# Patient Record
Sex: MALE | Race: WHITE | Employment: FULL TIME | URBAN - METROPOLITAN AREA
[De-identification: names, ages, dates, MRNs, and addresses within clinical notes are randomized per-mention and may not be internally consistent; named-entity substitution may affect disease eponyms.]

---

## 2018-01-01 ENCOUNTER — HOSPITAL ENCOUNTER (EMERGENCY)
Age: 58
Discharge: HOME | End: 2018-12-31
Attending: EMERGENCY MEDICINE
Payer: COMMERCIAL

## 2018-01-01 ENCOUNTER — APPOINTMENT (OUTPATIENT)
Dept: GENERAL RADIOLOGY | Age: 58
End: 2018-01-01
Attending: EMERGENCY MEDICINE
Payer: COMMERCIAL

## 2018-01-01 ENCOUNTER — APPOINTMENT (OUTPATIENT)
Dept: CT IMAGING | Age: 58
End: 2018-01-01
Attending: EMERGENCY MEDICINE
Payer: COMMERCIAL

## 2018-01-01 VITALS
OXYGEN SATURATION: 99 % | HEIGHT: 70 IN | RESPIRATION RATE: 12 BRPM | BODY MASS INDEX: 24.21 KG/M2 | TEMPERATURE: 98.3 F | WEIGHT: 169.09 LBS | DIASTOLIC BLOOD PRESSURE: 87 MMHG | SYSTOLIC BLOOD PRESSURE: 138 MMHG | HEART RATE: 93 BPM

## 2018-01-01 DIAGNOSIS — R91.8 MASS OF UPPER LOBE OF RIGHT LUNG: Primary | ICD-10-CM

## 2018-01-01 LAB
ALBUMIN SERPL-MCNC: 4.2 G/DL (ref 3.5–5)
ALBUMIN/GLOB SERPL: 0.9 {RATIO} (ref 1.1–2.2)
ALP SERPL-CCNC: 42 U/L (ref 45–117)
ALT SERPL-CCNC: 54 U/L (ref 12–78)
ANION GAP SERPL CALC-SCNC: 7 MMOL/L (ref 5–15)
APPEARANCE UR: CLEAR
AST SERPL-CCNC: 41 U/L (ref 15–37)
ATRIAL RATE: 97 BPM
BACTERIA URNS QL MICRO: NEGATIVE /HPF
BASOPHILS # BLD: 0 K/UL (ref 0–0.1)
BASOPHILS NFR BLD: 0 % (ref 0–1)
BILIRUB SERPL-MCNC: 0.5 MG/DL (ref 0.2–1)
BILIRUB UR QL: NEGATIVE
BUN SERPL-MCNC: 18 MG/DL (ref 6–20)
BUN/CREAT SERPL: 12 (ref 12–20)
CALCIUM SERPL-MCNC: 9.7 MG/DL (ref 8.5–10.1)
CALCULATED R AXIS, ECG10: 59 DEGREES
CALCULATED T AXIS, ECG11: 67 DEGREES
CHLORIDE SERPL-SCNC: 102 MMOL/L (ref 97–108)
CO2 SERPL-SCNC: 25 MMOL/L (ref 21–32)
COLOR UR: NORMAL
CREAT SERPL-MCNC: 1.48 MG/DL (ref 0.7–1.3)
DIAGNOSIS, 93000: NORMAL
DIFFERENTIAL METHOD BLD: NORMAL
EOSINOPHIL # BLD: 0 K/UL (ref 0–0.4)
EOSINOPHIL NFR BLD: 0 % (ref 0–7)
EPITH CASTS URNS QL MICRO: NORMAL /LPF
ERYTHROCYTE [DISTWIDTH] IN BLOOD BY AUTOMATED COUNT: 14.2 % (ref 11.5–14.5)
GLOBULIN SER CALC-MCNC: 4.6 G/DL (ref 2–4)
GLUCOSE BLD STRIP.AUTO-MCNC: 190 MG/DL (ref 65–100)
GLUCOSE BLD STRIP.AUTO-MCNC: 55 MG/DL (ref 65–100)
GLUCOSE BLD STRIP.AUTO-MCNC: 68 MG/DL (ref 65–100)
GLUCOSE SERPL-MCNC: 79 MG/DL (ref 65–100)
GLUCOSE UR STRIP.AUTO-MCNC: NEGATIVE MG/DL
HCT VFR BLD AUTO: 40.4 % (ref 36.6–50.3)
HGB BLD-MCNC: 12.9 G/DL (ref 12.1–17)
HGB UR QL STRIP: NEGATIVE
HYALINE CASTS URNS QL MICRO: NORMAL /LPF (ref 0–5)
IMM GRANULOCYTES # BLD: 0 K/UL (ref 0–0.04)
IMM GRANULOCYTES NFR BLD AUTO: 0 % (ref 0–0.5)
KETONES UR QL STRIP.AUTO: NEGATIVE MG/DL
LEUKOCYTE ESTERASE UR QL STRIP.AUTO: NEGATIVE
LYMPHOCYTES # BLD: 1.1 K/UL (ref 0.8–3.5)
LYMPHOCYTES NFR BLD: 22 % (ref 12–49)
MAGNESIUM SERPL-MCNC: 2.2 MG/DL (ref 1.6–2.4)
MCH RBC QN AUTO: 27.7 PG (ref 26–34)
MCHC RBC AUTO-ENTMCNC: 31.9 G/DL (ref 30–36.5)
MCV RBC AUTO: 86.9 FL (ref 80–99)
MONOCYTES # BLD: 0.6 K/UL (ref 0–1)
MONOCYTES NFR BLD: 13 % (ref 5–13)
NEUTS SEG # BLD: 3.2 K/UL (ref 1.8–8)
NEUTS SEG NFR BLD: 64 % (ref 32–75)
NITRITE UR QL STRIP.AUTO: NEGATIVE
NRBC # BLD: 0 K/UL (ref 0–0.01)
NRBC BLD-RTO: 0 PER 100 WBC
P-R INTERVAL, ECG05: 154 MS
PH UR STRIP: 6.5 [PH] (ref 5–8)
PLATELET # BLD AUTO: 323 K/UL (ref 150–400)
PMV BLD AUTO: 9 FL (ref 8.9–12.9)
POTASSIUM SERPL-SCNC: 3.9 MMOL/L (ref 3.5–5.1)
PROT SERPL-MCNC: 8.8 G/DL (ref 6.4–8.2)
PROT UR STRIP-MCNC: NEGATIVE MG/DL
Q-T INTERVAL, ECG07: 348 MS
QRS DURATION, ECG06: 78 MS
QTC CALCULATION (BEZET), ECG08: 441 MS
RBC # BLD AUTO: 4.65 M/UL (ref 4.1–5.7)
RBC #/AREA URNS HPF: NORMAL /HPF (ref 0–5)
SERVICE CMNT-IMP: ABNORMAL
SERVICE CMNT-IMP: ABNORMAL
SERVICE CMNT-IMP: NORMAL
SODIUM SERPL-SCNC: 134 MMOL/L (ref 136–145)
SP GR UR REFRACTOMETRY: 1.01 (ref 1–1.03)
TROPONIN I SERPL-MCNC: <0.05 NG/ML
UA: UC IF INDICATED,UAUC: NORMAL
UROBILINOGEN UR QL STRIP.AUTO: 1 EU/DL (ref 0.2–1)
VENTRICULAR RATE, ECG03: 97 BPM
WBC # BLD AUTO: 4.9 K/UL (ref 4.1–11.1)
WBC URNS QL MICRO: NORMAL /HPF (ref 0–4)

## 2018-01-01 PROCEDURE — 85025 COMPLETE CBC W/AUTO DIFF WBC: CPT

## 2018-01-01 PROCEDURE — 81001 URINALYSIS AUTO W/SCOPE: CPT

## 2018-01-01 PROCEDURE — 71275 CT ANGIOGRAPHY CHEST: CPT

## 2018-01-01 PROCEDURE — 36415 COLL VENOUS BLD VENIPUNCTURE: CPT

## 2018-01-01 PROCEDURE — 82962 GLUCOSE BLOOD TEST: CPT

## 2018-01-01 PROCEDURE — 84484 ASSAY OF TROPONIN QUANT: CPT

## 2018-01-01 PROCEDURE — 99284 EMERGENCY DEPT VISIT MOD MDM: CPT

## 2018-01-01 PROCEDURE — 83735 ASSAY OF MAGNESIUM: CPT

## 2018-01-01 PROCEDURE — 74011636320 HC RX REV CODE- 636/320: Performed by: EMERGENCY MEDICINE

## 2018-01-01 PROCEDURE — 74011250636 HC RX REV CODE- 250/636: Performed by: EMERGENCY MEDICINE

## 2018-01-01 PROCEDURE — 93005 ELECTROCARDIOGRAM TRACING: CPT

## 2018-01-01 PROCEDURE — 80053 COMPREHEN METABOLIC PANEL: CPT

## 2018-01-01 PROCEDURE — 71046 X-RAY EXAM CHEST 2 VIEWS: CPT

## 2018-01-01 RX ORDER — HYDROCODONE BITARTRATE AND ACETAMINOPHEN 5; 325 MG/1; MG/1
1 TABLET ORAL
Qty: 20 TAB | Refills: 0 | Status: SHIPPED | OUTPATIENT
Start: 2018-01-01 | End: 2019-01-01 | Stop reason: ALTCHOICE

## 2018-01-01 RX ORDER — SODIUM CHLORIDE 9 MG/ML
50 INJECTION, SOLUTION INTRAVENOUS
Status: COMPLETED | OUTPATIENT
Start: 2018-01-01 | End: 2018-01-01

## 2018-01-01 RX ORDER — SODIUM CHLORIDE 0.9 % (FLUSH) 0.9 %
10 SYRINGE (ML) INJECTION
Status: COMPLETED | OUTPATIENT
Start: 2018-01-01 | End: 2018-01-01

## 2018-01-01 RX ADMIN — IOPAMIDOL 80 ML: 755 INJECTION, SOLUTION INTRAVENOUS at 16:58

## 2018-01-01 RX ADMIN — SODIUM CHLORIDE 50 ML/HR: 900 INJECTION, SOLUTION INTRAVENOUS at 16:58

## 2018-01-01 RX ADMIN — Medication 10 ML: at 16:58

## 2018-12-31 NOTE — ED NOTES
Bedside and Verbal shift change report given to Constantino Hall RN (oncoming nurse) by  Devang gomez). Report included the following information SBAR, Kardex, ED Summary, [facility] and Recent Results.

## 2018-12-31 NOTE — ED NOTES
Pt discharged by Dr. Dickson Fuentes. Pt provided with discharge instructions Rx and instructions on follow up care. Pt out of ED in stable condition accompanied by wife.

## 2018-12-31 NOTE — ED PROVIDER NOTES
EMERGENCY DEPARTMENT HISTORY AND PHYSICAL EXAM 
 
 
Date: 12/31/2018 Patient Name: Madi Godoy History of Presenting Illness Patient presents today with fatigue and weakness at home with on and off low blood sugar in 80s at home. Patient interviewed, examined and orders placed. Blood sugar in triage was 55, given 8 oz of orange juice with  Plan for re-check. Chief Complaint Patient presents with  Lethargy  
  x a few weeks at home, pt is diabetic with BG of 55 in triage. History Provided By: Patient HPI: Madi Godoy, 62 y.o. male with PMHx significant for DM, low blood sugar, presents ambulatory to the ED with cc of a low blood sugar this morning. Pt reports associated symptoms of sweating, fatigue, and weight loss. He states his fasting sugar was 89 and had eaten a light breakfast before taking his Humalog. About 1 hour later, his blood sugar had dropped and the pt had some juice and candy, to which he had felt better. Additionally, the pt notes he has been eating less due to a chest pain after eating with a recent dx of hyperactive esophagus x 1.5 month ago. He additionally reports being nauseous. Pt further notes he had been SOB last night with a productive cough. He denies any recent changes to his medication. Pt denies any vomiting, diarrhea, or urinary sxs. There are no other complaints, changes, or physical findings at this time. PCP: Demetrio Beatty MD 
 
No current facility-administered medications on file prior to encounter. No current outpatient medications on file prior to encounter. Past History Past Medical History: 
History reviewed. No pertinent past medical history. Past Surgical History: 
History reviewed. No pertinent surgical history. Family History: 
History reviewed. No pertinent family history. Social History: 
Social History Tobacco Use  Smoking status: Not on file Substance Use Topics  Alcohol use: Not on file  Drug use: Not on file Allergies: 
No Known Allergies Review of Systems Review of Systems Constitutional: Positive for appetite change, diaphoresis, fatigue and unexpected weight change. Negative for chills and fever. HENT: Negative for congestion and rhinorrhea. Eyes: Negative for visual disturbance. Respiratory: Positive for cough and shortness of breath. Negative for wheezing. Cardiovascular: Positive for chest pain. Negative for palpitations. Gastrointestinal: Positive for nausea. Negative for abdominal distention, abdominal pain, constipation, diarrhea and vomiting. Endocrine: Negative. Genitourinary: Negative for difficulty urinating and dysuria. Musculoskeletal: Negative. Skin: Negative for rash. Neurological: Negative for dizziness, weakness and light-headedness. Psychiatric/Behavioral: Negative for suicidal ideas. Physical Exam  
Physical Exam  
Constitutional: He is oriented to person, place, and time. He appears well-developed and well-nourished. No distress. HENT:  
Head: Normocephalic and atraumatic. Mouth/Throat: Oropharynx is clear and moist.  
Eyes: Conjunctivae and EOM are normal.  
Neck: Neck supple. No JVD present. No tracheal deviation present. Cardiovascular: Normal rate, regular rhythm and intact distal pulses. Exam reveals no gallop and no friction rub. No murmur heard. Pulmonary/Chest: Effort normal and breath sounds normal. No stridor. No respiratory distress. He has no wheezes. Abdominal: Soft. Bowel sounds are normal. He exhibits no distension and no mass. There is no tenderness. There is no guarding. Musculoskeletal: Normal range of motion. He exhibits no edema or tenderness. No deformity Neurological: He is alert and oriented to person, place, and time. He has normal strength. No focal deficits Skin: Skin is warm, dry and intact. No rash noted. Psychiatric: He has a normal mood and affect.  His behavior is normal. Judgment and thought content normal.  
Nursing note and vitals reviewed. Diagnostic Study Results Labs - Recent Results (from the past 12 hour(s)) GLUCOSE, POC Collection Time: 12/31/18  1:00 PM  
Result Value Ref Range Glucose (POC) 55 (L) 65 - 100 mg/dL Performed by Genaro JENKINS   
EKG, 12 LEAD, INITIAL Collection Time: 12/31/18  1:06 PM  
Result Value Ref Range Ventricular Rate 97 BPM  
 Atrial Rate 97 BPM  
 P-R Interval 154 ms QRS Duration 78 ms Q-T Interval 348 ms QTC Calculation (Bezet) 441 ms Calculated R Axis 59 degrees Calculated T Axis 67 degrees Diagnosis ** Poor data quality, interpretation may be adversely affected Recommend repeat Normal sinus rhythm Nonspecific ST abnormality No previous ECGs available Confirmed by Vinicio Cain MD, [facility] ([id number]) on 12/31/2018 4:47:37 PM 
  
GLUCOSE, POC Collection Time: 12/31/18  1:12 PM  
Result Value Ref Range Glucose (POC) 68 65 - 100 mg/dL Performed by Judi Wynn (PCT) CBC WITH AUTOMATED DIFF Collection Time: 12/31/18  1:13 PM  
Result Value Ref Range WBC 4.9 4.1 - 11.1 K/uL  
 RBC 4.65 4.10 - 5.70 M/uL  
 HGB 12.9 12.1 - 17.0 g/dL HCT 40.4 36.6 - 50.3 % MCV 86.9 80.0 - 99.0 FL  
 MCH 27.7 26.0 - 34.0 PG  
 MCHC 31.9 30.0 - 36.5 g/dL  
 RDW 14.2 11.5 - 14.5 % PLATELET 277 334 - 340 K/uL MPV 9.0 8.9 - 12.9 FL  
 NRBC 0.0 0  WBC ABSOLUTE NRBC 0.00 0.00 - 0.01 K/uL NEUTROPHILS 64 32 - 75 % LYMPHOCYTES 22 12 - 49 % MONOCYTES 13 5 - 13 % EOSINOPHILS 0 0 - 7 % BASOPHILS 0 0 - 1 % IMMATURE GRANULOCYTES 0 0.0 - 0.5 % ABS. NEUTROPHILS 3.2 1.8 - 8.0 K/UL  
 ABS. LYMPHOCYTES 1.1 0.8 - 3.5 K/UL  
 ABS. MONOCYTES 0.6 0.0 - 1.0 K/UL  
 ABS. EOSINOPHILS 0.0 0.0 - 0.4 K/UL  
 ABS. BASOPHILS 0.0 0.0 - 0.1 K/UL  
 ABS. IMM. GRANS. 0.0 0.00 - 0.04 K/UL  
 DF AUTOMATED METABOLIC PANEL, COMPREHENSIVE  Collection Time: 12/31/18  1:13 PM  
 Result Value Ref Range Sodium 134 (L) 136 - 145 mmol/L Potassium 3.9 3.5 - 5.1 mmol/L Chloride 102 97 - 108 mmol/L  
 CO2 25 21 - 32 mmol/L Anion gap 7 5 - 15 mmol/L Glucose 79 65 - 100 mg/dL BUN 18 6 - 20 MG/DL Creatinine 1.48 (H) 0.70 - 1.30 MG/DL  
 BUN/Creatinine ratio 12 12 - 20 GFR est AA 59 (L) >60 ml/min/1.73m2 GFR est non-AA 49 (L) >60 ml/min/1.73m2 Calcium 9.7 8.5 - 10.1 MG/DL Bilirubin, total 0.5 0.2 - 1.0 MG/DL  
 ALT (SGPT) 54 12 - 78 U/L  
 AST (SGOT) 41 (H) 15 - 37 U/L Alk. phosphatase 42 (L) 45 - 117 U/L Protein, total 8.8 (H) 6.4 - 8.2 g/dL Albumin 4.2 3.5 - 5.0 g/dL Globulin 4.6 (H) 2.0 - 4.0 g/dL A-G Ratio 0.9 (L) 1.1 - 2.2 MAGNESIUM Collection Time: 12/31/18  1:13 PM  
Result Value Ref Range Magnesium 2.2 1.6 - 2.4 mg/dL TROPONIN I Collection Time: 12/31/18  1:13 PM  
Result Value Ref Range Troponin-I, Qt. <0.05 <0.05 ng/mL GLUCOSE, POC Collection Time: 12/31/18  3:27 PM  
Result Value Ref Range Glucose (POC) 190 (H) 65 - 100 mg/dL Performed by Chaya Medinaurbon URINALYSIS W/ REFLEX CULTURE Collection Time: 12/31/18  4:19 PM  
Result Value Ref Range Color YELLOW/STRAW Appearance CLEAR CLEAR Specific gravity 1.007 1.003 - 1.030    
 pH (UA) 6.5 5.0 - 8.0 Protein NEGATIVE  NEG mg/dL Glucose NEGATIVE  NEG mg/dL Ketone NEGATIVE  NEG mg/dL Bilirubin NEGATIVE  NEG Blood NEGATIVE  NEG Urobilinogen 1.0 0.2 - 1.0 EU/dL Nitrites NEGATIVE  NEG Leukocyte Esterase NEGATIVE  NEG    
 WBC 0-4 0 - 4 /hpf  
 RBC 0-5 0 - 5 /hpf Epithelial cells FEW FEW /lpf Bacteria NEGATIVE  NEG /hpf  
 UA:UC IF INDICATED CULTURE NOT INDICATED BY UA RESULT CNI Hyaline cast 0-2 0 - 5 /lpf Radiologic Studies -  
CT Results  (Last 48 hours) 12/31/18 1658  CTA Nilson. Kristy 105 Final result Impression:  Impression: 1. Large right upper lobe mass measuring 5.6 x 4.7 x 4.8 cm, which is in  
continuity with extensive conglomerate right paratracheal and right hilar  
adenopathy. Findings are consistent with primary lung malignancy with extensive  
metastatic adenopathy. The right upper lobe mass occludes the segmental and  
subsegmental bronchi of the posterior segment of the right upper lobe. 2. Metastatic subcarinal adenopathy. Enlarged left lower paratracheal  
adenopathy, which is also favored to be metastatic. 3. A 6 mm satellite nodule in the right upper lobe, consistent with metastasis. 4. Indeterminate hypodensities within hepatic segment IVb, which may represent  
metastatic disease. 5. No evidence of pulmonary embolism. 23X Narrative:  EXAM:  CTA CHEST W OR W WO CONT INDICATION: Chest pain, acute, pulmonary embolism (PE) suspected COMPARISON: Chest radiograph 12/31/2018. CONTRAST:  80 mL of Isovue-370.  
? Technique: Following the uneventful intravenous administration of contrast, thin  
axial images were obtained through the chest. Coronal and sagittal  
reconstructions were generated. MIP image reconstructions were also performed. CT dose reduction was achieved through use of a standardized protocol tailored  
for this examination and automatic exposure control for dose modulation. ? Findings:  
Vascular: No evidence of acute or chronic pulmonary embolism. The pulmonary arteries are  
normal in size. The thoracic aorta is normal in size. ? Chest:  
Lungs/Pleura: Large right upper lobe mass measuring 5.6 x 4.7 x 4.8 cm. Small  
satellite nodule in the right upper lobe measuring 6 mm (series 2 image 88). The  
mass is in continuity with the conglomerate right paratracheal adenopathy as  
described. There is occlusion of the posterior segmental and subsegmental  
bronchi of the right upper lobe. Axilla/Soft Tissue: No pathologic axillary adenopathy. Mediastinum: The heart is normal in size. No pericardial effusion. Conglomerate  
right paratracheal adenopathy measuring 6.8 x 3.9 x 7.2 cm. Right hilar  
adenopathy measuring 3.0 x 2.2 cm. Enlarged subcarinal adenopathy measuring 5.5  
x 2.4 cm. There are enlarged left lower paratracheal lymph nodes measuring 2.7 x  
1.1 cm. Upper Abdomen: Indeterminant hypodensities within the left hepatic lobe segment IVb measuring 3.5 x 1.6 cm in aggregate (series 2 image 20). Simple cyst in the  
upper pole of the left kidney measuring 2.5 cm, with a posterior peripheral  
calcification. Bones: No evidence of acute fracture, dislocation, or aggressive osseous  
abnormality. ?  
  
  
 
CXR Results  (Last 48 hours) 12/31/18 1439  XR CHEST PA LAT Final result Impression:  IMPRESSION:   
1. Round mass in the right upper thorax measuring 3.8 x 3.0 cm, which appears to  
lie in the posterior mediastinum on lateral view. Recommend chest CT with  
contrast for further evaluation. 23X Narrative:  EXAM:  XR CHEST PA LAT INDICATION:   lethargy, weakness, cp  
   
COMPARISON: None. FINDINGS: PA and lateral radiographs of the chest were obtained. Round mass in the right upper thorax measuring 3.8 x 3.0 cm, which appears to  
lie in the posterior mediastinum on lateral view. Otherwise no focal  
consolidation. No pleural effusion or pneumothorax. Heart, rio, mediastinum  
are within normal limits. No acute osseous abnormalities. Multilevel  
degenerative disc disease in the thoracic spine. Medical Decision Making I am the first provider for this patient. I reviewed the vital signs, available nursing notes, past medical history, past surgical history, family history and social history. Vital Signs-Reviewed the patient's vital signs. Patient Vitals for the past 12 hrs: 
 Temp Pulse Resp BP SpO2  
12/31/18 1815  93 12 138/87 99 % 12/31/18 1645  91 17  98 % 12/31/18 1257 98.3 °F (36.8 °C) (!) 101 12 142/79 100 % Pulse Oximetry Analysis - 100% on RA Cardiac Monitor:  
Rate: 94 bpm 
Rhythm: Normal Sinus Rhythm EKG interpretation: (Preliminary): 1306 Rhythm: normal sinus rhythm; and regular . Rate (approx.): 97; Axis: normal; QRS interval: normal ; ST/T wave: no ST changes. Written by Natalia Tena ED Scribe, as dictated by Laila Baird DO. Records Reviewed: Nursing Notes, Old Medical Records, Previous Radiology Studies and Previous Laboratory Studies Provider Notes (Medical Decision Making): Pt presenting with hypoglycemia now resolved after eating. Has been having sseveral month h/o weakness, sob, intermittent cp with swallowing, and weight loss. DDx includes electrolyte abnormality, dehydration, anemia, esophageal spasm, esophageal stricture, GERD, atypical CP, PNA. Will check labs, continue to monitor blood sugar. Low suspicion for ACS given chronicity of pain and episodic nature with swallowing. ED Course:  
Initial assessment performed. The patients presenting problems have been discussed, and they are in agreement with the care plan formulated and outlined with them. I have encouraged them to ask questions as they arise throughout their visit. PROGRESS NOTE: 
4:13 PM 
Pt has been re-evaluated. He was updated on his lab and imaging results. Critical Care Time:  
0 Disposition: 
DISCHARGE NOTE 
6:14 PM 
The patient has been re-evaluated and is ready for discharge. Reviewed available results with patient. Counseled patient on diagnosis and care plan. Patient has expressed understanding, and all questions have been answered. Patient agrees with plan and agrees to follow up as recommended, or return to the ED if their symptoms worsen. Discharge instructions have been provided and explained to the patient, along with reasons to return to the ED. PLAN: 
1. Discharge There are no discharge medications for this patient. 2.  
Follow-up Information Follow up With Specialties Details Why Contact Info Irma Hernandez MD Hematology and Oncology, Internal Medicine, Hematology, Oncology Schedule an appointment as soon as possible for a visit in 2 days  [address] [facility] 
[phone] Luanne Burch MD Internal Medicine, Pulmonary Disease Schedule an appointment as soon as possible for a visit in 2 days  [address] [facility] 
[phone] Tianna Santiago MD Family Practice Schedule an appointment as soon as possible for a visit in 2 days  [address] [place] [address] 
[phone] [facility] EMERGENCY DEPT Emergency Medicine  As needed, If symptoms worsen [address] [facility] 
[phone] Return to ED if worse Diagnosis Clinical Impression: 1. Mass of upper lobe of right lung Attestations: This note is prepared by Gadiel Covington, acting as Scribe for Francis Muñoz DO. Francis Muñoz DO: The scribe's documentation has been prepared under my direction and personally reviewed by me in its entirety. I confirm that the note above accurately reflects all work, treatment, procedures, and medical decision making performed by me. This note will not be viewable in [address]

## 2019-01-01 ENCOUNTER — TELEPHONE (OUTPATIENT)
Dept: PALLATIVE CARE | Age: 59
End: 2019-01-01

## 2019-01-01 ENCOUNTER — HOME CARE VISIT (OUTPATIENT)
Dept: SCHEDULING | Facility: HOME HEALTH | Age: 59
End: 2019-01-01
Payer: MEDICAID

## 2019-01-01 ENCOUNTER — OFFICE VISIT (OUTPATIENT)
Dept: ONCOLOGY | Age: 59
End: 2019-01-01

## 2019-01-01 ENCOUNTER — HOME CARE VISIT (OUTPATIENT)
Dept: HOSPICE | Facility: HOSPICE | Age: 59
End: 2019-01-01
Payer: MEDICAID

## 2019-01-01 ENCOUNTER — OFFICE VISIT (OUTPATIENT)
Dept: FAMILY MEDICINE CLINIC | Age: 59
End: 2019-01-01

## 2019-01-01 ENCOUNTER — TELEPHONE (OUTPATIENT)
Dept: ONCOLOGY | Age: 59
End: 2019-01-01

## 2019-01-01 ENCOUNTER — DOCUMENTATION ONLY (OUTPATIENT)
Dept: ONCOLOGY | Age: 59
End: 2019-01-01

## 2019-01-01 ENCOUNTER — TELEPHONE (OUTPATIENT)
Dept: SURGERY | Age: 59
End: 2019-01-01

## 2019-01-01 ENCOUNTER — OFFICE VISIT (OUTPATIENT)
Dept: PALLATIVE CARE | Age: 59
End: 2019-01-01

## 2019-01-01 ENCOUNTER — HOSPITAL ENCOUNTER (OUTPATIENT)
Dept: INFUSION THERAPY | Age: 59
Discharge: HOME | End: 2019-08-12
Payer: MEDICAID

## 2019-01-01 ENCOUNTER — HOSPITAL ENCOUNTER (EMERGENCY)
Age: 59
Discharge: ANOTHER HOSPITAL | End: 2019-09-19
Attending: EMERGENCY MEDICINE
Payer: MEDICAID

## 2019-01-01 ENCOUNTER — HOSPITAL ENCOUNTER (OUTPATIENT)
Dept: GENERAL RADIOLOGY | Age: 59
Discharge: HOME | End: 2019-01-14
Attending: RADIOLOGY
Payer: COMMERCIAL

## 2019-01-01 ENCOUNTER — TELEPHONE (OUTPATIENT)
Dept: CASE MANAGEMENT | Age: 59
End: 2019-01-01

## 2019-01-01 ENCOUNTER — HOSPITAL ENCOUNTER (OUTPATIENT)
Dept: CT IMAGING | Age: 59
Discharge: HOME | End: 2019-05-13
Attending: INTERNAL MEDICINE
Payer: MEDICAID

## 2019-01-01 ENCOUNTER — ANESTHESIA EVENT (OUTPATIENT)
Dept: SURGERY | Age: 59
End: 2019-01-01
Payer: COMMERCIAL

## 2019-01-01 ENCOUNTER — NURSE NAVIGATOR (OUTPATIENT)
Dept: PALLATIVE CARE | Age: 59
End: 2019-01-01

## 2019-01-01 ENCOUNTER — LAB ONLY (OUTPATIENT)
Dept: FAMILY MEDICINE CLINIC | Age: 59
End: 2019-01-01

## 2019-01-01 ENCOUNTER — APPOINTMENT (OUTPATIENT)
Dept: CT IMAGING | Age: 59
DRG: 041 | End: 2019-01-01
Attending: NEUROLOGICAL SURGERY
Payer: MEDICAID

## 2019-01-01 ENCOUNTER — APPOINTMENT (OUTPATIENT)
Dept: INFUSION THERAPY | Age: 59
End: 2019-01-01
Payer: MEDICAID

## 2019-01-01 ENCOUNTER — HOSPITAL ENCOUNTER (INPATIENT)
Age: 59
LOS: 6 days | Discharge: HOSPICE | DRG: 041 | End: 2019-09-25
Attending: INTERNAL MEDICINE | Admitting: INTERNAL MEDICINE
Payer: MEDICAID

## 2019-01-01 ENCOUNTER — HOSPITAL ENCOUNTER (OUTPATIENT)
Dept: INFUSION THERAPY | Age: 59
Discharge: HOME | End: 2019-07-22
Payer: SUBSIDIZED

## 2019-01-01 ENCOUNTER — APPOINTMENT (OUTPATIENT)
Dept: INFUSION THERAPY | Age: 59
End: 2019-01-01

## 2019-01-01 ENCOUNTER — HOSPITAL ENCOUNTER (INPATIENT)
Dept: RADIATION THERAPY | Age: 59
Discharge: HOME | DRG: 041 | End: 2019-09-23
Payer: MEDICAID

## 2019-01-01 ENCOUNTER — HOSPITAL ENCOUNTER (INPATIENT)
Dept: MRI IMAGING | Age: 59
Discharge: HOME | DRG: 041 | End: 2019-09-19
Attending: NEUROLOGICAL SURGERY
Payer: MEDICAID

## 2019-01-01 ENCOUNTER — DOCUMENTATION ONLY (OUTPATIENT)
Dept: PALLATIVE CARE | Age: 59
End: 2019-01-01

## 2019-01-01 ENCOUNTER — HOSPITAL ENCOUNTER (OUTPATIENT)
Dept: INFUSION THERAPY | Age: 59
End: 2019-01-01
Payer: SUBSIDIZED

## 2019-01-01 ENCOUNTER — ANESTHESIA (OUTPATIENT)
Dept: SURGERY | Age: 59
End: 2019-01-01
Payer: COMMERCIAL

## 2019-01-01 ENCOUNTER — APPOINTMENT (OUTPATIENT)
Dept: CT IMAGING | Age: 59
End: 2019-01-01
Attending: EMERGENCY MEDICINE
Payer: MEDICAID

## 2019-01-01 ENCOUNTER — HOSPITAL ENCOUNTER (OUTPATIENT)
Dept: CT IMAGING | Age: 59
Discharge: HOME | End: 2019-01-14
Attending: INTERNAL MEDICINE
Payer: COMMERCIAL

## 2019-01-01 ENCOUNTER — TELEPHONE (OUTPATIENT)
Dept: FAMILY MEDICINE CLINIC | Age: 59
End: 2019-01-01

## 2019-01-01 ENCOUNTER — HOSPITAL ENCOUNTER (OUTPATIENT)
Dept: INFUSION THERAPY | Age: 59
Discharge: HOME | End: 2019-03-11
Payer: MEDICAID

## 2019-01-01 ENCOUNTER — HOSPITAL ENCOUNTER (OUTPATIENT)
Dept: MRI IMAGING | Age: 59
Discharge: HOME | End: 2019-07-03
Attending: INTERNAL MEDICINE
Payer: SUBSIDIZED

## 2019-01-01 ENCOUNTER — HOSPITAL ENCOUNTER (OUTPATIENT)
Dept: INFUSION THERAPY | Age: 59
End: 2019-01-01

## 2019-01-01 ENCOUNTER — HOSPITAL ENCOUNTER (OUTPATIENT)
Dept: CT IMAGING | Age: 59
Discharge: HOME | End: 2019-07-31
Attending: INTERNAL MEDICINE
Payer: MEDICAID

## 2019-01-01 ENCOUNTER — HOSPITAL ENCOUNTER (OUTPATIENT)
Age: 59
Discharge: HOME | End: 2019-02-12
Attending: THORACIC SURGERY (CARDIOTHORACIC VASCULAR SURGERY) | Admitting: THORACIC SURGERY (CARDIOTHORACIC VASCULAR SURGERY)
Payer: COMMERCIAL

## 2019-01-01 ENCOUNTER — HOSPITAL ENCOUNTER (OUTPATIENT)
Dept: INFUSION THERAPY | Age: 59
Discharge: HOME | End: 2019-06-03
Payer: MEDICAID

## 2019-01-01 ENCOUNTER — HOSPITAL ENCOUNTER (OUTPATIENT)
Dept: INFUSION THERAPY | Age: 59
Discharge: HOME | End: 2019-09-03
Payer: MEDICAID

## 2019-01-01 ENCOUNTER — HOSPITAL ENCOUNTER (OUTPATIENT)
Dept: INFUSION THERAPY | Age: 59
Discharge: HOME | End: 2019-03-04
Payer: MEDICAID

## 2019-01-01 ENCOUNTER — APPOINTMENT (OUTPATIENT)
Dept: GENERAL RADIOLOGY | Age: 59
End: 2019-01-01
Attending: THORACIC SURGERY (CARDIOTHORACIC VASCULAR SURGERY)
Payer: COMMERCIAL

## 2019-01-01 ENCOUNTER — DOCUMENTATION ONLY (OUTPATIENT)
Dept: FAMILY MEDICINE CLINIC | Age: 59
End: 2019-01-01

## 2019-01-01 ENCOUNTER — HOSPITAL ENCOUNTER (OUTPATIENT)
Dept: INFUSION THERAPY | Age: 59
Discharge: HOME | End: 2019-04-01
Payer: MEDICAID

## 2019-01-01 ENCOUNTER — OFFICE VISIT (OUTPATIENT)
Dept: SURGERY | Age: 59
End: 2019-01-01

## 2019-01-01 ENCOUNTER — HOSPITAL ENCOUNTER (OUTPATIENT)
Dept: INFUSION THERAPY | Age: 59
Discharge: HOME | End: 2019-05-13
Payer: MEDICAID

## 2019-01-01 ENCOUNTER — HOSPITAL ENCOUNTER (INPATIENT)
Age: 59
LOS: 9 days | End: 2019-11-28
Attending: INTERNAL MEDICINE | Admitting: INTERNAL MEDICINE

## 2019-01-01 ENCOUNTER — APPOINTMENT (OUTPATIENT)
Dept: GENERAL RADIOLOGY | Age: 59
End: 2019-01-01
Attending: EMERGENCY MEDICINE
Payer: MEDICAID

## 2019-01-01 ENCOUNTER — NURSE NAVIGATOR (OUTPATIENT)
Dept: ONCOLOGY | Age: 59
End: 2019-01-01

## 2019-01-01 ENCOUNTER — HOSPITAL ENCOUNTER (OUTPATIENT)
Dept: INFUSION THERAPY | Age: 59
Discharge: HOME | End: 2019-04-22
Payer: MEDICAID

## 2019-01-01 ENCOUNTER — HOSPICE ADMISSION (OUTPATIENT)
Dept: HOSPICE | Facility: HOSPICE | Age: 59
End: 2019-01-01
Payer: MEDICAID

## 2019-01-01 ENCOUNTER — HOSPITAL ENCOUNTER (OUTPATIENT)
Dept: INFUSION THERAPY | Age: 59
Discharge: HOME | End: 2019-07-01
Payer: SUBSIDIZED

## 2019-01-01 ENCOUNTER — HOSPITAL ENCOUNTER (INPATIENT)
Age: 59
LOS: 3 days | Discharge: HOSPICE | DRG: 951 | End: 2019-11-19
Attending: INTERNAL MEDICINE | Admitting: INTERNAL MEDICINE
Payer: OTHER MISCELLANEOUS

## 2019-01-01 VITALS
OXYGEN SATURATION: 98 % | HEART RATE: 82 BPM | RESPIRATION RATE: 16 BRPM | TEMPERATURE: 98.2 F | SYSTOLIC BLOOD PRESSURE: 140 MMHG | DIASTOLIC BLOOD PRESSURE: 92 MMHG

## 2019-01-01 VITALS
TEMPERATURE: 99.3 F | HEIGHT: 70 IN | OXYGEN SATURATION: 98 % | HEART RATE: 93 BPM | DIASTOLIC BLOOD PRESSURE: 87 MMHG | BODY MASS INDEX: 21.96 KG/M2 | WEIGHT: 153.4 LBS | RESPIRATION RATE: 16 BRPM | SYSTOLIC BLOOD PRESSURE: 148 MMHG

## 2019-01-01 VITALS
SYSTOLIC BLOOD PRESSURE: 123 MMHG | TEMPERATURE: 98.5 F | HEART RATE: 78 BPM | OXYGEN SATURATION: 99 % | DIASTOLIC BLOOD PRESSURE: 88 MMHG | HEIGHT: 70 IN | WEIGHT: 159.4 LBS | RESPIRATION RATE: 16 BRPM | BODY MASS INDEX: 22.82 KG/M2

## 2019-01-01 VITALS
OXYGEN SATURATION: 98 % | RESPIRATION RATE: 16 BRPM | BODY MASS INDEX: 23.69 KG/M2 | TEMPERATURE: 98.3 F | WEIGHT: 165.5 LBS | DIASTOLIC BLOOD PRESSURE: 73 MMHG | SYSTOLIC BLOOD PRESSURE: 130 MMHG | HEART RATE: 86 BPM | HEIGHT: 70 IN

## 2019-01-01 VITALS
SYSTOLIC BLOOD PRESSURE: 117 MMHG | OXYGEN SATURATION: 96 % | HEART RATE: 100 BPM | BODY MASS INDEX: 22.76 KG/M2 | TEMPERATURE: 98.7 F | RESPIRATION RATE: 16 BRPM | DIASTOLIC BLOOD PRESSURE: 74 MMHG | HEIGHT: 70 IN | WEIGHT: 159 LBS

## 2019-01-01 VITALS
TEMPERATURE: 98.6 F | RESPIRATION RATE: 18 BRPM | HEIGHT: 70 IN | DIASTOLIC BLOOD PRESSURE: 89 MMHG | WEIGHT: 161.8 LBS | HEART RATE: 92 BPM | OXYGEN SATURATION: 100 % | BODY MASS INDEX: 23.16 KG/M2 | SYSTOLIC BLOOD PRESSURE: 138 MMHG

## 2019-01-01 VITALS
SYSTOLIC BLOOD PRESSURE: 124 MMHG | HEIGHT: 70 IN | OXYGEN SATURATION: 96 % | RESPIRATION RATE: 18 BRPM | BODY MASS INDEX: 24.05 KG/M2 | WEIGHT: 168 LBS | DIASTOLIC BLOOD PRESSURE: 81 MMHG | HEART RATE: 83 BPM | TEMPERATURE: 98.4 F

## 2019-01-01 VITALS
HEART RATE: 100 BPM | OXYGEN SATURATION: 97 % | WEIGHT: 162 LBS | RESPIRATION RATE: 20 BRPM | HEIGHT: 70 IN | BODY MASS INDEX: 23.19 KG/M2 | TEMPERATURE: 99.7 F | DIASTOLIC BLOOD PRESSURE: 92 MMHG | SYSTOLIC BLOOD PRESSURE: 154 MMHG

## 2019-01-01 VITALS
WEIGHT: 159.9 LBS | HEART RATE: 86 BPM | RESPIRATION RATE: 16 BRPM | OXYGEN SATURATION: 100 % | TEMPERATURE: 98.6 F | BODY MASS INDEX: 22.89 KG/M2 | DIASTOLIC BLOOD PRESSURE: 87 MMHG | SYSTOLIC BLOOD PRESSURE: 132 MMHG | HEIGHT: 70 IN

## 2019-01-01 VITALS
HEART RATE: 112 BPM | BODY MASS INDEX: 20.44 KG/M2 | HEIGHT: 70 IN | DIASTOLIC BLOOD PRESSURE: 73 MMHG | OXYGEN SATURATION: 97 % | WEIGHT: 142.8 LBS | RESPIRATION RATE: 18 BRPM | TEMPERATURE: 98.6 F | SYSTOLIC BLOOD PRESSURE: 103 MMHG

## 2019-01-01 VITALS
SYSTOLIC BLOOD PRESSURE: 120 MMHG | TEMPERATURE: 98.3 F | HEIGHT: 70 IN | OXYGEN SATURATION: 96 % | RESPIRATION RATE: 18 BRPM | WEIGHT: 155.2 LBS | DIASTOLIC BLOOD PRESSURE: 84 MMHG | BODY MASS INDEX: 22.22 KG/M2 | HEART RATE: 103 BPM

## 2019-01-01 VITALS
SYSTOLIC BLOOD PRESSURE: 148 MMHG | DIASTOLIC BLOOD PRESSURE: 97 MMHG | HEART RATE: 97 BPM | WEIGHT: 158.2 LBS | TEMPERATURE: 98.6 F | OXYGEN SATURATION: 98 % | BODY MASS INDEX: 22.65 KG/M2 | HEIGHT: 70 IN | RESPIRATION RATE: 20 BRPM

## 2019-01-01 VITALS
BODY MASS INDEX: 20.47 KG/M2 | DIASTOLIC BLOOD PRESSURE: 76 MMHG | TEMPERATURE: 98.8 F | TEMPERATURE: 97.9 F | HEART RATE: 91 BPM | DIASTOLIC BLOOD PRESSURE: 97 MMHG | RESPIRATION RATE: 18 BRPM | HEIGHT: 70 IN | WEIGHT: 143 LBS | HEART RATE: 99 BPM | RESPIRATION RATE: 18 BRPM | SYSTOLIC BLOOD PRESSURE: 142 MMHG | SYSTOLIC BLOOD PRESSURE: 130 MMHG

## 2019-01-01 VITALS
SYSTOLIC BLOOD PRESSURE: 130 MMHG | DIASTOLIC BLOOD PRESSURE: 82 MMHG | HEART RATE: 95 BPM | RESPIRATION RATE: 18 BRPM | OXYGEN SATURATION: 99 %

## 2019-01-01 VITALS
RESPIRATION RATE: 18 BRPM | OXYGEN SATURATION: 98 % | WEIGHT: 159.8 LBS | BODY MASS INDEX: 22.88 KG/M2 | HEIGHT: 70 IN | TEMPERATURE: 97.9 F | DIASTOLIC BLOOD PRESSURE: 77 MMHG | SYSTOLIC BLOOD PRESSURE: 124 MMHG | HEART RATE: 81 BPM

## 2019-01-01 VITALS
RESPIRATION RATE: 16 BRPM | HEIGHT: 71 IN | WEIGHT: 159.39 LBS | DIASTOLIC BLOOD PRESSURE: 73 MMHG | HEART RATE: 86 BPM | TEMPERATURE: 98.3 F | OXYGEN SATURATION: 98 % | SYSTOLIC BLOOD PRESSURE: 130 MMHG | BODY MASS INDEX: 22.31 KG/M2

## 2019-01-01 VITALS
HEART RATE: 91 BPM | OXYGEN SATURATION: 98 % | SYSTOLIC BLOOD PRESSURE: 90 MMHG | DIASTOLIC BLOOD PRESSURE: 61 MMHG | TEMPERATURE: 97.8 F | HEIGHT: 70 IN | RESPIRATION RATE: 16 BRPM | BODY MASS INDEX: 20.39 KG/M2 | WEIGHT: 142.44 LBS

## 2019-01-01 VITALS
BODY MASS INDEX: 22.84 KG/M2 | OXYGEN SATURATION: 100 % | RESPIRATION RATE: 16 BRPM | DIASTOLIC BLOOD PRESSURE: 86 MMHG | HEIGHT: 70 IN | WEIGHT: 159.56 LBS | SYSTOLIC BLOOD PRESSURE: 134 MMHG | TEMPERATURE: 98.2 F | HEART RATE: 84 BPM

## 2019-01-01 VITALS
HEART RATE: 90 BPM | SYSTOLIC BLOOD PRESSURE: 128 MMHG | WEIGHT: 164 LBS | DIASTOLIC BLOOD PRESSURE: 83 MMHG | BODY MASS INDEX: 23.48 KG/M2 | HEIGHT: 70 IN | RESPIRATION RATE: 18 BRPM | TEMPERATURE: 98.3 F

## 2019-01-01 VITALS
HEART RATE: 98 BPM | BODY MASS INDEX: 22.94 KG/M2 | RESPIRATION RATE: 18 BRPM | HEIGHT: 70 IN | OXYGEN SATURATION: 98 % | TEMPERATURE: 98.7 F | SYSTOLIC BLOOD PRESSURE: 147 MMHG | WEIGHT: 160.2 LBS | DIASTOLIC BLOOD PRESSURE: 95 MMHG

## 2019-01-01 VITALS
HEART RATE: 93 BPM | BODY MASS INDEX: 21.45 KG/M2 | SYSTOLIC BLOOD PRESSURE: 131 MMHG | DIASTOLIC BLOOD PRESSURE: 84 MMHG | WEIGHT: 149.8 LBS | RESPIRATION RATE: 16 BRPM | OXYGEN SATURATION: 98 % | HEIGHT: 70 IN

## 2019-01-01 VITALS
TEMPERATURE: 98.8 F | BODY MASS INDEX: 22.68 KG/M2 | WEIGHT: 158.4 LBS | DIASTOLIC BLOOD PRESSURE: 83 MMHG | RESPIRATION RATE: 18 BRPM | HEART RATE: 66 BPM | HEIGHT: 70 IN | OXYGEN SATURATION: 100 % | SYSTOLIC BLOOD PRESSURE: 134 MMHG

## 2019-01-01 VITALS
OXYGEN SATURATION: 100 % | TEMPERATURE: 98 F | BODY MASS INDEX: 24.2 KG/M2 | RESPIRATION RATE: 16 BRPM | SYSTOLIC BLOOD PRESSURE: 118 MMHG | HEIGHT: 70 IN | HEART RATE: 79 BPM | DIASTOLIC BLOOD PRESSURE: 60 MMHG | WEIGHT: 169 LBS

## 2019-01-01 VITALS
DIASTOLIC BLOOD PRESSURE: 84 MMHG | WEIGHT: 162.1 LBS | BODY MASS INDEX: 23.21 KG/M2 | TEMPERATURE: 99 F | HEART RATE: 90 BPM | OXYGEN SATURATION: 99 % | SYSTOLIC BLOOD PRESSURE: 159 MMHG | RESPIRATION RATE: 18 BRPM | HEIGHT: 70 IN

## 2019-01-01 VITALS
TEMPERATURE: 98.8 F | BODY MASS INDEX: 22.84 KG/M2 | HEIGHT: 70 IN | WEIGHT: 159.56 LBS | DIASTOLIC BLOOD PRESSURE: 90 MMHG | OXYGEN SATURATION: 98 % | RESPIRATION RATE: 16 BRPM | SYSTOLIC BLOOD PRESSURE: 132 MMHG | HEART RATE: 91 BPM

## 2019-01-01 VITALS
BODY MASS INDEX: 22.88 KG/M2 | HEIGHT: 70 IN | RESPIRATION RATE: 18 BRPM | OXYGEN SATURATION: 97 % | SYSTOLIC BLOOD PRESSURE: 134 MMHG | DIASTOLIC BLOOD PRESSURE: 87 MMHG | WEIGHT: 159.8 LBS | TEMPERATURE: 98.5 F | HEART RATE: 91 BPM

## 2019-01-01 VITALS
DIASTOLIC BLOOD PRESSURE: 61 MMHG | RESPIRATION RATE: 18 BRPM | SYSTOLIC BLOOD PRESSURE: 94 MMHG | HEART RATE: 88 BPM | WEIGHT: 159 LBS | OXYGEN SATURATION: 98 % | HEIGHT: 70 IN | BODY MASS INDEX: 22.76 KG/M2

## 2019-01-01 VITALS
SYSTOLIC BLOOD PRESSURE: 74 MMHG | BODY MASS INDEX: 20.52 KG/M2 | RESPIRATION RATE: 18 BRPM | HEART RATE: 113 BPM | HEIGHT: 70 IN | DIASTOLIC BLOOD PRESSURE: 56 MMHG | WEIGHT: 143.3 LBS | OXYGEN SATURATION: 95 % | TEMPERATURE: 98.6 F

## 2019-01-01 VITALS
RESPIRATION RATE: 24 BRPM | DIASTOLIC BLOOD PRESSURE: 60 MMHG | HEART RATE: 123 BPM | TEMPERATURE: 98 F | OXYGEN SATURATION: 84 % | SYSTOLIC BLOOD PRESSURE: 100 MMHG

## 2019-01-01 VITALS
RESPIRATION RATE: 18 BRPM | DIASTOLIC BLOOD PRESSURE: 78 MMHG | HEART RATE: 92 BPM | SYSTOLIC BLOOD PRESSURE: 120 MMHG | OXYGEN SATURATION: 98 %

## 2019-01-01 VITALS
DIASTOLIC BLOOD PRESSURE: 86 MMHG | WEIGHT: 161.44 LBS | HEART RATE: 95 BPM | TEMPERATURE: 98.4 F | SYSTOLIC BLOOD PRESSURE: 140 MMHG | RESPIRATION RATE: 16 BRPM | OXYGEN SATURATION: 99 % | BODY MASS INDEX: 23.11 KG/M2 | HEIGHT: 70 IN

## 2019-01-01 VITALS
BODY MASS INDEX: 23.16 KG/M2 | HEIGHT: 70 IN | TEMPERATURE: 98.9 F | DIASTOLIC BLOOD PRESSURE: 85 MMHG | HEART RATE: 87 BPM | OXYGEN SATURATION: 100 % | SYSTOLIC BLOOD PRESSURE: 134 MMHG | RESPIRATION RATE: 18 BRPM | WEIGHT: 161.8 LBS

## 2019-01-01 VITALS
BODY MASS INDEX: 22.99 KG/M2 | RESPIRATION RATE: 18 BRPM | HEART RATE: 80 BPM | TEMPERATURE: 98.3 F | OXYGEN SATURATION: 97 % | DIASTOLIC BLOOD PRESSURE: 71 MMHG | SYSTOLIC BLOOD PRESSURE: 106 MMHG | HEIGHT: 70 IN | WEIGHT: 160.6 LBS

## 2019-01-01 VITALS
OXYGEN SATURATION: 95 % | WEIGHT: 159.39 LBS | DIASTOLIC BLOOD PRESSURE: 63 MMHG | SYSTOLIC BLOOD PRESSURE: 110 MMHG | HEIGHT: 70 IN | TEMPERATURE: 98.3 F | RESPIRATION RATE: 16 BRPM | BODY MASS INDEX: 22.82 KG/M2 | HEART RATE: 76 BPM

## 2019-01-01 VITALS
RESPIRATION RATE: 16 BRPM | HEART RATE: 104 BPM | DIASTOLIC BLOOD PRESSURE: 95 MMHG | OXYGEN SATURATION: 100 % | SYSTOLIC BLOOD PRESSURE: 137 MMHG | TEMPERATURE: 97.6 F

## 2019-01-01 VITALS
SYSTOLIC BLOOD PRESSURE: 110 MMHG | HEART RATE: 90 BPM | RESPIRATION RATE: 18 BRPM | OXYGEN SATURATION: 98 % | DIASTOLIC BLOOD PRESSURE: 78 MMHG

## 2019-01-01 VITALS
TEMPERATURE: 98.5 F | OXYGEN SATURATION: 98 % | RESPIRATION RATE: 18 BRPM | HEIGHT: 70 IN | DIASTOLIC BLOOD PRESSURE: 73 MMHG | BODY MASS INDEX: 23.02 KG/M2 | HEART RATE: 98 BPM | SYSTOLIC BLOOD PRESSURE: 120 MMHG | WEIGHT: 160.8 LBS

## 2019-01-01 VITALS
DIASTOLIC BLOOD PRESSURE: 68 MMHG | OXYGEN SATURATION: 97 % | RESPIRATION RATE: 20 BRPM | HEART RATE: 84 BPM | SYSTOLIC BLOOD PRESSURE: 120 MMHG

## 2019-01-01 VITALS
OXYGEN SATURATION: 96 % | SYSTOLIC BLOOD PRESSURE: 144 MMHG | WEIGHT: 161 LBS | HEART RATE: 97 BPM | HEIGHT: 70 IN | RESPIRATION RATE: 18 BRPM | BODY MASS INDEX: 23.05 KG/M2 | DIASTOLIC BLOOD PRESSURE: 79 MMHG | TEMPERATURE: 99 F

## 2019-01-01 VITALS
HEIGHT: 70 IN | RESPIRATION RATE: 20 BRPM | OXYGEN SATURATION: 98 % | BODY MASS INDEX: 21.9 KG/M2 | TEMPERATURE: 98.8 F | DIASTOLIC BLOOD PRESSURE: 97 MMHG | WEIGHT: 153 LBS | HEART RATE: 90 BPM | SYSTOLIC BLOOD PRESSURE: 153 MMHG

## 2019-01-01 VITALS
WEIGHT: 155.5 LBS | OXYGEN SATURATION: 99 % | HEART RATE: 98 BPM | DIASTOLIC BLOOD PRESSURE: 79 MMHG | RESPIRATION RATE: 18 BRPM | TEMPERATURE: 98.1 F | HEIGHT: 70 IN | BODY MASS INDEX: 22.26 KG/M2 | SYSTOLIC BLOOD PRESSURE: 132 MMHG

## 2019-01-01 VITALS
SYSTOLIC BLOOD PRESSURE: 122 MMHG | TEMPERATURE: 98.7 F | OXYGEN SATURATION: 98 % | DIASTOLIC BLOOD PRESSURE: 60 MMHG | RESPIRATION RATE: 18 BRPM | HEART RATE: 94 BPM

## 2019-01-01 VITALS
TEMPERATURE: 98.9 F | HEART RATE: 79 BPM | DIASTOLIC BLOOD PRESSURE: 83 MMHG | SYSTOLIC BLOOD PRESSURE: 150 MMHG | RESPIRATION RATE: 16 BRPM | WEIGHT: 153.25 LBS | HEIGHT: 70 IN | BODY MASS INDEX: 21.94 KG/M2

## 2019-01-01 VITALS
OXYGEN SATURATION: 97 % | DIASTOLIC BLOOD PRESSURE: 117 MMHG | RESPIRATION RATE: 18 BRPM | TEMPERATURE: 98.5 F | SYSTOLIC BLOOD PRESSURE: 130 MMHG | HEART RATE: 87 BPM

## 2019-01-01 VITALS
BODY MASS INDEX: 23.34 KG/M2 | HEART RATE: 88 BPM | OXYGEN SATURATION: 97 % | SYSTOLIC BLOOD PRESSURE: 130 MMHG | RESPIRATION RATE: 18 BRPM | WEIGHT: 163 LBS | TEMPERATURE: 98.8 F | HEIGHT: 70 IN | DIASTOLIC BLOOD PRESSURE: 75 MMHG

## 2019-01-01 VITALS
TEMPERATURE: 98.1 F | OXYGEN SATURATION: 97 % | SYSTOLIC BLOOD PRESSURE: 92 MMHG | DIASTOLIC BLOOD PRESSURE: 66 MMHG | BODY MASS INDEX: 20.47 KG/M2 | RESPIRATION RATE: 18 BRPM | WEIGHT: 143 LBS | HEIGHT: 70 IN | HEART RATE: 78 BPM

## 2019-01-01 VITALS
RESPIRATION RATE: 12 BRPM | OXYGEN SATURATION: 97 % | HEART RATE: 122 BPM | DIASTOLIC BLOOD PRESSURE: 50 MMHG | SYSTOLIC BLOOD PRESSURE: 88 MMHG

## 2019-01-01 VITALS
RESPIRATION RATE: 16 BRPM | OXYGEN SATURATION: 98 % | SYSTOLIC BLOOD PRESSURE: 132 MMHG | DIASTOLIC BLOOD PRESSURE: 80 MMHG | TEMPERATURE: 98.5 F | HEART RATE: 98 BPM

## 2019-01-01 VITALS
DIASTOLIC BLOOD PRESSURE: 60 MMHG | RESPIRATION RATE: 18 BRPM | HEART RATE: 94 BPM | SYSTOLIC BLOOD PRESSURE: 110 MMHG | OXYGEN SATURATION: 99 %

## 2019-01-01 DIAGNOSIS — E43 PROTEIN-CALORIE MALNUTRITION, SEVERE (HCC): ICD-10-CM

## 2019-01-01 DIAGNOSIS — C34.10 MALIGNANT NEOPLASM OF UPPER LOBE OF LUNG, UNSPECIFIED LATERALITY (HCC): Primary | ICD-10-CM

## 2019-01-01 DIAGNOSIS — R11.0 CHEMOTHERAPY-INDUCED NAUSEA: ICD-10-CM

## 2019-01-01 DIAGNOSIS — G89.3 CANCER RELATED PAIN: ICD-10-CM

## 2019-01-01 DIAGNOSIS — R59.0 MEDIASTINAL LYMPHADENOPATHY: ICD-10-CM

## 2019-01-01 DIAGNOSIS — E11.21 CONTROLLED TYPE 2 DIABETES MELLITUS WITH DIABETIC NEPHROPATHY, UNSPECIFIED WHETHER LONG TERM INSULIN USE (HCC): Primary | ICD-10-CM

## 2019-01-01 DIAGNOSIS — C79.31 LUNG CANCER METASTATIC TO BRAIN (HCC): Primary | ICD-10-CM

## 2019-01-01 DIAGNOSIS — E55.9 VITAMIN D DEFICIENCY: ICD-10-CM

## 2019-01-01 DIAGNOSIS — C34.90 MALIGNANT NEOPLASM OF LUNG, UNSPECIFIED LATERALITY, UNSPECIFIED PART OF LUNG (HCC): Primary | ICD-10-CM

## 2019-01-01 DIAGNOSIS — C34.11 MALIGNANT NEOPLASM OF UPPER LOBE OF RIGHT LUNG (HCC): ICD-10-CM

## 2019-01-01 DIAGNOSIS — C34.90 LUNG CANCER METASTATIC TO BRAIN (HCC): ICD-10-CM

## 2019-01-01 DIAGNOSIS — C34.90 LUNG CANCER METASTATIC TO BRAIN (HCC): Primary | ICD-10-CM

## 2019-01-01 DIAGNOSIS — C79.31 LUNG CANCER METASTATIC TO BRAIN (HCC): ICD-10-CM

## 2019-01-01 DIAGNOSIS — E78.00 HYPERCHOLESTEROLEMIA: ICD-10-CM

## 2019-01-01 DIAGNOSIS — G89.3 NEOPLASM RELATED PAIN: ICD-10-CM

## 2019-01-01 DIAGNOSIS — R41.89 COGNITIVE IMPAIRMENT: ICD-10-CM

## 2019-01-01 DIAGNOSIS — C34.91 PRIMARY MALIGNANT NEOPLASM OF RIGHT LUNG METASTATIC TO OTHER SITE (HCC): ICD-10-CM

## 2019-01-01 DIAGNOSIS — C79.31 BRAIN METASTASES (HCC): ICD-10-CM

## 2019-01-01 DIAGNOSIS — C34.11 MALIGNANT NEOPLASM OF UPPER LOBE OF RIGHT LUNG (HCC): Primary | ICD-10-CM

## 2019-01-01 DIAGNOSIS — M25.511 ACUTE PAIN OF RIGHT SHOULDER: ICD-10-CM

## 2019-01-01 DIAGNOSIS — R91.8 LUNG MASS: ICD-10-CM

## 2019-01-01 DIAGNOSIS — C34.91 MALIGNANT NEOPLASM OF RIGHT LUNG, UNSPECIFIED PART OF LUNG (HCC): Primary | ICD-10-CM

## 2019-01-01 DIAGNOSIS — R53.83 LETHARGY: ICD-10-CM

## 2019-01-01 DIAGNOSIS — E11.65 TYPE 2 DIABETES MELLITUS WITH HYPERGLYCEMIA, WITH LONG-TERM CURRENT USE OF INSULIN (HCC): ICD-10-CM

## 2019-01-01 DIAGNOSIS — E86.1 HYPOTENSION DUE TO HYPOVOLEMIA: ICD-10-CM

## 2019-01-01 DIAGNOSIS — E11.21 CONTROLLED TYPE 2 DIABETES MELLITUS WITH DIABETIC NEPHROPATHY, UNSPECIFIED WHETHER LONG TERM INSULIN USE (HCC): ICD-10-CM

## 2019-01-01 DIAGNOSIS — M25.511 ACUTE PAIN OF RIGHT SHOULDER: Primary | ICD-10-CM

## 2019-01-01 DIAGNOSIS — K59.03 DRUG-INDUCED CONSTIPATION: ICD-10-CM

## 2019-01-01 DIAGNOSIS — R41.0 CONFUSION: ICD-10-CM

## 2019-01-01 DIAGNOSIS — R35.0 FREQUENCY OF URINATION: ICD-10-CM

## 2019-01-01 DIAGNOSIS — C79.31 BRAIN METASTASIS (HCC): Primary | ICD-10-CM

## 2019-01-01 DIAGNOSIS — R53.0 NEOPLASTIC (MALIGNANT) RELATED FATIGUE: ICD-10-CM

## 2019-01-01 DIAGNOSIS — K11.7 INCREASED OROPHARYNGEAL SECRETIONS: ICD-10-CM

## 2019-01-01 DIAGNOSIS — T40.2X5A CONSTIPATION DUE TO OPIOID THERAPY: ICD-10-CM

## 2019-01-01 DIAGNOSIS — Z13.29 SCREENING FOR THYROID DISORDER: ICD-10-CM

## 2019-01-01 DIAGNOSIS — F05 DELIRIUM DUE TO KNOWN PHYSIOLOGICAL CONDITION: ICD-10-CM

## 2019-01-01 DIAGNOSIS — I95.89 HYPOTENSION DUE TO HYPOVOLEMIA: ICD-10-CM

## 2019-01-01 DIAGNOSIS — D64.9 NORMOCHROMIC NORMOCYTIC ANEMIA: ICD-10-CM

## 2019-01-01 DIAGNOSIS — I10 HYPERTENSION GOAL BP (BLOOD PRESSURE) < 130/80: ICD-10-CM

## 2019-01-01 DIAGNOSIS — R63.4 WEIGHT LOSS, NON-INTENTIONAL: ICD-10-CM

## 2019-01-01 DIAGNOSIS — T45.1X5A CHEMOTHERAPY-INDUCED NAUSEA: ICD-10-CM

## 2019-01-01 DIAGNOSIS — R06.09 OTHER FORM OF DYSPNEA: ICD-10-CM

## 2019-01-01 DIAGNOSIS — R63.0 ANOREXIA: ICD-10-CM

## 2019-01-01 DIAGNOSIS — C34.10 MALIGNANT NEOPLASM OF UPPER LOBE OF LUNG, UNSPECIFIED LATERALITY (HCC): ICD-10-CM

## 2019-01-01 DIAGNOSIS — C34.91 STAGE IV ADENOCARCINOMA OF LUNG, RIGHT (HCC): Primary | ICD-10-CM

## 2019-01-01 DIAGNOSIS — N28.9 ACUTE RENAL INSUFFICIENCY: ICD-10-CM

## 2019-01-01 DIAGNOSIS — Z51.5 COMFORT MEASURES ONLY STATUS: ICD-10-CM

## 2019-01-01 DIAGNOSIS — K21.00 GASTROESOPHAGEAL REFLUX DISEASE WITH ESOPHAGITIS: ICD-10-CM

## 2019-01-01 DIAGNOSIS — T45.1X5A ANTINEOPLASTIC CHEMOTHERAPY INDUCED ANEMIA: ICD-10-CM

## 2019-01-01 DIAGNOSIS — M54.12 BRACHIAL NEURALGIA: Primary | ICD-10-CM

## 2019-01-01 DIAGNOSIS — C34.90 NON-SMALL CELL LUNG CANCER, UNSPECIFIED LATERALITY (HCC): ICD-10-CM

## 2019-01-01 DIAGNOSIS — Z71.89 GOALS OF CARE, COUNSELING/DISCUSSION: ICD-10-CM

## 2019-01-01 DIAGNOSIS — Z79.4 TYPE 2 DIABETES MELLITUS WITH HYPERGLYCEMIA, WITH LONG-TERM CURRENT USE OF INSULIN (HCC): ICD-10-CM

## 2019-01-01 DIAGNOSIS — R63.0 ANOREXIA: Primary | ICD-10-CM

## 2019-01-01 DIAGNOSIS — F43.21 GRIEF: ICD-10-CM

## 2019-01-01 DIAGNOSIS — K59.03 CONSTIPATION DUE TO OPIOID THERAPY: ICD-10-CM

## 2019-01-01 DIAGNOSIS — G89.3 CANCER ASSOCIATED PAIN: ICD-10-CM

## 2019-01-01 DIAGNOSIS — Z11.59 NEED FOR HEPATITIS C SCREENING TEST: ICD-10-CM

## 2019-01-01 DIAGNOSIS — D64.81 ANTINEOPLASTIC CHEMOTHERAPY INDUCED ANEMIA: ICD-10-CM

## 2019-01-01 DIAGNOSIS — Z76.89 ESTABLISHING CARE WITH NEW DOCTOR, ENCOUNTER FOR: ICD-10-CM

## 2019-01-01 DIAGNOSIS — R07.89 RIGHT-SIDED CHEST WALL PAIN: ICD-10-CM

## 2019-01-01 DIAGNOSIS — R53.1 ACUTE LEFT-SIDED WEAKNESS: ICD-10-CM

## 2019-01-01 DIAGNOSIS — Z65.8 PSYCHOSOCIAL STRESSORS: ICD-10-CM

## 2019-01-01 DIAGNOSIS — R53.1 WEAKNESS GENERALIZED: ICD-10-CM

## 2019-01-01 DIAGNOSIS — C34.01 MALIGNANT NEOPLASM OF HILUS OF RIGHT LUNG (HCC): Primary | ICD-10-CM

## 2019-01-01 DIAGNOSIS — R52 INTRACTABLE PAIN: ICD-10-CM

## 2019-01-01 DIAGNOSIS — Z59.9 FINANCIAL PROBLEMS: ICD-10-CM

## 2019-01-01 DIAGNOSIS — R53.0 NEOPLASTIC MALIGNANT RELATED FATIGUE: ICD-10-CM

## 2019-01-01 DIAGNOSIS — R45.4 EXCESSIVE ANGER: ICD-10-CM

## 2019-01-01 DIAGNOSIS — R05.9 COUGH: ICD-10-CM

## 2019-01-01 DIAGNOSIS — R06.02 SHORTNESS OF BREATH: ICD-10-CM

## 2019-01-01 DIAGNOSIS — R07.89 RIGHT-SIDED CHEST WALL PAIN: Primary | ICD-10-CM

## 2019-01-01 DIAGNOSIS — R41.89 DECREASED RESPONSIVENESS: ICD-10-CM

## 2019-01-01 DIAGNOSIS — R45.1 RESTLESSNESS: ICD-10-CM

## 2019-01-01 LAB
25(OH)D3+25(OH)D2 SERPL-MCNC: 25.3 NG/ML (ref 30–100)
ALBUMIN SERPL-MCNC: 2.8 G/DL (ref 3.5–5)
ALBUMIN SERPL-MCNC: 3 G/DL (ref 3.5–5)
ALBUMIN SERPL-MCNC: 3 G/DL (ref 3.5–5)
ALBUMIN SERPL-MCNC: 3.2 G/DL (ref 3.5–5)
ALBUMIN SERPL-MCNC: 3.2 G/DL (ref 3.5–5)
ALBUMIN SERPL-MCNC: 3.3 G/DL (ref 3.5–5)
ALBUMIN SERPL-MCNC: 3.4 G/DL (ref 3.5–5)
ALBUMIN SERPL-MCNC: 3.5 G/DL (ref 3.5–5)
ALBUMIN SERPL-MCNC: 3.5 G/DL (ref 3.5–5)
ALBUMIN SERPL-MCNC: 3.6 G/DL (ref 3.5–5)
ALBUMIN SERPL-MCNC: 3.8 G/DL (ref 3.5–5)
ALBUMIN SERPL-MCNC: 4 G/DL (ref 3.5–5)
ALBUMIN SERPL-MCNC: 4.4 G/DL (ref 3.5–5.5)
ALBUMIN/GLOB SERPL: 0.6 {RATIO} (ref 1.1–2.2)
ALBUMIN/GLOB SERPL: 0.6 {RATIO} (ref 1.1–2.2)
ALBUMIN/GLOB SERPL: 0.7 {RATIO} (ref 1.1–2.2)
ALBUMIN/GLOB SERPL: 0.7 {RATIO} (ref 1.1–2.2)
ALBUMIN/GLOB SERPL: 0.8 {RATIO} (ref 1.1–2.2)
ALBUMIN/GLOB SERPL: 0.9 {RATIO} (ref 1.1–2.2)
ALBUMIN/GLOB SERPL: 1 {RATIO} (ref 1.1–2.2)
ALBUMIN/GLOB SERPL: 1.1 {RATIO} (ref 1.1–2.2)
ALBUMIN/GLOB SERPL: 1.6 {RATIO} (ref 1.2–2.2)
ALP SERPL-CCNC: 114 U/L (ref 45–117)
ALP SERPL-CCNC: 445 U/L (ref 45–117)
ALP SERPL-CCNC: 46 U/L (ref 45–117)
ALP SERPL-CCNC: 469 U/L (ref 45–117)
ALP SERPL-CCNC: 50 U/L (ref 45–117)
ALP SERPL-CCNC: 53 U/L (ref 45–117)
ALP SERPL-CCNC: 59 U/L (ref 45–117)
ALP SERPL-CCNC: 61 U/L (ref 45–117)
ALP SERPL-CCNC: 68 U/L (ref 45–117)
ALP SERPL-CCNC: 72 IU/L (ref 39–117)
ALP SERPL-CCNC: 73 U/L (ref 45–117)
ALP SERPL-CCNC: 73 U/L (ref 45–117)
ALP SERPL-CCNC: 74 U/L (ref 45–117)
ALP SERPL-CCNC: 76 U/L (ref 45–117)
ALP SERPL-CCNC: 89 U/L (ref 45–117)
ALT SERPL-CCNC: 146 U/L (ref 12–78)
ALT SERPL-CCNC: 151 U/L (ref 12–78)
ALT SERPL-CCNC: 22 U/L (ref 12–78)
ALT SERPL-CCNC: 22 U/L (ref 12–78)
ALT SERPL-CCNC: 23 U/L (ref 12–78)
ALT SERPL-CCNC: 26 U/L (ref 12–78)
ALT SERPL-CCNC: 27 U/L (ref 12–78)
ALT SERPL-CCNC: 28 IU/L (ref 0–44)
ALT SERPL-CCNC: 31 U/L (ref 12–78)
ALT SERPL-CCNC: 33 U/L (ref 12–78)
ALT SERPL-CCNC: 34 U/L (ref 12–78)
ALT SERPL-CCNC: 41 U/L (ref 12–78)
ALT SERPL-CCNC: 41 U/L (ref 12–78)
ALT SERPL-CCNC: 47 U/L (ref 12–78)
ALT SERPL-CCNC: 52 U/L (ref 12–78)
ANION GAP SERPL CALC-SCNC: 11 MMOL/L (ref 5–15)
ANION GAP SERPL CALC-SCNC: 4 MMOL/L (ref 5–15)
ANION GAP SERPL CALC-SCNC: 4 MMOL/L (ref 5–15)
ANION GAP SERPL CALC-SCNC: 5 MMOL/L (ref 5–15)
ANION GAP SERPL CALC-SCNC: 6 MMOL/L (ref 5–15)
ANION GAP SERPL CALC-SCNC: 6 MMOL/L (ref 5–15)
ANION GAP SERPL CALC-SCNC: 7 MMOL/L (ref 5–15)
ANION GAP SERPL CALC-SCNC: 8 MMOL/L (ref 5–15)
ANION GAP SERPL CALC-SCNC: 9 MMOL/L (ref 5–15)
APPEARANCE UR: CLEAR
APPEARANCE UR: CLEAR
AST SERPL-CCNC: 111 U/L (ref 15–37)
AST SERPL-CCNC: 116 U/L (ref 15–37)
AST SERPL-CCNC: 121 U/L (ref 15–37)
AST SERPL-CCNC: 14 U/L (ref 15–37)
AST SERPL-CCNC: 15 U/L (ref 15–37)
AST SERPL-CCNC: 15 U/L (ref 15–37)
AST SERPL-CCNC: 21 U/L (ref 15–37)
AST SERPL-CCNC: 22 U/L (ref 15–37)
AST SERPL-CCNC: 27 U/L (ref 15–37)
AST SERPL-CCNC: 29 U/L (ref 15–37)
AST SERPL-CCNC: 30 U/L (ref 15–37)
AST SERPL-CCNC: 34 IU/L (ref 0–40)
AST SERPL-CCNC: 39 U/L (ref 15–37)
AST SERPL-CCNC: 40 U/L (ref 15–37)
AST SERPL-CCNC: 56 U/L (ref 15–37)
ATRIAL RATE: 88 BPM
BACTERIA URNS QL MICRO: NEGATIVE /HPF
BASO+EOS+MONOS # BLD AUTO: 0.4 K/UL (ref 0.2–1.2)
BASO+EOS+MONOS NFR BLD AUTO: 13 % (ref 3.2–16.9)
BASOPHILS # BLD AUTO: 0 X10E3/UL (ref 0–0.2)
BASOPHILS # BLD: 0 K/UL (ref 0–0.1)
BASOPHILS # BLD: 0.1 K/UL (ref 0–0.1)
BASOPHILS NFR BLD AUTO: 1 %
BASOPHILS NFR BLD: 0 % (ref 0–1)
BASOPHILS NFR BLD: 1 % (ref 0–1)
BILIRUB SERPL-MCNC: 0.3 MG/DL (ref 0.2–1)
BILIRUB SERPL-MCNC: 0.4 MG/DL (ref 0.2–1)
BILIRUB SERPL-MCNC: 0.4 MG/DL (ref 0–1.2)
BILIRUB SERPL-MCNC: 0.5 MG/DL (ref 0.2–1)
BILIRUB SERPL-MCNC: 0.5 MG/DL (ref 0.2–1)
BILIRUB SERPL-MCNC: 0.6 MG/DL (ref 0.2–1)
BILIRUB SERPL-MCNC: 0.7 MG/DL (ref 0.2–1)
BILIRUB SERPL-MCNC: 0.8 MG/DL (ref 0.2–1)
BILIRUB SERPL-MCNC: 0.8 MG/DL (ref 0.2–1)
BILIRUB SERPL-MCNC: 1.1 MG/DL (ref 0.2–1)
BILIRUB UR QL CFM: POSITIVE
BILIRUB UR QL STRIP: NEGATIVE
BUN SERPL-MCNC: 14 MG/DL (ref 6–20)
BUN SERPL-MCNC: 15 MG/DL (ref 6–24)
BUN SERPL-MCNC: 17 MG/DL (ref 6–20)
BUN SERPL-MCNC: 17 MG/DL (ref 6–20)
BUN SERPL-MCNC: 18 MG/DL (ref 6–20)
BUN SERPL-MCNC: 19 MG/DL (ref 6–20)
BUN SERPL-MCNC: 20 MG/DL (ref 6–20)
BUN SERPL-MCNC: 21 MG/DL (ref 6–20)
BUN SERPL-MCNC: 21 MG/DL (ref 6–20)
BUN SERPL-MCNC: 22 MG/DL (ref 6–20)
BUN SERPL-MCNC: 23 MG/DL (ref 6–20)
BUN SERPL-MCNC: 23 MG/DL (ref 6–20)
BUN SERPL-MCNC: 25 MG/DL (ref 6–20)
BUN SERPL-MCNC: 26 MG/DL (ref 6–20)
BUN SERPL-MCNC: 27 MG/DL (ref 6–20)
BUN SERPL-MCNC: 33 MG/DL (ref 6–20)
BUN SERPL-MCNC: 54 MG/DL (ref 6–20)
BUN/CREAT SERPL: 14 (ref 9–20)
BUN/CREAT SERPL: 16 (ref 12–20)
BUN/CREAT SERPL: 17 (ref 12–20)
BUN/CREAT SERPL: 17 (ref 12–20)
BUN/CREAT SERPL: 18 (ref 12–20)
BUN/CREAT SERPL: 19 (ref 12–20)
BUN/CREAT SERPL: 20 (ref 12–20)
BUN/CREAT SERPL: 22 (ref 12–20)
BUN/CREAT SERPL: 22 (ref 12–20)
BUN/CREAT SERPL: 23 (ref 12–20)
BUN/CREAT SERPL: 24 (ref 12–20)
BUN/CREAT SERPL: 26 (ref 12–20)
BUN/CREAT SERPL: 29 (ref 12–20)
BUN/CREAT SERPL: 33 (ref 12–20)
BUN/CREAT SERPL: 34 (ref 12–20)
CALCIUM SERPL-MCNC: 10 MG/DL (ref 8.5–10.1)
CALCIUM SERPL-MCNC: 8.6 MG/DL (ref 8.5–10.1)
CALCIUM SERPL-MCNC: 8.6 MG/DL (ref 8.5–10.1)
CALCIUM SERPL-MCNC: 8.7 MG/DL (ref 8.5–10.1)
CALCIUM SERPL-MCNC: 8.9 MG/DL (ref 8.5–10.1)
CALCIUM SERPL-MCNC: 9 MG/DL (ref 8.5–10.1)
CALCIUM SERPL-MCNC: 9.1 MG/DL (ref 8.5–10.1)
CALCIUM SERPL-MCNC: 9.3 MG/DL (ref 8.5–10.1)
CALCIUM SERPL-MCNC: 9.4 MG/DL (ref 8.7–10.2)
CALCIUM SERPL-MCNC: 9.5 MG/DL (ref 8.5–10.1)
CALCIUM SERPL-MCNC: 9.5 MG/DL (ref 8.5–10.1)
CALCIUM SERPL-MCNC: 9.7 MG/DL (ref 8.5–10.1)
CALCIUM SERPL-MCNC: 9.8 MG/DL (ref 8.5–10.1)
CALCULATED P AXIS, ECG09: 51 DEGREES
CALCULATED R AXIS, ECG10: 16 DEGREES
CALCULATED T AXIS, ECG11: 31 DEGREES
CHLORIDE SERPL-SCNC: 100 MMOL/L (ref 96–106)
CHLORIDE SERPL-SCNC: 100 MMOL/L (ref 97–108)
CHLORIDE SERPL-SCNC: 101 MMOL/L (ref 97–108)
CHLORIDE SERPL-SCNC: 102 MMOL/L (ref 97–108)
CHLORIDE SERPL-SCNC: 102 MMOL/L (ref 97–108)
CHLORIDE SERPL-SCNC: 103 MMOL/L (ref 97–108)
CHLORIDE SERPL-SCNC: 104 MMOL/L (ref 97–108)
CHLORIDE SERPL-SCNC: 106 MMOL/L (ref 97–108)
CHLORIDE SERPL-SCNC: 107 MMOL/L (ref 97–108)
CHLORIDE SERPL-SCNC: 108 MMOL/L (ref 97–108)
CHLORIDE SERPL-SCNC: 96 MMOL/L (ref 97–108)
CHLORIDE SERPL-SCNC: 96 MMOL/L (ref 97–108)
CHLORIDE SERPL-SCNC: 97 MMOL/L (ref 97–108)
CHLORIDE SERPL-SCNC: 99 MMOL/L (ref 97–108)
CO2 SERPL-SCNC: 21 MMOL/L (ref 20–29)
CO2 SERPL-SCNC: 22 MMOL/L (ref 21–32)
CO2 SERPL-SCNC: 23 MMOL/L (ref 21–32)
CO2 SERPL-SCNC: 24 MMOL/L (ref 21–32)
CO2 SERPL-SCNC: 24 MMOL/L (ref 21–32)
CO2 SERPL-SCNC: 25 MMOL/L (ref 21–32)
CO2 SERPL-SCNC: 25 MMOL/L (ref 21–32)
CO2 SERPL-SCNC: 26 MMOL/L (ref 21–32)
CO2 SERPL-SCNC: 28 MMOL/L (ref 21–32)
CO2 SERPL-SCNC: 29 MMOL/L (ref 21–32)
COLOR UR: ABNORMAL
COLOR UR: YELLOW
CREAT SERPL-MCNC: 0.68 MG/DL (ref 0.7–1.3)
CREAT SERPL-MCNC: 0.78 MG/DL (ref 0.7–1.3)
CREAT SERPL-MCNC: 0.8 MG/DL (ref 0.7–1.3)
CREAT SERPL-MCNC: 0.93 MG/DL (ref 0.7–1.3)
CREAT SERPL-MCNC: 0.93 MG/DL (ref 0.7–1.3)
CREAT SERPL-MCNC: 1 MG/DL (ref 0.7–1.3)
CREAT SERPL-MCNC: 1.01 MG/DL (ref 0.7–1.3)
CREAT SERPL-MCNC: 1.01 MG/DL (ref 0.7–1.3)
CREAT SERPL-MCNC: 1.03 MG/DL (ref 0.7–1.3)
CREAT SERPL-MCNC: 1.04 MG/DL (ref 0.7–1.3)
CREAT SERPL-MCNC: 1.04 MG/DL (ref 0.7–1.3)
CREAT SERPL-MCNC: 1.09 MG/DL (ref 0.76–1.27)
CREAT SERPL-MCNC: 1.11 MG/DL (ref 0.7–1.3)
CREAT SERPL-MCNC: 1.12 MG/DL (ref 0.7–1.3)
CREAT SERPL-MCNC: 1.16 MG/DL (ref 0.7–1.3)
CREAT SERPL-MCNC: 1.59 MG/DL (ref 0.7–1.3)
CREAT SERPL-MCNC: 1.64 MG/DL (ref 0.7–1.3)
DIAGNOSIS, 93000: NORMAL
DIFFERENTIAL METHOD BLD: ABNORMAL
EOSINOPHIL # BLD AUTO: 0 X10E3/UL (ref 0–0.4)
EOSINOPHIL # BLD: 0 K/UL (ref 0–0.4)
EOSINOPHIL NFR BLD AUTO: 0 %
EOSINOPHIL NFR BLD: 0 % (ref 0–7)
EPITH CASTS URNS QL MICRO: ABNORMAL /LPF
ERYTHROCYTE [DISTWIDTH] IN BLOOD BY AUTOMATED COUNT: 12.7 % (ref 12.3–15.4)
ERYTHROCYTE [DISTWIDTH] IN BLOOD BY AUTOMATED COUNT: 13 % (ref 11.5–14.5)
ERYTHROCYTE [DISTWIDTH] IN BLOOD BY AUTOMATED COUNT: 13.1 % (ref 11.5–14.5)
ERYTHROCYTE [DISTWIDTH] IN BLOOD BY AUTOMATED COUNT: 13.1 % (ref 11.5–14.5)
ERYTHROCYTE [DISTWIDTH] IN BLOOD BY AUTOMATED COUNT: 13.2 % (ref 11.5–14.5)
ERYTHROCYTE [DISTWIDTH] IN BLOOD BY AUTOMATED COUNT: 13.3 % (ref 11.5–14.5)
ERYTHROCYTE [DISTWIDTH] IN BLOOD BY AUTOMATED COUNT: 13.3 % (ref 11.5–14.5)
ERYTHROCYTE [DISTWIDTH] IN BLOOD BY AUTOMATED COUNT: 13.5 % (ref 11.5–14.5)
ERYTHROCYTE [DISTWIDTH] IN BLOOD BY AUTOMATED COUNT: 13.8 % (ref 11.5–14.5)
ERYTHROCYTE [DISTWIDTH] IN BLOOD BY AUTOMATED COUNT: 13.9 % (ref 11.5–14.5)
ERYTHROCYTE [DISTWIDTH] IN BLOOD BY AUTOMATED COUNT: 14.4 % (ref 11.5–14.5)
ERYTHROCYTE [DISTWIDTH] IN BLOOD BY AUTOMATED COUNT: 18.5 % (ref 11.5–14.5)
ERYTHROCYTE [DISTWIDTH] IN BLOOD BY AUTOMATED COUNT: 19.3 % (ref 11.5–14.5)
ERYTHROCYTE [DISTWIDTH] IN BLOOD BY AUTOMATED COUNT: 19.4 % (ref 11.8–15.8)
ERYTHROCYTE [DISTWIDTH] IN BLOOD BY AUTOMATED COUNT: 21.1 % (ref 11.5–14.5)
EST. AVERAGE GLUCOSE BLD GHB EST-MCNC: 114 MG/DL
EST. AVERAGE GLUCOSE BLD GHB EST-MCNC: 117 MG/DL
GLOBULIN SER CALC-MCNC: 2.8 G/DL (ref 1.5–4.5)
GLOBULIN SER CALC-MCNC: 3.2 G/DL (ref 2–4)
GLOBULIN SER CALC-MCNC: 3.4 G/DL (ref 2–4)
GLOBULIN SER CALC-MCNC: 3.7 G/DL (ref 2–4)
GLOBULIN SER CALC-MCNC: 3.7 G/DL (ref 2–4)
GLOBULIN SER CALC-MCNC: 3.8 G/DL (ref 2–4)
GLOBULIN SER CALC-MCNC: 4 G/DL (ref 2–4)
GLOBULIN SER CALC-MCNC: 4.1 G/DL (ref 2–4)
GLOBULIN SER CALC-MCNC: 4.1 G/DL (ref 2–4)
GLOBULIN SER CALC-MCNC: 4.6 G/DL (ref 2–4)
GLOBULIN SER CALC-MCNC: 4.7 G/DL (ref 2–4)
GLUCOSE BLD STRIP.AUTO-MCNC: 121 MG/DL (ref 65–100)
GLUCOSE BLD STRIP.AUTO-MCNC: 122 MG/DL (ref 65–100)
GLUCOSE BLD STRIP.AUTO-MCNC: 130 MG/DL (ref 65–100)
GLUCOSE BLD STRIP.AUTO-MCNC: 131 MG/DL (ref 65–100)
GLUCOSE BLD STRIP.AUTO-MCNC: 149 MG/DL (ref 65–100)
GLUCOSE BLD STRIP.AUTO-MCNC: 151 MG/DL (ref 65–100)
GLUCOSE BLD STRIP.AUTO-MCNC: 155 MG/DL (ref 65–100)
GLUCOSE BLD STRIP.AUTO-MCNC: 158 MG/DL (ref 65–100)
GLUCOSE BLD STRIP.AUTO-MCNC: 164 MG/DL (ref 65–100)
GLUCOSE BLD STRIP.AUTO-MCNC: 165 MG/DL (ref 65–100)
GLUCOSE BLD STRIP.AUTO-MCNC: 176 MG/DL (ref 65–100)
GLUCOSE BLD STRIP.AUTO-MCNC: 178 MG/DL (ref 65–100)
GLUCOSE BLD STRIP.AUTO-MCNC: 179 MG/DL (ref 65–100)
GLUCOSE BLD STRIP.AUTO-MCNC: 180 MG/DL (ref 65–100)
GLUCOSE BLD STRIP.AUTO-MCNC: 190 MG/DL (ref 65–100)
GLUCOSE BLD STRIP.AUTO-MCNC: 195 MG/DL (ref 65–100)
GLUCOSE BLD STRIP.AUTO-MCNC: 198 MG/DL (ref 65–100)
GLUCOSE BLD STRIP.AUTO-MCNC: 209 MG/DL (ref 65–100)
GLUCOSE BLD STRIP.AUTO-MCNC: 210 MG/DL (ref 65–100)
GLUCOSE BLD STRIP.AUTO-MCNC: 214 MG/DL (ref 65–100)
GLUCOSE BLD STRIP.AUTO-MCNC: 222 MG/DL (ref 65–100)
GLUCOSE BLD STRIP.AUTO-MCNC: 225 MG/DL (ref 65–100)
GLUCOSE BLD STRIP.AUTO-MCNC: 231 MG/DL (ref 65–100)
GLUCOSE BLD STRIP.AUTO-MCNC: 237 MG/DL (ref 65–100)
GLUCOSE BLD STRIP.AUTO-MCNC: 245 MG/DL (ref 65–100)
GLUCOSE BLD STRIP.AUTO-MCNC: 245 MG/DL (ref 65–100)
GLUCOSE BLD STRIP.AUTO-MCNC: 247 MG/DL (ref 65–100)
GLUCOSE BLD STRIP.AUTO-MCNC: 248 MG/DL (ref 65–100)
GLUCOSE BLD STRIP.AUTO-MCNC: 256 MG/DL (ref 65–100)
GLUCOSE BLD STRIP.AUTO-MCNC: 263 MG/DL (ref 65–100)
GLUCOSE BLD STRIP.AUTO-MCNC: 318 MG/DL (ref 65–100)
GLUCOSE BLD STRIP.AUTO-MCNC: 345 MG/DL (ref 65–100)
GLUCOSE BLD STRIP.AUTO-MCNC: 369 MG/DL (ref 65–100)
GLUCOSE BLD STRIP.AUTO-MCNC: 431 MG/DL (ref 65–100)
GLUCOSE BLD STRIP.AUTO-MCNC: 56 MG/DL (ref 65–100)
GLUCOSE BLD STRIP.AUTO-MCNC: 56 MG/DL (ref 65–100)
GLUCOSE BLD STRIP.AUTO-MCNC: 593 MG/DL (ref 65–100)
GLUCOSE BLD STRIP.AUTO-MCNC: 64 MG/DL (ref 65–100)
GLUCOSE BLD STRIP.AUTO-MCNC: 69 MG/DL (ref 65–100)
GLUCOSE BLD STRIP.AUTO-MCNC: 72 MG/DL (ref 65–100)
GLUCOSE BLD STRIP.AUTO-MCNC: 85 MG/DL (ref 65–100)
GLUCOSE BLD STRIP.AUTO-MCNC: 87 MG/DL (ref 65–100)
GLUCOSE BLD STRIP.AUTO-MCNC: 93 MG/DL (ref 65–100)
GLUCOSE BLD STRIP.AUTO-MCNC: >600 MG/DL (ref 65–100)
GLUCOSE POC: 127 MG/DL
GLUCOSE SERPL-MCNC: 135 MG/DL (ref 65–100)
GLUCOSE SERPL-MCNC: 139 MG/DL (ref 65–100)
GLUCOSE SERPL-MCNC: 141 MG/DL (ref 65–100)
GLUCOSE SERPL-MCNC: 142 MG/DL (ref 65–100)
GLUCOSE SERPL-MCNC: 143 MG/DL (ref 65–100)
GLUCOSE SERPL-MCNC: 143 MG/DL (ref 65–100)
GLUCOSE SERPL-MCNC: 147 MG/DL (ref 65–100)
GLUCOSE SERPL-MCNC: 151 MG/DL (ref 65–100)
GLUCOSE SERPL-MCNC: 161 MG/DL (ref 65–100)
GLUCOSE SERPL-MCNC: 172 MG/DL (ref 65–99)
GLUCOSE SERPL-MCNC: 182 MG/DL (ref 65–100)
GLUCOSE SERPL-MCNC: 191 MG/DL (ref 65–100)
GLUCOSE SERPL-MCNC: 230 MG/DL (ref 65–100)
GLUCOSE SERPL-MCNC: 64 MG/DL (ref 65–100)
GLUCOSE SERPL-MCNC: 738 MG/DL (ref 65–100)
GLUCOSE SERPL-MCNC: 85 MG/DL (ref 65–100)
GLUCOSE SERPL-MCNC: 91 MG/DL (ref 65–100)
GLUCOSE UR QL: NEGATIVE
GLUCOSE UR STRIP.AUTO-MCNC: NEGATIVE MG/DL
HBA1C MFR BLD HPLC: 6.5 %
HBA1C MFR BLD: 5.6 % (ref 4.2–6.3)
HBA1C MFR BLD: 5.7 % (ref 4.8–5.6)
HCT VFR BLD AUTO: 31.1 % (ref 36.6–50.3)
HCT VFR BLD AUTO: 31.8 % (ref 36.6–50.3)
HCT VFR BLD AUTO: 32 % (ref 36.6–50.3)
HCT VFR BLD AUTO: 32.3 % (ref 36.6–50.3)
HCT VFR BLD AUTO: 32.9 % (ref 36.6–50.3)
HCT VFR BLD AUTO: 33 % (ref 36.6–50.3)
HCT VFR BLD AUTO: 33 % (ref 36.6–50.3)
HCT VFR BLD AUTO: 33.7 % (ref 36.6–50.3)
HCT VFR BLD AUTO: 34.7 % (ref 36.6–50.3)
HCT VFR BLD AUTO: 35 % (ref 36.6–50.3)
HCT VFR BLD AUTO: 35 % (ref 36.6–50.3)
HCT VFR BLD AUTO: 35.1 % (ref 36.6–50.3)
HCT VFR BLD AUTO: 35.1 % (ref 37.5–51)
HCT VFR BLD AUTO: 36.2 % (ref 36.6–50.3)
HCT VFR BLD AUTO: 37.2 % (ref 36.6–50.3)
HGB BLD-MCNC: 10.2 G/DL (ref 12.1–17)
HGB BLD-MCNC: 10.4 G/DL (ref 12.1–17)
HGB BLD-MCNC: 10.6 G/DL (ref 12.1–17)
HGB BLD-MCNC: 10.6 G/DL (ref 12.1–17)
HGB BLD-MCNC: 10.7 G/DL (ref 12.1–17)
HGB BLD-MCNC: 10.9 G/DL (ref 12.1–17)
HGB BLD-MCNC: 10.9 G/DL (ref 12.1–17)
HGB BLD-MCNC: 11.1 G/DL (ref 12.1–17)
HGB BLD-MCNC: 11.2 G/DL (ref 12.1–17)
HGB BLD-MCNC: 11.3 G/DL (ref 13–17.7)
HGB BLD-MCNC: 11.6 G/DL (ref 12.1–17)
HGB BLD-MCNC: 11.7 G/DL (ref 12.1–17)
HGB BLD-MCNC: 11.7 G/DL (ref 12.1–17)
HGB BLD-MCNC: 12.2 G/DL (ref 12.1–17)
HGB BLD-MCNC: 9.9 G/DL (ref 12.1–17)
HGB UR QL STRIP: ABNORMAL
HGB UR QL STRIP: NEGATIVE
HYALINE CASTS URNS QL MICRO: ABNORMAL /LPF (ref 0–5)
IMM GRANULOCYTES # BLD AUTO: 0 K/UL (ref 0–0.04)
IMM GRANULOCYTES # BLD AUTO: 0 X10E3/UL (ref 0–0.1)
IMM GRANULOCYTES # BLD AUTO: 0.1 K/UL (ref 0–0.04)
IMM GRANULOCYTES # BLD AUTO: 0.2 K/UL (ref 0–0.04)
IMM GRANULOCYTES # BLD AUTO: 0.5 K/UL (ref 0–0.04)
IMM GRANULOCYTES NFR BLD AUTO: 0 % (ref 0–0.5)
IMM GRANULOCYTES NFR BLD AUTO: 1 %
IMM GRANULOCYTES NFR BLD AUTO: 1 % (ref 0–0.5)
IMM GRANULOCYTES NFR BLD AUTO: 2 % (ref 0–0.5)
IMM GRANULOCYTES NFR BLD AUTO: 2 % (ref 0–0.5)
IMM GRANULOCYTES NFR BLD AUTO: 6 % (ref 0–0.5)
INR PPP: 1.2 (ref 0.9–1.1)
KETONES UR QL STRIP.AUTO: 15 MG/DL
KETONES UR QL STRIP: NEGATIVE
LEUKOCYTE ESTERASE UR QL STRIP.AUTO: NEGATIVE
LEUKOCYTE ESTERASE UR QL STRIP: NEGATIVE
LYMPHOCYTES # BLD AUTO: 0.8 X10E3/UL (ref 0.7–3.1)
LYMPHOCYTES # BLD: 0.5 K/UL (ref 0.8–3.5)
LYMPHOCYTES # BLD: 0.5 K/UL (ref 0.8–3.5)
LYMPHOCYTES # BLD: 0.6 K/UL (ref 0.8–3.5)
LYMPHOCYTES # BLD: 0.7 K/UL (ref 0.8–3.5)
LYMPHOCYTES # BLD: 0.7 K/UL (ref 0.8–3.5)
LYMPHOCYTES # BLD: 1.1 K/UL (ref 0.8–3.5)
LYMPHOCYTES # BLD: 1.2 K/UL (ref 0.8–3.5)
LYMPHOCYTES # BLD: 1.4 K/UL (ref 0.8–3.5)
LYMPHOCYTES # BLD: 1.4 K/UL (ref 0.8–3.5)
LYMPHOCYTES # BLD: 1.7 K/UL (ref 0.8–3.5)
LYMPHOCYTES NFR BLD AUTO: 53 %
LYMPHOCYTES NFR BLD: 10 % (ref 12–49)
LYMPHOCYTES NFR BLD: 10 % (ref 12–49)
LYMPHOCYTES NFR BLD: 15 % (ref 12–49)
LYMPHOCYTES NFR BLD: 16 % (ref 12–49)
LYMPHOCYTES NFR BLD: 16 % (ref 12–49)
LYMPHOCYTES NFR BLD: 19 % (ref 12–49)
LYMPHOCYTES NFR BLD: 22 % (ref 12–49)
LYMPHOCYTES NFR BLD: 25 % (ref 12–49)
LYMPHOCYTES NFR BLD: 26 % (ref 12–49)
LYMPHOCYTES NFR BLD: 26 % (ref 12–49)
LYMPHOCYTES NFR BLD: 29 % (ref 12–49)
LYMPHOCYTES NFR BLD: 40 % (ref 12–49)
LYMPHOCYTES NFR BLD: 5 % (ref 12–49)
LYMPHOCYTES NFR BLD: 6 % (ref 12–49)
MCH RBC QN AUTO: 28.7 PG (ref 26–34)
MCH RBC QN AUTO: 28.8 PG (ref 26–34)
MCH RBC QN AUTO: 29.7 PG (ref 26–34)
MCH RBC QN AUTO: 30 PG (ref 26–34)
MCH RBC QN AUTO: 30.3 PG (ref 26–34)
MCH RBC QN AUTO: 30.4 PG (ref 26–34)
MCH RBC QN AUTO: 30.4 PG (ref 26–34)
MCH RBC QN AUTO: 30.5 PG (ref 26–34)
MCH RBC QN AUTO: 30.9 PG (ref 26.6–33)
MCH RBC QN AUTO: 31.8 PG (ref 26–34)
MCH RBC QN AUTO: 32 PG (ref 26–34)
MCH RBC QN AUTO: 32.1 PG (ref 26–34)
MCH RBC QN AUTO: 32.2 PG (ref 26–34)
MCH RBC QN AUTO: 32.6 PG (ref 26–34)
MCH RBC QN AUTO: 33.1 PG (ref 26–34)
MCHC RBC AUTO-ENTMCNC: 31.8 G/DL (ref 30–36.5)
MCHC RBC AUTO-ENTMCNC: 31.8 G/DL (ref 30–36.5)
MCHC RBC AUTO-ENTMCNC: 31.9 G/DL (ref 30–36.5)
MCHC RBC AUTO-ENTMCNC: 32 G/DL (ref 30–36.5)
MCHC RBC AUTO-ENTMCNC: 32 G/DL (ref 30–36.5)
MCHC RBC AUTO-ENTMCNC: 32.2 G/DL (ref 30–36.5)
MCHC RBC AUTO-ENTMCNC: 32.2 G/DL (ref 31.5–35.7)
MCHC RBC AUTO-ENTMCNC: 32.3 G/DL (ref 30–36.5)
MCHC RBC AUTO-ENTMCNC: 32.7 G/DL (ref 30–36.5)
MCHC RBC AUTO-ENTMCNC: 32.8 G/DL (ref 30–36.5)
MCHC RBC AUTO-ENTMCNC: 32.8 G/DL (ref 30–36.5)
MCHC RBC AUTO-ENTMCNC: 33 G/DL (ref 30–36.5)
MCHC RBC AUTO-ENTMCNC: 33 G/DL (ref 30–36.5)
MCHC RBC AUTO-ENTMCNC: 33.1 G/DL (ref 30–36.5)
MCHC RBC AUTO-ENTMCNC: 33.3 G/DL (ref 30–36.5)
MCV RBC AUTO: 100 FL (ref 80–99)
MCV RBC AUTO: 86.2 FL (ref 80–99)
MCV RBC AUTO: 87.9 FL (ref 80–99)
MCV RBC AUTO: 92.8 FL (ref 80–99)
MCV RBC AUTO: 94.3 FL (ref 80–99)
MCV RBC AUTO: 94.4 FL (ref 80–99)
MCV RBC AUTO: 95 FL (ref 80–99)
MCV RBC AUTO: 95.3 FL (ref 80–99)
MCV RBC AUTO: 95.4 FL (ref 80–99)
MCV RBC AUTO: 96 FL (ref 79–97)
MCV RBC AUTO: 96.8 FL (ref 80–99)
MCV RBC AUTO: 97.2 FL (ref 80–99)
MCV RBC AUTO: 97.6 FL (ref 80–99)
MCV RBC AUTO: 99.2 FL (ref 80–99)
MCV RBC AUTO: 99.4 FL (ref 80–99)
MICRO URNS: NORMAL
MONOCYTES # BLD AUTO: 0.3 X10E3/UL (ref 0.1–0.9)
MONOCYTES # BLD: 0.5 K/UL (ref 0–1)
MONOCYTES # BLD: 0.6 K/UL (ref 0–1)
MONOCYTES # BLD: 0.8 K/UL (ref 0–1)
MONOCYTES # BLD: 0.8 K/UL (ref 0–1)
MONOCYTES # BLD: 0.9 K/UL (ref 0–1)
MONOCYTES # BLD: 1.1 K/UL (ref 0–1)
MONOCYTES # BLD: 1.1 K/UL (ref 0–1)
MONOCYTES # BLD: 1.3 K/UL (ref 0–1)
MONOCYTES # BLD: 1.4 K/UL (ref 0–1)
MONOCYTES NFR BLD AUTO: 21 %
MONOCYTES NFR BLD: 12 % (ref 5–13)
MONOCYTES NFR BLD: 12 % (ref 5–13)
MONOCYTES NFR BLD: 13 % (ref 5–13)
MONOCYTES NFR BLD: 14 % (ref 5–13)
MONOCYTES NFR BLD: 14 % (ref 5–13)
MONOCYTES NFR BLD: 15 % (ref 5–13)
MONOCYTES NFR BLD: 16 % (ref 5–13)
MONOCYTES NFR BLD: 16 % (ref 5–13)
MONOCYTES NFR BLD: 18 % (ref 5–13)
MONOCYTES NFR BLD: 20 % (ref 5–13)
MONOCYTES NFR BLD: 5 % (ref 5–13)
MONOCYTES NFR BLD: 7 % (ref 5–13)
MONOCYTES NFR BLD: 9 % (ref 5–13)
MORPHOLOGY BLD-IMP: ABNORMAL
NEUTROPHILS # BLD AUTO: 0.4 X10E3/UL (ref 1.4–7)
NEUTROPHILS NFR BLD AUTO: 24 %
NEUTS SEG # BLD: 1.2 K/UL (ref 1.8–8)
NEUTS SEG # BLD: 1.9 K/UL (ref 1.8–8)
NEUTS SEG # BLD: 10.2 K/UL (ref 1.8–8)
NEUTS SEG # BLD: 10.4 K/UL (ref 1.8–8)
NEUTS SEG # BLD: 2 K/UL (ref 1.8–8)
NEUTS SEG # BLD: 2.3 K/UL (ref 1.8–8)
NEUTS SEG # BLD: 2.5 K/UL (ref 1.8–8)
NEUTS SEG # BLD: 2.6 K/UL (ref 1.8–8)
NEUTS SEG # BLD: 3.1 K/UL (ref 1.8–8)
NEUTS SEG # BLD: 3.2 K/UL (ref 1.8–8)
NEUTS SEG # BLD: 4.7 K/UL (ref 1.8–8)
NEUTS SEG # BLD: 5.9 K/UL (ref 1.8–8)
NEUTS SEG # BLD: 7.3 K/UL (ref 1.8–8)
NEUTS SEG # BLD: 9.6 K/UL (ref 1.8–8)
NEUTS SEG NFR BLD: 39 % (ref 32–75)
NEUTS SEG NFR BLD: 56 % (ref 32–75)
NEUTS SEG NFR BLD: 57 % (ref 32–75)
NEUTS SEG NFR BLD: 58 % (ref 32–75)
NEUTS SEG NFR BLD: 61 % (ref 32–75)
NEUTS SEG NFR BLD: 62 % (ref 32–75)
NEUTS SEG NFR BLD: 65 % (ref 32–75)
NEUTS SEG NFR BLD: 66 % (ref 32–75)
NEUTS SEG NFR BLD: 68 % (ref 32–75)
NEUTS SEG NFR BLD: 69 % (ref 32–75)
NEUTS SEG NFR BLD: 72 % (ref 32–75)
NEUTS SEG NFR BLD: 81 % (ref 32–75)
NEUTS SEG NFR BLD: 88 % (ref 32–75)
NEUTS SEG NFR BLD: 88 % (ref 32–75)
NITRITE UR QL STRIP.AUTO: NEGATIVE
NITRITE UR QL STRIP: NEGATIVE
NRBC # BLD: 0 K/UL (ref 0–0.01)
NRBC # BLD: 0.02 K/UL (ref 0–0.01)
NRBC # BLD: 0.03 K/UL (ref 0–0.01)
NRBC BLD-RTO: 0 PER 100 WBC
NRBC BLD-RTO: 0.3 PER 100 WBC
NRBC BLD-RTO: 0.4 PER 100 WBC
P-R INTERVAL, ECG05: 166 MS
PH UR STRIP: 5.5 [PH] (ref 5–8)
PH UR STRIP: 6 [PH] (ref 5–7.5)
PLATELET # BLD AUTO: 131 X10E3/UL (ref 150–450)
PLATELET # BLD AUTO: 205 K/UL (ref 150–400)
PLATELET # BLD AUTO: 228 K/UL (ref 150–400)
PLATELET # BLD AUTO: 234 K/UL (ref 150–400)
PLATELET # BLD AUTO: 234 K/UL (ref 150–400)
PLATELET # BLD AUTO: 235 K/UL (ref 150–400)
PLATELET # BLD AUTO: 237 K/UL (ref 150–400)
PLATELET # BLD AUTO: 242 K/UL (ref 150–400)
PLATELET # BLD AUTO: 253 K/UL (ref 150–400)
PLATELET # BLD AUTO: 260 K/UL (ref 150–400)
PLATELET # BLD AUTO: 261 K/UL (ref 150–400)
PLATELET # BLD AUTO: 286 K/UL (ref 150–400)
PLATELET # BLD AUTO: 299 K/UL (ref 150–400)
PLATELET # BLD AUTO: 345 K/UL (ref 150–400)
PLATELET # BLD AUTO: 485 K/UL (ref 150–400)
PLATELET COMMENTS,PCOM: ABNORMAL
PMV BLD AUTO: 10.3 FL (ref 8.9–12.9)
PMV BLD AUTO: 8.5 FL (ref 8.9–12.9)
PMV BLD AUTO: 8.6 FL (ref 8.9–12.9)
PMV BLD AUTO: 8.9 FL (ref 8.9–12.9)
PMV BLD AUTO: 9 FL (ref 8.9–12.9)
PMV BLD AUTO: 9.1 FL (ref 8.9–12.9)
PMV BLD AUTO: 9.4 FL (ref 8.9–12.9)
PMV BLD AUTO: 9.5 FL (ref 8.9–12.9)
PMV BLD AUTO: 9.5 FL (ref 8.9–12.9)
POTASSIUM SERPL-SCNC: 3.7 MMOL/L (ref 3.5–5.1)
POTASSIUM SERPL-SCNC: 3.7 MMOL/L (ref 3.5–5.2)
POTASSIUM SERPL-SCNC: 3.9 MMOL/L (ref 3.5–5.1)
POTASSIUM SERPL-SCNC: 4 MMOL/L (ref 3.5–5.1)
POTASSIUM SERPL-SCNC: 4.1 MMOL/L (ref 3.5–5.1)
POTASSIUM SERPL-SCNC: 4.2 MMOL/L (ref 3.5–5.1)
POTASSIUM SERPL-SCNC: 4.3 MMOL/L (ref 3.5–5.1)
POTASSIUM SERPL-SCNC: 4.4 MMOL/L (ref 3.5–5.1)
POTASSIUM SERPL-SCNC: 4.4 MMOL/L (ref 3.5–5.1)
POTASSIUM SERPL-SCNC: 5.4 MMOL/L (ref 3.5–5.1)
PROT SERPL-MCNC: 6.6 G/DL (ref 6.4–8.2)
PROT SERPL-MCNC: 6.9 G/DL (ref 6.4–8.2)
PROT SERPL-MCNC: 7 G/DL (ref 6.4–8.2)
PROT SERPL-MCNC: 7.2 G/DL (ref 6–8.5)
PROT SERPL-MCNC: 7.3 G/DL (ref 6.4–8.2)
PROT SERPL-MCNC: 7.4 G/DL (ref 6.4–8.2)
PROT SERPL-MCNC: 7.5 G/DL (ref 6.4–8.2)
PROT SERPL-MCNC: 7.6 G/DL (ref 6.4–8.2)
PROT SERPL-MCNC: 7.7 G/DL (ref 6.4–8.2)
PROT SERPL-MCNC: 7.8 G/DL (ref 6.4–8.2)
PROT UR QL STRIP: NEGATIVE
PROT UR STRIP-MCNC: NEGATIVE MG/DL
PROTHROMBIN TIME: 12.6 SEC (ref 9–11.1)
Q-T INTERVAL, ECG07: 364 MS
QRS DURATION, ECG06: 86 MS
QTC CALCULATION (BEZET), ECG08: 440 MS
RBC # BLD AUTO: 3.25 M/UL (ref 4.1–5.7)
RBC # BLD AUTO: 3.26 M/UL (ref 4.1–5.7)
RBC # BLD AUTO: 3.27 M/UL (ref 4.1–5.7)
RBC # BLD AUTO: 3.37 M/UL (ref 4.1–5.7)
RBC # BLD AUTO: 3.38 M/UL (ref 4.1–5.7)
RBC # BLD AUTO: 3.41 M/UL (ref 4.1–5.7)
RBC # BLD AUTO: 3.49 M/UL (ref 4.1–5.7)
RBC # BLD AUTO: 3.5 M/UL (ref 4.1–5.7)
RBC # BLD AUTO: 3.57 M/UL (ref 4.1–5.7)
RBC # BLD AUTO: 3.64 M/UL (ref 4.1–5.7)
RBC # BLD AUTO: 3.65 M/UL (ref 4.1–5.7)
RBC # BLD AUTO: 3.66 X10E6/UL (ref 4.14–5.8)
RBC # BLD AUTO: 3.77 M/UL (ref 4.1–5.7)
RBC # BLD AUTO: 4.07 M/UL (ref 4.1–5.7)
RBC # BLD AUTO: 4.23 M/UL (ref 4.1–5.7)
RBC #/AREA URNS HPF: ABNORMAL /HPF (ref 0–5)
RBC MORPH BLD: ABNORMAL
SERVICE CMNT-IMP: ABNORMAL
SERVICE CMNT-IMP: NORMAL
SODIUM SERPL-SCNC: 130 MMOL/L (ref 136–145)
SODIUM SERPL-SCNC: 131 MMOL/L (ref 136–145)
SODIUM SERPL-SCNC: 132 MMOL/L (ref 136–145)
SODIUM SERPL-SCNC: 133 MMOL/L (ref 136–145)
SODIUM SERPL-SCNC: 134 MMOL/L (ref 136–145)
SODIUM SERPL-SCNC: 135 MMOL/L (ref 136–145)
SODIUM SERPL-SCNC: 136 MMOL/L (ref 136–145)
SODIUM SERPL-SCNC: 136 MMOL/L (ref 136–145)
SODIUM SERPL-SCNC: 137 MMOL/L (ref 134–144)
SODIUM SERPL-SCNC: 137 MMOL/L (ref 136–145)
SODIUM SERPL-SCNC: 138 MMOL/L (ref 136–145)
SODIUM SERPL-SCNC: 138 MMOL/L (ref 136–145)
SODIUM SERPL-SCNC: 139 MMOL/L (ref 136–145)
SODIUM SERPL-SCNC: 140 MMOL/L (ref 136–145)
SP GR UR REFRACTOMETRY: 1.02 (ref 1–1.03)
SP GR UR: 1.02 (ref 1–1.03)
TROPONIN I SERPL-MCNC: <0.05 NG/ML
TSH SERPL DL<=0.005 MIU/L-ACNC: 3 UIU/ML (ref 0.45–4.5)
TSH SERPL DL<=0.05 MIU/L-ACNC: 1.21 UIU/ML (ref 0.36–3.74)
TSH SERPL DL<=0.05 MIU/L-ACNC: 1.69 UIU/ML (ref 0.36–3.74)
TSH SERPL DL<=0.05 MIU/L-ACNC: 1.86 UIU/ML (ref 0.36–3.74)
TSH SERPL DL<=0.05 MIU/L-ACNC: 2.26 UIU/ML (ref 0.36–3.74)
TSH SERPL DL<=0.05 MIU/L-ACNC: 3.71 UIU/ML (ref 0.36–3.74)
TSH SERPL DL<=0.05 MIU/L-ACNC: 4.06 UIU/ML (ref 0.36–3.74)
UA: UC IF INDICATED,UAUC: ABNORMAL
UROBILINOGEN UR QL STRIP.AUTO: 1 EU/DL (ref 0.2–1)
UROBILINOGEN UR STRIP-MCNC: 0.2 MG/DL (ref 0.2–1)
VENTRICULAR RATE, ECG03: 88 BPM
WBC # BLD AUTO: 1.5 X10E3/UL (ref 3.4–10.8)
WBC # BLD AUTO: 10.7 K/UL (ref 4.1–11.1)
WBC # BLD AUTO: 11 K/UL (ref 4.1–11.1)
WBC # BLD AUTO: 11.8 K/UL (ref 4.1–11.1)
WBC # BLD AUTO: 12.6 K/UL (ref 4.1–11.1)
WBC # BLD AUTO: 2.8 K/UL (ref 4.1–11.1)
WBC # BLD AUTO: 3 K/UL (ref 4.1–11.1)
WBC # BLD AUTO: 3.3 K/UL (ref 4.1–11.1)
WBC # BLD AUTO: 3.9 K/UL (ref 4.1–11.1)
WBC # BLD AUTO: 4.2 K/UL (ref 4.1–11.1)
WBC # BLD AUTO: 4.3 K/UL (ref 4.1–11.1)
WBC # BLD AUTO: 4.5 K/UL (ref 4.1–11.1)
WBC # BLD AUTO: 5.4 K/UL (ref 4.1–11.1)
WBC # BLD AUTO: 7.1 K/UL (ref 4.1–11.1)
WBC # BLD AUTO: 9.1 K/UL (ref 4.1–11.1)
WBC MORPH BLD: ABNORMAL
WBC URNS QL MICRO: ABNORMAL /HPF (ref 0–4)

## 2019-01-01 PROCEDURE — 0656 HSPC GENERAL INPATIENT

## 2019-01-01 PROCEDURE — 96413 CHEMO IV INFUSION 1 HR: CPT

## 2019-01-01 PROCEDURE — 74011250636 HC RX REV CODE- 250/636: Performed by: INTERNAL MEDICINE

## 2019-01-01 PROCEDURE — 74011250636 HC RX REV CODE- 250/636: Performed by: NURSE PRACTITIONER

## 2019-01-01 PROCEDURE — 80053 COMPREHEN METABOLIC PANEL: CPT

## 2019-01-01 PROCEDURE — 74011000258 HC RX REV CODE- 258: Performed by: INTERNAL MEDICINE

## 2019-01-01 PROCEDURE — 74011636637 HC RX REV CODE- 636/637: Performed by: HOSPITALIST

## 2019-01-01 PROCEDURE — 96411 CHEMO IV PUSH ADDL DRUG: CPT

## 2019-01-01 PROCEDURE — 0651 HSPC ROUTINE HOME CARE

## 2019-01-01 PROCEDURE — 74011250636 HC RX REV CODE- 250/636

## 2019-01-01 PROCEDURE — G0299 HHS/HOSPICE OF RN EA 15 MIN: HCPCS

## 2019-01-01 PROCEDURE — 74011250636 HC RX REV CODE- 250/636: Performed by: HOSPITALIST

## 2019-01-01 PROCEDURE — 93005 ELECTROCARDIOGRAM TRACING: CPT

## 2019-01-01 PROCEDURE — 85025 COMPLETE CBC W/AUTO DIFF WBC: CPT

## 2019-01-01 PROCEDURE — 74011250637 HC RX REV CODE- 250/637: Performed by: NURSE PRACTITIONER

## 2019-01-01 PROCEDURE — 77030020256 HC SOL INJ NACL 0.9%  500ML: Performed by: THORACIC SURGERY (CARDIOTHORACIC VASCULAR SURGERY)

## 2019-01-01 PROCEDURE — 74011636637 HC RX REV CODE- 636/637: Performed by: INTERNAL MEDICINE

## 2019-01-01 PROCEDURE — 74011000250 HC RX REV CODE- 250: Performed by: INTERNAL MEDICINE

## 2019-01-01 PROCEDURE — 96375 TX/PRO/DX INJ NEW DRUG ADDON: CPT

## 2019-01-01 PROCEDURE — HOSPICE MEDICATION HC HH HOSPICE MEDICATION

## 2019-01-01 PROCEDURE — 84443 ASSAY THYROID STIM HORMONE: CPT

## 2019-01-01 PROCEDURE — 76000 FLUOROSCOPY <1 HR PHYS/QHP: CPT

## 2019-01-01 PROCEDURE — 36415 COLL VENOUS BLD VENIPUNCTURE: CPT

## 2019-01-01 PROCEDURE — ? HC HH HOSPICE MEDICATION

## 2019-01-01 PROCEDURE — 96367 TX/PROPH/DG ADDL SEQ IV INF: CPT

## 2019-01-01 PROCEDURE — G0155 HHCP-SVS OF CSW,EA 15 MIN: HCPCS

## 2019-01-01 PROCEDURE — A6250 SKIN SEAL PROTECT MOISTURIZR: HCPCS

## 2019-01-01 PROCEDURE — 74011250637 HC RX REV CODE- 250/637: Performed by: PHYSICAL MEDICINE & REHABILITATION

## 2019-01-01 PROCEDURE — 80048 BASIC METABOLIC PNL TOTAL CA: CPT

## 2019-01-01 PROCEDURE — 65270000015 HC RM PRIVATE ONCOLOGY

## 2019-01-01 PROCEDURE — G0300 HHS/HOSPICE OF LPN EA 15 MIN: HCPCS

## 2019-01-01 PROCEDURE — 74011250637 HC RX REV CODE- 250/637: Performed by: INTERNAL MEDICINE

## 2019-01-01 PROCEDURE — 77030020847 HC STATLOK BARD -A

## 2019-01-01 PROCEDURE — T4541 LARGE DISPOSABLE UNDERPAD: HCPCS

## 2019-01-01 PROCEDURE — A4927 NON-STERILE GLOVES: HCPCS

## 2019-01-01 PROCEDURE — 74011000250 HC RX REV CODE- 250: Performed by: EMERGENCY MEDICINE

## 2019-01-01 PROCEDURE — 74011636637 HC RX REV CODE- 636/637: Performed by: FAMILY MEDICINE

## 2019-01-01 PROCEDURE — 96417 CHEMO IV INFUS EACH ADDL SEQ: CPT

## 2019-01-01 PROCEDURE — 71275 CT ANGIOGRAPHY CHEST: CPT

## 2019-01-01 PROCEDURE — 74011250637 HC RX REV CODE- 250/637: Performed by: HOSPITALIST

## 2019-01-01 PROCEDURE — 65660000000 HC RM CCU STEPDOWN

## 2019-01-01 PROCEDURE — 96372 THER/PROPH/DIAG INJ SC/IM: CPT

## 2019-01-01 PROCEDURE — 82962 GLUCOSE BLOOD TEST: CPT

## 2019-01-01 PROCEDURE — 74011250636 HC RX REV CODE- 250/636: Performed by: ANESTHESIOLOGY

## 2019-01-01 PROCEDURE — 96361 HYDRATE IV INFUSION ADD-ON: CPT

## 2019-01-01 PROCEDURE — 71260 CT THORAX DX C+: CPT

## 2019-01-01 PROCEDURE — 74011000250 HC RX REV CODE- 250

## 2019-01-01 PROCEDURE — 74011636320 HC RX REV CODE- 636/320: Performed by: INTERNAL MEDICINE

## 2019-01-01 PROCEDURE — 70553 MRI BRAIN STEM W/O & W/DYE: CPT

## 2019-01-01 PROCEDURE — A9270 NON-COVERED ITEM OR SERVICE: HCPCS

## 2019-01-01 PROCEDURE — HOSPICE MEDICATION 0636 HC HH HOSPICE MEDICATION

## 2019-01-01 PROCEDURE — 71045 X-RAY EXAM CHEST 1 VIEW: CPT

## 2019-01-01 PROCEDURE — 85610 PROTHROMBIN TIME: CPT

## 2019-01-01 PROCEDURE — 97116 GAIT TRAINING THERAPY: CPT

## 2019-01-01 PROCEDURE — 74011000250 HC RX REV CODE- 250: Performed by: NURSE PRACTITIONER

## 2019-01-01 PROCEDURE — 77030013160 HC PROTCT ARM THMB DERY -A

## 2019-01-01 PROCEDURE — 77030039266 HC ADH SKN EXOFIN S2SG -A: Performed by: THORACIC SURGERY (CARDIOTHORACIC VASCULAR SURGERY)

## 2019-01-01 PROCEDURE — 97530 THERAPEUTIC ACTIVITIES: CPT

## 2019-01-01 PROCEDURE — 74011250636 HC RX REV CODE- 250/636: Performed by: THORACIC SURGERY (CARDIOTHORACIC VASCULAR SURGERY)

## 2019-01-01 PROCEDURE — 74011000258 HC RX REV CODE- 258: Performed by: RADIOLOGY

## 2019-01-01 PROCEDURE — 77030012965 HC NDL HUBR BBMI -A

## 2019-01-01 PROCEDURE — 77030031139 HC SUT VCRL2 J&J -A: Performed by: THORACIC SURGERY (CARDIOTHORACIC VASCULAR SURGERY)

## 2019-01-01 PROCEDURE — 88333 PATH CONSLTJ SURG CYTO XM 1: CPT

## 2019-01-01 PROCEDURE — 70470 CT HEAD/BRAIN W/O & W/DYE: CPT

## 2019-01-01 PROCEDURE — 81001 URINALYSIS AUTO W/SCOPE: CPT

## 2019-01-01 PROCEDURE — 74011250636 HC RX REV CODE- 250/636: Performed by: NEUROLOGICAL SURGERY

## 2019-01-01 PROCEDURE — 74011636637 HC RX REV CODE- 636/637: Performed by: NURSE PRACTITIONER

## 2019-01-01 PROCEDURE — 77030032490 HC SLV COMPR SCD KNE COVD -B: Performed by: THORACIC SURGERY (CARDIOTHORACIC VASCULAR SURGERY)

## 2019-01-01 PROCEDURE — 97165 OT EVAL LOW COMPLEX 30 MIN: CPT

## 2019-01-01 PROCEDURE — A9575 INJ GADOTERATE MEGLUMI 0.1ML: HCPCS | Performed by: NEUROLOGICAL SURGERY

## 2019-01-01 PROCEDURE — C1788 PORT, INDWELLING, IMP: HCPCS | Performed by: THORACIC SURGERY (CARDIOTHORACIC VASCULAR SURGERY)

## 2019-01-01 PROCEDURE — 74011000258 HC RX REV CODE- 258: Performed by: NURSE PRACTITIONER

## 2019-01-01 PROCEDURE — 88305 TISSUE EXAM BY PATHOLOGIST: CPT

## 2019-01-01 PROCEDURE — 74011000255 HC RX REV CODE- 255: Performed by: INTERNAL MEDICINE

## 2019-01-01 PROCEDURE — 77010033678 HC OXYGEN DAILY

## 2019-01-01 PROCEDURE — A9575 INJ GADOTERATE MEGLUMI 0.1ML: HCPCS | Performed by: INTERNAL MEDICINE

## 2019-01-01 PROCEDURE — 74011000250 HC RX REV CODE- 250: Performed by: THORACIC SURGERY (CARDIOTHORACIC VASCULAR SURGERY)

## 2019-01-01 PROCEDURE — 90686 IIV4 VACC NO PRSV 0.5 ML IM: CPT | Performed by: INTERNAL MEDICINE

## 2019-01-01 PROCEDURE — 74177 CT ABD & PELVIS W/CONTRAST: CPT

## 2019-01-01 PROCEDURE — 96360 HYDRATION IV INFUSION INIT: CPT

## 2019-01-01 PROCEDURE — 94760 N-INVAS EAR/PLS OXIMETRY 1: CPT

## 2019-01-01 PROCEDURE — 74011250637 HC RX REV CODE- 250/637: Performed by: EMERGENCY MEDICINE

## 2019-01-01 PROCEDURE — 83036 HEMOGLOBIN GLYCOSYLATED A1C: CPT

## 2019-01-01 PROCEDURE — 90471 IMMUNIZATION ADMIN: CPT

## 2019-01-01 PROCEDURE — 32405 CT BX LUNG NDL PERC: CPT

## 2019-01-01 PROCEDURE — 74011250637 HC RX REV CODE- 250/637: Performed by: RADIOLOGY

## 2019-01-01 PROCEDURE — 76060000034 HC ANESTHESIA 1.5 TO 2 HR: Performed by: THORACIC SURGERY (CARDIOTHORACIC VASCULAR SURGERY)

## 2019-01-01 PROCEDURE — 96415 CHEMO IV INFUSION ADDL HR: CPT

## 2019-01-01 PROCEDURE — 74011636320 HC RX REV CODE- 636/320: Performed by: EMERGENCY MEDICINE

## 2019-01-01 PROCEDURE — 74011250636 HC RX REV CODE- 250/636: Performed by: EMERGENCY MEDICINE

## 2019-01-01 PROCEDURE — 51798 US URINE CAPACITY MEASURE: CPT

## 2019-01-01 PROCEDURE — 77030020782 HC GWN BAIR PAWS FLX 3M -B

## 2019-01-01 PROCEDURE — A4245 ALCOHOL WIPES PER BOX: HCPCS

## 2019-01-01 PROCEDURE — 99285 EMERGENCY DEPT VISIT HI MDM: CPT

## 2019-01-01 PROCEDURE — 97161 PT EVAL LOW COMPLEX 20 MIN: CPT

## 2019-01-01 PROCEDURE — A4221 SUPP NON-INSULIN INF CATH/WK: HCPCS

## 2019-01-01 PROCEDURE — 65610000006 HC RM INTENSIVE CARE

## 2019-01-01 PROCEDURE — 76210000021 HC REC RM PH II 0.5 TO 1 HR: Performed by: THORACIC SURGERY (CARDIOTHORACIC VASCULAR SURGERY)

## 2019-01-01 PROCEDURE — HHS10554 SHAMPOO/BODY WASH 8 OZ ALOE VESTA

## 2019-01-01 PROCEDURE — 77030011640 HC PAD GRND REM COVD -A: Performed by: THORACIC SURGERY (CARDIOTHORACIC VASCULAR SURGERY)

## 2019-01-01 PROCEDURE — 71046 X-RAY EXAM CHEST 2 VIEWS: CPT

## 2019-01-01 PROCEDURE — 84484 ASSAY OF TROPONIN QUANT: CPT

## 2019-01-01 PROCEDURE — A4212 NON CORING NEEDLE OR STYLET: HCPCS

## 2019-01-01 PROCEDURE — A4253 BLOOD GLUCOSE/REAGENT STRIPS: HCPCS

## 2019-01-01 PROCEDURE — 74011250636 HC RX REV CODE- 250/636: Performed by: FAMILY MEDICINE

## 2019-01-01 PROCEDURE — 3336500001 HSPC ELECTION

## 2019-01-01 PROCEDURE — E0325 URINAL MALE JUG-TYPE: HCPCS

## 2019-01-01 PROCEDURE — 77030002986 HC SUT PROL J&J -A: Performed by: THORACIC SURGERY (CARDIOTHORACIC VASCULAR SURGERY)

## 2019-01-01 PROCEDURE — 96374 THER/PROPH/DIAG INJ IV PUSH: CPT

## 2019-01-01 PROCEDURE — 76010000153 HC OR TIME 1.5 TO 2 HR: Performed by: THORACIC SURGERY (CARDIOTHORACIC VASCULAR SURGERY)

## 2019-01-01 PROCEDURE — 74011636320 HC RX REV CODE- 636/320: Performed by: RADIOLOGY

## 2019-01-01 PROCEDURE — 76210000063 HC OR PH I REC FIRST 0.5 HR: Performed by: THORACIC SURGERY (CARDIOTHORACIC VASCULAR SURGERY)

## 2019-01-01 DEVICE — SYSTEM INFUS PRT CATH 8FR L66CM INTRO 8FR CHST TI SGL LUMN: Type: IMPLANTABLE DEVICE | Site: CHEST | Status: FUNCTIONAL

## 2019-01-01 RX ORDER — ALBUTEROL SULFATE 0.83 MG/ML
2.5 SOLUTION RESPIRATORY (INHALATION) AS NEEDED
Status: CANCELLED
Start: 2019-01-01

## 2019-01-01 RX ORDER — SODIUM CHLORIDE 9 MG/ML
10 INJECTION INTRAMUSCULAR; INTRAVENOUS; SUBCUTANEOUS AS NEEDED
Status: CANCELLED | OUTPATIENT
Start: 2019-01-01

## 2019-01-01 RX ORDER — HEPARIN 100 UNIT/ML
300-500 SYRINGE INTRAVENOUS AS NEEDED
Status: CANCELLED
Start: 2019-01-01

## 2019-01-01 RX ORDER — HYDROCORTISONE SODIUM SUCCINATE 100 MG/2ML
100 INJECTION, POWDER, FOR SOLUTION INTRAMUSCULAR; INTRAVENOUS AS NEEDED
Status: CANCELLED | OUTPATIENT
Start: 2019-01-01

## 2019-01-01 RX ORDER — SODIUM CHLORIDE 0.9 % (FLUSH) 0.9 %
10 SYRINGE (ML) INJECTION AS NEEDED
Status: ACTIVE | OUTPATIENT
Start: 2019-01-01 | End: 2019-01-01

## 2019-01-01 RX ORDER — ONDANSETRON 2 MG/ML
8 INJECTION INTRAMUSCULAR; INTRAVENOUS ONCE
Status: COMPLETED | OUTPATIENT
Start: 2019-01-01 | End: 2019-01-01

## 2019-01-01 RX ORDER — ONDANSETRON 2 MG/ML
8 INJECTION INTRAMUSCULAR; INTRAVENOUS AS NEEDED
Status: CANCELLED | OUTPATIENT
Start: 2019-01-01

## 2019-01-01 RX ORDER — SODIUM CHLORIDE 9 MG/ML
25 INJECTION, SOLUTION INTRAVENOUS CONTINUOUS
Status: DISCONTINUED | OUTPATIENT
Start: 2019-01-01 | End: 2019-01-01 | Stop reason: HOSPADM

## 2019-01-01 RX ORDER — HEPARIN 100 UNIT/ML
300-500 SYRINGE INTRAVENOUS AS NEEDED
Status: ACTIVE | OUTPATIENT
Start: 2019-01-01 | End: 2019-01-01

## 2019-01-01 RX ORDER — HALOPERIDOL 5 MG/ML
2 INJECTION INTRAMUSCULAR EVERY 6 HOURS
Status: DISCONTINUED | OUTPATIENT
Start: 2019-01-01 | End: 2019-01-01 | Stop reason: HOSPADM

## 2019-01-01 RX ORDER — OXYCODONE HYDROCHLORIDE 10 MG/1
10 TABLET ORAL
Qty: 60 TAB | Refills: 0 | Status: SHIPPED | OUTPATIENT
Start: 2019-01-01 | End: 2019-01-01

## 2019-01-01 RX ORDER — AMLODIPINE BESYLATE 10 MG/1
5 TABLET ORAL DAILY
COMMUNITY
End: 2019-01-01 | Stop reason: ALTCHOICE

## 2019-01-01 RX ORDER — PALONOSETRON 0.05 MG/ML
0.25 INJECTION, SOLUTION INTRAVENOUS ONCE
Status: CANCELLED | OUTPATIENT
Start: 2019-01-01

## 2019-01-01 RX ORDER — MORPHINE SULFATE 30 MG/1
30 TABLET, FILM COATED, EXTENDED RELEASE ORAL EVERY 8 HOURS
Qty: 90 TAB | Refills: 0 | Status: SHIPPED | OUTPATIENT
Start: 2019-01-01 | End: 2019-01-01 | Stop reason: SDUPTHER

## 2019-01-01 RX ORDER — ACETAMINOPHEN 325 MG/1
650 TABLET ORAL ONCE
Status: COMPLETED | OUTPATIENT
Start: 2019-01-01 | End: 2019-01-01

## 2019-01-01 RX ORDER — SODIUM CHLORIDE 9 MG/ML
25 INJECTION, SOLUTION INTRAVENOUS CONTINUOUS
Status: DISPENSED | OUTPATIENT
Start: 2019-01-01 | End: 2019-01-01

## 2019-01-01 RX ORDER — SODIUM CHLORIDE 0.9 % (FLUSH) 0.9 %
10 SYRINGE (ML) INJECTION AS NEEDED
Status: CANCELLED
Start: 2019-01-01

## 2019-01-01 RX ORDER — HALOPERIDOL 5 MG/ML
2 INJECTION INTRAMUSCULAR EVERY 4 HOURS
Status: DISCONTINUED | OUTPATIENT
Start: 2019-01-01 | End: 2019-01-01

## 2019-01-01 RX ORDER — CYANOCOBALAMIN 1000 UG/ML
1000 INJECTION, SOLUTION INTRAMUSCULAR; SUBCUTANEOUS
Status: COMPLETED | OUTPATIENT
Start: 2019-01-01 | End: 2019-01-01

## 2019-01-01 RX ORDER — OXYCODONE HCL 20 MG/1
20 TABLET, FILM COATED, EXTENDED RELEASE ORAL EVERY 12 HOURS
Qty: 60 TAB | Refills: 0 | Status: SHIPPED | OUTPATIENT
Start: 2019-01-01 | End: 2019-01-01

## 2019-01-01 RX ORDER — SODIUM CHLORIDE 9 MG/ML
25 INJECTION, SOLUTION INTRAVENOUS CONTINUOUS
Status: CANCELLED | OUTPATIENT
Start: 2019-01-01 | End: 2019-01-01

## 2019-01-01 RX ORDER — EPINEPHRINE 1 MG/ML
0.3 INJECTION, SOLUTION, CONCENTRATE INTRAVENOUS AS NEEDED
Status: CANCELLED | OUTPATIENT
Start: 2019-01-01

## 2019-01-01 RX ORDER — SODIUM CHLORIDE 0.9 % (FLUSH) 0.9 %
5-40 SYRINGE (ML) INJECTION EVERY 8 HOURS
Status: DISCONTINUED | OUTPATIENT
Start: 2019-01-01 | End: 2019-01-01 | Stop reason: HOSPADM

## 2019-01-01 RX ORDER — MORPHINE SULFATE 30 MG/1
30 TABLET, FILM COATED, EXTENDED RELEASE ORAL EVERY 8 HOURS
Qty: 90 TAB | Refills: 0 | Status: SHIPPED | OUTPATIENT
Start: 2019-01-01 | End: 2019-01-01

## 2019-01-01 RX ORDER — DEXAMETHASONE SODIUM PHOSPHATE 10 MG/ML
6 INJECTION INTRAMUSCULAR; INTRAVENOUS DAILY
Status: DISCONTINUED | OUTPATIENT
Start: 2019-01-01 | End: 2019-01-01

## 2019-01-01 RX ORDER — INSULIN GLARGINE 100 [IU]/ML
7 INJECTION, SOLUTION SUBCUTANEOUS
Status: DISCONTINUED | OUTPATIENT
Start: 2019-01-01 | End: 2019-01-01

## 2019-01-01 RX ORDER — ONDANSETRON 2 MG/ML
8 INJECTION INTRAMUSCULAR; INTRAVENOUS ONCE
Status: CANCELLED
Start: 2019-01-01

## 2019-01-01 RX ORDER — HALOPERIDOL 5 MG/ML
2 INJECTION INTRAMUSCULAR
Status: DISCONTINUED | OUTPATIENT
Start: 2019-01-01 | End: 2019-01-01

## 2019-01-01 RX ORDER — DIPHENHYDRAMINE HYDROCHLORIDE 50 MG/ML
12.5 INJECTION, SOLUTION INTRAMUSCULAR; INTRAVENOUS AS NEEDED
Status: DISCONTINUED | OUTPATIENT
Start: 2019-01-01 | End: 2019-01-01 | Stop reason: HOSPADM

## 2019-01-01 RX ORDER — SODIUM CHLORIDE 0.9 % (FLUSH) 0.9 %
5-40 SYRINGE (ML) INJECTION AS NEEDED
Status: DISCONTINUED | OUTPATIENT
Start: 2019-01-01 | End: 2019-01-01 | Stop reason: HOSPADM

## 2019-01-01 RX ORDER — DIPHENHYDRAMINE HYDROCHLORIDE 50 MG/ML
50 INJECTION, SOLUTION INTRAMUSCULAR; INTRAVENOUS AS NEEDED
Status: CANCELLED
Start: 2019-01-01

## 2019-01-01 RX ORDER — DEXTROSE 50 % IN WATER (D50W) INTRAVENOUS SYRINGE
12.5-25 AS NEEDED
Status: DISCONTINUED | OUTPATIENT
Start: 2019-01-01 | End: 2019-01-01

## 2019-01-01 RX ORDER — SODIUM CHLORIDE, SODIUM LACTATE, POTASSIUM CHLORIDE, CALCIUM CHLORIDE 600; 310; 30; 20 MG/100ML; MG/100ML; MG/100ML; MG/100ML
125 INJECTION, SOLUTION INTRAVENOUS CONTINUOUS
Status: DISCONTINUED | OUTPATIENT
Start: 2019-01-01 | End: 2019-01-01 | Stop reason: HOSPADM

## 2019-01-01 RX ORDER — HALOPERIDOL 2 MG/ML
2 SOLUTION ORAL
Status: DISCONTINUED | OUTPATIENT
Start: 2019-01-01 | End: 2019-01-01 | Stop reason: HOSPADM

## 2019-01-01 RX ORDER — SIMVASTATIN 40 MG/1
40 TABLET, FILM COATED ORAL
COMMUNITY
End: 2019-01-01 | Stop reason: ALTCHOICE

## 2019-01-01 RX ORDER — DEXAMETHASONE SODIUM PHOSPHATE 4 MG/ML
8 INJECTION, SOLUTION INTRA-ARTICULAR; INTRALESIONAL; INTRAMUSCULAR; INTRAVENOUS; SOFT TISSUE ONCE
Status: CANCELLED | OUTPATIENT
Start: 2019-01-01

## 2019-01-01 RX ORDER — ONDANSETRON 2 MG/ML
4 INJECTION INTRAMUSCULAR; INTRAVENOUS
Status: DISCONTINUED | OUTPATIENT
Start: 2019-01-01 | End: 2019-01-01 | Stop reason: HOSPADM

## 2019-01-01 RX ORDER — DEXAMETHASONE SODIUM PHOSPHATE 4 MG/ML
10 INJECTION, SOLUTION INTRA-ARTICULAR; INTRALESIONAL; INTRAMUSCULAR; INTRAVENOUS; SOFT TISSUE
Status: COMPLETED | OUTPATIENT
Start: 2019-01-01 | End: 2019-01-01

## 2019-01-01 RX ORDER — SODIUM CHLORIDE 0.9 % (FLUSH) 0.9 %
10 SYRINGE (ML) INJECTION
Status: COMPLETED | OUTPATIENT
Start: 2019-01-01 | End: 2019-01-01

## 2019-01-01 RX ORDER — SODIUM CHLORIDE 9 MG/ML
10 INJECTION INTRAMUSCULAR; INTRAVENOUS; SUBCUTANEOUS AS NEEDED
Status: ACTIVE | OUTPATIENT
Start: 2019-01-01 | End: 2019-01-01

## 2019-01-01 RX ORDER — LIDOCAINE HYDROCHLORIDE 10 MG/ML
5 INJECTION, SOLUTION EPIDURAL; INFILTRATION; INTRACAUDAL; PERINEURAL
Status: COMPLETED | OUTPATIENT
Start: 2019-01-01 | End: 2019-01-01

## 2019-01-01 RX ORDER — MORPHINE SULFATE 30 MG/1
30 TABLET, FILM COATED, EXTENDED RELEASE ORAL EVERY 12 HOURS
Qty: 60 TAB | Refills: 0 | Status: SHIPPED | OUTPATIENT
Start: 2019-01-01 | End: 2019-01-01

## 2019-01-01 RX ORDER — MORPHINE SULFATE 30 MG/1
30 TABLET, FILM COATED, EXTENDED RELEASE ORAL EVERY 8 HOURS
Status: DISCONTINUED | OUTPATIENT
Start: 2019-01-01 | End: 2019-01-01 | Stop reason: HOSPADM

## 2019-01-01 RX ORDER — OXYCODONE HYDROCHLORIDE 10 MG/1
10 TABLET ORAL
Qty: 60 TAB | Refills: 0 | Status: SHIPPED | OUTPATIENT
Start: 2019-01-01 | End: 2019-01-01 | Stop reason: SDUPTHER

## 2019-01-01 RX ORDER — OXYCODONE HYDROCHLORIDE 20 MG/1
20 TABLET ORAL
Qty: 90 TAB | Refills: 0 | Status: SHIPPED | OUTPATIENT
Start: 2019-01-01 | End: 2019-01-01

## 2019-01-01 RX ORDER — INSULIN GLARGINE 100 [IU]/ML
7 INJECTION, SOLUTION SUBCUTANEOUS
Status: DISCONTINUED | OUTPATIENT
Start: 2019-01-01 | End: 2019-01-01 | Stop reason: HOSPADM

## 2019-01-01 RX ORDER — CYANOCOBALAMIN 1000 UG/ML
1000 INJECTION, SOLUTION INTRAMUSCULAR; SUBCUTANEOUS
Status: CANCELLED | OUTPATIENT
Start: 2019-01-01

## 2019-01-01 RX ORDER — INSULIN GLARGINE 100 [IU]/ML
14 INJECTION, SOLUTION SUBCUTANEOUS
Qty: 1 VIAL | Refills: 3 | Status: SHIPPED | OUTPATIENT
Start: 2019-01-01 | End: 2019-01-01

## 2019-01-01 RX ORDER — CYANOCOBALAMIN 1000 UG/ML
1000 INJECTION, SOLUTION INTRAMUSCULAR; SUBCUTANEOUS ONCE
Status: CANCELLED
Start: 2019-01-01

## 2019-01-01 RX ORDER — PALONOSETRON 0.05 MG/ML
0.25 INJECTION, SOLUTION INTRAVENOUS ONCE
Status: COMPLETED | OUTPATIENT
Start: 2019-01-01 | End: 2019-01-01

## 2019-01-01 RX ORDER — DEXTROSE MONOHYDRATE 100 MG/ML
0-250 INJECTION, SOLUTION INTRAVENOUS AS NEEDED
Status: DISCONTINUED | OUTPATIENT
Start: 2019-01-01 | End: 2019-01-01 | Stop reason: HOSPADM

## 2019-01-01 RX ORDER — SODIUM CHLORIDE 0.9 % (FLUSH) 0.9 %
5-40 SYRINGE (ML) INJECTION EVERY 8 HOURS
Status: DISCONTINUED | OUTPATIENT
Start: 2019-01-01 | End: 2019-01-01

## 2019-01-01 RX ORDER — OXYCODONE HYDROCHLORIDE 20 MG/1
20 TABLET ORAL
Qty: 90 TAB | Refills: 0 | Status: SHIPPED | OUTPATIENT
Start: 2019-01-01 | End: 2019-01-01 | Stop reason: SDUPTHER

## 2019-01-01 RX ORDER — LISINOPRIL 10 MG/1
10 TABLET ORAL DAILY
Qty: 30 TAB | Refills: 5 | Status: SHIPPED | OUTPATIENT
Start: 2019-01-01

## 2019-01-01 RX ORDER — SODIUM CHLORIDE 450 MG/100ML
75 INJECTION, SOLUTION INTRAVENOUS CONTINUOUS
Status: DISCONTINUED | OUTPATIENT
Start: 2019-01-01 | End: 2019-01-01

## 2019-01-01 RX ORDER — OXYCODONE HYDROCHLORIDE 15 MG/1
15 TABLET ORAL
Qty: 90 TAB | Refills: 0 | Status: SHIPPED | OUTPATIENT
Start: 2019-01-01 | End: 2019-01-01

## 2019-01-01 RX ORDER — MIDAZOLAM HYDROCHLORIDE 1 MG/ML
1 INJECTION, SOLUTION INTRAMUSCULAR; INTRAVENOUS AS NEEDED
Status: DISCONTINUED | OUTPATIENT
Start: 2019-01-01 | End: 2019-01-01 | Stop reason: HOSPADM

## 2019-01-01 RX ORDER — GABAPENTIN 100 MG/1
100 CAPSULE ORAL 3 TIMES DAILY
Status: DISCONTINUED | OUTPATIENT
Start: 2019-01-01 | End: 2019-01-01 | Stop reason: HOSPADM

## 2019-01-01 RX ORDER — PANTOPRAZOLE SODIUM 40 MG/1
40 TABLET, DELAYED RELEASE ORAL 2 TIMES DAILY
Status: DISCONTINUED | OUTPATIENT
Start: 2019-01-01 | End: 2019-01-01

## 2019-01-01 RX ORDER — FENOFIBRATE 160 MG/1
TABLET ORAL
Refills: 0 | COMMUNITY
Start: 2018-01-01 | End: 2019-01-01

## 2019-01-01 RX ORDER — MEGESTROL ACETATE 40 MG/1
40 TABLET ORAL 2 TIMES DAILY
Qty: 60 TAB | Refills: 0 | Status: SHIPPED | OUTPATIENT
Start: 2019-01-01 | End: 2019-01-01 | Stop reason: SDUPTHER

## 2019-01-01 RX ORDER — MORPHINE SULFATE 2 MG/ML
2 INJECTION, SOLUTION INTRAMUSCULAR; INTRAVENOUS
Status: DISCONTINUED | OUTPATIENT
Start: 2019-01-01 | End: 2019-01-01

## 2019-01-01 RX ORDER — FENTANYL CITRATE 50 UG/ML
50 INJECTION, SOLUTION INTRAMUSCULAR; INTRAVENOUS AS NEEDED
Status: DISCONTINUED | OUTPATIENT
Start: 2019-01-01 | End: 2019-01-01 | Stop reason: HOSPADM

## 2019-01-01 RX ORDER — MIDAZOLAM HYDROCHLORIDE 1 MG/ML
INJECTION, SOLUTION INTRAMUSCULAR; INTRAVENOUS AS NEEDED
Status: DISCONTINUED | OUTPATIENT
Start: 2019-01-01 | End: 2019-01-01 | Stop reason: HOSPADM

## 2019-01-01 RX ORDER — OXYCODONE HYDROCHLORIDE 5 MG/1
15 TABLET ORAL
Status: COMPLETED | OUTPATIENT
Start: 2019-01-01 | End: 2019-01-01

## 2019-01-01 RX ORDER — DEXAMETHASONE SODIUM PHOSPHATE 4 MG/ML
8 INJECTION, SOLUTION INTRA-ARTICULAR; INTRALESIONAL; INTRAMUSCULAR; INTRAVENOUS; SOFT TISSUE ONCE
Status: COMPLETED | OUTPATIENT
Start: 2019-01-01 | End: 2019-01-01

## 2019-01-01 RX ORDER — FENTANYL CITRATE 50 UG/ML
INJECTION, SOLUTION INTRAMUSCULAR; INTRAVENOUS AS NEEDED
Status: DISCONTINUED | OUTPATIENT
Start: 2019-01-01 | End: 2019-01-01 | Stop reason: HOSPADM

## 2019-01-01 RX ORDER — ACETAMINOPHEN 325 MG/1
650 TABLET ORAL AS NEEDED
Status: CANCELLED
Start: 2019-01-01

## 2019-01-01 RX ORDER — POLYETHYLENE GLYCOL 3350 17 G/17G
17 POWDER, FOR SOLUTION ORAL DAILY
Status: DISCONTINUED | OUTPATIENT
Start: 2019-01-01 | End: 2019-01-01 | Stop reason: HOSPADM

## 2019-01-01 RX ORDER — ONDANSETRON 2 MG/ML
4 INJECTION INTRAMUSCULAR; INTRAVENOUS AS NEEDED
Status: DISCONTINUED | OUTPATIENT
Start: 2019-01-01 | End: 2019-01-01 | Stop reason: HOSPADM

## 2019-01-01 RX ORDER — INSULIN LISPRO 100 [IU]/ML
15 INJECTION, SOLUTION INTRAVENOUS; SUBCUTANEOUS ONCE
Status: COMPLETED | OUTPATIENT
Start: 2019-01-01 | End: 2019-01-01

## 2019-01-01 RX ORDER — MORPHINE SULFATE 30 MG/1
30 TABLET, FILM COATED, EXTENDED RELEASE ORAL EVERY 8 HOURS
Qty: 90 TAB | Refills: 0 | Status: SHIPPED | OUTPATIENT
Start: 2019-01-01 | End: 2019-01-01 | Stop reason: CLARIF

## 2019-01-01 RX ORDER — PROCHLORPERAZINE MALEATE 10 MG
5 TABLET ORAL
Qty: 60 TAB | Refills: 3 | Status: SHIPPED | OUTPATIENT
Start: 2019-01-01 | End: 2019-01-01

## 2019-01-01 RX ORDER — SODIUM CHLORIDE 9 MG/ML
10 INJECTION INTRAMUSCULAR; INTRAVENOUS; SUBCUTANEOUS AS NEEDED
Status: DISPENSED | OUTPATIENT
Start: 2019-01-01 | End: 2019-01-01

## 2019-01-01 RX ORDER — FENTANYL CITRATE 50 UG/ML
25 INJECTION, SOLUTION INTRAMUSCULAR; INTRAVENOUS
Status: DISCONTINUED | OUTPATIENT
Start: 2019-01-01 | End: 2019-01-01 | Stop reason: HOSPADM

## 2019-01-01 RX ORDER — MORPHINE SULFATE 30 MG/1
30 TABLET, FILM COATED, EXTENDED RELEASE ORAL EVERY 8 HOURS
Qty: 90 TAB | Refills: 0 | OUTPATIENT
Start: 2019-01-01 | End: 2019-01-01

## 2019-01-01 RX ORDER — DEXAMETHASONE 4 MG/1
6 TABLET ORAL 2 TIMES DAILY
Status: DISCONTINUED | OUTPATIENT
Start: 2019-01-01 | End: 2019-01-01 | Stop reason: HOSPADM

## 2019-01-01 RX ORDER — GUAIFENESIN 100 MG/5ML
100 SOLUTION ORAL
Status: DISCONTINUED | OUTPATIENT
Start: 2019-01-01 | End: 2019-01-01

## 2019-01-01 RX ORDER — FENTANYL CITRATE 50 UG/ML
100 INJECTION, SOLUTION INTRAMUSCULAR; INTRAVENOUS
Status: DISCONTINUED | OUTPATIENT
Start: 2019-01-01 | End: 2019-01-01

## 2019-01-01 RX ORDER — INSULIN GLARGINE 100 [IU]/ML
14 INJECTION, SOLUTION SUBCUTANEOUS
Qty: 1 VIAL | Refills: 1 | Status: SHIPPED | OUTPATIENT
Start: 2019-01-01 | End: 2019-01-01 | Stop reason: SDUPTHER

## 2019-01-01 RX ORDER — MORPHINE SULFATE 100 MG/5ML
10 SOLUTION ORAL
Status: DISCONTINUED | OUTPATIENT
Start: 2019-01-01 | End: 2019-01-01

## 2019-01-01 RX ORDER — INSULIN GLARGINE 100 [IU]/ML
7 INJECTION, SOLUTION SUBCUTANEOUS
Qty: 3 VIAL | Refills: 3 | Status: SHIPPED | OUTPATIENT
Start: 2019-01-01 | End: 2019-01-01

## 2019-01-01 RX ORDER — AMOXICILLIN 250 MG
1 CAPSULE ORAL DAILY
Status: DISCONTINUED | OUTPATIENT
Start: 2019-01-01 | End: 2019-01-01 | Stop reason: HOSPADM

## 2019-01-01 RX ORDER — IBUPROFEN 200 MG
TABLET ORAL
COMMUNITY

## 2019-01-01 RX ORDER — DEXAMETHASONE 1 MG/1
2 TABLET ORAL DAILY
Qty: 30 TAB | Refills: 0 | Status: SHIPPED | OUTPATIENT
Start: 2019-01-01 | End: 2019-01-01 | Stop reason: SDUPTHER

## 2019-01-01 RX ORDER — ACETAMINOPHEN 650 MG/1
650 SUPPOSITORY RECTAL
Status: DISCONTINUED | OUTPATIENT
Start: 2019-01-01 | End: 2019-01-01 | Stop reason: HOSPADM

## 2019-01-01 RX ORDER — OXYCODONE HYDROCHLORIDE 15 MG/1
15 TABLET ORAL
Qty: 63 TAB | Refills: 0 | Status: SHIPPED | OUTPATIENT
Start: 2019-01-01 | End: 2019-01-01

## 2019-01-01 RX ORDER — MAGNESIUM SULFATE 100 %
4 CRYSTALS MISCELLANEOUS AS NEEDED
Status: DISCONTINUED | OUTPATIENT
Start: 2019-01-01 | End: 2019-01-01 | Stop reason: HOSPADM

## 2019-01-01 RX ORDER — ONDANSETRON 4 MG/1
4 TABLET, ORALLY DISINTEGRATING ORAL
Status: DISCONTINUED | OUTPATIENT
Start: 2019-01-01 | End: 2019-01-01 | Stop reason: HOSPADM

## 2019-01-01 RX ORDER — CEFAZOLIN SODIUM/WATER 2 G/20 ML
2 SYRINGE (ML) INTRAVENOUS ONCE
Status: COMPLETED | OUTPATIENT
Start: 2019-01-01 | End: 2019-01-01

## 2019-01-01 RX ORDER — MORPHINE SULFATE 15 MG/1
60 TABLET, FILM COATED, EXTENDED RELEASE ORAL EVERY 8 HOURS
Status: DISCONTINUED | OUTPATIENT
Start: 2019-01-01 | End: 2019-01-01

## 2019-01-01 RX ORDER — POLYETHYLENE GLYCOL 3350 17 G/17G
17 POWDER, FOR SOLUTION ORAL DAILY
Qty: 30 PACKET | Refills: 0 | Status: SHIPPED | OUTPATIENT
Start: 2019-01-01 | End: 2019-01-01

## 2019-01-01 RX ORDER — OXYCODONE HYDROCHLORIDE 5 MG/1
20 TABLET ORAL
Status: DISCONTINUED | OUTPATIENT
Start: 2019-01-01 | End: 2019-01-01 | Stop reason: HOSPADM

## 2019-01-01 RX ORDER — LIDOCAINE HYDROCHLORIDE 10 MG/ML
0.1 INJECTION, SOLUTION EPIDURAL; INFILTRATION; INTRACAUDAL; PERINEURAL AS NEEDED
Status: DISCONTINUED | OUTPATIENT
Start: 2019-01-01 | End: 2019-01-01 | Stop reason: HOSPADM

## 2019-01-01 RX ORDER — GUAIFENESIN 600 MG/1
600 TABLET, EXTENDED RELEASE ORAL EVERY 12 HOURS
Status: DISCONTINUED | OUTPATIENT
Start: 2019-01-01 | End: 2019-01-01

## 2019-01-01 RX ORDER — FACIAL-BODY WIPES
10 EACH TOPICAL DAILY PRN
Status: DISCONTINUED | OUTPATIENT
Start: 2019-01-01 | End: 2019-01-01 | Stop reason: HOSPADM

## 2019-01-01 RX ORDER — MORPHINE SULFATE 10 MG/ML
2 INJECTION, SOLUTION INTRAMUSCULAR; INTRAVENOUS
Status: DISCONTINUED | OUTPATIENT
Start: 2019-01-01 | End: 2019-01-01 | Stop reason: HOSPADM

## 2019-01-01 RX ORDER — LORAZEPAM 2 MG/ML
1 INJECTION INTRAMUSCULAR
Status: DISCONTINUED | OUTPATIENT
Start: 2019-01-01 | End: 2019-01-01 | Stop reason: HOSPADM

## 2019-01-01 RX ORDER — SODIUM CHLORIDE 9 MG/ML
250 INJECTION, SOLUTION INTRAVENOUS CONTINUOUS
Status: DISCONTINUED | OUTPATIENT
Start: 2019-01-01 | End: 2019-01-01

## 2019-01-01 RX ORDER — GADOTERATE MEGLUMINE 376.9 MG/ML
20 INJECTION INTRAVENOUS
Status: COMPLETED | OUTPATIENT
Start: 2019-01-01 | End: 2019-01-01

## 2019-01-01 RX ORDER — LISINOPRIL 5 MG/1
5 TABLET ORAL DAILY
Qty: 30 TAB | Refills: 3 | Status: SHIPPED | OUTPATIENT
Start: 2019-01-01 | End: 2019-01-01

## 2019-01-01 RX ORDER — IBUPROFEN 200 MG
1-2 CAPSULE ORAL AS NEEDED
COMMUNITY
End: 2019-01-01

## 2019-01-01 RX ORDER — DEXAMETHASONE SODIUM PHOSPHATE 4 MG/ML
4 INJECTION, SOLUTION INTRA-ARTICULAR; INTRALESIONAL; INTRAMUSCULAR; INTRAVENOUS; SOFT TISSUE EVERY 6 HOURS
Status: DISCONTINUED | OUTPATIENT
Start: 2019-01-01 | End: 2019-01-01

## 2019-01-01 RX ORDER — PANTOPRAZOLE SODIUM 40 MG/1
40 TABLET, DELAYED RELEASE ORAL
Status: DISCONTINUED | OUTPATIENT
Start: 2019-01-01 | End: 2019-01-01 | Stop reason: HOSPADM

## 2019-01-01 RX ORDER — OXYCODONE HYDROCHLORIDE 20 MG/1
20 TABLET ORAL
Qty: 60 TAB | Refills: 0 | Status: SHIPPED | OUTPATIENT
Start: 2019-01-01 | End: 2019-01-01

## 2019-01-01 RX ORDER — OXYCODONE HYDROCHLORIDE 5 MG/1
5 TABLET ORAL
Qty: 60 TAB | Refills: 0 | Status: SHIPPED | OUTPATIENT
Start: 2019-01-01 | End: 2019-01-01 | Stop reason: ALTCHOICE

## 2019-01-01 RX ORDER — OXYCODONE HYDROCHLORIDE 5 MG/1
15 TABLET ORAL
Status: DISCONTINUED | OUTPATIENT
Start: 2019-01-01 | End: 2019-01-01 | Stop reason: SDUPTHER

## 2019-01-01 RX ORDER — IPRATROPIUM BROMIDE AND ALBUTEROL SULFATE 2.5; .5 MG/3ML; MG/3ML
3 SOLUTION RESPIRATORY (INHALATION)
Status: DISCONTINUED | OUTPATIENT
Start: 2019-01-01 | End: 2019-01-01

## 2019-01-01 RX ORDER — LORAZEPAM 2 MG/ML
1 CONCENTRATE ORAL
Status: DISCONTINUED | OUTPATIENT
Start: 2019-01-01 | End: 2019-01-01

## 2019-01-01 RX ORDER — LIDOCAINE HYDROCHLORIDE 20 MG/ML
INJECTION, SOLUTION EPIDURAL; INFILTRATION; INTRACAUDAL; PERINEURAL AS NEEDED
Status: DISCONTINUED | OUTPATIENT
Start: 2019-01-01 | End: 2019-01-01 | Stop reason: HOSPADM

## 2019-01-01 RX ORDER — INSULIN LISPRO 100 [IU]/ML
INJECTION, SOLUTION INTRAVENOUS; SUBCUTANEOUS
Status: DISCONTINUED | OUTPATIENT
Start: 2019-01-01 | End: 2019-01-01 | Stop reason: HOSPADM

## 2019-01-01 RX ORDER — HEPARIN 100 UNIT/ML
500 SYRINGE INTRAVENOUS
Status: COMPLETED | OUTPATIENT
Start: 2019-01-01 | End: 2019-01-01

## 2019-01-01 RX ORDER — INSULIN GLARGINE 100 [IU]/ML
INJECTION, SOLUTION SUBCUTANEOUS
COMMUNITY
Start: 2018-07-03 | End: 2019-01-01 | Stop reason: SDUPTHER

## 2019-01-01 RX ORDER — PANTOPRAZOLE SODIUM 40 MG/1
40 TABLET, DELAYED RELEASE ORAL DAILY
COMMUNITY
End: 2019-01-01 | Stop reason: SDUPTHER

## 2019-01-01 RX ORDER — GABAPENTIN 100 MG/1
100 CAPSULE ORAL 3 TIMES DAILY
Status: DISCONTINUED | OUTPATIENT
Start: 2019-01-01 | End: 2019-01-01

## 2019-01-01 RX ORDER — PALONOSETRON 0.05 MG/ML
0.25 INJECTION, SOLUTION INTRAVENOUS ONCE
Status: CANCELLED | OUTPATIENT
Start: 2019-01-01 | End: 2019-01-01

## 2019-01-01 RX ORDER — ACETAMINOPHEN 325 MG/1
325 TABLET ORAL
COMMUNITY
End: 2019-01-01

## 2019-01-01 RX ORDER — GABAPENTIN 100 MG/1
100 CAPSULE ORAL 3 TIMES DAILY
Qty: 90 CAP | Refills: 0 | Status: SHIPPED | OUTPATIENT
Start: 2019-01-01

## 2019-01-01 RX ORDER — LISINOPRIL 10 MG/1
10 TABLET ORAL DAILY
Status: DISCONTINUED | OUTPATIENT
Start: 2019-01-01 | End: 2019-01-01 | Stop reason: HOSPADM

## 2019-01-01 RX ORDER — MORPHINE SULFATE 2 MG/ML
6 INJECTION, SOLUTION INTRAMUSCULAR; INTRAVENOUS
Status: DISCONTINUED | OUTPATIENT
Start: 2019-01-01 | End: 2019-01-01

## 2019-01-01 RX ORDER — INSULIN LISPRO 100 [IU]/ML
INJECTION, SOLUTION INTRAVENOUS; SUBCUTANEOUS
Qty: 3 VIAL | Refills: 1 | Status: SHIPPED | OUTPATIENT
Start: 2019-01-01

## 2019-01-01 RX ORDER — OXYCODONE HYDROCHLORIDE 10 MG/1
10 TABLET ORAL
Qty: 60 TAB | Refills: 0 | Status: SHIPPED | OUTPATIENT
Start: 2019-01-01 | End: 2019-01-01 | Stop reason: CLARIF

## 2019-01-01 RX ORDER — MEGESTROL ACETATE 40 MG/1
40 TABLET ORAL 2 TIMES DAILY
Qty: 60 TAB | Refills: 2 | Status: SHIPPED | OUTPATIENT
Start: 2019-01-01

## 2019-01-01 RX ORDER — DEXAMETHASONE 4 MG/1
4 TABLET ORAL 3 TIMES DAILY
Status: DISCONTINUED | OUTPATIENT
Start: 2019-01-01 | End: 2019-01-01 | Stop reason: HOSPADM

## 2019-01-01 RX ORDER — MORPHINE SULFATE 100 MG/5ML
10 SOLUTION ORAL EVERY 6 HOURS
Status: DISCONTINUED | OUTPATIENT
Start: 2019-01-01 | End: 2019-01-01

## 2019-01-01 RX ORDER — SODIUM CHLORIDE 0.9 % (FLUSH) 0.9 %
5-40 SYRINGE (ML) INJECTION AS NEEDED
Status: DISCONTINUED | OUTPATIENT
Start: 2019-01-01 | End: 2019-01-01

## 2019-01-01 RX ORDER — ONDANSETRON 4 MG/1
8 TABLET, ORALLY DISINTEGRATING ORAL
Status: DISCONTINUED | OUTPATIENT
Start: 2019-01-01 | End: 2019-01-01

## 2019-01-01 RX ORDER — DEXAMETHASONE 4 MG/1
4 TABLET ORAL 3 TIMES DAILY
Qty: 90 TAB | Refills: 0 | Status: SHIPPED | OUTPATIENT
Start: 2019-01-01 | End: 2019-01-01

## 2019-01-01 RX ORDER — SCOLOPAMINE TRANSDERMAL SYSTEM 1 MG/1
1 PATCH, EXTENDED RELEASE TRANSDERMAL
Status: DISCONTINUED | OUTPATIENT
Start: 2019-01-01 | End: 2019-01-01 | Stop reason: HOSPADM

## 2019-01-01 RX ORDER — MORPHINE SULFATE 2 MG/ML
4 INJECTION, SOLUTION INTRAMUSCULAR; INTRAVENOUS
Status: DISCONTINUED | OUTPATIENT
Start: 2019-01-01 | End: 2019-01-01 | Stop reason: HOSPADM

## 2019-01-01 RX ORDER — GADOTERATE MEGLUMINE 376.9 MG/ML
14 INJECTION INTRAVENOUS
Status: COMPLETED | OUTPATIENT
Start: 2019-01-01 | End: 2019-01-01

## 2019-01-01 RX ORDER — MIDAZOLAM HYDROCHLORIDE 1 MG/ML
5 INJECTION, SOLUTION INTRAMUSCULAR; INTRAVENOUS
Status: DISCONTINUED | OUTPATIENT
Start: 2019-01-01 | End: 2019-01-01

## 2019-01-01 RX ORDER — SIMVASTATIN 40 MG/1
40 TABLET, FILM COATED ORAL DAILY
COMMUNITY
End: 2019-01-01

## 2019-01-01 RX ORDER — OXYCODONE HYDROCHLORIDE 15 MG/1
15 TABLET ORAL
Qty: 60 TAB | Refills: 0 | Status: SHIPPED | OUTPATIENT
Start: 2019-01-01 | End: 2019-01-01

## 2019-01-01 RX ORDER — SENNOSIDES 8.6 MG/1
2 TABLET ORAL
COMMUNITY
End: 2019-01-01

## 2019-01-01 RX ORDER — ONDANSETRON 2 MG/ML
4 INJECTION INTRAMUSCULAR; INTRAVENOUS
Status: DISCONTINUED | OUTPATIENT
Start: 2019-01-01 | End: 2019-01-01

## 2019-01-01 RX ORDER — FLAXSEED OIL 1000 MG
1 CAPSULE ORAL DAILY
COMMUNITY
End: 2019-01-01

## 2019-01-01 RX ORDER — PANTOPRAZOLE SODIUM 40 MG/1
40 TABLET, DELAYED RELEASE ORAL 2 TIMES DAILY
Status: DISCONTINUED | OUTPATIENT
Start: 2019-01-01 | End: 2019-01-01 | Stop reason: HOSPADM

## 2019-01-01 RX ORDER — FENOFIBRATE 160 MG/1
160 TABLET ORAL DAILY
COMMUNITY
End: 2019-01-01

## 2019-01-01 RX ORDER — MORPHINE SULFATE 100 MG/5ML
10 SOLUTION ORAL EVERY 4 HOURS
Status: DISCONTINUED | OUTPATIENT
Start: 2019-01-01 | End: 2019-01-01

## 2019-01-01 RX ORDER — INSULIN LISPRO 100 [IU]/ML
INJECTION, SOLUTION INTRAVENOUS; SUBCUTANEOUS EVERY 4 HOURS
Status: DISCONTINUED | OUTPATIENT
Start: 2019-01-01 | End: 2019-01-01 | Stop reason: HOSPADM

## 2019-01-01 RX ORDER — SODIUM CHLORIDE 9 MG/ML
25 INJECTION, SOLUTION INTRAVENOUS CONTINUOUS
Status: CANCELLED | OUTPATIENT
Start: 2019-01-01

## 2019-01-01 RX ORDER — CYANOCOBALAMIN 1000 UG/ML
1000 INJECTION, SOLUTION INTRAMUSCULAR; SUBCUTANEOUS ONCE
Status: COMPLETED | OUTPATIENT
Start: 2019-01-01 | End: 2019-01-01

## 2019-01-01 RX ORDER — DEXAMETHASONE 1 MG/1
1 TABLET ORAL DAILY
Qty: 7 TAB | Refills: 0 | Status: SHIPPED | OUTPATIENT
Start: 2019-01-01 | End: 2019-01-01 | Stop reason: SDUPTHER

## 2019-01-01 RX ORDER — INSULIN GLARGINE 100 [IU]/ML
14 INJECTION, SOLUTION SUBCUTANEOUS
Status: DISCONTINUED | OUTPATIENT
Start: 2019-01-01 | End: 2019-01-01

## 2019-01-01 RX ORDER — DOCUSATE SODIUM 100 MG/1
100 CAPSULE, LIQUID FILLED ORAL 2 TIMES DAILY
COMMUNITY
End: 2019-01-01

## 2019-01-01 RX ORDER — PROPOFOL 10 MG/ML
INJECTION, EMULSION INTRAVENOUS AS NEEDED
Status: DISCONTINUED | OUTPATIENT
Start: 2019-01-01 | End: 2019-01-01 | Stop reason: HOSPADM

## 2019-01-01 RX ORDER — KETOROLAC TROMETHAMINE 30 MG/ML
30 INJECTION, SOLUTION INTRAMUSCULAR; INTRAVENOUS
Status: ACTIVE | OUTPATIENT
Start: 2019-01-01 | End: 2019-01-01

## 2019-01-01 RX ORDER — INSULIN GLARGINE 100 [IU]/ML
10 INJECTION, SOLUTION SUBCUTANEOUS
Status: CANCELLED | OUTPATIENT
Start: 2019-01-01

## 2019-01-01 RX ORDER — DEXAMETHASONE SODIUM PHOSPHATE 4 MG/ML
6 INJECTION, SOLUTION INTRA-ARTICULAR; INTRALESIONAL; INTRAMUSCULAR; INTRAVENOUS; SOFT TISSUE DAILY
Status: DISCONTINUED | OUTPATIENT
Start: 2019-01-01 | End: 2019-01-01

## 2019-01-01 RX ORDER — INSULIN LISPRO 100 [IU]/ML
INJECTION, SOLUTION INTRAVENOUS; SUBCUTANEOUS
Qty: 1 VIAL | Refills: 3
Start: 2019-01-01 | End: 2019-01-01 | Stop reason: SDUPTHER

## 2019-01-01 RX ORDER — OXYCODONE HYDROCHLORIDE 20 MG/1
20 TABLET ORAL
Qty: 180 TAB | Refills: 0 | Status: SHIPPED | OUTPATIENT
Start: 2019-01-01 | End: 2019-01-01

## 2019-01-01 RX ORDER — MORPHINE SULFATE 4 MG/ML
4 INJECTION INTRAVENOUS EVERY 4 HOURS
Status: DISCONTINUED | OUTPATIENT
Start: 2019-01-01 | End: 2019-01-01 | Stop reason: HOSPADM

## 2019-01-01 RX ORDER — DEXAMETHASONE 2 MG/1
2 TABLET ORAL 3 TIMES DAILY
Qty: 90 TAB | Refills: 0 | Status: SHIPPED | OUTPATIENT
Start: 2019-01-01 | End: 2019-01-01 | Stop reason: SDUPTHER

## 2019-01-01 RX ORDER — AMOXICILLIN 250 MG
2 CAPSULE ORAL DAILY
Qty: 120 TAB | Refills: 6 | Status: SHIPPED | OUTPATIENT
Start: 2019-01-01 | End: 2019-01-01

## 2019-01-01 RX ORDER — PANTOPRAZOLE SODIUM 40 MG/1
40 TABLET, DELAYED RELEASE ORAL DAILY
Qty: 30 TAB | Refills: 6 | Status: SHIPPED | OUTPATIENT
Start: 2019-01-01 | End: 2019-01-01 | Stop reason: SDUPTHER

## 2019-01-01 RX ORDER — MORPHINE SULFATE 10 MG/ML
6 INJECTION, SOLUTION INTRAMUSCULAR; INTRAVENOUS
Status: DISCONTINUED | OUTPATIENT
Start: 2019-01-01 | End: 2019-01-01

## 2019-01-01 RX ORDER — LACTULOSE 10 G/15ML
20 SOLUTION ORAL; RECTAL 3 TIMES DAILY
Qty: 480 ML | Refills: 1 | Status: SHIPPED | OUTPATIENT
Start: 2019-01-01 | End: 2019-01-01

## 2019-01-01 RX ORDER — ACETAMINOPHEN 325 MG/1
650 TABLET ORAL
Status: DISCONTINUED | OUTPATIENT
Start: 2019-01-01 | End: 2019-01-01 | Stop reason: HOSPADM

## 2019-01-01 RX ORDER — BUTALBITAL, ACETAMINOPHEN AND CAFFEINE 300; 40; 50 MG/1; MG/1; MG/1
2 CAPSULE ORAL
COMMUNITY
End: 2019-01-01

## 2019-01-01 RX ORDER — DEXMEDETOMIDINE HYDROCHLORIDE 4 UG/ML
INJECTION, SOLUTION INTRAVENOUS AS NEEDED
Status: DISCONTINUED | OUTPATIENT
Start: 2019-01-01 | End: 2019-01-01 | Stop reason: HOSPADM

## 2019-01-01 RX ORDER — ONDANSETRON 4 MG/1
4 TABLET, ORALLY DISINTEGRATING ORAL
Qty: 40 TAB | Refills: 1 | Status: SHIPPED | OUTPATIENT
Start: 2019-01-01

## 2019-01-01 RX ORDER — SODIUM CHLORIDE 9 MG/ML
75 INJECTION, SOLUTION INTRAVENOUS CONTINUOUS
Status: DISCONTINUED | OUTPATIENT
Start: 2019-01-01 | End: 2019-01-01

## 2019-01-01 RX ORDER — MORPHINE SULFATE 4 MG/ML
3 INJECTION INTRAVENOUS EVERY 4 HOURS
Status: DISCONTINUED | OUTPATIENT
Start: 2019-01-01 | End: 2019-01-01

## 2019-01-01 RX ORDER — BARIUM SULFATE 20 MG/ML
900 SUSPENSION ORAL
Status: COMPLETED | OUTPATIENT
Start: 2019-01-01 | End: 2019-01-01

## 2019-01-01 RX ORDER — OXYCODONE AND ACETAMINOPHEN 5; 325 MG/1; MG/1
1 TABLET ORAL AS NEEDED
Status: DISCONTINUED | OUTPATIENT
Start: 2019-01-01 | End: 2019-01-01 | Stop reason: HOSPADM

## 2019-01-01 RX ORDER — ASCORBIC ACID 250 MG
250 TABLET ORAL DAILY
COMMUNITY
End: 2019-01-01

## 2019-01-01 RX ORDER — INSULIN LISPRO 100 [IU]/ML
INJECTION, SOLUTION INTRAVENOUS; SUBCUTANEOUS
Qty: 5 PEN | Refills: 0 | Status: SHIPPED | OUTPATIENT
Start: 2019-01-01 | End: 2019-01-01 | Stop reason: SDUPTHER

## 2019-01-01 RX ORDER — OXYCODONE HYDROCHLORIDE 10 MG/1
15 TABLET ORAL
Qty: 60 TAB | Refills: 0 | Status: SHIPPED | OUTPATIENT
Start: 2019-01-01 | End: 2019-01-01

## 2019-01-01 RX ORDER — INSULIN LISPRO 100 [IU]/ML
INJECTION, SOLUTION INTRAVENOUS; SUBCUTANEOUS
Qty: 1 VIAL | Refills: 3 | Status: SHIPPED | OUTPATIENT
Start: 2019-01-01 | End: 2019-01-01

## 2019-01-01 RX ORDER — OXYCODONE HYDROCHLORIDE 5 MG/1
15 TABLET ORAL
Status: DISCONTINUED | OUTPATIENT
Start: 2019-01-01 | End: 2019-01-01

## 2019-01-01 RX ORDER — DEXTROSE 50 % IN WATER (D50W) INTRAVENOUS SYRINGE
50 AS NEEDED
Status: DISCONTINUED | OUTPATIENT
Start: 2019-01-01 | End: 2019-01-01

## 2019-01-01 RX ORDER — GLYCOPYRROLATE 0.2 MG/ML
0.2 INJECTION INTRAMUSCULAR; INTRAVENOUS
Status: DISCONTINUED | OUTPATIENT
Start: 2019-01-01 | End: 2019-01-01 | Stop reason: HOSPADM

## 2019-01-01 RX ORDER — INSULIN LISPRO 100 [IU]/ML
15 INJECTION, SOLUTION INTRAVENOUS; SUBCUTANEOUS ONCE
Status: DISCONTINUED | OUTPATIENT
Start: 2019-01-01 | End: 2019-01-01

## 2019-01-01 RX ORDER — PANTOPRAZOLE SODIUM 40 MG/1
40 TABLET, DELAYED RELEASE ORAL 2 TIMES DAILY
Qty: 60 TAB | Refills: 6 | Status: SHIPPED | OUTPATIENT
Start: 2019-01-01

## 2019-01-01 RX ORDER — HEPARIN 100 UNIT/ML
SYRINGE INTRAVENOUS AS NEEDED
Status: DISCONTINUED | OUTPATIENT
Start: 2019-01-01 | End: 2019-01-01 | Stop reason: HOSPADM

## 2019-01-01 RX ORDER — DEXAMETHASONE 1 MG/1
1 TABLET ORAL DAILY
Qty: 7 TAB | Refills: 0 | Status: SHIPPED | OUTPATIENT
Start: 2019-01-01 | End: 2019-01-01

## 2019-01-01 RX ORDER — MORPHINE SULFATE 30 MG/1
30 TABLET, FILM COATED, EXTENDED RELEASE ORAL EVERY 12 HOURS
Qty: 60 TAB | Refills: 0 | Status: SHIPPED | OUTPATIENT
Start: 2019-01-01 | End: 2019-01-01 | Stop reason: SDUPTHER

## 2019-01-01 RX ORDER — LIDOCAINE AND PRILOCAINE 25; 25 MG/G; MG/G
CREAM TOPICAL AS NEEDED
Qty: 30 G | Refills: 0 | Status: SHIPPED | OUTPATIENT
Start: 2019-01-01

## 2019-01-01 RX ORDER — LISINOPRIL 20 MG/1
20 TABLET ORAL DAILY
COMMUNITY
End: 2019-01-01 | Stop reason: ALTCHOICE

## 2019-01-01 RX ORDER — INSULIN LISPRO 100 [IU]/ML
INJECTION, SOLUTION INTRAVENOUS; SUBCUTANEOUS
COMMUNITY
Start: 2018-04-24 | End: 2019-01-01 | Stop reason: SDUPTHER

## 2019-01-01 RX ORDER — HALOPERIDOL 2 MG/ML
2 SOLUTION ORAL EVERY 4 HOURS
Status: DISCONTINUED | OUTPATIENT
Start: 2019-01-01 | End: 2019-01-01

## 2019-01-01 RX ORDER — DEXAMETHASONE 4 MG/1
4 TABLET ORAL EVERY 12 HOURS
COMMUNITY
End: 2019-01-01 | Stop reason: SDUPTHER

## 2019-01-01 RX ORDER — INSULIN GLARGINE 100 [IU]/ML
10 INJECTION, SOLUTION SUBCUTANEOUS
Status: DISCONTINUED | OUTPATIENT
Start: 2019-01-01 | End: 2019-01-01 | Stop reason: HOSPADM

## 2019-01-01 RX ORDER — MEGESTROL ACETATE 40 MG/1
40 TABLET ORAL 2 TIMES DAILY
Status: DISCONTINUED | OUTPATIENT
Start: 2019-01-01 | End: 2019-01-01 | Stop reason: HOSPADM

## 2019-01-01 RX ORDER — SODIUM CHLORIDE, SODIUM LACTATE, POTASSIUM CHLORIDE, CALCIUM CHLORIDE 600; 310; 30; 20 MG/100ML; MG/100ML; MG/100ML; MG/100ML
INJECTION, SOLUTION INTRAVENOUS
Status: DISCONTINUED | OUTPATIENT
Start: 2019-01-01 | End: 2019-01-01 | Stop reason: HOSPADM

## 2019-01-01 RX ORDER — PROPOFOL 10 MG/ML
INJECTION, EMULSION INTRAVENOUS
Status: DISCONTINUED | OUTPATIENT
Start: 2019-01-01 | End: 2019-01-01 | Stop reason: HOSPADM

## 2019-01-01 RX ORDER — MIDAZOLAM HYDROCHLORIDE 1 MG/ML
0.5 INJECTION, SOLUTION INTRAMUSCULAR; INTRAVENOUS
Status: DISCONTINUED | OUTPATIENT
Start: 2019-01-01 | End: 2019-01-01 | Stop reason: HOSPADM

## 2019-01-01 RX ORDER — INSULIN GLARGINE 100 [IU]/ML
14 INJECTION, SOLUTION SUBCUTANEOUS
Qty: 1 VIAL | Refills: 3
Start: 2019-01-01 | End: 2019-01-01 | Stop reason: SDUPTHER

## 2019-01-01 RX ADMIN — HALOPERIDOL LACTATE 2 MG: 5 INJECTION, SOLUTION INTRAMUSCULAR at 12:11

## 2019-01-01 RX ADMIN — HUMAN INSULIN 10 UNITS: 100 INJECTION, SOLUTION SUBCUTANEOUS at 17:14

## 2019-01-01 RX ADMIN — Medication 10 ML: at 10:20

## 2019-01-01 RX ADMIN — IPRATROPIUM BROMIDE AND ALBUTEROL SULFATE 3 ML: .5; 3 SOLUTION RESPIRATORY (INHALATION) at 00:50

## 2019-01-01 RX ADMIN — MORPHINE SULFATE 10 MG: 100 SOLUTION ORAL at 13:41

## 2019-01-01 RX ADMIN — MORPHINE SULFATE 10 MG: 100 SOLUTION ORAL at 01:42

## 2019-01-01 RX ADMIN — MEGESTROL ACETATE 40 MG: 40 TABLET ORAL at 18:59

## 2019-01-01 RX ADMIN — IPRATROPIUM BROMIDE AND ALBUTEROL SULFATE 3 ML: .5; 3 SOLUTION RESPIRATORY (INHALATION) at 00:53

## 2019-01-01 RX ADMIN — POLYETHYLENE GLYCOL 3350 17 G: 17 POWDER, FOR SOLUTION ORAL at 08:43

## 2019-01-01 RX ADMIN — HALOPERIDOL LACTATE 2 MG: 2 SOLUTION, CONCENTRATE ORAL at 20:28

## 2019-01-01 RX ADMIN — GABAPENTIN 100 MG: 100 CAPSULE ORAL at 21:23

## 2019-01-01 RX ADMIN — PANTOPRAZOLE SODIUM 40 MG: 40 TABLET, DELAYED RELEASE ORAL at 08:55

## 2019-01-01 RX ADMIN — SODIUM CHLORIDE 25 ML/HR: 900 INJECTION, SOLUTION INTRAVENOUS at 11:49

## 2019-01-01 RX ADMIN — LORAZEPAM 1 MG: 2 SOLUTION, CONCENTRATE ORAL at 18:22

## 2019-01-01 RX ADMIN — ONDANSETRON 8 MG: 2 INJECTION, SOLUTION INTRAMUSCULAR; INTRAVENOUS at 11:50

## 2019-01-01 RX ADMIN — LIDOCAINE HYDROCHLORIDE 60 MG: 20 INJECTION, SOLUTION EPIDURAL; INFILTRATION; INTRACAUDAL; PERINEURAL at 12:26

## 2019-01-01 RX ADMIN — IPRATROPIUM BROMIDE AND ALBUTEROL SULFATE 3 ML: .5; 3 SOLUTION RESPIRATORY (INHALATION) at 06:27

## 2019-01-01 RX ADMIN — MORPHINE SULFATE 30 MG: 30 TABLET, FILM COATED, EXTENDED RELEASE ORAL at 05:53

## 2019-01-01 RX ADMIN — FENTANYL CITRATE 25 MCG: 50 INJECTION, SOLUTION INTRAMUSCULAR; INTRAVENOUS at 11:58

## 2019-01-01 RX ADMIN — DEXAMETHASONE SODIUM PHOSPHATE 4 MG: 4 INJECTION, SOLUTION INTRAMUSCULAR; INTRAVENOUS at 05:00

## 2019-01-01 RX ADMIN — Medication 10 ML: at 14:00

## 2019-01-01 RX ADMIN — SODIUM CHLORIDE 10 ML: 9 INJECTION, SOLUTION INTRAMUSCULAR; INTRAVENOUS; SUBCUTANEOUS at 11:24

## 2019-01-01 RX ADMIN — Medication 10 ML: at 15:20

## 2019-01-01 RX ADMIN — MORPHINE SULFATE 10 MG: 100 SOLUTION ORAL at 18:14

## 2019-01-01 RX ADMIN — Medication 10 ML: at 05:42

## 2019-01-01 RX ADMIN — MORPHINE SULFATE 3 MG: 4 INJECTION INTRAVENOUS at 19:55

## 2019-01-01 RX ADMIN — IPRATROPIUM BROMIDE AND ALBUTEROL SULFATE 3 ML: .5; 3 SOLUTION RESPIRATORY (INHALATION) at 05:02

## 2019-01-01 RX ADMIN — DEXAMETHASONE SODIUM PHOSPHATE 4 MG: 4 INJECTION, SOLUTION INTRAMUSCULAR; INTRAVENOUS at 05:41

## 2019-01-01 RX ADMIN — Medication 10 ML: at 14:43

## 2019-01-01 RX ADMIN — HALOPERIDOL LACTATE 2 MG: 5 INJECTION INTRAMUSCULAR at 20:06

## 2019-01-01 RX ADMIN — MORPHINE SULFATE 10 MG: 100 SOLUTION ORAL at 01:41

## 2019-01-01 RX ADMIN — Medication 10 ML: at 05:54

## 2019-01-01 RX ADMIN — INSULIN GLARGINE 10 UNITS: 100 INJECTION, SOLUTION SUBCUTANEOUS at 22:14

## 2019-01-01 RX ADMIN — DEXAMETHASONE SODIUM PHOSPHATE 4 MG: 4 INJECTION, SOLUTION INTRAMUSCULAR; INTRAVENOUS at 06:51

## 2019-01-01 RX ADMIN — GADOTERATE MEGLUMINE 19 ML: 376.9 INJECTION INTRAVENOUS at 18:01

## 2019-01-01 RX ADMIN — Medication 500 UNITS: at 12:27

## 2019-01-01 RX ADMIN — PANTOPRAZOLE SODIUM 40 MG: 40 TABLET, DELAYED RELEASE ORAL at 08:43

## 2019-01-01 RX ADMIN — CYANOCOBALAMIN 1000 MCG: 1000 INJECTION, SOLUTION INTRAMUSCULAR at 10:37

## 2019-01-01 RX ADMIN — MORPHINE SULFATE 6 MG: 10 INJECTION INTRAVENOUS at 04:31

## 2019-01-01 RX ADMIN — GADOTERATE MEGLUMINE 14 ML: 376.9 INJECTION INTRAVENOUS at 21:49

## 2019-01-01 RX ADMIN — SODIUM CHLORIDE 25 ML/HR: 900 INJECTION, SOLUTION INTRAVENOUS at 14:00

## 2019-01-01 RX ADMIN — HALOPERIDOL LACTATE 2 MG: 2 SOLUTION, CONCENTRATE ORAL at 06:27

## 2019-01-01 RX ADMIN — MORPHINE SULFATE 30 MG: 30 TABLET, FILM COATED, EXTENDED RELEASE ORAL at 21:10

## 2019-01-01 RX ADMIN — MORPHINE SULFATE 3 MG: 4 INJECTION INTRAVENOUS at 04:21

## 2019-01-01 RX ADMIN — LORAZEPAM 1 MG: 2 SOLUTION, CONCENTRATE ORAL at 15:04

## 2019-01-01 RX ADMIN — Medication 10 ML: at 05:29

## 2019-01-01 RX ADMIN — DEXAMETHASONE SODIUM PHOSPHATE 4 MG: 4 INJECTION, SOLUTION INTRAMUSCULAR; INTRAVENOUS at 01:13

## 2019-01-01 RX ADMIN — PALONOSETRON HYDROCHLORIDE 0.25 MG: 0.25 INJECTION, SOLUTION INTRAVENOUS at 14:47

## 2019-01-01 RX ADMIN — GABAPENTIN 100 MG: 100 CAPSULE ORAL at 22:06

## 2019-01-01 RX ADMIN — MORPHINE SULFATE 30 MG: 30 TABLET, FILM COATED, EXTENDED RELEASE ORAL at 18:59

## 2019-01-01 RX ADMIN — MORPHINE SULFATE 10 MG: 100 SOLUTION ORAL at 20:39

## 2019-01-01 RX ADMIN — Medication 500 UNITS: at 15:53

## 2019-01-01 RX ADMIN — GABAPENTIN 100 MG: 100 CAPSULE ORAL at 21:10

## 2019-01-01 RX ADMIN — HALOPERIDOL LACTATE 2 MG: 2 SOLUTION, CONCENTRATE ORAL at 11:48

## 2019-01-01 RX ADMIN — Medication 10 ML: at 06:48

## 2019-01-01 RX ADMIN — INSULIN LISPRO 10 UNITS: 100 INJECTION, SOLUTION INTRAVENOUS; SUBCUTANEOUS at 22:01

## 2019-01-01 RX ADMIN — SODIUM CHLORIDE 10 ML: 9 INJECTION, SOLUTION INTRAMUSCULAR; INTRAVENOUS; SUBCUTANEOUS at 10:20

## 2019-01-01 RX ADMIN — HALOPERIDOL LACTATE 2 MG: 5 INJECTION INTRAMUSCULAR at 16:10

## 2019-01-01 RX ADMIN — MORPHINE SULFATE 10 MG: 100 SOLUTION ORAL at 18:22

## 2019-01-01 RX ADMIN — SODIUM CHLORIDE 150 MG: 900 INJECTION, SOLUTION INTRAVENOUS at 14:35

## 2019-01-01 RX ADMIN — MORPHINE SULFATE 30 MG: 30 TABLET, FILM COATED, EXTENDED RELEASE ORAL at 21:47

## 2019-01-01 RX ADMIN — MEGESTROL ACETATE 40 MG: 40 TABLET ORAL at 17:41

## 2019-01-01 RX ADMIN — DEXAMETHASONE SODIUM PHOSPHATE 4 MG: 4 INJECTION, SOLUTION INTRAMUSCULAR; INTRAVENOUS at 11:38

## 2019-01-01 RX ADMIN — MIDAZOLAM HYDROCHLORIDE 1 MG: 1 INJECTION, SOLUTION INTRAMUSCULAR; INTRAVENOUS at 11:55

## 2019-01-01 RX ADMIN — HALOPERIDOL LACTATE 2 MG: 2 SOLUTION, CONCENTRATE ORAL at 14:13

## 2019-01-01 RX ADMIN — MORPHINE SULFATE 4 MG: 4 INJECTION INTRAVENOUS at 12:25

## 2019-01-01 RX ADMIN — SODIUM CHLORIDE 945 MG: 900 INJECTION, SOLUTION INTRAVENOUS at 15:13

## 2019-01-01 RX ADMIN — Medication 10 ML: at 09:17

## 2019-01-01 RX ADMIN — CARBOPLATIN 350 MG: 10 INJECTION, SOLUTION INTRAVENOUS at 15:25

## 2019-01-01 RX ADMIN — GABAPENTIN 100 MG: 100 CAPSULE ORAL at 15:49

## 2019-01-01 RX ADMIN — DEXAMETHASONE SODIUM PHOSPHATE 4 MG: 4 INJECTION, SOLUTION INTRAMUSCULAR; INTRAVENOUS at 12:00

## 2019-01-01 RX ADMIN — SODIUM CHLORIDE 200 MG: 900 INJECTION, SOLUTION INTRAVENOUS at 15:45

## 2019-01-01 RX ADMIN — FENTANYL CITRATE 25 MCG: 50 INJECTION, SOLUTION INTRAMUSCULAR; INTRAVENOUS at 11:55

## 2019-01-01 RX ADMIN — LISINOPRIL 10 MG: 10 TABLET ORAL at 08:55

## 2019-01-01 RX ADMIN — SODIUM CHLORIDE 945 MG: 900 INJECTION, SOLUTION INTRAVENOUS at 15:09

## 2019-01-01 RX ADMIN — GABAPENTIN 100 MG: 100 CAPSULE ORAL at 08:43

## 2019-01-01 RX ADMIN — MEGESTROL ACETATE 40 MG: 40 TABLET ORAL at 08:43

## 2019-01-01 RX ADMIN — MORPHINE SULFATE 30 MG: 30 TABLET, FILM COATED, EXTENDED RELEASE ORAL at 06:51

## 2019-01-01 RX ADMIN — Medication 10 ML: at 09:44

## 2019-01-01 RX ADMIN — IPRATROPIUM BROMIDE AND ALBUTEROL SULFATE 3 ML: .5; 3 SOLUTION RESPIRATORY (INHALATION) at 01:25

## 2019-01-01 RX ADMIN — PANTOPRAZOLE SODIUM 40 MG: 40 TABLET, DELAYED RELEASE ORAL at 07:51

## 2019-01-01 RX ADMIN — MORPHINE SULFATE 10 MG: 100 SOLUTION ORAL at 20:11

## 2019-01-01 RX ADMIN — Medication 10 ML: at 16:08

## 2019-01-01 RX ADMIN — Medication 500 UNITS: at 17:22

## 2019-01-01 RX ADMIN — SODIUM CHLORIDE 945 MG: 900 INJECTION, SOLUTION INTRAVENOUS at 12:04

## 2019-01-01 RX ADMIN — GABAPENTIN 100 MG: 100 CAPSULE ORAL at 08:39

## 2019-01-01 RX ADMIN — SODIUM CHLORIDE 945 MG: 900 INJECTION, SOLUTION INTRAVENOUS at 12:50

## 2019-01-01 RX ADMIN — DEXAMETHASONE SODIUM PHOSPHATE 4 MG: 4 INJECTION, SOLUTION INTRAMUSCULAR; INTRAVENOUS at 23:36

## 2019-01-01 RX ADMIN — HALOPERIDOL LACTATE 2 MG: 2 SOLUTION, CONCENTRATE ORAL at 04:21

## 2019-01-01 RX ADMIN — OXYCODONE HYDROCHLORIDE 20 MG: 5 TABLET ORAL at 00:19

## 2019-01-01 RX ADMIN — PALONOSETRON HYDROCHLORIDE 0.25 MG: 0.25 INJECTION, SOLUTION INTRAVENOUS at 13:44

## 2019-01-01 RX ADMIN — LORAZEPAM 1 MG: 2 SOLUTION, CONCENTRATE ORAL at 17:29

## 2019-01-01 RX ADMIN — LORAZEPAM 1 MG: 2 INJECTION INTRAMUSCULAR; INTRAVENOUS at 04:25

## 2019-01-01 RX ADMIN — IOPAMIDOL 100 ML: 755 INJECTION, SOLUTION INTRAVENOUS at 22:00

## 2019-01-01 RX ADMIN — GABAPENTIN 100 MG: 100 CAPSULE ORAL at 08:55

## 2019-01-01 RX ADMIN — HALOPERIDOL LACTATE 2 MG: 2 SOLUTION, CONCENTRATE ORAL at 17:34

## 2019-01-01 RX ADMIN — MORPHINE SULFATE 10 MG: 100 SOLUTION ORAL at 10:23

## 2019-01-01 RX ADMIN — IPRATROPIUM BROMIDE AND ALBUTEROL SULFATE 3 ML: .5; 3 SOLUTION RESPIRATORY (INHALATION) at 19:58

## 2019-01-01 RX ADMIN — Medication 10 ML: at 15:52

## 2019-01-01 RX ADMIN — MORPHINE SULFATE 6 MG: 10 INJECTION INTRAVENOUS at 16:10

## 2019-01-01 RX ADMIN — SODIUM CHLORIDE 200 MG: 900 INJECTION, SOLUTION INTRAVENOUS at 14:30

## 2019-01-01 RX ADMIN — PANTOPRAZOLE SODIUM 40 MG: 40 TABLET, DELAYED RELEASE ORAL at 17:09

## 2019-01-01 RX ADMIN — POLYETHYLENE GLYCOL 3350 17 G: 17 POWDER, FOR SOLUTION ORAL at 08:36

## 2019-01-01 RX ADMIN — DEXAMETHASONE SODIUM PHOSPHATE 4 MG: 4 INJECTION, SOLUTION INTRAMUSCULAR; INTRAVENOUS at 11:04

## 2019-01-01 RX ADMIN — DEXAMETHASONE SODIUM PHOSPHATE 6 MG: 4 INJECTION, SOLUTION INTRAMUSCULAR; INTRAVENOUS at 08:25

## 2019-01-01 RX ADMIN — DEXAMETHASONE SODIUM PHOSPHATE 6 MG: 4 INJECTION, SOLUTION INTRAMUSCULAR; INTRAVENOUS at 09:09

## 2019-01-01 RX ADMIN — Medication 500 UNITS: at 16:09

## 2019-01-01 RX ADMIN — HUMAN INSULIN 2 UNITS: 100 INJECTION, SOLUTION SUBCUTANEOUS at 07:52

## 2019-01-01 RX ADMIN — MEGESTROL ACETATE 40 MG: 40 TABLET ORAL at 17:13

## 2019-01-01 RX ADMIN — LORAZEPAM 1 MG: 2 INJECTION INTRAMUSCULAR; INTRAVENOUS at 22:54

## 2019-01-01 RX ADMIN — ONDANSETRON 8 MG: 2 INJECTION, SOLUTION INTRAMUSCULAR; INTRAVENOUS at 11:44

## 2019-01-01 RX ADMIN — MORPHINE SULFATE 6 MG: 10 INJECTION INTRAVENOUS at 23:57

## 2019-01-01 RX ADMIN — Medication 10 ML: at 12:58

## 2019-01-01 RX ADMIN — GLYCOPYRROLATE 0.2 MG: 0.2 INJECTION, SOLUTION INTRAMUSCULAR; INTRAVENOUS at 10:22

## 2019-01-01 RX ADMIN — INFLUENZA VIRUS VACCINE 0.5 ML: 15; 15; 15; 15 SUSPENSION INTRAMUSCULAR at 12:42

## 2019-01-01 RX ADMIN — Medication 500 UNITS: at 12:06

## 2019-01-01 RX ADMIN — DEXTROSE MONOHYDRATE 12.5 G: 25 INJECTION, SOLUTION INTRAVENOUS at 10:01

## 2019-01-01 RX ADMIN — MORPHINE SULFATE 6 MG: 10 INJECTION INTRAVENOUS at 20:06

## 2019-01-01 RX ADMIN — OXYCODONE HYDROCHLORIDE 15 MG: 5 TABLET ORAL at 11:14

## 2019-01-01 RX ADMIN — LORAZEPAM 1 MG: 2 INJECTION INTRAMUSCULAR; INTRAVENOUS at 07:21

## 2019-01-01 RX ADMIN — PALONOSETRON HYDROCHLORIDE 0.25 MG: 0.25 INJECTION, SOLUTION INTRAVENOUS at 13:51

## 2019-01-01 RX ADMIN — OXYCODONE HYDROCHLORIDE 20 MG: 5 TABLET ORAL at 20:37

## 2019-01-01 RX ADMIN — FENTANYL CITRATE 25 MCG: 50 INJECTION, SOLUTION INTRAMUSCULAR; INTRAVENOUS at 12:45

## 2019-01-01 RX ADMIN — MORPHINE SULFATE 10 MG: 100 SOLUTION ORAL at 12:34

## 2019-01-01 RX ADMIN — DEXAMETHASONE SODIUM PHOSPHATE 4 MG: 4 INJECTION, SOLUTION INTRAMUSCULAR; INTRAVENOUS at 05:54

## 2019-01-01 RX ADMIN — DEXAMETHASONE SODIUM PHOSPHATE 6 MG: 4 INJECTION, SOLUTION INTRAMUSCULAR; INTRAVENOUS at 08:38

## 2019-01-01 RX ADMIN — MORPHINE SULFATE 10 MG: 100 SOLUTION ORAL at 06:09

## 2019-01-01 RX ADMIN — SODIUM CHLORIDE, PRESERVATIVE FREE 500 UNITS: 5 INJECTION INTRAVENOUS at 12:54

## 2019-01-01 RX ADMIN — SODIUM CHLORIDE 10 ML: 9 INJECTION, SOLUTION INTRAMUSCULAR; INTRAVENOUS; SUBCUTANEOUS at 11:21

## 2019-01-01 RX ADMIN — INSULIN LISPRO 8 UNITS: 100 INJECTION, SOLUTION INTRAVENOUS; SUBCUTANEOUS at 13:00

## 2019-01-01 RX ADMIN — POLYETHYLENE GLYCOL 3350 17 G: 17 POWDER, FOR SOLUTION ORAL at 12:35

## 2019-01-01 RX ADMIN — MORPHINE SULFATE 6 MG: 10 INJECTION INTRAVENOUS at 08:36

## 2019-01-01 RX ADMIN — INSULIN LISPRO 2 UNITS: 100 INJECTION, SOLUTION INTRAVENOUS; SUBCUTANEOUS at 19:11

## 2019-01-01 RX ADMIN — OXYCODONE HYDROCHLORIDE 15 MG: 5 TABLET ORAL at 11:04

## 2019-01-01 RX ADMIN — SODIUM CHLORIDE 25 ML/HR: 900 INJECTION, SOLUTION INTRAVENOUS at 10:47

## 2019-01-01 RX ADMIN — Medication 500 UNITS: at 12:58

## 2019-01-01 RX ADMIN — MORPHINE SULFATE 30 MG: 30 TABLET, FILM COATED, EXTENDED RELEASE ORAL at 13:32

## 2019-01-01 RX ADMIN — MORPHINE SULFATE 3 MG: 4 INJECTION INTRAVENOUS at 23:32

## 2019-01-01 RX ADMIN — IOPAMIDOL 100 ML: 755 INJECTION, SOLUTION INTRAVENOUS at 13:57

## 2019-01-01 RX ADMIN — DEXMEDETOMIDINE HYDROCHLORIDE 4 MCG: 4 INJECTION, SOLUTION INTRAVENOUS at 12:30

## 2019-01-01 RX ADMIN — MORPHINE SULFATE 30 MG: 30 TABLET, FILM COATED, EXTENDED RELEASE ORAL at 05:29

## 2019-01-01 RX ADMIN — INSULIN GLARGINE 7 UNITS: 100 INJECTION, SOLUTION SUBCUTANEOUS at 22:07

## 2019-01-01 RX ADMIN — DEXAMETHASONE SODIUM PHOSPHATE 12 MG: 4 INJECTION, SOLUTION INTRA-ARTICULAR; INTRALESIONAL; INTRAMUSCULAR; INTRAVENOUS; SOFT TISSUE at 14:15

## 2019-01-01 RX ADMIN — INSULIN LISPRO 3 UNITS: 100 INJECTION, SOLUTION INTRAVENOUS; SUBCUTANEOUS at 22:07

## 2019-01-01 RX ADMIN — HALOPERIDOL LACTATE 2 MG: 2 SOLUTION, CONCENTRATE ORAL at 20:10

## 2019-01-01 RX ADMIN — PALONOSETRON HYDROCHLORIDE 0.25 MG: 0.25 INJECTION, SOLUTION INTRAVENOUS at 13:01

## 2019-01-01 RX ADMIN — HALOPERIDOL LACTATE 2 MG: 2 SOLUTION, CONCENTRATE ORAL at 06:23

## 2019-01-01 RX ADMIN — HALOPERIDOL LACTATE 2 MG: 2 SOLUTION, CONCENTRATE ORAL at 18:22

## 2019-01-01 RX ADMIN — DEXAMETHASONE SODIUM PHOSPHATE 4 MG: 4 INJECTION, SOLUTION INTRAMUSCULAR; INTRAVENOUS at 12:35

## 2019-01-01 RX ADMIN — DEXAMETHASONE SODIUM PHOSPHATE 4 MG: 4 INJECTION, SOLUTION INTRAMUSCULAR; INTRAVENOUS at 18:59

## 2019-01-01 RX ADMIN — IPRATROPIUM BROMIDE AND ALBUTEROL SULFATE 3 ML: .5; 3 SOLUTION RESPIRATORY (INHALATION) at 09:14

## 2019-01-01 RX ADMIN — CYANOCOBALAMIN 1000 MCG: 1000 INJECTION, SOLUTION INTRAMUSCULAR at 14:49

## 2019-01-01 RX ADMIN — IPRATROPIUM BROMIDE AND ALBUTEROL SULFATE 3 ML: .5; 3 SOLUTION RESPIRATORY (INHALATION) at 08:56

## 2019-01-01 RX ADMIN — PANTOPRAZOLE SODIUM 40 MG: 40 TABLET, DELAYED RELEASE ORAL at 08:39

## 2019-01-01 RX ADMIN — HUMAN INSULIN 10 UNITS: 100 INJECTION, SOLUTION SUBCUTANEOUS at 17:52

## 2019-01-01 RX ADMIN — MEGESTROL ACETATE 40 MG: 40 TABLET ORAL at 08:55

## 2019-01-01 RX ADMIN — HALOPERIDOL LACTATE 2 MG: 2 SOLUTION, CONCENTRATE ORAL at 00:51

## 2019-01-01 RX ADMIN — SENNOSIDES, DOCUSATE SODIUM 1 TABLET: 50; 8.6 TABLET, FILM COATED ORAL at 09:00

## 2019-01-01 RX ADMIN — MORPHINE SULFATE 30 MG: 30 TABLET, FILM COATED, EXTENDED RELEASE ORAL at 22:35

## 2019-01-01 RX ADMIN — SODIUM CHLORIDE 250 ML/HR: 900 INJECTION, SOLUTION INTRAVENOUS at 21:00

## 2019-01-01 RX ADMIN — SODIUM CHLORIDE, SODIUM LACTATE, POTASSIUM CHLORIDE, CALCIUM CHLORIDE: 600; 310; 30; 20 INJECTION, SOLUTION INTRAVENOUS at 12:21

## 2019-01-01 RX ADMIN — IOPAMIDOL 100 ML: 755 INJECTION, SOLUTION INTRAVENOUS at 12:38

## 2019-01-01 RX ADMIN — SODIUM CHLORIDE 200 MG: 900 INJECTION, SOLUTION INTRAVENOUS at 11:59

## 2019-01-01 RX ADMIN — Medication 10 ML: at 23:34

## 2019-01-01 RX ADMIN — INSULIN LISPRO 2 UNITS: 100 INJECTION, SOLUTION INTRAVENOUS; SUBCUTANEOUS at 12:11

## 2019-01-01 RX ADMIN — GABAPENTIN 100 MG: 100 CAPSULE ORAL at 15:37

## 2019-01-01 RX ADMIN — IPRATROPIUM BROMIDE AND ALBUTEROL SULFATE 3 ML: .5; 3 SOLUTION RESPIRATORY (INHALATION) at 11:24

## 2019-01-01 RX ADMIN — DEXAMETHASONE SODIUM PHOSPHATE 12 MG: 4 INJECTION, SOLUTION INTRA-ARTICULAR; INTRALESIONAL; INTRAMUSCULAR; INTRAVENOUS; SOFT TISSUE at 14:50

## 2019-01-01 RX ADMIN — Medication 10 ML: at 12:38

## 2019-01-01 RX ADMIN — GABAPENTIN 100 MG: 100 CAPSULE ORAL at 22:35

## 2019-01-01 RX ADMIN — GABAPENTIN 100 MG: 100 CAPSULE ORAL at 15:16

## 2019-01-01 RX ADMIN — INSULIN LISPRO 2 UNITS: 100 INJECTION, SOLUTION INTRAVENOUS; SUBCUTANEOUS at 17:32

## 2019-01-01 RX ADMIN — Medication 10 ML: at 06:03

## 2019-01-01 RX ADMIN — HALOPERIDOL LACTATE 2 MG: 5 INJECTION INTRAMUSCULAR at 12:25

## 2019-01-01 RX ADMIN — SODIUM CHLORIDE 150 MG: 900 INJECTION, SOLUTION INTRAVENOUS at 13:45

## 2019-01-01 RX ADMIN — MORPHINE SULFATE 4 MG: 2 INJECTION, SOLUTION INTRAMUSCULAR; INTRAVENOUS at 22:54

## 2019-01-01 RX ADMIN — MORPHINE SULFATE 30 MG: 30 TABLET, FILM COATED, EXTENDED RELEASE ORAL at 14:16

## 2019-01-01 RX ADMIN — SODIUM CHLORIDE 25 ML/HR: 900 INJECTION, SOLUTION INTRAVENOUS at 13:12

## 2019-01-01 RX ADMIN — FENTANYL CITRATE 25 MCG: 50 INJECTION, SOLUTION INTRAMUSCULAR; INTRAVENOUS at 12:04

## 2019-01-01 RX ADMIN — Medication 10 ML: at 11:24

## 2019-01-01 RX ADMIN — DEXAMETHASONE SODIUM PHOSPHATE 6 MG: 4 INJECTION, SOLUTION INTRAMUSCULAR; INTRAVENOUS at 08:37

## 2019-01-01 RX ADMIN — GLUCAGON HYDROCHLORIDE 1 MG: 1 INJECTION, POWDER, FOR SOLUTION INTRAMUSCULAR; INTRAVENOUS; SUBCUTANEOUS at 09:25

## 2019-01-01 RX ADMIN — INSULIN LISPRO 2 UNITS: 100 INJECTION, SOLUTION INTRAVENOUS; SUBCUTANEOUS at 22:14

## 2019-01-01 RX ADMIN — SODIUM CHLORIDE 945 MG: 900 INJECTION, SOLUTION INTRAVENOUS at 12:13

## 2019-01-01 RX ADMIN — MORPHINE SULFATE 4 MG: 4 INJECTION INTRAVENOUS at 20:19

## 2019-01-01 RX ADMIN — INSULIN LISPRO 1 UNITS: 100 INJECTION, SOLUTION INTRAVENOUS; SUBCUTANEOUS at 22:08

## 2019-01-01 RX ADMIN — HALOPERIDOL LACTATE 2 MG: 5 INJECTION, SOLUTION INTRAMUSCULAR at 16:12

## 2019-01-01 RX ADMIN — Medication 10 ML: at 13:31

## 2019-01-01 RX ADMIN — INSULIN LISPRO 3 UNITS: 100 INJECTION, SOLUTION INTRAVENOUS; SUBCUTANEOUS at 17:40

## 2019-01-01 RX ADMIN — GABAPENTIN 100 MG: 100 CAPSULE ORAL at 18:59

## 2019-01-01 RX ADMIN — MEGESTROL ACETATE 40 MG: 40 TABLET ORAL at 08:12

## 2019-01-01 RX ADMIN — BARIUM SULFATE 900 ML: 21 SUSPENSION ORAL at 12:38

## 2019-01-01 RX ADMIN — SODIUM CHLORIDE 200 MG: 9 INJECTION, SOLUTION INTRAVENOUS at 13:20

## 2019-01-01 RX ADMIN — SODIUM CHLORIDE, PRESERVATIVE FREE 500 UNITS: 5 INJECTION INTRAVENOUS at 13:56

## 2019-01-01 RX ADMIN — IPRATROPIUM BROMIDE AND ALBUTEROL SULFATE 3 ML: .5; 3 SOLUTION RESPIRATORY (INHALATION) at 08:57

## 2019-01-01 RX ADMIN — IPRATROPIUM BROMIDE AND ALBUTEROL SULFATE 3 ML: .5; 3 SOLUTION RESPIRATORY (INHALATION) at 04:20

## 2019-01-01 RX ADMIN — HUMAN INSULIN 8 UNITS: 100 INJECTION, SOLUTION SUBCUTANEOUS at 17:29

## 2019-01-01 RX ADMIN — HALOPERIDOL LACTATE 2 MG: 5 INJECTION, SOLUTION INTRAMUSCULAR at 08:43

## 2019-01-01 RX ADMIN — MORPHINE SULFATE 30 MG: 30 TABLET, FILM COATED, EXTENDED RELEASE ORAL at 15:20

## 2019-01-01 RX ADMIN — PROPOFOL 30 MG: 10 INJECTION, EMULSION INTRAVENOUS at 12:26

## 2019-01-01 RX ADMIN — DEXAMETHASONE 4 MG: 4 TABLET ORAL at 08:12

## 2019-01-01 RX ADMIN — PANTOPRAZOLE SODIUM 40 MG: 40 TABLET, DELAYED RELEASE ORAL at 17:20

## 2019-01-01 RX ADMIN — SODIUM CHLORIDE 200 MG: 900 INJECTION, SOLUTION INTRAVENOUS at 14:48

## 2019-01-01 RX ADMIN — DEXAMETHASONE SODIUM PHOSPHATE 4 MG: 4 INJECTION, SOLUTION INTRAMUSCULAR; INTRAVENOUS at 11:14

## 2019-01-01 RX ADMIN — HUMAN INSULIN 4 UNITS: 100 INJECTION, SOLUTION SUBCUTANEOUS at 11:24

## 2019-01-01 RX ADMIN — HUMAN INSULIN 2 UNITS: 100 INJECTION, SOLUTION SUBCUTANEOUS at 22:41

## 2019-01-01 RX ADMIN — IPRATROPIUM BROMIDE AND ALBUTEROL SULFATE 3 ML: .5; 3 SOLUTION RESPIRATORY (INHALATION) at 16:58

## 2019-01-01 RX ADMIN — MORPHINE SULFATE 10 MG: 100 SOLUTION ORAL at 01:24

## 2019-01-01 RX ADMIN — IPRATROPIUM BROMIDE AND ALBUTEROL SULFATE 3 ML: .5; 3 SOLUTION RESPIRATORY (INHALATION) at 17:29

## 2019-01-01 RX ADMIN — PANTOPRAZOLE SODIUM 40 MG: 40 TABLET, DELAYED RELEASE ORAL at 09:23

## 2019-01-01 RX ADMIN — HUMAN INSULIN 2 UNITS: 100 INJECTION, SOLUTION SUBCUTANEOUS at 22:00

## 2019-01-01 RX ADMIN — GABAPENTIN 100 MG: 100 CAPSULE ORAL at 08:36

## 2019-01-01 RX ADMIN — OXYCODONE HYDROCHLORIDE 15 MG: 5 TABLET ORAL at 12:11

## 2019-01-01 RX ADMIN — MORPHINE SULFATE 10 MG: 100 SOLUTION ORAL at 06:27

## 2019-01-01 RX ADMIN — HUMAN INSULIN 2 UNITS: 100 INJECTION, SOLUTION SUBCUTANEOUS at 16:52

## 2019-01-01 RX ADMIN — HALOPERIDOL LACTATE 2 MG: 2 SOLUTION, CONCENTRATE ORAL at 15:27

## 2019-01-01 RX ADMIN — LISINOPRIL 10 MG: 10 TABLET ORAL at 08:38

## 2019-01-01 RX ADMIN — DEXAMETHASONE 6 MG: 4 TABLET ORAL at 09:00

## 2019-01-01 RX ADMIN — Medication 10 ML: at 11:05

## 2019-01-01 RX ADMIN — DEXAMETHASONE SODIUM PHOSPHATE 8 MG: 4 INJECTION, SOLUTION INTRAMUSCULAR; INTRAVENOUS at 14:02

## 2019-01-01 RX ADMIN — HUMAN INSULIN 2 UNITS: 100 INJECTION, SOLUTION SUBCUTANEOUS at 12:33

## 2019-01-01 RX ADMIN — GABAPENTIN 100 MG: 100 CAPSULE ORAL at 16:32

## 2019-01-01 RX ADMIN — FENTANYL CITRATE 25 MCG: 50 INJECTION, SOLUTION INTRAMUSCULAR; INTRAVENOUS at 12:08

## 2019-01-01 RX ADMIN — DEXAMETHASONE SODIUM PHOSPHATE 8 MG: 4 INJECTION, SOLUTION INTRA-ARTICULAR; INTRALESIONAL; INTRAMUSCULAR; INTRAVENOUS; SOFT TISSUE at 10:35

## 2019-01-01 RX ADMIN — DEXAMETHASONE SODIUM PHOSPHATE 6 MG: 4 INJECTION, SOLUTION INTRAMUSCULAR; INTRAVENOUS at 10:18

## 2019-01-01 RX ADMIN — HALOPERIDOL LACTATE 2 MG: 5 INJECTION INTRAMUSCULAR at 03:42

## 2019-01-01 RX ADMIN — DEXMEDETOMIDINE HYDROCHLORIDE 4 MCG: 4 INJECTION, SOLUTION INTRAVENOUS at 12:27

## 2019-01-01 RX ADMIN — SODIUM CHLORIDE 945 MG: 900 INJECTION, SOLUTION INTRAVENOUS at 15:45

## 2019-01-01 RX ADMIN — HALOPERIDOL LACTATE 2 MG: 5 INJECTION, SOLUTION INTRAMUSCULAR at 23:32

## 2019-01-01 RX ADMIN — SODIUM CHLORIDE 25 ML/HR: 900 INJECTION, SOLUTION INTRAVENOUS at 12:55

## 2019-01-01 RX ADMIN — Medication 10 ML: at 22:36

## 2019-01-01 RX ADMIN — INSULIN LISPRO 2 UNITS: 100 INJECTION, SOLUTION INTRAVENOUS; SUBCUTANEOUS at 11:58

## 2019-01-01 RX ADMIN — HUMAN INSULIN 6 UNITS: 100 INJECTION, SOLUTION SUBCUTANEOUS at 11:31

## 2019-01-01 RX ADMIN — POLYETHYLENE GLYCOL 3350 17 G: 17 POWDER, FOR SOLUTION ORAL at 08:13

## 2019-01-01 RX ADMIN — MORPHINE SULFATE 30 MG: 30 TABLET, FILM COATED, EXTENDED RELEASE ORAL at 22:06

## 2019-01-01 RX ADMIN — INSULIN LISPRO 2 UNITS: 100 INJECTION, SOLUTION INTRAVENOUS; SUBCUTANEOUS at 17:10

## 2019-01-01 RX ADMIN — IPRATROPIUM BROMIDE AND ALBUTEROL SULFATE 3 ML: .5; 3 SOLUTION RESPIRATORY (INHALATION) at 04:18

## 2019-01-01 RX ADMIN — DEXAMETHASONE SODIUM PHOSPHATE 4 MG: 4 INJECTION, SOLUTION INTRAMUSCULAR; INTRAVENOUS at 00:42

## 2019-01-01 RX ADMIN — DEXAMETHASONE SODIUM PHOSPHATE 6 MG: 4 INJECTION, SOLUTION INTRAMUSCULAR; INTRAVENOUS at 08:56

## 2019-01-01 RX ADMIN — CYANOCOBALAMIN 1000 MCG: 1000 INJECTION, SOLUTION INTRAMUSCULAR at 12:56

## 2019-01-01 RX ADMIN — PANTOPRAZOLE SODIUM 40 MG: 40 TABLET, DELAYED RELEASE ORAL at 18:59

## 2019-01-01 RX ADMIN — HALOPERIDOL LACTATE 2 MG: 2 SOLUTION, CONCENTRATE ORAL at 03:12

## 2019-01-01 RX ADMIN — SODIUM CHLORIDE 945 MG: 900 INJECTION, SOLUTION INTRAVENOUS at 16:25

## 2019-01-01 RX ADMIN — MORPHINE SULFATE 10 MG: 100 SOLUTION ORAL at 06:41

## 2019-01-01 RX ADMIN — MEGESTROL ACETATE 40 MG: 40 TABLET ORAL at 17:09

## 2019-01-01 RX ADMIN — HALOPERIDOL LACTATE 2 MG: 2 SOLUTION, CONCENTRATE ORAL at 14:35

## 2019-01-01 RX ADMIN — IPRATROPIUM BROMIDE AND ALBUTEROL SULFATE 3 ML: .5; 3 SOLUTION RESPIRATORY (INHALATION) at 00:04

## 2019-01-01 RX ADMIN — LISINOPRIL 10 MG: 10 TABLET ORAL at 09:23

## 2019-01-01 RX ADMIN — GABAPENTIN 100 MG: 100 CAPSULE ORAL at 08:13

## 2019-01-01 RX ADMIN — SODIUM CHLORIDE 25 ML/HR: 900 INJECTION, SOLUTION INTRAVENOUS at 11:44

## 2019-01-01 RX ADMIN — Medication 10 ML: at 11:29

## 2019-01-01 RX ADMIN — Medication 10 ML: at 13:01

## 2019-01-01 RX ADMIN — Medication 10 ML: at 05:00

## 2019-01-01 RX ADMIN — MORPHINE SULFATE 10 MG: 100 SOLUTION ORAL at 06:23

## 2019-01-01 RX ADMIN — DEXAMETHASONE SODIUM PHOSPHATE 12 MG: 4 INJECTION, SOLUTION INTRA-ARTICULAR; INTRALESIONAL; INTRAMUSCULAR; INTRAVENOUS; SOFT TISSUE at 14:05

## 2019-01-01 RX ADMIN — OXYCODONE HYDROCHLORIDE 15 MG: 5 TABLET ORAL at 00:40

## 2019-01-01 RX ADMIN — ACETAMINOPHEN 650 MG: 325 TABLET ORAL at 13:58

## 2019-01-01 RX ADMIN — IPRATROPIUM BROMIDE AND ALBUTEROL SULFATE 3 ML: .5; 3 SOLUTION RESPIRATORY (INHALATION) at 07:43

## 2019-01-01 RX ADMIN — SODIUM CHLORIDE 150 MG: 900 INJECTION, SOLUTION INTRAVENOUS at 15:16

## 2019-01-01 RX ADMIN — GABAPENTIN 100 MG: 100 CAPSULE ORAL at 21:47

## 2019-01-01 RX ADMIN — MORPHINE SULFATE 30 MG: 30 TABLET, FILM COATED, EXTENDED RELEASE ORAL at 05:41

## 2019-01-01 RX ADMIN — HALOPERIDOL LACTATE 2 MG: 2 SOLUTION, CONCENTRATE ORAL at 10:34

## 2019-01-01 RX ADMIN — DEXAMETHASONE SODIUM PHOSPHATE 4 MG: 4 INJECTION, SOLUTION INTRAMUSCULAR; INTRAVENOUS at 06:48

## 2019-01-01 RX ADMIN — MORPHINE SULFATE 4 MG: 4 INJECTION INTRAVENOUS at 16:09

## 2019-01-01 RX ADMIN — MORPHINE SULFATE 30 MG: 30 TABLET, FILM COATED, EXTENDED RELEASE ORAL at 13:00

## 2019-01-01 RX ADMIN — SODIUM CHLORIDE 200 MG: 900 INJECTION, SOLUTION INTRAVENOUS at 10:57

## 2019-01-01 RX ADMIN — GABAPENTIN 100 MG: 100 CAPSULE ORAL at 16:30

## 2019-01-01 RX ADMIN — INSULIN LISPRO 2 UNITS: 100 INJECTION, SOLUTION INTRAVENOUS; SUBCUTANEOUS at 17:20

## 2019-01-01 RX ADMIN — SODIUM CHLORIDE 945 MG: 900 INJECTION, SOLUTION INTRAVENOUS at 12:40

## 2019-01-01 RX ADMIN — MORPHINE SULFATE 10 MG: 100 SOLUTION ORAL at 10:35

## 2019-01-01 RX ADMIN — LIDOCAINE HYDROCHLORIDE 10 ML: 10 INJECTION, SOLUTION EPIDURAL; INFILTRATION; INTRACAUDAL; PERINEURAL at 11:45

## 2019-01-01 RX ADMIN — OXYCODONE HYDROCHLORIDE 15 MG: 5 TABLET ORAL at 09:27

## 2019-01-01 RX ADMIN — SODIUM CHLORIDE 150 MG: 900 INJECTION, SOLUTION INTRAVENOUS at 13:33

## 2019-01-01 RX ADMIN — IOHEXOL 50 ML: 240 INJECTION, SOLUTION INTRATHECAL; INTRAVASCULAR; INTRAVENOUS; ORAL at 22:00

## 2019-01-01 RX ADMIN — PANTOPRAZOLE SODIUM 40 MG: 40 TABLET, DELAYED RELEASE ORAL at 18:00

## 2019-01-01 RX ADMIN — MEGESTROL ACETATE 40 MG: 40 TABLET ORAL at 17:20

## 2019-01-01 RX ADMIN — LORAZEPAM 1 MG: 2 SOLUTION, CONCENTRATE ORAL at 01:42

## 2019-01-01 RX ADMIN — Medication 2 G: at 12:29

## 2019-01-01 RX ADMIN — OXYCODONE HYDROCHLORIDE 20 MG: 5 TABLET ORAL at 08:24

## 2019-01-01 RX ADMIN — INSULIN LISPRO 2 UNITS: 100 INJECTION, SOLUTION INTRAVENOUS; SUBCUTANEOUS at 04:34

## 2019-01-01 RX ADMIN — PROPOFOL 50 MCG/KG/MIN: 10 INJECTION, EMULSION INTRAVENOUS at 12:26

## 2019-01-01 RX ADMIN — HALOPERIDOL LACTATE 2 MG: 5 INJECTION INTRAMUSCULAR at 21:47

## 2019-01-01 RX ADMIN — Medication 10 ML: at 21:10

## 2019-01-01 RX ADMIN — Medication 500 UNITS: at 16:45

## 2019-01-01 RX ADMIN — MEGESTROL ACETATE 40 MG: 40 TABLET ORAL at 09:23

## 2019-01-01 RX ADMIN — LISINOPRIL 10 MG: 10 TABLET ORAL at 08:46

## 2019-01-01 RX ADMIN — IOPAMIDOL 100 ML: 755 INJECTION, SOLUTION INTRAVENOUS at 09:44

## 2019-01-01 RX ADMIN — HUMAN INSULIN 6 UNITS: 100 INJECTION, SOLUTION SUBCUTANEOUS at 12:31

## 2019-01-01 RX ADMIN — CARBOPLATIN 662 MG: 10 INJECTION, SOLUTION INTRAVENOUS at 15:25

## 2019-01-01 RX ADMIN — MORPHINE SULFATE 30 MG: 30 TABLET, FILM COATED, EXTENDED RELEASE ORAL at 05:00

## 2019-01-01 RX ADMIN — SODIUM CHLORIDE 10 ML: 9 INJECTION INTRAMUSCULAR; INTRAVENOUS; SUBCUTANEOUS at 11:29

## 2019-01-01 RX ADMIN — SODIUM CHLORIDE 1000 ML: 900 INJECTION, SOLUTION INTRAVENOUS at 11:34

## 2019-01-01 RX ADMIN — MIDAZOLAM HYDROCHLORIDE 2 MG: 1 INJECTION, SOLUTION INTRAMUSCULAR; INTRAVENOUS at 12:21

## 2019-01-01 RX ADMIN — SODIUM CHLORIDE 10 ML: 9 INJECTION INTRAMUSCULAR; INTRAVENOUS; SUBCUTANEOUS at 09:16

## 2019-01-01 RX ADMIN — Medication 10 ML: at 11:01

## 2019-01-01 RX ADMIN — Medication 10 ML: at 13:57

## 2019-01-01 RX ADMIN — PANTOPRAZOLE SODIUM 40 MG: 40 TABLET, DELAYED RELEASE ORAL at 17:41

## 2019-01-01 RX ADMIN — MORPHINE SULFATE 10 MG: 100 SOLUTION ORAL at 22:32

## 2019-01-01 RX ADMIN — INSULIN GLARGINE 14 UNITS: 100 INJECTION, SOLUTION SUBCUTANEOUS at 22:00

## 2019-01-01 RX ADMIN — Medication 10 ML: at 16:45

## 2019-01-01 RX ADMIN — Medication 10 ML: at 06:52

## 2019-01-01 RX ADMIN — Medication 10 ML: at 11:38

## 2019-01-01 RX ADMIN — Medication 500 UNITS: at 16:02

## 2019-01-01 RX ADMIN — FENTANYL CITRATE 25 MCG: 50 INJECTION, SOLUTION INTRAMUSCULAR; INTRAVENOUS at 12:26

## 2019-01-01 RX ADMIN — Medication 30 ML: at 12:06

## 2019-01-01 RX ADMIN — MORPHINE SULFATE 30 MG: 30 TABLET, FILM COATED, EXTENDED RELEASE ORAL at 06:48

## 2019-01-01 RX ADMIN — DEXAMETHASONE SODIUM PHOSPHATE 4 MG: 4 INJECTION, SOLUTION INTRAMUSCULAR; INTRAVENOUS at 00:39

## 2019-01-01 RX ADMIN — HALOPERIDOL LACTATE 2 MG: 2 SOLUTION, CONCENTRATE ORAL at 01:41

## 2019-01-01 RX ADMIN — MORPHINE SULFATE 10 MG: 100 SOLUTION ORAL at 00:50

## 2019-01-01 RX ADMIN — POLYETHYLENE GLYCOL 3350 17 G: 17 POWDER, FOR SOLUTION ORAL at 08:54

## 2019-01-01 RX ADMIN — DEXTROSE MONOHYDRATE 100 ML: 10 INJECTION, SOLUTION INTRAVENOUS at 11:17

## 2019-01-01 RX ADMIN — CARBOPLATIN 525 MG: 10 INJECTION, SOLUTION INTRAVENOUS at 16:10

## 2019-01-01 RX ADMIN — INSULIN LISPRO 15 UNITS: 100 INJECTION, SOLUTION INTRAVENOUS; SUBCUTANEOUS at 00:21

## 2019-01-01 RX ADMIN — Medication 10 ML: at 21:48

## 2019-01-01 RX ADMIN — DEXAMETHASONE 6 MG: 4 TABLET ORAL at 17:20

## 2019-01-01 RX ADMIN — MORPHINE SULFATE 6 MG: 10 INJECTION INTRAVENOUS at 11:07

## 2019-01-01 RX ADMIN — SODIUM CHLORIDE 10 ML: 9 INJECTION INTRAMUSCULAR; INTRAVENOUS; SUBCUTANEOUS at 11:46

## 2019-01-01 RX ADMIN — MORPHINE SULFATE 2 MG: 2 INJECTION, SOLUTION INTRAMUSCULAR; INTRAVENOUS at 07:21

## 2019-01-01 RX ADMIN — MIDAZOLAM HYDROCHLORIDE 1 MG: 1 INJECTION, SOLUTION INTRAMUSCULAR; INTRAVENOUS at 11:58

## 2019-01-01 RX ADMIN — HALOPERIDOL LACTATE 2 MG: 2 SOLUTION, CONCENTRATE ORAL at 18:13

## 2019-01-01 RX ADMIN — SODIUM CHLORIDE 25 ML/HR: 900 INJECTION, SOLUTION INTRAVENOUS at 14:43

## 2019-01-01 RX ADMIN — POLYETHYLENE GLYCOL 3350 17 G: 17 POWDER, FOR SOLUTION ORAL at 08:27

## 2019-01-01 RX ADMIN — HALOPERIDOL LACTATE 2 MG: 5 INJECTION INTRAMUSCULAR at 23:18

## 2019-01-01 RX ADMIN — LISINOPRIL 10 MG: 10 TABLET ORAL at 08:13

## 2019-01-01 RX ADMIN — INSULIN LISPRO 15 UNITS: 100 INJECTION, SOLUTION INTRAVENOUS; SUBCUTANEOUS at 04:34

## 2019-01-01 RX ADMIN — Medication 10 ML: at 13:55

## 2019-01-01 RX ADMIN — MORPHINE SULFATE 30 MG: 30 TABLET, FILM COATED, EXTENDED RELEASE ORAL at 13:08

## 2019-01-01 RX ADMIN — HALOPERIDOL LACTATE 2 MG: 2 SOLUTION, CONCENTRATE ORAL at 22:32

## 2019-01-01 RX ADMIN — Medication 10 ML: at 22:00

## 2019-01-01 RX ADMIN — OXYCODONE HYDROCHLORIDE 20 MG: 5 TABLET ORAL at 14:14

## 2019-01-01 RX ADMIN — MORPHINE SULFATE 30 MG: 30 TABLET, FILM COATED, EXTENDED RELEASE ORAL at 13:31

## 2019-01-01 RX ADMIN — Medication 500 UNITS: at 13:55

## 2019-01-01 RX ADMIN — HALOPERIDOL LACTATE 2 MG: 5 INJECTION INTRAMUSCULAR at 08:38

## 2019-01-01 RX ADMIN — IPRATROPIUM BROMIDE AND ALBUTEROL SULFATE 3 ML: .5; 3 SOLUTION RESPIRATORY (INHALATION) at 20:36

## 2019-01-01 RX ADMIN — Medication 10 ML: at 21:23

## 2019-01-01 RX ADMIN — MORPHINE SULFATE 30 MG: 30 TABLET, FILM COATED, EXTENDED RELEASE ORAL at 21:23

## 2019-01-01 RX ADMIN — HALOPERIDOL LACTATE 2 MG: 2 SOLUTION, CONCENTRATE ORAL at 14:17

## 2019-01-01 RX ADMIN — MEGESTROL ACETATE 40 MG: 40 TABLET ORAL at 08:38

## 2019-01-01 RX ADMIN — INSULIN LISPRO 4 UNITS: 100 INJECTION, SOLUTION INTRAVENOUS; SUBCUTANEOUS at 17:09

## 2019-01-01 RX ADMIN — Medication 10 ML: at 17:22

## 2019-01-01 RX ADMIN — SODIUM CHLORIDE 200 MG: 900 INJECTION, SOLUTION INTRAVENOUS at 11:30

## 2019-01-01 RX ADMIN — INSULIN GLARGINE 10 UNITS: 100 INJECTION, SOLUTION SUBCUTANEOUS at 22:09

## 2019-01-01 RX ADMIN — MEGESTROL ACETATE 40 MG: 40 TABLET ORAL at 17:29

## 2019-01-01 RX ADMIN — HUMAN INSULIN 8 UNITS: 100 INJECTION, SOLUTION SUBCUTANEOUS at 22:39

## 2019-01-01 RX ADMIN — INSULIN LISPRO 2 UNITS: 100 INJECTION, SOLUTION INTRAVENOUS; SUBCUTANEOUS at 12:56

## 2019-01-01 RX ADMIN — MORPHINE SULFATE 10 MG: 100 SOLUTION ORAL at 06:25

## 2019-01-01 RX ADMIN — SODIUM CHLORIDE 10 ML: 9 INJECTION INTRAMUSCULAR; INTRAVENOUS; SUBCUTANEOUS at 11:00

## 2019-01-01 RX ADMIN — Medication 10 ML: at 16:02

## 2019-01-01 RX ADMIN — INSULIN LISPRO 3 UNITS: 100 INJECTION, SOLUTION INTRAVENOUS; SUBCUTANEOUS at 12:08

## 2019-01-01 RX ADMIN — IPRATROPIUM BROMIDE AND ALBUTEROL SULFATE 3 ML: .5; 3 SOLUTION RESPIRATORY (INHALATION) at 11:25

## 2019-01-01 RX ADMIN — Medication 10 ML: at 22:08

## 2019-01-01 RX ADMIN — DEXAMETHASONE SODIUM PHOSPHATE 6 MG: 4 INJECTION, SOLUTION INTRAMUSCULAR; INTRAVENOUS at 08:44

## 2019-01-01 RX ADMIN — MORPHINE SULFATE 10 MG: 100 SOLUTION ORAL at 00:52

## 2019-01-01 RX ADMIN — LORAZEPAM 1 MG: 2 INJECTION INTRAMUSCULAR; INTRAVENOUS at 20:19

## 2019-01-01 RX ADMIN — LORAZEPAM 1 MG: 2 INJECTION INTRAMUSCULAR; INTRAVENOUS at 16:09

## 2019-01-01 RX ADMIN — INSULIN LISPRO 4 UNITS: 100 INJECTION, SOLUTION INTRAVENOUS; SUBCUTANEOUS at 09:00

## 2019-01-01 RX ADMIN — IPRATROPIUM BROMIDE AND ALBUTEROL SULFATE 3 ML: .5; 3 SOLUTION RESPIRATORY (INHALATION) at 20:00

## 2019-01-01 RX ADMIN — DEXAMETHASONE SODIUM PHOSPHATE 4 MG: 4 INJECTION, SOLUTION INTRAMUSCULAR; INTRAVENOUS at 17:20

## 2019-01-01 RX ADMIN — MEGESTROL ACETATE 40 MG: 40 TABLET ORAL at 08:36

## 2019-01-01 RX ADMIN — PANTOPRAZOLE SODIUM 40 MG: 40 TABLET, DELAYED RELEASE ORAL at 08:12

## 2019-01-01 RX ADMIN — MORPHINE SULFATE 10 MG: 100 SOLUTION ORAL at 09:13

## 2019-01-01 RX ADMIN — SODIUM CHLORIDE 25 ML/HR: 900 INJECTION, SOLUTION INTRAVENOUS at 10:33

## 2019-01-01 RX ADMIN — MORPHINE SULFATE 10 MG: 100 SOLUTION ORAL at 22:05

## 2019-01-01 RX ADMIN — LISINOPRIL 10 MG: 10 TABLET ORAL at 08:36

## 2019-01-01 RX ADMIN — LORAZEPAM 1 MG: 2 SOLUTION, CONCENTRATE ORAL at 22:49

## 2019-01-01 RX ADMIN — HALOPERIDOL LACTATE 2 MG: 5 INJECTION INTRAMUSCULAR at 18:10

## 2019-01-01 RX ADMIN — MORPHINE SULFATE 3 MG: 4 INJECTION INTRAVENOUS at 16:12

## 2019-01-01 RX ADMIN — SODIUM CHLORIDE 200 MG: 900 INJECTION, SOLUTION INTRAVENOUS at 14:25

## 2019-01-01 RX ADMIN — LORAZEPAM 1 MG: 2 SOLUTION, CONCENTRATE ORAL at 09:14

## 2019-01-01 RX ADMIN — MORPHINE SULFATE 10 MG: 100 SOLUTION ORAL at 16:52

## 2019-01-01 RX ADMIN — SODIUM CHLORIDE 100 ML: 900 INJECTION, SOLUTION INTRAVENOUS at 22:00

## 2019-01-01 RX ADMIN — HALOPERIDOL LACTATE 2 MG: 5 INJECTION, SOLUTION INTRAMUSCULAR at 04:23

## 2019-01-01 RX ADMIN — IPRATROPIUM BROMIDE AND ALBUTEROL SULFATE 3 ML: .5; 3 SOLUTION RESPIRATORY (INHALATION) at 12:34

## 2019-01-01 RX ADMIN — INSULIN LISPRO 2 UNITS: 100 INJECTION, SOLUTION INTRAVENOUS; SUBCUTANEOUS at 17:00

## 2019-01-01 RX ADMIN — HALOPERIDOL LACTATE 2 MG: 5 INJECTION, SOLUTION INTRAMUSCULAR at 19:55

## 2019-01-01 RX ADMIN — MORPHINE SULFATE 10 MG: 100 SOLUTION ORAL at 17:33

## 2019-01-01 RX ADMIN — HUMAN INSULIN 8 UNITS: 100 INJECTION, SOLUTION SUBCUTANEOUS at 22:55

## 2019-01-01 RX ADMIN — SODIUM CHLORIDE 25 ML/HR: 900 INJECTION, SOLUTION INTRAVENOUS at 13:43

## 2019-01-01 RX ADMIN — INSULIN LISPRO 2 UNITS: 100 INJECTION, SOLUTION INTRAVENOUS; SUBCUTANEOUS at 08:12

## 2019-01-01 RX ADMIN — HUMAN INSULIN 6 UNITS: 100 INJECTION, SOLUTION SUBCUTANEOUS at 11:39

## 2019-01-01 RX ADMIN — GABAPENTIN 100 MG: 100 CAPSULE ORAL at 09:23

## 2019-01-01 RX ADMIN — MORPHINE SULFATE 3 MG: 4 INJECTION INTRAVENOUS at 08:43

## 2019-01-01 RX ADMIN — INSULIN LISPRO 2 UNITS: 100 INJECTION, SOLUTION INTRAVENOUS; SUBCUTANEOUS at 23:48

## 2019-01-01 RX ADMIN — Medication 10 ML: at 19:04

## 2019-01-01 RX ADMIN — HUMAN INSULIN 4 UNITS: 100 INJECTION, SOLUTION SUBCUTANEOUS at 07:30

## 2019-01-01 RX ADMIN — IPRATROPIUM BROMIDE AND ALBUTEROL SULFATE 3 ML: .5; 3 SOLUTION RESPIRATORY (INHALATION) at 15:27

## 2019-01-01 RX ADMIN — CARBOPLATIN 646 MG: 10 INJECTION, SOLUTION INTRAVENOUS at 16:45

## 2019-01-01 RX ADMIN — LORAZEPAM 1 MG: 2 INJECTION INTRAMUSCULAR; INTRAVENOUS at 23:47

## 2019-01-01 RX ADMIN — MORPHINE SULFATE 30 MG: 30 TABLET, FILM COATED, EXTENDED RELEASE ORAL at 23:33

## 2019-01-01 RX ADMIN — DEXAMETHASONE SODIUM PHOSPHATE 4 MG: 4 INJECTION, SOLUTION INTRAMUSCULAR; INTRAVENOUS at 00:19

## 2019-01-01 RX ADMIN — DEXAMETHASONE 6 MG: 4 TABLET ORAL at 17:12

## 2019-01-01 RX ADMIN — SODIUM CHLORIDE 945 MG: 900 INJECTION, SOLUTION INTRAVENOUS at 15:30

## 2019-01-01 RX ADMIN — SODIUM CHLORIDE, SODIUM LACTATE, POTASSIUM CHLORIDE, AND CALCIUM CHLORIDE 125 ML/HR: 600; 310; 30; 20 INJECTION, SOLUTION INTRAVENOUS at 11:37

## 2019-01-01 RX ADMIN — DEXAMETHASONE SODIUM PHOSPHATE 10 MG: 4 INJECTION, SOLUTION INTRAMUSCULAR; INTRAVENOUS at 10:43

## 2019-01-01 RX ADMIN — SODIUM CHLORIDE 200 MG: 900 INJECTION, SOLUTION INTRAVENOUS at 15:05

## 2019-01-01 RX ADMIN — DEXAMETHASONE SODIUM PHOSPHATE 12 MG: 4 INJECTION, SOLUTION INTRA-ARTICULAR; INTRALESIONAL; INTRAMUSCULAR; INTRAVENOUS; SOFT TISSUE at 13:58

## 2019-01-01 RX ADMIN — PANTOPRAZOLE SODIUM 40 MG: 40 TABLET, DELAYED RELEASE ORAL at 17:29

## 2019-01-01 RX ADMIN — DEXAMETHASONE 4 MG: 4 TABLET ORAL at 22:06

## 2019-01-01 RX ADMIN — MORPHINE SULFATE 10 MG: 100 SOLUTION ORAL at 13:08

## 2019-01-01 RX ADMIN — MORPHINE SULFATE 10 MG: 100 SOLUTION ORAL at 20:34

## 2019-01-01 RX ADMIN — DEXAMETHASONE SODIUM PHOSPHATE 4 MG: 4 INJECTION, SOLUTION INTRAMUSCULAR; INTRAVENOUS at 17:29

## 2019-01-01 RX ADMIN — HALOPERIDOL LACTATE 2 MG: 5 INJECTION INTRAMUSCULAR at 19:34

## 2019-01-01 RX ADMIN — PANTOPRAZOLE SODIUM 40 MG: 40 TABLET, DELAYED RELEASE ORAL at 08:36

## 2019-01-01 RX ADMIN — DEXAMETHASONE SODIUM PHOSPHATE 4 MG: 4 INJECTION, SOLUTION INTRAMUSCULAR; INTRAVENOUS at 17:44

## 2019-01-01 RX ADMIN — IPRATROPIUM BROMIDE AND ALBUTEROL SULFATE 3 ML: .5; 3 SOLUTION RESPIRATORY (INHALATION) at 20:35

## 2019-01-01 RX ADMIN — MORPHINE SULFATE 10 MG: 100 SOLUTION ORAL at 11:48

## 2019-01-01 RX ADMIN — MORPHINE SULFATE 10 MG: 100 SOLUTION ORAL at 14:17

## 2019-01-01 RX ADMIN — HALOPERIDOL LACTATE 2 MG: 5 INJECTION INTRAMUSCULAR at 04:30

## 2019-01-01 RX ADMIN — MORPHINE SULFATE 3 MG: 4 INJECTION INTRAVENOUS at 12:10

## 2019-01-01 RX ADMIN — GABAPENTIN 100 MG: 100 CAPSULE ORAL at 23:33

## 2019-01-01 RX ADMIN — MORPHINE SULFATE 10 MG: 100 SOLUTION ORAL at 14:13

## 2019-01-01 RX ADMIN — Medication 30 ML: at 12:53

## 2019-01-01 RX ADMIN — DEXAMETHASONE SODIUM PHOSPHATE 6 MG: 4 INJECTION, SOLUTION INTRAMUSCULAR; INTRAVENOUS at 09:12

## 2019-01-01 RX ADMIN — DEXAMETHASONE SODIUM PHOSPHATE 8 MG: 4 INJECTION, SOLUTION INTRAMUSCULAR; INTRAVENOUS at 10:49

## 2019-01-01 RX ADMIN — DEXAMETHASONE SODIUM PHOSPHATE 4 MG: 4 INJECTION, SOLUTION INTRAMUSCULAR; INTRAVENOUS at 17:10

## 2019-01-01 RX ADMIN — MORPHINE SULFATE 10 MG: 100 SOLUTION ORAL at 17:29

## 2019-01-01 RX ADMIN — Medication 10 ML: at 11:46

## 2019-01-01 SDOH — ECONOMIC STABILITY - INCOME SECURITY: PROBLEM RELATED TO HOUSING AND ECONOMIC CIRCUMSTANCES, UNSPECIFIED: Z59.9

## 2019-01-10 NOTE — TELEPHONE ENCOUNTER
Patient's wife is asking whether they should keep tomorrow's appointment or reschedule. Biopsy was done by Dr. Malvin Merino ([phone]), but she is unsure if the results are back. Patient is also scheduled for MRI of the brain and a PET scan this week but it has been postponed due to insurance. Please advise. Home number for the next few minutes. Otherwise try her cell: [phone].

## 2019-01-10 NOTE — TELEPHONE ENCOUNTER
Biopsy results received Dr Renaldo Layne office, scanned into computer. Per Dr Umair Machuca, he does not need the PET results before making a plan. Pt advised to keep appointment for tomorrow and time and directions confirmed.

## 2019-01-11 NOTE — TELEPHONE ENCOUNTER
ONCOLOGY NURSE NAVIGATOR    TC to [facility] [phone] Dr. Cindy Campuzano (Pulmonologist nurse) to discuss neg lymph node bx received. Pt scheduled to see Dr. Matthew Burden today. [facility]  at [facility] for MRI 15th  PET 16th. [facility] [place] [facility] spoke to [employer] yesterday for CT guided bx. TC to Rosibel, CT Supervisor, no appt scheduled at this time. Message to Dr. Karin Lennon and Wendy Zhao. RAZA will work on getting PET/MRI completed sooner.     Peña Souza RN

## 2019-01-11 NOTE — PROGRESS NOTES
Received path reports from Dr Charmaine Helms office and reviewed with Dr Rosa Hoskins. Bx was from lymph node and was negative. Called Pt  And spoke to spouse, he is going to be scheduled for CT guided lung bx in the next few days and will call this office to get an appointment for 2-3 days after biopsy to discuss results. Pt and wife are aware to call and appreciative of the follow up.

## 2019-01-14 NOTE — PROGRESS NOTES
Name of procedure: Lung biopsy    Complications, if any, r/t procedure: none    Sedation medications given: 2 mg Versed, 100 mcg Fentanyl    Sedation tolerated: well    VS : Stable     Pt tolerated procedure well. VSS. No C/O pain. Dressing to site D&I. No bleeding or hematoma noted to site. HOB elevated. Pt resting comfortably on stretcher. NAD noted. Will monitor. PCXR at 1400.

## 2019-01-14 NOTE — H&P
Radiology History and Physical    Patient: Amber Cisneros 62 y.o. male       Chief Complaint: No chief complaint on file. History of Present Illness: ctguidedrt lung mass biopsy    History:    No past medical history on file. No family history on file. Social History     Socioeconomic History    Marital status:      Spouse name: Not on file    Number of children: Not on file    Years of education: Not on file    Highest education level: Not on file   Social Needs    Financial resource strain: Not on file    Food insecurity - worry: Not on file    Food insecurity - inability: Not on file    Transportation needs - medical: Not on file   [employer] needs - non-medical: Not on file   Occupational History    Not on file   Tobacco Use    Smoking status: Not on file   Substance and Sexual Activity    Alcohol use: Not on file    Drug use: Not on file    Sexual activity: Not on file   Other Topics Concern    Not on file   Social History Narrative    Not on file       Allergies: No Known Allergies    Current Medications:  Current Outpatient Medications   Medication Sig    amLODIPine (NORVASC) 10 mg tablet Take 5 mg by mouth daily.  lisinopril (PRINIVIL, ZESTRIL) 20 mg tablet Take 20 mg by mouth daily.  simvastatin (ZOCOR) 40 mg tablet Take 40 mg by mouth daily.  insulin lispro protamine/insulin lispro (HUMALOG MIX 50-50 INSULN U-100) 100 unit/mL (50-50) injection by SubCUTAneous route.  HYDROcodone-acetaminophen (NORCO) 5-325 mg per tablet Take 1 Tab by mouth every four (4) hours as needed for Pain. Max Daily Amount: 6 Tabs. No current facility-administered medications for this encounter. Physical Exam:  Blood pressure 118/60, pulse 79, temperature 98 °F (36.7 °C), resp. rate 16, height 5' 10\" (1.778 m), weight 76.7 kg (169 lb), SpO2 100 %.   LUNG: clear to auscultation bilaterally, HEART: regular rate and rhythm      Alerts:      Laboratory:    No results for input(s): HGB, HCT, WBC, PLT, INR, BUN, CREA, K, CRCLT, HGBEXT, HCTEXT, PLTEXT in the last 72 hours. No lab exists for component: PTT, PT, INREXT      Plan of Care/Planned Procedure:  Risks, benefits, and alternatives reviewed with patient and he agrees to proceed with the procedure.        Keysha Cisneros MD

## 2019-01-14 NOTE — DISCHARGE INSTRUCTIONS
[address]  Special Procedures/Radiology Department    Radiologist:  Dr. Kira Reyes     Date: 1/14/2019          Lung Biopsy Discharge Instruction    You may have an aching pain in the biopsy site tonight. Take Tylenol, as directed on the label, for pain or discomfort. Avoid ibuprofen (Advil, Motrin) and aspirin for the next 48 hours as these drugs may cause you to bleed. Resume your previous diet and follow the medication reconciliation form. Rest for the next 48 hours. No strenuous activity for the next 48 hours. You may notice some blood-tinged sputum when you cough. This is normal.  If you have any bright red blood, or if you cough up blood clots, call 911 and get to the nearest Emergency Room immediately. It may take up to 3 days for your test results to become available. Call your physician if you have not heard anything after 3 business days. If you have any questions or concerns, please call [phone] and ask to speak to the nurse on-call.

## 2019-01-21 NOTE — PROGRESS NOTES
[facility] Palliative Medicine Office  Nursing Note  [phone]  Fax [phone]      Name:  Ashlyn Gonzalez  YOB: 1960    Received outpatient Palliative Medicine referral from Dr. Jonah Espinoza to see pt for symptom management and supportive care. Chart  reviewed. Ashlyn Gonzalez is a 62 y.o. male with newly diagnosed lung cancer with brain mets. Pt has office visit today with Fadi to establish care. Pt presented to [facility] ED on 12/31/18 with SOB and weight loss. FNA of right lung mass on 1/14/19 showed malignant cells. MRI on 1/15/19 showed multiple brain lesions.     ACP:    None on file                                                                                                                                                       Pt scheduled for outpatient Palliative Medicine appt on 1/18/19 at 1:30pm      RONNA Rosa, RN-BC  Clinical Referral Navigator  [phone]

## 2019-01-21 NOTE — PROGRESS NOTES
ONCOLOGY NURSE NAVIGATOR    Roxana Albarado 63 yo    Dx: Lung Cancer, Mets to Brain     (wife Maryjo Mathews present), Employed at [employer] introduced self and role at Medical Oncology Consultation vs.  Pt seen in ER 12/31/18, seen by pulmonologist-bronchoscopy done. CT guided lung bx 1/14/19 + lung ca, however not enough tissue for further testing. MRI/PET scan images requested from [facility]. PET scan report faxed. Referral to Luis Rouse 1/22/19 3pm    Referral to Palliative for pain/sx management. C/O pain to R shoulder upper chest, \"120\" on 1-10 scale this am.  Has been using Ibuprofen 2 tabs 3-4X/day. Wife states pt has had changes in behaviors past 6 months, \"becomes angry very easily\"  States lays on couch and doesn't want to do much. Given script for Roxicodone IR  5mg 1 tab every 4 hrs PRN pain. Instructed to take Senna 1-2 tabs daily and watch for s/sx of constipation. Advised on adding prunes/prune juice to diet. Advised to call if poor pain control. Provided w/ contact information for staff (Gissel Nguyễn) Use [facility] in [place], advised to call to be sure med in stock. Referral to Dr. Carlos Duque for mediastinoscopy w/ bx and port placement 2/4/19  Email to Federica Drummond NP about moving appt time up. Introduced to [employer] MSW, [employer] dietician. Referral sent to [facility] for assistance w/ will, finances (mortgage) no STD/LTD. Wife does not work, on disability, 10 yr breast cancer survivor. Provided w/ nccn. org, cancer.gov, cancercare. org, lungevity, cancer. org websites for viewing. Given folder w/ lung cancer resources and support information. MD advised to quit working due to safety concerns and and continue making plans (will, adoption of grandchildren, disability)  Provided w/ contact information for [place] DSS and SSD, advised to call in am.     Will need to address ACP.     Plan:   Rad Onc Consult  Thoracic Surgery  Palliative  Chemo    Kristel Hermosillo RN

## 2019-01-21 NOTE — PROGRESS NOTES
[address] [facility] [address] [facility] [address], [address] [facility] W: [phone]  F: [phone] Medical Nutrition Therapy Reason for nutrition visit: Supportive visit with patient and wife to introduce self and discussed role of oncology dietitian in providing supportive nutrition care. Explained that RD is available to be a resource for managing symptoms, minimizing weight changes and maintaining optimal nutrition status during and after cancer treatment. He reports a 10# weight loss over the past several months due to having his teeth removed and esophageal issues. He reports good appetite. He drinks 1-2 Glucerna shakes a day. He makes smoothies in the morning with his bullet (1/2 glucerna, peanut butter, cottage cheese, kale and milk). Wt Readings from Last 10 Encounters:  
01/21/19 168 lb (76.2 kg) 01/14/19 169 lb (76.7 kg) 12/31/18 169 lb 1.5 oz (76.7 kg) Results:  
Diagnosis: lung cancer with brain mets Estimated Nutrition Needs:  
Calorie Range: 2280-2660kcal/day Protein Range: 76-86g/day Fluid Needs: 2200ml Assessment:  
Involuntary weight loss related to teeth removal/disease as evidence by weight loss of 10# x 3 months. Plan:  
· Eating small amounts several times a day verses large meals. · Add protein and calories to favorite foods  (Ex. adding non-fat dry milk powder, butters, oils, whole milk, etc) · Keep nutrient-dense foods close at hand and snack frequently · Discussed consumption of nutrition supplements per day - glucerna shakes and coupons given. · Ideas to make more calorically dense without increasing volume. I appreciate the opportunity to participate in Mr. iLsa villar. Signed By: Adryan Mail, [address], [address] , [place] [address] Contact: [phone]

## 2019-01-21 NOTE — PROGRESS NOTES
Oncology Navigator Psychosocial AssessmentReason for Assessment:   
[]Depression  []Anxiety  []Caregiver Shevlin  []Maladaptive Coping with Serious Illness   [x]Other: New dx - w/brain mets Sources of Information:   
[x]Patient  []Family  []Staff  []Medical Record   Wife Melba Garnett (8 days) - been together 28 years Advance Care Planning: No flowsheet data found. Mental Status:   
[x]Alert  []Lethargic  []Unresponsive Oriented to:  [x]Person  [x]Place  [x]Time  [x]Situation Barriers to Learning:   
[]Language  []Developmental  []Cognitive  []Altered Mental Status  []Visual/Hearing Impairment  []Unable to Read/Write  []Motivational   []No Barriers Identified  []Other: 
 
Relationship Status: 
[]Single  [x]  []Significant Other/Life Partner  []  []  []  8 days ago Living Circumstances: 
[]Lives Alone  [x]Family/Significant Other in Household  []Roommates  []Children in the Home  []Paid Caregivers  []Assisted Living Facility/Group [address]  []Homeless  []Incarcerated  []Environmental/Care Concerns  [x] Other  Melba Garnett has adopted 36year old grandchildren. Support System:   
[]Strong  []Fair  [x]Limited  Pt has 3 children (not sure of relationship/location) and wife has 3 and they have 325year old together Financial/Legal Concerns:   
[]Uninsured  [x]Limited Income/Resources  []Non-Citizen  []No Concerns Identified  []Financial POA:   
[]Other: 
 
Islam/Spiritual/Existential: 
[]Strong Sense of Spirituality  []Involved in [facility]    Not assessed []Request  Visit  []Expressing Spiritual/Existential Angst  []No Concerns Identified Coping with Illness:       
 Patient: Family/Caregiver:  
Understanding and Acceptance of Illness/Prognosis  [x] [] Strong Sense of Resilience [] []  
Self Reflection [] [] Engaged Support System [] [] Does not Readily Discuss Illness [] [] Denial of Terminal Status [] [] Anger [] [] Depression [] [] Anxiety/Fear [] []  
Bargaining [] [] Recent Diagnosis/Prognosis [x] [] Difficulties with Body Image [] [] Loss of Identity [] [] Excessive Substance Use [] [] Mental Health History [] []  
Enmeshed Relationships [] [] History of Loss [] [] Anticipatory Grief [] [] Concern for Complicated Grief [] [] Suicidal Ideation or Plan [] [] Unable to assess [] []  
 
            
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 PT wife is on disability $1300 and she receives $371 for each of the 6year old twins. PT will not be able to work (not a good idea to drive/work- due to brain mets). Pt mortgage is close to $1700 - they have lived at the home since 2010. Referrals:  
 
I. Transportation Medicaid ([facility]) [] [facility] [] Regional organization  [] Financial Assistance/Medication Access Patient assistance program (Care Card) [x] Co-pay assistance  [] Leukemia & Lymphoma Society [] [address]  [] Patient [address] [] CancerCare  [] Emotional support Peer support group [] Local counseling [] Online support group [] Coordination of psychiatry consult [] Goals/Plan:  Pt need to file for SSI/SSDI, SNAP, and any other programs. Wife's sister is coming from [facility] in February to help. Pt wife is a stage 2 breast cancer survivor and states she had some concerns with her own lungs but that is ok now. Resources provided on social security disability, knowledge about , LINC and other resources provided by the [facility].

## 2019-01-21 NOTE — PROGRESS NOTES
Oncology Consultation Note Patient: Dianne Huynh MRN: [id number]  SSN: [id number] YOB: 1960  Age: 62 y.o. Sex: male Subjective:  
  
Dianne Huynh is a 62 y.o. male who I am seeing in consultation for a new diagnosis of metastatic lung carcinoma. He has a over 30 pack years of cigarette smoking. He works in the construction business. He is accompanied by his wife. He started experiencing pain in the right upper chest for last 2 months. Its works with movement. It radiates to the back. He is also experiencing worsening cough and sputum production. He has lost about 10 lbs. A bronchoscopy was non-diagnostic. A CT guided Bx has scant cells s/o a diagnosis of malignancy. He comes in to discuss the next steps. Review of Systems: 
 
Constitutional: negative Eyes: negative Ears, Nose, Mouth, Throat, and Face: negative Respiratory: cough Cardiovascular: negative Gastrointestinal: negative Genitourinary:negative Integument/Breast: negative Hematologic/Lymphatic: negative Musculoskeletal: right upper chest pain Neurological: negative History reviewed. No pertinent history of prior cancer History reviewed. No pertinent surgical history. History reviewed. No pertinent family history of cancer Social History Tobacco Use  Smoking status: Former Smoker Last attempt to quit: 1/21/2009 Years since quitting: 10.0  Smokeless tobacco: Never Used Substance Use Topics  Alcohol use: No  
  Frequency: Never Prior to Admission medications Medication Sig Start Date End Date Taking?  Authorizing Provider  
insulin glargine (LANTUS) 100 unit/mL injection 14 units qhs--increase Lantus 2 units q day until fasting 150 7/3/18  Yes Provider, Historical  
insulin lispro (HUMALOG U-100 INSULIN) 100 unit/mL injection INJECT 5 UNITS SUBCUTANEOUSLY BEFORE MEALS-INCREASE HUMALOG 1 UNIT EVERY OTHER DAY UNTIL 2 HR PP  OR LESS 4/24/18  Yes Provider, Historical  
fenofibrate (LOFIBRA) 160 mg tablet take 1 tablet by mouth once daily 12/10/18  Yes Provider, Historical  
ascorbic acid, vitamin C, (VITAMIN C) 250 mg tablet Take  by mouth. Yes Provider, Historical  
TURMERIC PO Take  by mouth. Yes Provider, Historical  
flaxseed oil 1,000 mg cap Take  by mouth. Yes Provider, Historical  
acetaminophen (TYLENOL) 325 mg tablet Take  by mouth every four (4) hours as needed for Pain. Yes Provider, Historical  
ibuprofen 200 mg cap Take  by mouth. Yes Provider, Historical  
oxyCODONE IR (ROXICODONE) 5 mg immediate release tablet Take 1 Tab by mouth every four (4) hours as needed for Pain. Max Daily Amount: 30 mg. 1/21/19  Yes Alice Kelly, NP  
amLODIPine (NORVASC) 10 mg tablet Take 5 mg by mouth daily. Yes Provider, Historical  
lisinopril (PRINIVIL, ZESTRIL) 20 mg tablet Take 20 mg by mouth daily. Yes Provider, Historical  
simvastatin (ZOCOR) 40 mg tablet Take 40 mg by mouth daily. Yes Provider, Historical  
insulin lispro protamine/insulin lispro (HUMALOG MIX 50-50 INSULN U-100) 100 unit/mL (50-50) injection by SubCUTAneous route. Provider, Historical  
  
 
 
 
 
No Known Allergies Objective:  
 
Vitals:  
 01/21/19 1341 BP: 124/81 Pulse: 83 Resp: 18 Temp: 98.4 °F (36.9 °C) TempSrc: Oral  
SpO2: 96% Weight: 168 lb (76.2 kg) Height: 5' 10\" (1.778 m) Physical Exam: 
 
GENERAL: alert, cooperative, no distress, appears stated age EYE: conjunctivae/corneas clear. PERRL, EOM's intact. Fundi benign LYMPHATIC: Cervical, supraclavicular, and axillary nodes normal.  
THROAT & NECK: normal and no erythema or exudates noted. LUNG: clear to auscultation bilaterally HEART: regular rate and rhythm, S1, S2 normal, no murmur, click, rub or gallop ABDOMEN: soft, non-tender. Bowel sounds normal. No masses,  no organomegaly EXTREMITIES:  extremities normal, atraumatic, no cyanosis or edema SKIN: Normal. 
NEUROLOGIC: AOx3. Gait normal. Reflexes and motor strength normal and symmetric. Cranial nerves 2-12 and sensation grossly intact. EXAM:  CTA CHEST W OR W WO CONT 
  
INDICATION: Chest pain, acute, pulmonary embolism (PE) suspected 
  
COMPARISON: Chest radiograph 12/31/2018. 
  
CONTRAST:  80 mL of Isovue-370. 
? Technique: Following the uneventful intravenous administration of contrast, thin 
axial images were obtained through the chest. Coronal and sagittal 
reconstructions were generated. MIP image reconstructions were also performed. CT dose reduction was achieved through use of a standardized protocol tailored 
for this examination and automatic exposure control for dose modulation. ? Findings: 
Vascular: No evidence of acute or chronic pulmonary embolism. The pulmonary arteries are 
normal in size. The thoracic aorta is normal in size. ? Chest: 
Lungs/Pleura: Large right upper lobe mass measuring 5.6 x 4.7 x 4.8 cm. Small 
satellite nodule in the right upper lobe measuring 6 mm (series 2 image 88). The 
mass is in continuity with the conglomerate right paratracheal adenopathy as 
described. There is occlusion of the posterior segmental and subsegmental 
bronchi of the right upper lobe. 
  
Axilla/Soft Tissue: No pathologic axillary adenopathy. 
  
Mediastinum: The heart is normal in size. No pericardial effusion. Conglomerate 
right paratracheal adenopathy measuring 6.8 x 3.9 x 7.2 cm. Right hilar 
adenopathy measuring 3.0 x 2.2 cm. Enlarged subcarinal adenopathy measuring 5.5 
x 2.4 cm. There are enlarged left lower paratracheal lymph nodes measuring 2.7 x 
1.1 cm. 
  
Upper Abdomen: Indeterminant hypodensities within the left hepatic lobe segment IVb measuring 3.5 x 1.6 cm in aggregate (series 2 image 20).  Simple cyst in the 
upper pole of the left kidney measuring 2.5 cm, with a posterior peripheral 
 calcification. 
  
Bones: No evidence of acute fracture, dislocation, or aggressive osseous 
abnormality. ? 
IMPRESSION Impression: 
  
1. Large right upper lobe mass measuring 5.6 x 4.7 x 4.8 cm, which is in 
continuity with extensive conglomerate right paratracheal and right hilar 
adenopathy. Findings are consistent with primary lung malignancy with extensive 
metastatic adenopathy. The right upper lobe mass occludes the segmental and 
subsegmental bronchi of the posterior segment of the right upper lobe. 2. Metastatic subcarinal adenopathy. Enlarged left lower paratracheal 
adenopathy, which is also favored to be metastatic. 3. A 6 mm satellite nodule in the right upper lobe, consistent with metastasis. 4. Indeterminate hypodensities within hepatic segment IVb, which may represent 
metastatic disease. 5. No evidence of pulmonary embolism. I personally reviewed the images. MRI brain: 5 lesions: right occipital, right lateral ventricular arm, right temporal, right anterior temporal and left frontal 
 
PET CT: large RUL mass with extensive mediastinal adenopathy, metastatic disease to the liver, brain Assessment:  
 
1. T3 N3 M1a (Stage IV) NSCLC of the lung, likely adenoca 
  
> EGFR/ALK/ROS1/MET/BRAF/TRK/PD-L1 pending 
> Unresectable disease ECOG PS 1 Intent of Treatment - palliative Prognosis poor I spent 65 minute with the patient in a face-to-face encounter. I explained him the stage of the disease, pathophysiology of the disease and the treatment approaches. I answered all his questions. More than 50% of the time was utilized in education, counseling and co-ordination of care. We need more tissue to obtain histology and biomarker testing. I referred him to Dr. Walter De Leon for mediastinoscopy. The disease in the brain needs to addressed ASAP. I have referred him to Dr. Rivera Pena for consideration of SBRT. I explained to him the basics of chemotherapy, immunotherapy and targeted therapy, their rationale and role. The patient's emotional well being was addressed during this office visit and patient seems to be coping well with the diagnosis and the treatment. Patient will be meeting with navigation services to discuss any financial barriers to care/estimated cost of care. Plan: 1. Refer to Dr. Galdino Jama for mediastinoscopy 2. Refer to Dr. Bobby Foster for SBRT to brain lesions 3. Return in 3 weeks Signed By: Deana Shepherd MD   
 January 21, 2019   
 
 
CC. Tashi Nguyen MD 
CC. Maude Rubinstein, MD 
CC. William Panda MD 
CC.  Scott San MD

## 2019-01-21 NOTE — PROGRESS NOTES
Charlie Alvarez is a 62 y.o. male new patient here today for lung mass; +ve malignancy. MRI show +ve brain mets. Patient accompanied by his spouse to today's appt. Former smoker. Patient denies drinking alcohol. Patient reports right shoulder and right arm pain; pain 10/10; pt takes OTC Tylenol or Ibuprofen for pain. Patient employed at [employer] [place]. Visit Vitals /81 (BP 1 Location: Left arm, BP Patient Position: Sitting) Pulse 83 Temp 98.4 °F (36.9 °C) (Oral) Resp 18 Ht 5' 10\" (1.778 m) Wt 168 lb (76.2 kg) SpO2 96% BMI 24.11 kg/m² Health Maintenance Review: Patient reminded of \"due or due soon\" health maintenance. I have asked the patient to contact his/her primary care provider (PCP) for follow-up on his/her health maintenance.

## 2019-01-21 NOTE — TELEPHONE ENCOUNTER
Oncology Nurse Navigator    TC to [facility] Radiology to request MRI/PET films be pushed over to [address] [facility], will have [facility] Radiology upload. Request faxed to [address]. Also requested PET Scan report be faxed, Red Manuel states does not have.     Kristel Hermosillo RN

## 2019-01-23 NOTE — TELEPHONE ENCOUNTER
TC to pt, advised Alan Barakat NP increase Roxicodone IR 5mg to every 3 hrs PRN pain due to poor pain control. States Toyin Nguyen spoke to [employer] and [facility] today. She is currently out getting an xray. Discussed family dynamics w/ pt and who would he trust to help make decisions w/ Toyin Nguyen and be supportive of her. Their son, Paige Carlson, who lives in [place]. Toyin Nguyen has 3 children who live in [place], she has adopted her oldest sons 5 yo son and daughter. Pt has 3 children who live in [place]. All of the children are aware of dx, however have not been updated. Pt states he wants to get them all together to tell them the latest information. ONN advised to call office [phone] if pain not controlled or other concerns.      Daniele Angulo RN

## 2019-01-23 NOTE — TELEPHONE ENCOUNTER
Returned TC to pt's wife, Lydia Proctor re: thoracic appt. To see Dr. Sj Rizvi 1/24/19. States Dr. Renetta Diaz referred to [facility] Neuro Oncology for surgical consult re: [facility] 2 tumors then do radiation\" per wife. JUSTIN Ramos RN Rad Onc states referral sent to new pt coordinator @ [facility]. Noted could take up to 1 week, she notified Margaret Chung NP Neuro [facility]. ONN advised Dr. Matthew Burden, states if clinic unable to see in 1-2 days will refer to Dr. Ann Singer. TC to [facility] [phone] @ 9:00am, NP unavailable, message left w/ above info. Provided ONN contact information. Discussed w/ pt/spouse status of Medicaid/SSD phone calls. \"He won't do either\" states wife. ONN advised pt to contact SS this am and would call back later today to update.       Peña Souza RN

## 2019-01-23 NOTE — TELEPHONE ENCOUNTER
TC from [facility] Daniels stating pt will be seen by [facility] Neuro Oncology @ [facility] 1/24/19 10:20am surgical evaluation of brain mets.     Appt 1/24/19 [facility] Thoracic Dr. Stephanie Rachel    Routed to Dr. Joshua Panda RN  Oncology Nurse Navigator

## 2019-01-23 NOTE — TELEPHONE ENCOUNTER
ONCOLOGY NURSE NAVIGATOR    TC from pt wife stating SSD phone interview on Friday 1/25/19. Instructed to now contact Medicaid, provided w/ coverva toll free number and advised to contact next. TC from [facility], referral given, state will contact pt today for assistance w/ will, mortgage, and finances. Pt C/O pain \"20\" this am, started Roxicodone IR 5mg every 4 hrs PRN on 1/21, is awakening in the night in pain and takes ATC every 4 hrs. Denies constipation, taking fiber, greens, and drinking liquids. Advised Chris Hunter NP re: poor pain control.     Luigi Wilcox RN

## 2019-01-28 NOTE — PROGRESS NOTES
Palliative Medicine  Nursing Note  [phone]  Fax [phone]        Clinic Office Visit  Patient Name: Anmol Montoya  YOB: [birth year]      No flowsheet data found. Plan per Dr. Kyung Steinberg,      Provided New Patient Welcome Packet: Includes Opioid Safety, PM brochure and flyer, Magnet with Palliative Medicine Phone number. AVS reviewed with patient, verbalized an understanding of material discussed. Instructions given to patient to call the Palliative Medicine Office at [phone]) for any questions or concerns 24 hours a day, 7 days a weeks. Follow up appointment scheduled for 2 weeks    Nurse Navigator will provide a follow up phone call in one week.       APOLONIA BarriosN, RN, OCN, [facility]  Palliative Medicine

## 2019-01-28 NOTE — PROGRESS NOTES
Palliative Medicine Office Visit Palliative Medicine Nurse Check In 
[phone] Patient Name: Koffi Do YOB: 1960 Date of Office Visit: 1/28/2019 Patient states: \"  \" 
 
From Check In Sheet (scanned in Media): 
Has a medical provider talked with you about cardiopulmonary resuscitation (CPR)? [x] Yes   [] No   [] Unable to obtain Nurse reminder to complete or update ACP FlowSheet: 
 
Is ACP on the Problem List?    [] Yes    [x] No 
IF ACP Document is ON FILE; Nurse to place ACP on Problem List  
 
Is there an ACP Note in Chart Review/Note? [] Yes    [x] No  
If NO: ALERT PROVIDER No flowsheet data found. Is there anything that we should know about you as a person in order to provide you the best care possible? Have you been to the ER, urgent care clinic since your last visit? [] Yes   [x] No   [] Unable to obtain Have you been hospitalized since your last visit? [] Yes   [x] No   [] Unable to obtain Have you seen or consulted any other health care providers outside of the [address] [place] [address] since your last visit? [] Yes   [x] No   [] Unable to obtain Functional status (describe):  
 
 
 
Last BM: 1/28/2019  accessed (date): 1/28/2019 Bottle review (for opioid pain medication): 
Medication 1:  
Date filled:  
Directions:  
# filled: # left: # pills taking per day: 
Last dose taken: 
 
Medication 2:  
Date filled:  
Directions:  
# filled: # left: # pills taking per day: 
Last dose taken: 
 
Medication 3:  
Date filled:  
Directions:  
# filled: # left: # pills taking per day: 
Last dose taken: 
 
Medication 4:  
Date filled:  
Directions:  
# filled: # left: # pills taking per day: 
Last dose taken:

## 2019-01-28 NOTE — PROGRESS NOTES
Palliative Medicine Outpatient Services [place]: [phone] Patient Name: Linn Ryder YOB: 1960 Date of Current Visit: 01/28/19 Location of Current Visit:   
[] [facility] Office 
[] [facility] Office [x] [address] [facility] Office 
[] Home 
[] Other:   
 
Date of Initial Visit: 1/28/19 Requesting Physician: Dr. Ancil Schaumann Primary Care Physician: Derek Stack MD 
  
 SUMMARY:  
Linn Ryder is a 62y.o. 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 Ancil Schaumann for management of symptoms and support. The patients social history includes worked in construction, has 30 plus year smoking history but quit in 2008, lives with wife who is stage 2 breast cancer survivor for over 8 yeas. Wife Billy Agustin has 2 children and one with Sami Medel. Twin grand children live with them, Sami Medel has 3 children. He is getting admitted to [facility] for brain surgery 1/29. Biopsy of the brain mass will determine course of treatment. He prefers to get his chemo/ RT here at [address] [facility]. PALLIATIVE DIAGNOSES:  
 
  ICD-10-CM ICD-9-CM 1. Right-sided chest wall pain R07.89 786.52 oxyCODONE IR (OXY-IR) 15 mg immediate release tablet 2. Drug-induced constipation K59.03 564.09   
  E980.5 3. Psychosocial stressors Z65.8 V62.89   
4. [facility] C34.90 162.9   
 C79.31 198.3 PLAN:  
Patient Instructions Dear Linn Ryder , It was a pleasure seeing you today at [id number] [facility] office Your described symptoms were: Fatigue: 10 Drowsiness: 2 Depression: 1 Pain: 10 Anxiety: 0 Nausea: 7 Anorexia: 1 Dyspnea: 1 Best Well-Being: 10 Constipation: Yes This is the plan we talked about: 1. Right sided chest pain radiating to the shoulder - You are taking Oxycodone 5 mg every 3 hours without any benefit and are in a lot of pain. - Let us increase Oxycodone to 15 mg every 4 hours as needed. Please make note of what your pain level is when you take the medicine and how much this helps. - You are getting admitted to [facility] tomorrow for brain surgery and expect to be released later in the week. - We will call you next week to see how you are doing. 2. Constipation - Opioid medications will cause constipation. 
- You are taking Prune juice, please continue that. - Start stool softeners- Pericoalce, 2 tabs two times a day and Miralax every other day as needed. 3. ACP 
- We completed an advance medical directive and medical power of  today. - Please work with a  to complete financial POA and living will for assets and funds. Your  does not have to do anything regarding medical POA or directive. This is what you have shared with us about Advance Care Planning: 
 
Primary Decision [facility] ([address]): Ranulfo May Relationship to patient:Wife 
Phone Alanis Staples [x] Named in a scanned document  
[x] Legal Next of Kin 
[] Guardian Secondary Decision Maker (First Alternate Health Care Agent):  Antony Fierro Relationship to patient: Father Phone number: [phone] [x] Named in a scanned document  
[] Legal Next of Kin 
[] Guardian ACP documents you current have include: 
[x] Advance Directive or Living Will 
[] Durable Do Not Resuscitate 
[] Physician Orders for Scope of Treatment (POST) [] Medical Power of  
[] Other The Palliative Medicine Team is here to support you and your family. We will see you again in 2 weeks Sincerely, 
 
 
Júnior Delgado MD and the Palliative Medicine Team 
 
 
Counseling and Coordination:  
See above AMD completion- 30 mins GOALS OF CARE / TREATMENT PREFERENCES:  
[====Goals of Care====] GOALS OF CARE: 
Patient / health care proxy stated goals: FULL 
 
 
TREATMENT PREFERENCES:  
 Code Status:  [x] Attempt Resuscitation       [] Do Not Attempt Resuscitation Advance Care Planning: 
[x] The [facility] Interdisciplinary Team has updated the ACP Navigator with [address] and Patient Capacity The palliative care team has discussed with patient / health care proxy about goals of care / treatment preferences for patient. 
[====Goals of Care====] PRESCRIPTIONS GIVEN:  
 
Medications Ordered Today Medications  oxyCODONE IR (OXY-IR) 15 mg immediate release tablet Sig: Take 1 Tab by mouth every four (4) hours as needed for Pain for up to 15 days. Max Daily Amount: 90 mg. Dispense:  90 Tab Refill:  0  
 senna-docusate (PERICOLACE) 8.6-50 mg per tablet Sig: Take 2 Tabs by mouth daily. Dispense:  120 Tab Refill:  6 FOLLOW UP: Future Appointments Date Time Provider Marlo Maria G 2/5/2019  9:30 AM Reyna Caballero MD [address]  
2/6/2019  8:00 AM MD Cristi Ervin  
2/11/2019 10:45 AM MD Ashley Barnard. 49 PHYSICIANS INVOLVED IN CARE:  
Patient Care Team: 
Tobias Wild MD as PCP - [facility]) HISTORY:  
Nursing documentation from date of visit reviewed. Reviewed patient-completed ESAS and advance care planning form. Reviewed patient record in prescription monitoring program. 
 
CHIEF COMPLAINT: right sided chest pain HPI/SUBJECTIVE: The patient is: [] Verbal / [] Nonverbal  
 
Patient here today along with his wife Kristine Martinez and Dilcia's sister from [facility]. Wilfredo Yun reports severe right-sided chest wall pain that radiates to his shoulder. He has been on oxycodone 5 mg which was increased from every 4 hours to every 3 hours. He reports that this does not work in any way and he is in excruciating pain throughout the day. He does not have past history of opioids. He quit smoking and drinking in 2008. He is constipated. Taking prune juice. On dexamethasone 4 mg 2 times a day for brain metastases Significant amount of distress in the family with expected grief. He was diagnosed with cancer on New Year's Shari and it has been overwhelming since then. We talked about prognosis at length and what to expect with brain surgery, radiation and chemotherapy. Advised and encouraged them to take it one day at a time instead of being overwhelmed. Encourage communication within the family and involve adult children and helping out with appointments support at home and care for the twin 6year-old grandchildren. Clinical Pain Assessment (nonverbal scale for nonverbal patients):  
[++++ Clinical Pain Assessment++++] [++++Pain Severity++++]: Pain: 10 
[++++Pain Character++++]: digging 
[++++Pain Duration++++]: weeks [++++Pain Effect++++]: functional and emotional 
[++++Pain Factors++++]: none in particular 
[++++Pain Frequency++++]: constant [++++Pain Location++++]: right-sided chest wall and shoulder 
[++++ Clinical Pain Assessment++++] FUNCTIONAL ASSESSMENT:  
 
Palliative Performance Scale (PPS): PPS: 80 PSYCHOSOCIAL/SPIRITUAL SCREENING:  
 
Any spiritual / Anabaptism concerns: 
[] Yes /  [x] No 
 
Caregiver Burnout: 
[] Yes /  [x] No /  [] No Caregiver Present Anticipatory grief assessment:  
[x] Normal  / [] Maladaptive ESAS Anxiety: Anxiety: 0 
 
ESAS Depression: Depression: 1 REVIEW OF SYSTEMS:  
 
The following systems were [] reviewed / [] unable to be reviewed Systems: constitutional, ears/nose/mouth/throat, respiratory, gastrointestinal, genitourinary, musculoskeletal, integumentary, neurologic, psychiatric, endocrine. Positive findings noted below. Modified ESAS Completed by: provider Fatigue: 10 Drowsiness: 2 Depression: 1 Pain: 10 Anxiety: 0 Nausea: 7 Anorexia: 1 Dyspnea: 1 Best Well-Being: 10 Constipation: Yes PHYSICAL EXAM:  
 
Wt Readings from Last 3 Encounters: 01/28/19 159 lb 12.8 oz (72.5 kg) 01/21/19 168 lb (76.2 kg) 01/14/19 169 lb (76.7 kg) Blood pressure 124/77, pulse 81, temperature 97.9 °F (36.6 °C), temperature source Oral, resp. rate 18, height 5' 10\" (1.778 m), weight 159 lb 12.8 oz (72.5 kg), SpO2 98 %. Last bowel movement: See Nursing Note Constitutional: Alert, oriented Eyes: pupils equal, anicteric ENMT: no nasal discharge, moist mucous membranes Cardiovascular: regular rhythm, distal pulses intact Respiratory: breathing not labored, symmetric with poor air entry on the right side Gastrointestinal: soft non-tender, +bowel sounds Musculoskeletal: no deformity, no tenderness to palpation, obvious muscle wasting in the chest area Skin: warm, dry Neurologic: following commands, moving all extremities Psychiatric: full affect, no hallucinations Other: 
 
 
 HISTORY:  
 
History reviewed. No pertinent past medical history. History reviewed. No pertinent surgical history. History reviewed. No pertinent family history. History reviewed, no pertinent family history. Social History Tobacco Use  Smoking status: Former Smoker Last attempt to quit: 1/21/2009 Years since quitting: 10.0  Smokeless tobacco: Never Used Substance Use Topics  Alcohol use: No  
  Frequency: Never No Known Allergies Current Outpatient Medications Medication Sig  
 oxyCODONE IR (OXY-IR) 15 mg immediate release tablet Take 1 Tab by mouth every four (4) hours as needed for Pain for up to 15 days. Max Daily Amount: 90 mg.  
 senna-docusate (PERICOLACE) 8.6-50 mg per tablet Take 2 Tabs by mouth daily.  insulin glargine (LANTUS) 100 unit/mL injection 14 units qhs--increase Lantus 2 units q day until fasting 150  
 insulin lispro (HUMALOG U-100 INSULIN) 100 unit/mL injection INJECT 5 UNITS SUBCUTANEOUSLY BEFORE MEALS-INCREASE HUMALOG 1 UNIT EVERY OTHER DAY UNTIL 2 HR PP  OR LESS  fenofibrate (LOFIBRA) 160 mg tablet take 1 tablet by mouth once daily  acetaminophen (TYLENOL) 325 mg tablet Take  by mouth every four (4) hours as needed for Pain.  amLODIPine (NORVASC) 10 mg tablet Take 5 mg by mouth daily.  lisinopril (PRINIVIL, ZESTRIL) 20 mg tablet Take 20 mg by mouth daily.  simvastatin (ZOCOR) 40 mg tablet Take 40 mg by mouth daily.  ascorbic acid, vitamin C, (VITAMIN C) 250 mg tablet Take  by mouth.  TURMERIC PO Take  by mouth.  flaxseed oil 1,000 mg cap Take  by mouth.  ibuprofen 200 mg cap Take  by mouth.  insulin lispro protamine/insulin lispro (HUMALOG MIX 50-50 INSULN U-100) 100 unit/mL (50-50) injection by SubCUTAneous route. No current facility-administered medications for this visit. LAB DATA REVIEWED:  
 
Lab Results Component Value Date/Time WBC 4.9 12/31/2018 01:13 PM  
 HGB 12.9 12/31/2018 01:13 PM  
 PLATELET 172 93/41/3031 01:13 PM  
 
Lab Results Component Value Date/Time Sodium 134 (L) 12/31/2018 01:13 PM  
 Potassium 3.9 12/31/2018 01:13 PM  
 Chloride 102 12/31/2018 01:13 PM  
 CO2 25 12/31/2018 01:13 PM  
 BUN 18 12/31/2018 01:13 PM  
 Creatinine 1.48 (H) 12/31/2018 01:13 PM  
 Calcium 9.7 12/31/2018 01:13 PM  
 Magnesium 2.2 12/31/2018 01:13 PM  
 Phosphorus 2.6 03/31/2010 06:43 AM  
  
Lab Results Component Value Date/Time AST (SGOT) 41 (H) 12/31/2018 01:13 PM  
 Alk. phosphatase 42 (L) 12/31/2018 01:13 PM  
 Protein, total 8.8 (H) 12/31/2018 01:13 PM  
 Albumin 4.2 12/31/2018 01:13 PM  
 Globulin 4.6 (H) 12/31/2018 01:13 PM  
 
Lab Results Component Value Date/Time  
 INR >9.2 (HH) 05/28/2010 04:43 PM  
 Prothrombin time >75.0 (H) 05/28/2010 04:43 PM  
 aPTT 144.2 (H) 05/28/2010 04:43 PM  
  
No results found for: IRON, FE, TIBC, IBCT, PSAT, FERR Impression: 
  
1. Large right upper lobe mass measuring 5.6 x 4.7 x 4.8 cm, which is in 
continuity with extensive conglomerate right paratracheal and right hilar adenopathy. Findings are consistent with primary lung malignancy with extensive 
metastatic adenopathy. The right upper lobe mass occludes the segmental and 
subsegmental bronchi of the posterior segment of the right upper lobe. 2. Metastatic subcarinal adenopathy. Enlarged left lower paratracheal 
adenopathy, which is also favored to be metastatic. 3. A 6 mm satellite nodule in the right upper lobe, consistent with metastasis. 4. Indeterminate hypodensities within hepatic segment IVb, which may represent 
metastatic disease. 5. No evidence of pulmonary embolism. 
  
I personally reviewed the images. 
  
MRI brain: 5 lesions: right occipital, right lateral ventricular arm, right temporal, right anterior temporal and left frontal 
  
PET CT: large RUL mass with extensive mediastinal adenopathy, metastatic disease to the liver, brain 
  
 
 
 
 CONTROLLED SUBSTANCES SAFETY ASSESSMENT (IF ON CONTROLLED SUBSTANCES):  
 
Reviewed opioid safety handout:  [x] Yes   [] No 
24 hour opioid dose >150mg morphine equivalent/day:  [] Yes   [x] No 
Benzodiazepines:  [] Yes   [x] No 
Sleep apnea:  [] Yes   [x] No 
Urine Toxicology Testing within last 6 months:  [] Yes   [x] No 
History of or new aberrant medication taking behaviors:  [] Yes   [x] No 
Has Narcan been prescribed [] Yes   [x] No 
 
   
 
Total time: 90m Counseling / coordination time: 70m 
> 50% counseling / coordination?: y

## 2019-01-28 NOTE — PATIENT INSTRUCTIONS
Dear Charlie Alvarez , It was a pleasure seeing you today at [facility] office Your described symptoms were: Fatigue: 10 Drowsiness: 2 Depression: 1 Pain: 10 Anxiety: 0 Nausea: 7 Anorexia: 1 Dyspnea: 1 Best Well-Being: 10 Constipation: Yes This is the plan we talked about: 1. Right sided chest pain radiating to the shoulder - You are taking Oxycodone 5 mg every 3 hours without any benefit and are in a lot of pain. - Let us increase Oxycodone to 15 mg every 4 hours as needed. Please make note of what your pain level is when you take the medicine and how much this helps. - You are getting admitted to [facility] tomorrow for brain surgery and expect to be released later in the week. - We will call you next week to see how you are doing. 2. Constipation - Opioid medications will cause constipation. 
- You are taking Prune juice, please continue that. - Start stool softeners- Pericoalce, 2 tabs two times a day and Miralax every other day as needed. 3. ACP 
- We completed an advance medical directive and medical power of  today. - Please work with a  to complete financial POA and living will for assets and funds. Your  does not have to do anything regarding medical POA or directive. This is what you have shared with us about Advance Care Planning: 
 
Primary Decision [facility] ([address]): Pat Alfred Relationship to patient:Wife 
Phone [place] [x] Named in a scanned document  
[x] Legal Next of Kin 
[] Guardian Secondary Decision Maker (First Alternate Health Care Agent):  Farzaneh Myles Relationship to patient: Father Phone number: [phone] [x] Named in a scanned document  
[] Legal Next of Kin 
[] Guardian ACP documents you current have include: 
[x] Advance Directive or Living Will 
[] Durable Do Not Resuscitate 
[] Physician Orders for Scope of Treatment (POST) [] Medical Power of  
[] Other The Palliative Medicine Team is here to support you and your family. We will see you again in 2 weeks Sincerely, 
 
 
Sharonda Llamas MD and the Palliative Medicine Team

## 2019-01-28 NOTE — ACP (ADVANCE CARE PLANNING)
Advance Care Planning (ACP) Provider [facility] Date of ACP Conversation: 01/28/19 Persons included in Conversation:  patient and family Length of ACP Conversation in minutes:  25 minutes Authorized Decision Maker (if patient is incapable of making informed decisions): This person is:  
Healthcare Agent/Medical Power of  under Advance Directive For Patients with Decision Making Capacity:  
Values/Goals: Exploration of values, goals, and preferences if recovery is not expected, even with continued medical treatment in the event of:  Imminent death Conversation Outcomes / Follow-Up Plan:  
Completed new Advance Directive

## 2019-02-04 NOTE — TELEPHONE ENCOUNTER
Called Dilcia back and advised her that Dr. Karen Sainz will see the patient on Wednesday afternoon. I also advised her that Wilner, my PSR, will call her a little later to reschedule the appointment and give her the time. Vernell Kang thanked me for working with her.

## 2019-02-04 NOTE — TELEPHONE ENCOUNTER
Returned Dilcia's (on HIPAA) call. She asked if the patient needed to keep the appointment with Dr. Denzel Vivar tomorrow since the patient had surgery for brain cancer last week and Dr. Denzel Vivar is a lung surgeon. I advised Jasbir Gage [phone] that the patient should keep the appointment since it is a consultation and Dr. Kristin Dias is referring the patient to see him. Dilcia verbalized understanding. She then asked if she could reschedule for another time since she has an appointment in [facility] at 7:30am tomorrow and there is not way they will be able to get back in time for the patient's appointment at 9:30 with Dr. Denzel Vivar. I told her I would see when the next available appointment is and call her back. I think it is next Tuesday. Dilcia verbalized understanding.

## 2019-02-04 NOTE — PROGRESS NOTES
Palliative Medicine  Nursing Note  [phone]  Fax [phone]      Telephone Call  Patient Name: Tejas Gibson  YOB: [birth year]      No flowsheet data found. Spoke with Ms. Marisa Rowland regarding her  [facility] to see how he was doing after having brain surgery at [facility] on 1/29/2019. She stated that he was recovering well from the surgery but that he has continues to have a lot of pain in his right arm and goes around to his back at times. She stated that once he got home from the hospital he began taking Oxycodone 15 mg tablets but added an oxycodone 5 mg tablets that he had left from a previous prescription to total 20 mg every 4 hours as needed for pain. She said that even taking those he was having a lot of pain. She was told to have him add one Tylenol 325 mg every other time he took the oxycodone 15 mg tablets. PM nurse asked that Ms. Marisa Rowland ask her  if he had any relief when he took Oxycodone, she stated that he did get some relief but that he is still having quiet a bit of pain. It was difficult to get a clear picture of what the pain levels were. He has a couple of appointments with different doctors this week regarding his diagnosis and how they will proceed with treating his cancer. It was also recommended that he try a lidocaine patch to the arm to see if that would be helpful. She was told that the PM nurse would call her back to check on her  tomorrow. She was appreciative of the call. Information was shared with Dr. Iker Bowles, APOLONIAN, RN, OCN, [place] [id number]-After speaking with Dr. Jennifer Pino, Mr. Marisa Rowland Oxycodone 15 mg was increased to 1.5 tablets every 4 hours as need for pain. She will write a seven day prescription for him. The sister-in-law was notified of this and is agreeable to pick it up at the [address] office on Wednesday.   Adam Sloan RN  Palliative Medicine

## 2019-02-05 NOTE — PROGRESS NOTES
Prescription was written for Mr. Steve Robles for oxycodone 15 mg , with the instructions to take one and a half tablet every 4 hours as needed for pain. It will be picked up at the [id number] [place] [address] office on 2/6/2019.     Jose Guadalupe Souza RN  Palliative Medicine

## 2019-02-06 NOTE — TELEPHONE ENCOUNTER
Patient's wife called on his behalf requesting a return call. She has a couple of questions regarding patient. Patient is scheduled to see 3 neurologists including one at [facility]. She would like to know can they cancel the other 2 appointments. She also stated the cancer in his lung is causing pain in his chest, arm, and back. She was told by another physician he can have radiation done on his brain and lung at the same time to relieve the pain. She would like to know if this is true. Please return call to discuss [phone].   lisa

## 2019-02-07 NOTE — PROGRESS NOTES
Asked to see Mr Alisia Gonzalez re: Stage IV lung cancer; possible mediastinoscopy and port-a-cath    Referred by Dr. Martha Rhodes    Mr Alisia Gonzalez is a pleasant 61 y/o gentleman with a past medical history significant for DMII, tobacco abuse and now lung cancer. He was originally worked up by Dr. Shaila Barnes for a RUL lung mass. He was found to have extensive mediastinal disease and brain mets. He was seen urgently at [facility] and had a craniotomy. He is recovering well and now needs to begin chemotherapy. He reports some left shoulder pain. Denies hemoptysis. Some weight loss. No Known Allergies    PMHx: DMII, Lung cancer    PSHx: cervical spine procedure, craniotomy    SocHx: long time smoker, quit 10+ years ago    Family History   Problem Relation Age of Onset    Stroke Father        Outpatient Medications Marked as Taking for the 2/7/19 encounter (Office Visit) with Nu Benavidez MD   Medication Sig Dispense Refill    oxyCODONE IR (OXY-IR) 15 mg immediate release tablet Take 1 Tab by mouth every four (4) hours as needed for Pain for up to 7 days. Max Daily Amount: 90 mg. May take 1.5 tablets as needed for pain. 63 Tab 0    senna-docusate (PERICOLACE) 8.6-50 mg per tablet Take 2 Tabs by mouth daily. 120 Tab 6    insulin glargine (LANTUS) 100 unit/mL injection 14 units qhs--increase Lantus 2 units q day until fasting 150      insulin lispro (HUMALOG U-100 INSULIN) 100 unit/mL injection INJECT 5 UNITS SUBCUTANEOUSLY BEFORE MEALS-INCREASE HUMALOG 1 UNIT EVERY OTHER DAY UNTIL 2 HR PP  OR LESS      fenofibrate (LOFIBRA) 160 mg tablet take 1 tablet by mouth once daily  0    flaxseed oil 1,000 mg cap Take  by mouth.  acetaminophen (TYLENOL) 325 mg tablet Take  by mouth every four (4) hours as needed for Pain.  ibuprofen 200 mg cap Take  by mouth as needed.  amLODIPine (NORVASC) 10 mg tablet Take 5 mg by mouth daily.       lisinopril (PRINIVIL, ZESTRIL) 20 mg tablet Take 20 mg by mouth daily.  simvastatin (ZOCOR) 40 mg tablet Take 40 mg by mouth daily. ROS:    Constitutional- some weight loss, fatigue  HEENT- denies dysphagia  Neuro- no deficits, still discomfort from Crani  Optho- no recent changes in vision  Resp- cough, pleuritic chest pain  CV- denies angina or palpitations  GI- denies abd pain  - no complaints  ID- denies recent fevers  Vasc- denies claudication    Blood pressure 94/61, pulse 88, resp. rate 18, height 5' 10\" (1.778 m), weight 159 lb (72.1 kg), SpO2 98 %. On exam he is seated upright  Alert and oriented  Appears thin  Pale  Normal speech, normal affect  Not tachypneic  Right occipito craniotomy well healing  No cervical or supraclavicular lymphadenopathy  Lungs CTA b/l  No chest wall tenderness  RRR, no murmurs  Radial pulses palp b/l  Abd SNTND, no organomegaly  LE non edematous  ==============================    Cr 1.48    WBC 4.9  Hgb 12.9  Plts 342  ==============================    I have personally reviewed his Chest CT and PET scan. He has a large RUL mass with extensive mediastinal lymphadenopathy. Most of the pathology is hypermetabolic.    ==============================    PFTs- none    ==============================    I have personally reveiwed his path report from his craniotomy at [address] [place] [address]. Metastatic carcinoma, favor Squamous cell, lung primary    ===========================    Diagnoses  1: Stage IV Squamous Cell Carcinoma RUL  [id number]  2: Brain metastases    =============================    Mr. Leonette Ringer has symptomatic stage IV lung cancer with brain metastases. He has undergone successful craniotomy. Unfortunately, with his disease process there is no role for surgical resection of his lung primary. He needs to begin palliative chemoradiation. Since adequate tissue was obtained at [address] [place] [address] no need for mediastinoscopy. Will place a port-a-cath for chemotherapy next week so he can begin ASAP.     He is on steroids, no blood thinners. No other major comorbidities. I spent a lot of time speaking with Mr. Mcdaniel Nurse and his wife and family about Stage IV lung cancer, survival, prognosis, statistics, treatments and quality of life. They asked many important, good and direct questions. Thank you for allowing us to care for Mr. Saucedo Lab spent 60 minutes with Michaela Petit and their family, greater than 50% of which was spent in counseling and education reviewing their films, discussing surgical options and formulating a future care plan.

## 2019-02-07 NOTE — PROGRESS NOTES
New Patient. Evaluate lung cancer & biopsy. 1. Have you been to the ER, urgent care clinic since your last visit? Hospitalized since your last visit? No    2. Have you seen or consulted any other health care providers outside of the [address] [place] [address] since your last visit? Include any pap smears or colon screening. Yes, [facility] providers.

## 2019-02-07 NOTE — TELEPHONE ENCOUNTER
Spoke with Pt's wife, they saw Dr Nasir Venegas this am and will have a port inserted to start chemo, Dr Sagrario Vital and Dr Erendira Gonzalez have spoken and Pt will be seen in our office on Monday 2/11/19

## 2019-02-11 PROBLEM — C34.90 LUNG CANCER (HCC): Status: ACTIVE | Noted: 2019-01-01

## 2019-02-11 NOTE — PROGRESS NOTES
Follow-up Note        Patient: Alfredo Fam MRN: [id number]  SSN: [id number]    YOB: 1960  Age: 62 y.o. Sex: male        Diagnosis:     1. T3 N3 M1a (Stage IV) NSCLC of the lung, likely adenocarcinoma    EGFR/ALK/ROS1/MET/BRAF/TRK/PD-L1 pending    Subjective:      Alfredo Fam is a 62 y.o. male with a new diagnosis of metastatic lung carcinoma. He has a over 30 pack years of cigarette smoking. He works in the construction business. He started experiencing pain in the right upper chest for last 2 months. Its works with movement. It radiates to the back. He is also experiencing worsening cough and sputum production. He has lost about 10 lbs. A bronchoscopy was non-diagnostic. A CT guided Bx has scant cells s/o a diagnosis of malignancy. MRI brain revealed 5 focus of metastatic disease and he underwent a right occipital craniotomy at [facility] on 1/29/2019. He is here today with his sister-in-law to discuss palliative treatment. Review of Systems:    Constitutional: negative  Eyes: negative  Ears, Nose, Mouth, Throat, and Face: negative  Respiratory: cough  Cardiovascular: negative  Gastrointestinal: negative  Genitourinary:negative  Integument/Breast: negative  Hematologic/Lymphatic: negative  Musculoskeletal: right upper chest pain  Neurological: negative      History reviewed. No pertinent history of prior cancer  History reviewed. No pertinent surgical history. History reviewed. No pertinent family history of cancer    Social History     Tobacco Use    Smoking status: Former Smoker     Packs/day: 1.50     Years: 25.00     Pack years: 37.50     Last attempt to quit: 1/21/2009     Years since quitting: 10.0    Smokeless tobacco: Never Used   Substance Use Topics    Alcohol use: No     Frequency: Never      Prior to Admission medications    Medication Sig Start Date End Date Taking? Authorizing Provider   senna (SENNA) 8.6 mg tablet Take 2 Tabs by mouth nightly.    Yes Provider, Historical   pantoprazole (PROTONIX) 40 mg tablet Take 40 mg by mouth daily. Yes Provider, Historical   docusate sodium (COL-RITE) 100 mg capsule Take 100 mg by mouth two (2) times a day. Yes Provider, Historical   dexamethasone (DECADRON) 4 mg tablet Take 4 mg by mouth every twelve (12) hours. Yes Provider, Historical   butalbital-acetaminophen-caff (FIORICET) -40 mg per capsule Take 2 Caps by mouth every four (4) hours as needed for Pain or Headache. Yes Provider, Historical   oxyCODONE IR (OXY-IR) 15 mg immediate release tablet Take 1 Tab by mouth every four (4) hours as needed for Pain for up to 7 days. Max Daily Amount: 90 mg. May take 1.5 tablets as needed for pain. Patient taking differently: Take 22.5 mg by mouth every four (4) hours as needed for Pain. May take 1.5 tablets as needed for pain. 2/5/19 2/12/19 Yes Darlene Hernández MD   insulin glargine (LANTUS) 100 unit/mL injection 14 units qhs--increase Lantus 2 units q day until fasting 150 7/3/18  Yes Provider, Historical   insulin lispro (HUMALOG U-100 INSULIN) 100 unit/mL injection INJECT 5 UNITS SUBCUTANEOUSLY BEFORE MEALS-INCREASE HUMALOG 1 UNIT EVERY OTHER DAY UNTIL 2 HR PP  OR LESS 4/24/18  Yes Provider, Historical   fenofibrate (LOFIBRA) 160 mg tablet take 1 tablet by mouth once daily 12/10/18  Yes Provider, Historical   ascorbic acid, vitamin C, (VITAMIN C) 250 mg tablet Take 250 mg by mouth daily. Yes Provider, Historical   TURMERIC PO Take 500 mg by mouth daily. Yes Provider, Historical   flaxseed oil 1,000 mg cap Take 1 Cap by mouth daily. Yes Provider, Historical   acetaminophen (TYLENOL) 325 mg tablet Take 325 mg by mouth every four (4) hours as needed for Pain. Yes Provider, Historical   ibuprofen 200 mg cap Take 1-2 Tabs by mouth as needed. Yes Provider, Historical   amLODIPine (NORVASC) 10 mg tablet Take 5 mg by mouth daily.    Yes Provider, Historical   lisinopril (PRINIVIL, ZESTRIL) 20 mg tablet Take 20 mg by mouth daily. Yes Provider, Historical   simvastatin (ZOCOR) 40 mg tablet Take 40 mg by mouth daily. Yes Provider, Historical              No Known Allergies        Objective:     Visit Vitals  /88 (BP 1 Location: Left arm, BP Patient Position: Sitting)   Pulse 78   Temp 98.5 °F (36.9 °C) (Oral)   Resp 16   Ht 5' 10\" (1.778 m)   Wt 159 lb 6.4 oz (72.3 kg)   SpO2 99%   BMI 22.87 kg/m²       Pain Scale: 4/10  Pain Location: (c/o right sholder & rt. arm pain)      Physical Exam:    GENERAL: alert, cooperative, no distress, appears stated age  EYE: conjunctivae/corneas clear. PERRL, EOM's intact. Fundi benign  LYMPHATIC: Cervical, supraclavicular, and axillary nodes normal.   THROAT & NECK: normal and no erythema or exudates noted. LUNG: clear to auscultation bilaterally  HEART: regular rate and rhythm, S1, S2 normal, no murmur, click, rub or gallop  ABDOMEN: soft, non-tender. Bowel sounds normal. No masses,  no organomegaly  EXTREMITIES:  extremities normal, atraumatic, no cyanosis or edema  SKIN: Normal.  NEUROLOGIC: AOx3. Gait normal. Reflexes and motor strength normal and symmetric. Cranial nerves 2-12 and sensation grossly intact. EXAM:  CTA CHEST W OR W WO CONT     INDICATION: Chest pain, acute, pulmonary embolism (PE) suspected     COMPARISON: Chest radiograph 12/31/2018.     CONTRAST:  80 mL of Isovue-370.  ? Technique: Following the uneventful intravenous administration of contrast, thin  axial images were obtained through the chest. Coronal and sagittal  reconstructions were generated. MIP image reconstructions were also performed. CT dose reduction was achieved through use of a standardized protocol tailored  for this examination and automatic exposure control for dose modulation. ? Findings:  Vascular:  No evidence of acute or chronic pulmonary embolism. The pulmonary arteries are  normal in size. The thoracic aorta is normal in size. ?   Chest:  Lungs/Pleura: Large right upper lobe mass measuring 5.6 x 4.7 x 4.8 cm. Small  satellite nodule in the right upper lobe measuring 6 mm (series 2 image 88). The  mass is in continuity with the conglomerate right paratracheal adenopathy as  described. There is occlusion of the posterior segmental and subsegmental  bronchi of the right upper lobe.     Axilla/Soft Tissue: No pathologic axillary adenopathy.     Mediastinum: The heart is normal in size. No pericardial effusion. Conglomerate  right paratracheal adenopathy measuring 6.8 x 3.9 x 7.2 cm. Right hilar  adenopathy measuring 3.0 x 2.2 cm. Enlarged subcarinal adenopathy measuring 5.5  x 2.4 cm. There are enlarged left lower paratracheal lymph nodes measuring 2.7 x  1.1 cm.     Upper Abdomen: Indeterminant hypodensities within the left hepatic lobe segment  IVb measuring 3.5 x 1.6 cm in aggregate (series 2 image 20). Simple cyst in the  upper pole of the left kidney measuring 2.5 cm, with a posterior peripheral  calcification.     Bones: No evidence of acute fracture, dislocation, or aggressive osseous  abnormality. ?  IMPRESSION  Impression:     1. Large right upper lobe mass measuring 5.6 x 4.7 x 4.8 cm, which is in  continuity with extensive conglomerate right paratracheal and right hilar  adenopathy. Findings are consistent with primary lung malignancy with extensive  metastatic adenopathy. The right upper lobe mass occludes the segmental and  subsegmental bronchi of the posterior segment of the right upper lobe. 2. Metastatic subcarinal adenopathy. Enlarged left lower paratracheal  adenopathy, which is also favored to be metastatic. 3. A 6 mm satellite nodule in the right upper lobe, consistent with metastasis. 4. Indeterminate hypodensities within hepatic segment IVb, which may represent  metastatic disease. 5. No evidence of pulmonary embolism. I personally reviewed the images.     MRI brain: 5 lesions: right occipital, right lateral ventricular arm, right temporal, right anterior temporal and left frontal    PET CT: large RUL mass with extensive mediastinal adenopathy, metastatic disease to the liver, brain      Assessment:     1. T3 N3 M1a (Stage IV) NSCLC of the lung, likely adenoca     > EGFR/ALK/ROS1/MET/BRAF/TRK/PD-L1 pending  > Unresectable disease     ECOG PS 1  Intent of Treatment - palliative  Prognosis poor    S/p right occipital craniotomy at [facility] on 1/29/2019    Plan for SBRT to brain lesions and [facility] [address] [facility] start palliative chemotherapy/immunotherapy with Carboplatin/Alimta/Keytruda    In the pivotal KEYNOTE-189 phase III RCT, after a median follow-up of 10.5 months, the estimated rate of overall survival at 12 months was 69.2% (95% confidence interval [CI], 64.1 to 73.8) in the pembrolizumab-combination group versus 49.4% (95% CI, 42.1 to 56.2) in the placebo-combination group (hazard ratio for death, 0.49; 95% CI, 0.38 to 0.64; P<0.001). Improvement in overall survival was seen across all PD-L1 categories that were evaluated. Median progression-free survival was 8.8 months (95% CI, 7.6 to 9.2) in the pembrolizumab-combination group and 4.9 months (95% CI, 4.7 to 5.5) in the placebo-combination group (hazard ratio for disease progression or death, 0.52; 95% CI, 0.43 to 0.64; P<0.001). Adverse events of grade 3 or higher occurred in 67.2% of the patients in the pembrolizumab-combination group and in 65.8% of those in the placebo-combination group. I counseled the patient regarding the chemotherapy. Discussions included side-effect, toxicity, benefit and risks of chemotherapy. He understood the expected side-effect which includes fatigue, anemia (seven [12%] of 59) and decreased neutrophil count (three [5%]); an additional six events each occurred in two (3%) for acute kidney injury, decreased lymphocyte count, neutropenia, and sepsis, and thrombocytopenia.  After weighing the benefit and risks, he agreed to proceed with chemotherapy. He understands that there is no alternative to this treatment is no treatment or single agent Alimta. I counseled the patient regarding the immunotherapy. Discussions included side-effect, toxicity, benefit and risks of chemotherapy. He understood the expected side-effect which includes fatigue and various immune related side effects such as colitis, pneumonitis, hypophysitis, arthritis among other things. After weighing the benefit and risks, he agreed to proceed with chemotherapy. He understands that the alternative to this treatment is observation alone. The patient's emotional well being was addressed during this office visit and patient seems to be coping well with the diagnosis and the treatment. Patient will be meeting with navigation services to discuss any financial barriers to care/estimated cost of care. The duration of this treatment plan will be until progression, intolerable side effects, or patient choice. We will plan to see the patient in follow up at least once per cycle, or sooner if symptoms warrant. Plan:       > Plan to start palliative chemotherapy in 3 weeks  > Port placement by Dr. Cyn Bahena on 2/12/201  > Awaiting PD-L1 and foundation one testing  > SBRT to brain lesions at [facility] radiation  > Follow-up at start of treatment        Signed by: Prince Davis MD                     February 11, 2019        CC. Amber Mederos MD  CC. Waqar Gaitan MD  CC. Sindy Warner MD  CC.  Tarah Kuo MD

## 2019-02-11 NOTE — PROGRESS NOTES
[facility]  Social Work Navigator Encounter     Patient Name:  Maya Stuart    Medical History: dx metastatic lung cancer - brain mets - had surgery at [facility] Directives:on file - palliative completed. Narrative: Pt wife having scans today at [address] and wants to change to doctors at [facility]. Marquez Drop her from [facility]. She has a  and children 15 and 11 there (they have dual citizenship). Spouse Currently being seen at [facility]. Daryle Mons says they have the medicaid application and are working on and also working on the employment on [employer]. Pt doing well. I saw no cognitive deficiencies today, pt stated he cooked some. Pt has Social security phone call Friday. Pt to begin radiation on Feb 18 (1-1.5 weeks - he is unsure if every other day or no) - will see at appt. Geonomic profile has been ordered and will help play a role in treatment. 10-15% chance - treat with pill. 20-30% chance of immunotherapy only. Otherwise chemo, + immunotherapy. PDL1 being tested   La putting in port. We will begin treatment in 2-3 weeks - appt will be set up for 3 weeks to have on the books and it can be moved. Barriers to Care:     Plan:   1. [facility] folder provided for financial planning resources.

## 2019-02-11 NOTE — PROGRESS NOTES
Ana María Woods is a 62 y.o. male here today for metastatic lung carcinoma f/u. S/P SBRT to brain lesions; pt saw Dr. Chacon Flies today to have staples removed. Port will be placed on 2/12/19 and pt starts rads on 2/18/19. VS stable. Patient reports right shoulder and arm pain. Good appetite. Patient denies N/V/D and constipation. Patient denies numbness and tingling. Patient denies mouth ulcers. Patient denies cough. Patient denies SOB. Patient reports a H/A. Visit Vitals  /88 (BP 1 Location: Left arm, BP Patient Position: Sitting)   Pulse 78   Temp 98.5 °F (36.9 °C) (Oral)   Resp 16   Ht 5' 10\" (1.778 m)   Wt 159 lb 6.4 oz (72.3 kg)   SpO2 99%   BMI 22.87 kg/m²       Pain Scale: 10 - Worst pain ever/10  Pain Location: (c/o right sholder & rt. arm pain)    1. Have you been to the ER, urgent care clinic since your last visit? Hospitalized since your last visit? No    2. Have you seen or consulted any other health care providers outside of the [address] [place] [address] since your last visit?   Include any pap smears or colon screening. yes; [facility]

## 2019-02-11 NOTE — PROGRESS NOTES
Initial chemo teaching completed for Nai/Alimta/Keytruda, info reviewed and written copies given, chemo packet given, consent signed, all questions answered.

## 2019-02-11 NOTE — TELEPHONE ENCOUNTER
ONCOLOGY NURSE NAVIGATOR    TC from pt's spouse last week stating pt had several neurology appt, was being seen by neurologist at [address] [facility], where he had Craniotomy. Returned call this am, wife states all taken care of, has various questions about her care and SS/Medicaid. Advised to apply for [employer], [employer], and provided toll free number for [employer]. Has touched [facility] w/ [facility] but has not followed up with . [facility] has not applied for Medicaid; SSD telephone interview 2/15/19. [facility] rec'd letter in mail stating he declned SSDI, instructed to call SS and make them aware. Met pt in Med Onc office for treatment plans, Saba SHARIF here from [facility] and brought pt to appt today. Recovering well from craniotomy. To begin chemo in several weeks, Carbo/Alimta/Keytruda, pending final tumor testing results. Provided Medicaid application, [facility] has one and CHANTE started, wife asked to let her do it. Advised this should be turned in ASAP to [address]  Provided CHANTE Hernandez w/ information to transfer pt's spouse, Jean Jeffrey, aurea to Dr. Wayne Andres, hx of breast/lung ca 10 yrs ago. Having CT scan today in [address], due to + Bone Scan several months ago. ONN will follow.     Misty Daigle RN

## 2019-02-12 NOTE — BRIEF OP NOTE
BRIEF OPERATIVE NOTE    Date of Procedure: 2/12/2019   Preoperative Diagnosis: LUNG CANCER  Postoperative Diagnosis: LUNG CANCER    Procedure(s):  1: BRYANNA CATH INSERTION left internal jugular vein  2: intraoperative ultrasound 3: Intraoperative fluoroscopy  Surgeon(s) and Role:     Damaris Perdomo MD - Primary         Surgical Assistant: none    Surgical Staff:  Circ-1: Oscar Langford RN  Circ-Relief: Jorge Bernal RN  Physician Assistant: Kareem Perez [place]  Radiology Technician: Azra Ramosub RN-1: Bethanie Ding RN  Surg Asst-1: Radha Farrell  Event Time In Time Out   Incision Start 1242    Incision Close       Anesthesia: MAC   Estimated Blood Loss: 5ml  Specimens: * No specimens in log *   Findings: space between left clavicle and 1st rib too narrow to pass obturator/dilator and feed catheter. Had to convert to IJ stick   Complications: none  Implants:   Implant Name Type Inv.  Item Serial No.  Lot No. LRB No. Used Action   PORT VASC INFUS SET 8FR TI -- SMART PORT CT - [id number]  PORT VASC INFUS SET 8FR TI -- SMART PORT CT [id number] [employer]  Left 1 Implanted       Condition: stable    Disposition: to pacu

## 2019-02-12 NOTE — H&P
Asked to see Mr Anmol Montoya re: Stage IV lung cancer; possible mediastinoscopy and port-a-cath     Referred by Dr. Thanh Diaz     Mr Anmol Montoya is a pleasant 63 y/o gentleman with a past medical history significant for DMII, tobacco abuse and now lung cancer. He was originally worked up by Dr. Breonna Sparks for a RUL lung mass. He was found to have extensive mediastinal disease and brain mets. He was seen urgently at [facility] and had a craniotomy. He is recovering well and now needs to begin chemotherapy. He reports some left shoulder pain. Denies hemoptysis. Some weight loss.     No Known Allergies     PMHx: DMII, Lung cancer     PSHx: cervical spine procedure, craniotomy     SocHx: long time smoker, quit 10+ years ago           Family History   Problem Relation Age of Onset    Stroke Father                  Outpatient Medications Marked as Taking for the 2/7/19 encounter (Office Visit) with Eboni Camejo MD   Medication Sig Dispense Refill    oxyCODONE IR (OXY-IR) 15 mg immediate release tablet Take 1 Tab by mouth every four (4) hours as needed for Pain for up to 7 days. Max Daily Amount: 90 mg. May take 1.5 tablets as needed for pain. 63 Tab 0    senna-docusate (PERICOLACE) 8.6-50 mg per tablet Take 2 Tabs by mouth daily.  120 Tab 6    insulin glargine (LANTUS) 100 unit/mL injection 14 units qhs--increase Lantus 2 units q day until fasting 150        insulin lispro (HUMALOG U-100 INSULIN) 100 unit/mL injection INJECT 5 UNITS SUBCUTANEOUSLY BEFORE MEALS-INCREASE HUMALOG 1 UNIT EVERY OTHER DAY UNTIL 2 HR PP  OR LESS        fenofibrate (LOFIBRA) 160 mg tablet take 1 tablet by mouth once daily   0    flaxseed oil 1,000 mg cap Take  by mouth.        acetaminophen (TYLENOL) 325 mg tablet Take  by mouth every four (4) hours as needed for Pain.        ibuprofen 200 mg cap Take  by mouth as needed.        amLODIPine (NORVASC) 10 mg tablet Take 5 mg by mouth daily.        lisinopril (PRINIVIL, ZESTRIL) 20 mg tablet Take 20 mg by mouth daily.        simvastatin (ZOCOR) 40 mg tablet Take 40 mg by mouth daily.             ROS:     Constitutional- some weight loss, fatigue  HEENT- denies dysphagia  Neuro- no deficits, still discomfort from Crani  Optho- no recent changes in vision  Resp- cough, pleuritic chest pain  CV- denies angina or palpitations  GI- denies abd pain  - no complaints  ID- denies recent fevers  Vasc- denies claudication     Blood pressure 94/61, pulse 88, resp. rate 18, height 5' 10\" (1.778 m), weight 159 lb (72.1 kg), SpO2 98 %.     On exam he is seated upright  Alert and oriented  Appears thin  Pale  Normal speech, normal affect  Not tachypneic  Right occipito craniotomy well healing  No cervical or supraclavicular lymphadenopathy  Lungs CTA b/l  No chest wall tenderness  RRR, no murmurs  Radial pulses palp b/l  Abd SNTND, no organomegaly  LE non edematous  ==============================     Cr 1.48     WBC 4.9  Hgb 12.9  Plts 342  ==============================     I have personally reviewed his Chest CT and PET scan. He has a large RUL mass with extensive mediastinal lymphadenopathy. Most of the pathology is hypermetabolic.     ==============================     PFTs- none     ==============================     I have personally reveiwed his path report from his craniotomy at [facility]. Metastatic carcinoma, favor Squamous cell, lung primary     ===========================     Diagnoses  1: Stage IV Squamous Cell Carcinoma RUL  [id number]  2: Brain metastases     =============================     Mr. Alin Sung has symptomatic stage IV lung cancer with brain metastases. He has undergone successful craniotomy. Unfortunately, with his disease process there is no role for surgical resection of his lung primary. He needs to begin palliative chemoradiation.     Since adequate tissue was obtained at [facility] no need for mediastinoscopy.   Will place a port-a-cath for chemotherapy next week so he can begin ASAP.     He is on steroids, no blood thinners. No other major comorbidities.     I spent a lot of time speaking with . Bryson Ferrell and his wife and family about Stage IV lung cancer, survival, prognosis, statistics, treatments and quality of life. They asked many important, good and direct questions. Date of Surgery Update:  George Degroot was seen and examined. History and physical has been reviewed. The patient has been examined. There have been no significant clinical changes since the completion of the originally dated History and Physical.  Patient identified by surgeon; surgical site was confirmed by patient and surgeon.     Signed By: Hollis Gan MD     February 12, 2019 10:43 AM

## 2019-02-12 NOTE — ANESTHESIA PREPROCEDURE EVALUATION
Anesthetic History No history of anesthetic complications Review of Systems / Medical History Patient summary reviewed, nursing notes reviewed and pertinent labs reviewed Pulmonary COPD: mild Smoker Comments: Met lung ca s/p crani. Neuro/Psych Within defined limits Cardiovascular Within defined limits Hypertension Exercise tolerance: >4 METS 
  
GI/Hepatic/Renal 
Within defined limits GERD Endo/Other Within defined limits Diabetes: type 2 Other Findings Physical Exam 
 
Airway Mallampati: II 
TM Distance: > 6 cm Neck ROM: normal range of motion Mouth opening: Normal 
 
 Cardiovascular Regular rate and rhythm,  S1 and S2 normal,  no murmur, click, rub, or gallop Dental 
 
Dentition: Edentulous Pulmonary Breath sounds clear to auscultation Abdominal 
GI exam deferred Other Findings Anesthetic Plan ASA: 3 Anesthesia type: MAC Induction: Intravenous Anesthetic plan and risks discussed with: Patient

## 2019-02-12 NOTE — ANESTHESIA POSTPROCEDURE EVALUATION
Post-Anesthesia Evaluation and Assessment Patient: Elmo Miranda MRN: [id number]  SSN: [id number] YOB: 1960  Age: 62 y.o. Sex: male I have evaluated the patient and they are stable and ready for discharge from the PACU. Cardiovascular Function/Vital Signs Visit Vitals BP 95/56 Pulse 85 Temp 36.8 °C (98.3 °F) Resp 17 Ht 5' 10\" (1.778 m) Wt 72.3 kg (159 lb 6.3 oz) SpO2 94% BMI 22.87 kg/m² Patient is status post MAC anesthesia for Procedure(s): BRYANNA CATH INSERTION (LEFT OR RIGHT). Nausea/Vomiting: None Postoperative hydration reviewed and adequate. Pain: 
Pain Scale 1: Numeric (0 - 10) (02/12/19 1107) Pain Intensity 1: 10 (02/12/19 1107) Managed Neurological Status:  
Neuro (WDL): Exceptions to WDL (02/12/19 1110) At baseline Mental Status, Level of Consciousness: Alert and  oriented to person, place, and time Pulmonary Status:  
O2 Device: CO2 nasal cannula (02/12/19 1400) Adequate oxygenation and airway patent Complications related to anesthesia: None Post-anesthesia assessment completed. No concerns Signed By: Ray Manrique MD   
 February 12, 2019 Procedure(s): BRYANNA CATH INSERTION (LEFT OR RIGHT). <BSHSIANPOST> Visit Vitals BP 95/56 Pulse 85 Temp 36.8 °C (98.3 °F) Resp 17 Ht 5' 10\" (1.778 m) Wt 72.3 kg (159 lb 6.3 oz) SpO2 94% BMI 22.87 kg/m²

## 2019-02-12 NOTE — DISCHARGE INSTRUCTIONS
Patient Education     Implanted [facility] for Chemotherapy: Care Instructions  Your Care Instructions  An implanted port is a device placed, in most cases, under the skin of your chest below your collarbone. It is made of plastic, stainless steel, or titanium. The port is about the size of a quarter, but thicker. A thin, flexible tube called a catheter runs from the port into a large vein. A membrane (septum) similar to a pencil eraser is in the center of the port. A nurse uses a needle to put chemotherapy or other medicine and fluids through the septum into a blood vessel. A health professional also can take blood for tests through the port. An implanted port can be used for months. A special needle (called a Guerra needle) may stay in the port for a short time. The port and catheter need regular care to make sure they do not get blocked. Tell your doctor if you take aspirin or some other blood thinner. These medicines can increase the chance of bleeding inside your body. Follow-up care is a key part of your treatment and safety. Be sure to make and go to all appointments, and call your doctor if you are having problems. It's also a good idea to know your test results and keep a list of the medicines you take. How can you care for yourself at home? · You will probably need to take 1 day off from work and will be able return to normal activities shortly after. This depends on the type of work you do, why you have the port, and how you feel. · You probably will be able to bathe and swim. But you may need to avoid some activities if a Guerra needle is left in the port. Talk to your doctor about any limits on your activity. · Some clothes may rub the skin over the port. Do not wear a bra or suspenders that irritate your skin near the port. Do not have your blood pressure taken on the arm with the port. · You will get a medical alert card with information about your port. Carry this with you.  It will tell health care workers you have a port in case you need emergency care. · Your port will need regular flushing to keep it open. A nurse or other health professional will do this for you. When should you call for help? Call your doctor now or seek immediate medical care if:    · You have signs of infection, such as:  ? Increased pain, swelling, warmth, or redness near the port. ? Red streaks leading from the port. ? Pus draining from the port. ? A fever.     · You have pain or swelling in your neck or arm.     · You have trouble breathing.    Watch closely for changes in your health, and be sure to contact your doctor if:    · You have any problems with your port. Where can you learn more? Go to http://raymon-sergo.info/. Enter [phone] in the search box to learn more about \"Implanted [facility] [place] for Chemotherapy: Care Instructions. \"  Current as of: September 23, 2018  Content Version: 11.9  © 9133-6734 [employer]. Care instructions adapted under license by [employer] (which disclaims liability or warranty for this information). If you have questions about a medical condition or this instruction, always ask your healthcare professional. [address] any warranty or liability for your use of this information. ______________________________________________________________________    Anesthesia Discharge Instructions    After general anesthesia or intervenous sedation, for 24 hours or while taking prescription Narcotics:  · Limit your activities  · Do not drive or operate hazardous machinery  · If you have not urinated within 8 hours after discharge, please contact your surgeon on call.   · Do not make important personal or business decisions  · Do not drink alcoholic beverages    Report the following to your surgeon:  · Excessive pain, swelling, redness or odor of or around the surgical area  · Temperature over 100.5 degrees  · Nausea and vomiting lasting longer than 4 hours or if unable to take medication  · Any signs of decreased circulation or nerve impairment to extremity:  Change in color, persistent numbness, tingling, coldness or increased pain.   · Any questions

## 2019-02-12 NOTE — ROUTINE PROCESS
Patient: Jonathan Huertas MRN: [id number]  SSN: [id number]   YOB: 1960  Age: 62 y.o. Sex: male     Patient is status post Procedure(s):  BRYANNA CATH INSERTION (LEFT OR RIGHT).     Surgeon(s) and Role:     Aliyah Mccoy MD - Primary    Local/Dose/Irrigation: SEE [facility]                  Peripheral IV 02/12/19 Left Hand (Active)                           Dressing/Packing:     Splint/Cast:  ]    Other:

## 2019-02-13 PROBLEM — C34.90 LUNG CANCER METASTATIC TO BRAIN (HCC): Status: ACTIVE | Noted: 2019-01-01

## 2019-02-13 PROBLEM — C79.31 LUNG CANCER METASTATIC TO BRAIN (HCC): Status: ACTIVE | Noted: 2019-01-01

## 2019-02-13 NOTE — TELEPHONE ENCOUNTER
Prescriptions for Emla, Zofran, and Compazine sent to patient's pharmacy. Called and spoke to Rosalba and informed her that prescriptions were went.

## 2019-02-13 NOTE — OP NOTES
[address] [facility] 
OPERATIVE REPORT Name:  Amber Redding 
MR#:  [id number] :  1960 ACCOUNT #:  [de-identified] DATE OF SERVICE:  2019 CLINICAL SERVICE:  Thoracic Surgery. SURGEON:  Kathy Lackey MD 
 
PROCEDURES PERFORMED: 
1. Placement of left internal jugular Port-A-Cath. 2.  Intraoperative fluoroscopy with interpretation. 3.  Intraoperative ultrasound with interpretation. 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 
with brain metastases. 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 
with brain metastases. ASSISTANT:  None. SPECIMENS SENT:  None. DRAINS AND TUBES: None. IMPLANTS:  An 8-[employer], lot #[id number], was inserted via the 
left internal jugular vein with the reservoir sitting in the left prepectoral space. ANESTHESIA:  MAC with local. 
 
ESTIMATED BLOOD LOSS:  For this case was less than 10 mL. INDICATIONS FOR PROCEDURE:   The patient is a 59-year-old gentleman recently 
diagnosed with stage IV squamous cell carcinoma of the right upper lobe. He was 
found to have symptomatic brain metastases, which were resected at [facility]. He was 
referred by his oncologist for placement of a Port-A-Cath to begin chemoradiation. PROCEDURE:  After informed consent was obtained and placed on the chart, the patient 
was taken to the operating room, placed supine on the operating table. MAC 
anesthesia was induced without complications. Preoperative antibiotics were 
administered. The patient's anterior chest and neck were prepped and draped in the 
sterile fashion. Time-out was performed. Using Seldinger technique, a puncture of the left subclavian vein was obtained and a 
guidewire was fed into the right side of the heart under fluoroscopic guidance. The 
obturator and dilator sheath was then passed over the guidewire.   The guidewire and 
 dilator were removed. The catheter was then attempted to be fed through the 
obturator sheath, this was not possible. It appears the patient has a very narrow 
space between his clavicle and first rib. After multiple attempts and multiple 
tricks to feed the catheter into the subclavian vein, this was not possible, and this 
approach was aborted. Attention was then turned to the left internal jugular vein. Sterile ultrasound 
probe was brought onto the field, and under ultrasound guidance, a left internal 
jugular venipuncture was performed. Using Seldinger technique, a guidewire was again 
fed under fluoroscopic guidance into the right side of the heart. The dilator and 
obturator sheath was then fed over the guidewire. The guidewire and dilator were 
withdrawn and the catheter was fed under fluoroscopic guidance into the right side of 
the heart until the tip set just beyond the right cavoatrial junction. The obturator 
sheath was peeled away. An approximately 2.5 cm transverse incision was made over the left pectoralis space. A pocket was developed inferiorly. A tunneling device was then used to tunnel the catheter from his venipuncture in the 
left IJ out through the prepectoral pocket. The catheter was trimmed to appropriate 
length, was connected to the reservoir and locked in place. The reservoir easily 
flushed and aspirated with heparinized saline. Final fluoroscopic shot was taken. The reservoir was then placed neatly in the pocket and packed to the prepectoral 
fascia using Prolene suture. The incision was then closed in multilayered fashion 
and covered with Dermabond. The two venipuncture counter incision sites were then 
closed using Dermabond. Approximately 30 mL of 1% Lidocaine mixed with 0.25% Marcaine was used as local 
anesthetic for this case. A final heparinized flush was performed through the reservoir without any 
complications. [facility] and taken to PACU in stable 
condition. All surgical counts were correct x2 at the end of the case. There were no immediate complications identified in this case. Dr. Atif Duke was present and scrubbed throughout the entire procedure. aKleb Stockton MD 
 
 
[id number] 
D:  02/12/2019 17:53 
T:  02/13/2019 4:37 JOB #:  [id number] CC:  Jeremiah Bermudez MD

## 2019-02-13 NOTE — TELEPHONE ENCOUNTER
Patient's sister in law called on his behalf requesting a return call. Patient was told he was going to have some medications called in to his pharmacy after his office visit on Monday, 2/11. Patient's pharmacy has yet to receive those. Please place order [facility] [phone]. Return call when placed [phone].   [facility]

## 2019-02-14 NOTE — PATIENT INSTRUCTIONS
Dear Jovan Yip , It was a pleasure seeing you today at [facility] office Your described symptoms were: Fatigue: 5 Drowsiness: 0 Depression: 0 Pain: 5 Anxiety: 0 Nausea: 0 Anorexia: 0 Dyspnea: 0 Best Well-Bein Constipation: No  
Other Problem (Comment): 5 This is the plan we talked about: 1. Right sided chest pain radiating to the shoulder - You are doing really well with Oxycodone 1.5 tabs of 15 mg every 4 hours. This is keeping your pain down at a tolerable level. - It is time we start you on a long acting/ slow release pain medicine- Start Oxycontin 20 mg two times a day. - Increase Oxycodone to 20 mg every 4 hours as needed. If you are in severe pain while you are due to take your Oxycontin, it is okay to go ahead and take Oxycontin along with your Oxycodone for immediate pain relief. 2. Constipation - I am glad you are taking stool softners regularly along with prune juice and not constipated. - Please continue this bowel regimen religiously. 3. ACP 
- We completed an advance medical directive and medical power of . - Financial poa is all taken care of as well. - Your wife is working with jl RAVI and Diann Alves for [facility] and Medicaid. 4. Next steps - I am so glad your brain surgery went well. - [place] [facility] [place] - You have a PORT placed already - You start chemo on . This is what you have shared with us about Advance Care Planning: 
 
Primary Decision [facility] ([address]): Elizabet Sandhu Relationship to patient:Wife 
Phone Newell Cherry [x] Named in a scanned document  
[x] Legal Next of Kin 
[] Guardian Secondary Decision Maker (First Alternate Health Care Agent):  Austin Pereira Relationship to patient: Father Phone number: [phone] [x] Named in a scanned document  
[] Legal Next of Kin 
[] Guardian ACP documents you current have include: 
[x] Advance Directive or Living Will [] Durable Do Not Resuscitate 
[] Physician Orders for Scope of Treatment (POST) [] Medical Power of  
[] Other The Palliative Medicine Team is here to support you and your family. We will see you again in 3-4 weeks Sincerely, 
 
 
Zac Adams MD and the Palliative Medicine Team

## 2019-02-14 NOTE — TELEPHONE ENCOUNTER
Spoke with Ms. Sweta Wray to let her know we are working on the PA for the oxycontin for Mr. Ng Dooling. She verbalized understanding and was appreciative of the call.     Michael Zendejas RN  Palliative Medicine

## 2019-02-14 NOTE — TELEPHONE ENCOUNTER
ONCOLOGY NURSE NAVIGATOR    TC to pt to check on status of Medicaid and SSD applications. VM left. Email sent with information on Rene Johnson for 5 yo twin grandchildren whom they are legal guardians for. Job Hussain will f/u with return call.     Raul Garcia RN

## 2019-02-14 NOTE — TELEPHONE ENCOUNTER
Patients sister in law called to advise prescription oxycodone wasn't approved by insurance.  [facility] pharmacy

## 2019-02-14 NOTE — PROGRESS NOTES
Palliative Medicine Outpatient Services [place]: [phone] ([phone]) Patient Name: Arely Gardiner YOB: 1960 Date of Current Visit: 19 Location of Current Visit:   
[] [facility] Office 
[] [facility] Office [x] [facility] Office 
[] Home 
[] Other:   
 
Date of Initial Visit: 19 Requesting Physician: Dr. Anirudh Barnes Primary Care Physician: Alice Miramontes MD 
  
 SUMMARY:  
Arely Gardiner is a 62y.o. 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 Ainrudh Barnes for management of symptoms and support. The patients social history includes worked in construction, has 30 plus year smoking history but quit in , lives with wife who is stage 2 breast cancer survivor for over 8 yeas. Wife Michael ross has 2 children and one with Po Su. Twin grand children live with them, Po Su has 3 children. Status post brain tumor removal at [facility]. About to start radiation to the brain at [facility] and chemo on  with Dr. Anirudh Barnes. PALLIATIVE DIAGNOSES:  
 
  ICD-10-CM ICD-9-CM 1. Acute pain of right shoulder M25.511 719.41 oxyCODONE ER (OXYCONTIN) 20 mg ER tablet  
   oxyCODONE IR (ROXICODONE) 20 mg immediate release tablet  
   oxyCODONE IR (ROXICODONE) 20 mg immediate release tablet 2. Lung cancer metastatic to brain (HCC) C34.90 162.9 oxyCODONE ER (OXYCONTIN) 20 mg ER tablet  
 C79.31 198.3 oxyCODONE IR (ROXICODONE) 20 mg immediate release tablet  
   oxyCODONE IR (ROXICODONE) 20 mg immediate release tablet 3. Cancer related pain G89.3 338.3 4. Psychosocial stressors Z65.8 V62.89 PLAN:  
Patient Instructions Dear Arely Gardiner , It was a pleasure seeing you today at [facility] office Your described symptoms were: Fatigue: 5 Drowsiness: 0 Depression: 0 Pain: 5 Anxiety: 0 Nausea: 0 Anorexia: 0 Dyspnea: 0 Best Well-Bein Constipation: No  
 Other Problem (Comment): 5 This is the plan we talked about: 1. Right sided chest pain radiating to the shoulder - You are doing really well with Oxycodone 1.5 tabs of 15 mg every 4 hours. This is keeping your pain down at a tolerable level. - It is time we start you on a long acting/ slow release pain medicine- Start Oxycontin 20 mg two times a day. - Increase Oxycodone to 20 mg every 4 hours as needed. If you are in severe pain while you are due to take your Oxycontin, it is okay to go ahead and take Oxycontin along with your Oxycodone for immediate pain relief. 2. Constipation - I am glad you are taking stool softners regularly along with prune juice and not constipated. - Please continue this bowel regimen religiously. 3. ACP 
- We completed an advance medical directive and medical power of . - Financial poa is all taken care of as well. - Your wife is working with jl RAVI and Jessica Berry for [facility] and Medicaid. 4. Next steps - I am so glad your brain surgery went well. - You are starting RT to the brain at [facility] next week. - You have a PORT placed already - You start chemo on March 4th. This is what you have shared with us about Advance Care Planning: 
 
Primary Decision [facility] ([address]): Pat Alfred Relationship to patient:Wife 
Phone [place] [x] Named in a scanned document  
[x] Legal Next of Kin 
[] Guardian Secondary Decision Maker (First Alternate Health Care Agent):  Farzaneh Myles Relationship to patient: Father Phone number: [phone] [x] Named in a scanned document  
[] Legal Next of Kin 
[] Guardian ACP documents you current have include: 
[x] Advance Directive or Living Will 
[] Durable Do Not Resuscitate 
[] Physician Orders for Scope of Treatment (POST) [] Medical Power of  
[] Other The Palliative Medicine Team is here to support you and your family. We will see you again in 3-4 weeks Sincerely, 
 
 
 Castro Hendrickson MD and the Palliative Medicine Team 
 
 
Counseling and Coordination:  
See above Opioid safety counseling GOALS OF CARE / TREATMENT PREFERENCES:  
[====Goals of Care====] GOALS OF CARE: 
Patient / health care proxy stated goals: FULL 
 
 
TREATMENT PREFERENCES:  
Code Status:  [x] Attempt Resuscitation       [] Do Not Attempt Resuscitation Advance Care Planning: 
[x] The [facility] Interdisciplinary Team has updated the ACP Navigator with [address] and Patient Capacity The palliative care team has discussed with patient / health care proxy about goals of care / treatment preferences for patient. 
[====Goals of Care====] PRESCRIPTIONS GIVEN:  
 
Medications Ordered Today Medications  oxyCODONE ER (OXYCONTIN) 20 mg ER tablet Sig: Take 1 Tab by mouth every twelve (12) hours for 30 days. Max Daily Amount: 40 mg. Dispense:  60 Tab Refill:  0  
 oxyCODONE IR (ROXICODONE) 20 mg immediate release tablet Sig: Take 1 Tab by mouth every four (4) hours as needed for Pain for up to 15 days. Max Daily Amount: 120 mg. Dispense:  90 Tab Refill:  0  
 oxyCODONE IR (ROXICODONE) 20 mg immediate release tablet Sig: Take 1 Tab by mouth every four (4) hours as needed for Pain for up to 15 days. Max Daily Amount: 120 mg. Dispense:  90 Tab Refill:  0  
  
 
 
 FOLLOW UP: Future Appointments Date Time Provider Marlo Cummins 3/4/2019  9:30 AM Kris Shaikh MD [address]  
3/4/2019 10:00 AM CHAIR 3 [facility]  
3/25/2019 10:00 AM CHAIR 3 [facility]  
4/15/2019 10:00 AM [place] INFUSION NURSE 4 [facility] PHYSICIANS INVOLVED IN CARE:  
Patient Care Team: 
Jesus Cohen MD as PCP - [facility]) Jenifer Jorge MD (Pulmonary Disease) Kris Shaikh MD (Hematology and Oncology) Joselito Acosta MD (Neurosurgery) Ruddy Horowitz MD as Surgeon (Thoracic (non-cardiac) Surgery) HISTORY:  
Nursing documentation from date of visit reviewed. Reviewed patient-completed ESAS and advance care planning form. Reviewed patient record in prescription monitoring program. 
 
CHIEF COMPLAINT: right sided chest pain HPI/SUBJECTIVE: The patient is: [] Verbal / [] Nonverbal  
 
Patient here with his sister-in-law Kurt Matthews. Wife is having a COPD exacerbation and is at home. He feels really good with his current pain pain management. We had to increase his oxycodone to 1-1/2 tablets over the last 2 weeks and this seems to be helping him. He continues to take 1/2 tablets about every 4 hours including waking up in the middle of the night. He feels the current dose is working well but is happy to accept even better pain control. No constipation. On stool softeners 2 tablets 2 times a day and every day prune juice. Her sister-in-law family is a little relieved now that brain surgery is completed and they have a  on things that need to be done. 
 
---------- Patient here today along with his wife Lydia Proctor and Ruth Valderrama sister from [facility]. Amanda Hassan reports severe right-sided chest wall pain that radiates to his shoulder. He has been on oxycodone 5 mg which was increased from every 4 hours to every 3 hours. He reports that this does not work in any way and he is in excruciating pain throughout the day. He does not have past history of opioids. He quit smoking and drinking in 2008. He is constipated. Taking prune juice. On dexamethasone 4 mg 2 times a day for brain metastases Significant amount of distress in the family with expected grief. He was diagnosed with cancer on New Year's Shari and it has been overwhelming since then. We talked about prognosis at length and what to expect with brain surgery, radiation and chemotherapy.   Advised and encouraged them to take it one day at a time instead of being overwhelmed. Encourage communication within the family and involve adult children and helping out with appointments support at home and care for the twin 6year-old grandchildren. Clinical Pain Assessment (nonverbal scale for nonverbal patients):  
[++++ Clinical Pain Assessment++++] [++++Pain Severity++++]: Pain: 5 
[++++Pain Character++++]: digging 
[++++Pain Duration++++]: weeks [++++Pain Effect++++]: functional and emotional 
[++++Pain Factors++++]: none in particular 
[++++Pain Frequency++++]: constant [++++Pain Location++++]: right-sided chest wall and shoulder 
[++++ Clinical Pain Assessment++++] FUNCTIONAL ASSESSMENT:  
 
Palliative Performance Scale (PPS): PPS: 70 PSYCHOSOCIAL/SPIRITUAL SCREENING:  
 
Any spiritual / Mu-ism concerns: 
[] Yes /  [x] No 
 
Caregiver Burnout: 
[] Yes /  [x] No /  [] No Caregiver Present Anticipatory grief assessment:  
[x] Normal  / [] Maladaptive ESAS Anxiety: Anxiety: 0 
 
ESAS Depression: Depression: 0 REVIEW OF SYSTEMS:  
 
The following systems were [] reviewed / [] unable to be reviewed Systems: constitutional, ears/nose/mouth/throat, respiratory, gastrointestinal, genitourinary, musculoskeletal, integumentary, neurologic, psychiatric, endocrine. Positive findings noted below. Modified ESAS Completed by: provider Fatigue: 5 Drowsiness: 0 Depression: 0 Pain: 5 Anxiety: 0 Nausea: 0 Anorexia: 0 Dyspnea: 0 Best Well-Bein Constipation: No  
Other Problem (Comment): 5 PHYSICAL EXAM:  
 
Wt Readings from Last 3 Encounters:  
19 160 lb 9.6 oz (72.8 kg) 19 159 lb 6.3 oz (72.3 kg) 19 159 lb 6.4 oz (72.3 kg) Blood pressure 106/71, pulse 80, temperature 98.3 °F (36.8 °C), temperature source Oral, resp. rate 18, height 5' 10\" (1.778 m), weight 160 lb 9.6 oz (72.8 kg), SpO2 97 %. Last bowel movement: See Nursing Note Constitutional: Alert, oriented Eyes: pupils equal, anicteric ENMT: no nasal discharge, moist mucous membranes Cardiovascular: regular rhythm, distal pulses intact Respiratory: breathing not labored, symmetric with poor air entry on the right side Gastrointestinal: soft non-tender, +bowel sounds Musculoskeletal: no deformity, no tenderness to palpation, obvious muscle wasting in the chest area Skin: warm, dry Neurologic: following commands, moving all extremities Psychiatric: full affect, no hallucinations Other: 
 
 
 HISTORY:  
 
Past Medical History:  
Diagnosis Date  Cancer ([facility] [address] LUNG RT. SIDE METS TO BRAIN, LIVER  Chronic obstructive pulmonary disease ([facility] [address] MILD PER WIFE  
 Diabetes ([facility] [address] TYPE II  
 GERD (gastroesophageal reflux disease)  Hypertension Past Surgical History:  
Procedure Laterality Date  HX CERVICAL FUSION    
 HX CRANIOTOMY  01/29/2019 REMOVAL OF BRAIN METS AT [facility]  HX GI    
 COLONOSCOPY  
 HX HERNIA REPAIR Right 1972 INGUINAL  VASCULAR SURGERY PROCEDURE UNLIST Bilateral   
 LEGS Family History Problem Relation Age of Onset  Stroke Father  Diabetes Father  Other Mother 58 SUDDEN DEATH  
 Cancer Maternal Grandfather  Anesth Problems Neg Hx History reviewed, no pertinent family history. Social History Tobacco Use  Smoking status: Former Smoker Packs/day: 1.50 Years: 25.00 Pack years: 37.50 Last attempt to quit: 1/21/2009 Years since quitting: 10.0  Smokeless tobacco: Never Used Substance Use Topics  Alcohol use: No  
  Frequency: Never No Known Allergies Current Outpatient Medications Medication Sig  
 oxyCODONE ER (OXYCONTIN) 20 mg ER tablet Take 1 Tab by mouth every twelve (12) hours for 30 days. Max Daily Amount: 40 mg.  
 oxyCODONE IR (ROXICODONE) 20 mg immediate release tablet Take 1 Tab by mouth every four (4) hours as needed for Pain for up to 15 days. Max Daily Amount: 120 mg.  [START ON 3/1/2019] oxyCODONE IR (ROXICODONE) 20 mg immediate release tablet Take 1 Tab by mouth every four (4) hours as needed for Pain for up to 15 days. Max Daily Amount: 120 mg.  
 lidocaine-prilocaine (EMLA) topical cream Apply  to affected area as needed for Pain.  ondansetron (ZOFRAN ODT) 4 mg disintegrating tablet Take 1 Tab by mouth every eight (8) hours as needed for Nausea.  prochlorperazine (COMPAZINE) 10 mg tablet Take 0.5 Tabs by mouth every six (6) hours as needed for up to 7 days.  senna (SENNA) 8.6 mg tablet Take 2 Tabs by mouth nightly.  pantoprazole (PROTONIX) 40 mg tablet Take 40 mg by mouth daily.  docusate sodium (COL-RITE) 100 mg capsule Take 100 mg by mouth two (2) times a day.  dexamethasone (DECADRON) 4 mg tablet Take 4 mg by mouth every twelve (12) hours.  butalbital-acetaminophen-caff (FIORICET) -40 mg per capsule Take 2 Caps by mouth every four (4) hours as needed for Pain or Headache.  insulin glargine (LANTUS) 100 unit/mL injection 14 units qhs--increase Lantus 2 units q day until fasting 150  
 insulin lispro (HUMALOG U-100 INSULIN) 100 unit/mL injection INJECT 5 UNITS SUBCUTANEOUSLY BEFORE MEALS-INCREASE HUMALOG 1 UNIT EVERY OTHER DAY UNTIL 2 HR PP  OR LESS  fenofibrate (LOFIBRA) 160 mg tablet take 1 tablet by mouth once daily  ascorbic acid, vitamin C, (VITAMIN C) 250 mg tablet Take 250 mg by mouth daily.  TURMERIC PO Take 500 mg by mouth daily.  flaxseed oil 1,000 mg cap Take 1 Cap by mouth daily.  amLODIPine (NORVASC) 10 mg tablet Take 5 mg by mouth daily.  lisinopril (PRINIVIL, ZESTRIL) 20 mg tablet Take 20 mg by mouth daily.  simvastatin (ZOCOR) 40 mg tablet Take 40 mg by mouth daily.  acetaminophen (TYLENOL) 325 mg tablet Take 325 mg by mouth every four (4) hours as needed for Pain.  ibuprofen 200 mg cap Take 1-2 Tabs by mouth as needed. No current facility-administered medications for this visit. LAB DATA REVIEWED:  
 
Lab Results Component Value Date/Time WBC 4.9 12/31/2018 01:13 PM  
 HGB 12.9 12/31/2018 01:13 PM  
 PLATELET 997 32/14/2750 01:13 PM  
 
Lab Results Component Value Date/Time Sodium 134 (L) 12/31/2018 01:13 PM  
 Potassium 3.9 12/31/2018 01:13 PM  
 Chloride 102 12/31/2018 01:13 PM  
 CO2 25 12/31/2018 01:13 PM  
 BUN 18 12/31/2018 01:13 PM  
 Creatinine 1.48 (H) 12/31/2018 01:13 PM  
 Calcium 9.7 12/31/2018 01:13 PM  
 Magnesium 2.2 12/31/2018 01:13 PM  
 Phosphorus 2.6 03/31/2010 06:43 AM  
  
Lab Results Component Value Date/Time AST (SGOT) 41 (H) 12/31/2018 01:13 PM  
 Alk. phosphatase 42 (L) 12/31/2018 01:13 PM  
 Protein, total 8.8 (H) 12/31/2018 01:13 PM  
 Albumin 4.2 12/31/2018 01:13 PM  
 Globulin 4.6 (H) 12/31/2018 01:13 PM  
 
Lab Results Component Value Date/Time  
 INR >9.2 (HH) 05/28/2010 04:43 PM  
 Prothrombin time >75.0 (H) 05/28/2010 04:43 PM  
 aPTT 144.2 (H) 05/28/2010 04:43 PM  
  
No results found for: IRON, FE, TIBC, IBCT, PSAT, FERR Impression: 
  
1. Large right upper lobe mass measuring 5.6 x 4.7 x 4.8 cm, which is in 
continuity with extensive conglomerate right paratracheal and right hilar 
adenopathy. Findings are consistent with primary lung malignancy with extensive 
metastatic adenopathy. The right upper lobe mass occludes the segmental and 
subsegmental bronchi of the posterior segment of the right upper lobe. 2. Metastatic subcarinal adenopathy. Enlarged left lower paratracheal 
adenopathy, which is also favored to be metastatic. 3. A 6 mm satellite nodule in the right upper lobe, consistent with metastasis. 4. Indeterminate hypodensities within hepatic segment IVb, which may represent 
metastatic disease.  
5. No evidence of pulmonary embolism. 
  
I personally reviewed the images. 
  
MRI brain: 5 lesions: right occipital, right lateral ventricular arm, right temporal, right anterior temporal and left frontal 
  
 PET CT: large RUL mass with extensive mediastinal adenopathy, metastatic disease to the liver, brain 
  
 
 
 
 CONTROLLED SUBSTANCES SAFETY ASSESSMENT (IF ON CONTROLLED SUBSTANCES):  
 
Reviewed opioid safety handout:  [x] Yes   [] No 
24 hour opioid dose >150mg morphine equivalent/day:  [] Yes   [x] No 
Benzodiazepines:  [] Yes   [x] No 
Sleep apnea:  [] Yes   [x] No 
Urine Toxicology Testing within last 6 months:  [] Yes   [x] No 
History of or new aberrant medication taking behaviors:  [] Yes   [x] No 
Has Narcan been prescribed [] Yes   [x] No 
 
   
 
Total time: 90m Counseling / coordination time: 70m 
> 50% counseling / coordination?: y

## 2019-02-27 NOTE — TELEPHONE ENCOUNTER
[place] (sister in law) called re Pt having trouble with bowels. Was given Senna at hospital which he is almost out of and has also used stool softeners and an enema and is still suffering  Does not have an appt until Monday. What other options does he have?       [phone] - [facility]  [phone] - home    klb

## 2019-02-27 NOTE — TELEPHONE ENCOUNTER
Return call placed to pt's sister-in-law. PHI and HIPPA verified. Pt's sister-in-law reports pt has been constipated for the past 2-3 days; pt has taken stools softeners and a enema; last BM today; Bm small and hard. NP notified. Pt's sister-in-law informed to give pt magnesium citrate and advised if symptoms worsen or no relief to call us back. Sister-in-law verbalized understanding and thanked writer for return call.

## 2019-03-04 NOTE — PROGRESS NOTES
[address] [facility] [place] at [address], [address] [facility] W: [phone]  F: [phone] Medical Nutrition Therapy Reason for nutrition visit: weight loss of 16# x 2 weeks He reports no appetite. Denies nausea, vomiting or taste changes. He is sleeping a lot during the day, switching from the recliner to the sofa. Yesterday, he had chicken noodle soup and 2 Boost shakes. If will get up and fix something for himself if he is hungry. Family states they don't want to bug him and nag him to eat. Discussed strategies to help provide reminders to eat without sounding like nagging. Explained that food is medicine, despite hunger/desire, we need to eat based on time/schedule. Wt Readings from Last 10 Encounters:  
03/04/19 143 lb 4.8 oz (65 kg) 02/14/19 160 lb 9.6 oz (72.8 kg) 02/12/19 159 lb 6.3 oz (72.3 kg) 02/11/19 159 lb 6.4 oz (72.3 kg) 02/07/19 159 lb (72.1 kg) 01/28/19 159 lb 12.8 oz (72.5 kg) 01/21/19 168 lb (76.2 kg) 01/14/19 169 lb (76.7 kg) 12/31/18 169 lb 1.5 oz (76.7 kg) Results:  
Diagnosis: lung cancer with brain mets Estimated Nutrition Needs:  
Calorie Range: 2280-2660kcal/day Protein Range: 76-86g/day Fluid Needs: 2200ml Assessment:  
Involuntary weight loss related to teeth removal/disease as evidence by weight loss of 10# x 3 months. Plan:  
· Eating small amounts several times a day verses large meals. · Add protein and calories to favorite foods  (Ex. adding non-fat dry milk powder, butters, oils, whole milk, etc) · Keep nutrient-dense foods close at hand and snack frequently - keep food in eye sight. · Discussed consumption of nutrition supplements per day - Boost and coupons given. · Ideas to make more calorically dense without increasing volume. · Think of food as medicine, eat based on time/schedule verses hunger cues. Set alarms as reminders to eat. I appreciate the opportunity to participate in . Gail Elvin villar. Signed By: Marcelino Steele, [address] [place] [address], [address] [place] [address] , [address] Contact: [phone]

## 2019-03-04 NOTE — PROGRESS NOTES
Follow-up Note Patient: Shashank Patel MRN: [id number]  SSN: [id number] YOB: 1960  Age: 62 y.o. Sex: male Diagnosis:  
 
1. T3 N3 M1a (Stage IV) NSCLC of the lung, likely adenocarcinoma EGFR/ALK/ROS1/MET/BRAF/TRK/PD-L1 pending Treatment: 1. Right occipital craniotomy at [facility] on 1/29/2019.  
2. SBRT to the brain lesions Subjective:  
  
Shashank Patel is a 62 y.o. male with a new diagnosis of metastatic lung carcinoma. He has a over 30 pack years of cigarette smoking. He works in the construction business. He started experiencing pain in the right upper chest for last 2 months. Its works with movement. It radiates to the back. He is also experiencing worsening cough and sputum production. He has lost about 10 lbs. A bronchoscopy was non-diagnostic. A CT guided Bx has scant cells s/o a diagnosis of malignancy. MRI brain revealed 5 focus of metastatic disease and he underwent a right occipital craniotomy at [facility] on 1/29/2019. He completed SB[facility] to the brain [facility]. He is here today to start palliative treatment. He feels poorly and has lost a significant amount of weight in the last several weeks. He is also following with Palliative Medicine. Review of Systems: 
 
Constitutional: fatigue, weight loss Eyes: negative Ears, Nose, Mouth, Throat, and Face: negative Respiratory: cough Cardiovascular: negative Gastrointestinal: negative Genitourinary:negative Integument/Breast: negative Hematologic/Lymphatic: negative Musculoskeletal: right upper chest pain Neurological: negative History reviewed. No pertinent history of prior cancer History reviewed. No pertinent surgical history. History reviewed. No pertinent family history of cancer Social History Tobacco Use  Smoking status: Former Smoker Packs/day: 1.50 Years: 25.00 Pack years: 37.50 Last attempt to quit: 1/21/2009 Years since quitting: 10.1  Smokeless tobacco: Never Used Substance Use Topics  Alcohol use: No  
  Frequency: Never Prior to Admission medications Medication Sig Start Date End Date Taking? Authorizing Provider  
oxyCODONE ER (OXYCONTIN) 20 mg ER tablet Take 1 Tab by mouth every twelve (12) hours for 30 days. Max Daily Amount: 40 mg. 2/14/19 3/16/19 Yes April Horowitz MD  
oxyCODONE IR (ROXICODONE) 20 mg immediate release tablet Take 1 Tab by mouth every four (4) hours as needed for Pain for up to 15 days. Max Daily Amount: 120 mg. 3/1/19 3/16/19 Yes April Horowitz MD  
lidocaine-prilocaine (EMLA) topical cream Apply  to affected area as needed for Pain. 2/13/19  Yes Ankit Blackwood MD  
ondansetron (ZOFRAN ODT) 4 mg disintegrating tablet Take 1 Tab by mouth every eight (8) hours as needed for Nausea. 2/13/19  Yes Ankit Blackwood MD  
senna (SENNA) 8.6 mg tablet Take 2 Tabs by mouth nightly. Yes Provider, Historical  
pantoprazole (PROTONIX) 40 mg tablet Take 40 mg by mouth daily. Yes Provider, Historical  
docusate sodium (COL-RITE) 100 mg capsule Take 100 mg by mouth two (2) times a day. Yes Provider, Historical  
dexamethasone (DECADRON) 4 mg tablet Take 4 mg by mouth every twelve (12) hours. Yes Provider, Historical  
butalbital-acetaminophen-caff (FIORICET) -40 mg per capsule Take 2 Caps by mouth every four (4) hours as needed for Pain or Headache.    Yes Provider, Historical  
insulin glargine (LANTUS) 100 unit/mL injection 14 units qhs--increase Lantus 2 units q day until fasting 150 7/3/18  Yes Provider, Historical  
insulin lispro (HUMALOG U-100 INSULIN) 100 unit/mL injection INJECT 5 UNITS SUBCUTANEOUSLY BEFORE MEALS-INCREASE HUMALOG 1 UNIT EVERY OTHER DAY UNTIL 2 HR PP  OR LESS 4/24/18  Yes Provider, Historical  
fenofibrate (LOFIBRA) 160 mg tablet take 1 tablet by mouth once daily 12/10/18  Yes Provider, Historical  
 ascorbic acid, vitamin C, (VITAMIN C) 250 mg tablet Take 250 mg by mouth daily. Yes Provider, Historical  
TURMERIC PO Take 500 mg by mouth daily. Yes Provider, Historical  
flaxseed oil 1,000 mg cap Take 1 Cap by mouth daily. Yes Provider, Historical  
acetaminophen (TYLENOL) 325 mg tablet Take 325 mg by mouth every four (4) hours as needed for Pain. Yes Provider, Historical  
ibuprofen 200 mg cap Take 1-2 Tabs by mouth as needed. Yes Provider, Historical  
amLODIPine (NORVASC) 10 mg tablet Take 5 mg by mouth daily. Yes Provider, Historical  
lisinopril (PRINIVIL, ZESTRIL) 20 mg tablet Take 20 mg by mouth daily. Yes Provider, Historical  
simvastatin (ZOCOR) 40 mg tablet Take 40 mg by mouth daily. Yes Provider, Historical  
  
 
 
No Known Allergies Objective:  
 
Visit Vitals BP (!) 74/56 Pulse (!) 113 Temp 98.6 °F (37 °C) (Oral) Resp 18 Ht 5' 10\" (1.778 m) Wt 143 lb 4.8 oz (65 kg) SpO2 95% BMI 20.56 kg/m² Pain Scale: 4/10 Pain Location: Shoulder (c/o rt shoulder pain) Physical Exam: 
 
GENERAL: alert, cooperative, no distress, appears stated age EYE: conjunctivae/corneas clear. PERRL, EOM's intact. Fundi benign LYMPHATIC: Cervical, supraclavicular, and axillary nodes normal.  
THROAT & NECK: normal and no erythema or exudates noted. LUNG: clear to auscultation bilaterally HEART: regular rate and rhythm, S1, S2 normal, no murmur, click, rub or gallop ABDOMEN: soft, non-tender. Bowel sounds normal. No masses,  no organomegaly EXTREMITIES:  extremities normal, atraumatic, no cyanosis or edema SKIN: Normal. 
NEUROLOGIC: AOx3. Gait normal. Reflexes and motor strength normal and symmetric. Cranial nerves 2-12 and sensation grossly intact. EXAM:  CTA CHEST W OR W WO CONT 
  
INDICATION: Chest pain, acute, pulmonary embolism (PE) suspected 
  
COMPARISON: Chest radiograph 12/31/2018. 
  
CONTRAST:  80 mL of Isovue-370.  
? 
 Technique: Following the uneventful intravenous administration of contrast, thin 
axial images were obtained through the chest. Coronal and sagittal 
reconstructions were generated. MIP image reconstructions were also performed. CT dose reduction was achieved through use of a standardized protocol tailored 
for this examination and automatic exposure control for dose modulation. ? Findings: 
Vascular: No evidence of acute or chronic pulmonary embolism. The pulmonary arteries are 
normal in size. The thoracic aorta is normal in size. ? Chest: 
Lungs/Pleura: Large right upper lobe mass measuring 5.6 x 4.7 x 4.8 cm. Small 
satellite nodule in the right upper lobe measuring 6 mm (series 2 image 88). The 
mass is in continuity with the conglomerate right paratracheal adenopathy as 
described. There is occlusion of the posterior segmental and subsegmental 
bronchi of the right upper lobe. 
  
Axilla/Soft Tissue: No pathologic axillary adenopathy. 
  
Mediastinum: The heart is normal in size. No pericardial effusion. Conglomerate 
right paratracheal adenopathy measuring 6.8 x 3.9 x 7.2 cm. Right hilar 
adenopathy measuring 3.0 x 2.2 cm. Enlarged subcarinal adenopathy measuring 5.5 
x 2.4 cm. There are enlarged left lower paratracheal lymph nodes measuring 2.7 x 
1.1 cm. 
  
Upper Abdomen: Indeterminant hypodensities within the left hepatic lobe segment IVb measuring 3.5 x 1.6 cm in aggregate (series 2 image 20). Simple cyst in the 
upper pole of the left kidney measuring 2.5 cm, with a posterior peripheral 
calcification. 
  
Bones: No evidence of acute fracture, dislocation, or aggressive osseous 
abnormality. ? 
IMPRESSION Impression: 
  
1. Large right upper lobe mass measuring 5.6 x 4.7 x 4.8 cm, which is in 
continuity with extensive conglomerate right paratracheal and right hilar 
adenopathy. Findings are consistent with primary lung malignancy with extensive metastatic adenopathy. The right upper lobe mass occludes the segmental and 
subsegmental bronchi of the posterior segment of the right upper lobe. 2. Metastatic subcarinal adenopathy. Enlarged left lower paratracheal 
adenopathy, which is also favored to be metastatic. 3. A 6 mm satellite nodule in the right upper lobe, consistent with metastasis. 4. Indeterminate hypodensities within hepatic segment IVb, which may represent 
metastatic disease. 5. No evidence of pulmonary embolism. I personally reviewed the images. MRI brain: 5 lesions: right occipital, right lateral ventricular arm, right temporal, right anterior temporal and left frontal 
 
PET CT: large RUL mass with extensive mediastinal adenopathy, metastatic disease to the liver, brain Lab Results Component Value Date/Time WBC 10.7 03/04/2019 11:31 AM  
 HGB 12.2 03/04/2019 11:31 AM  
 HCT 37.2 03/04/2019 11:31 AM  
 PLATELET 231 (H) 65/80/9432 11:31 AM  
 MCV 87.9 03/04/2019 11:31 AM  
 
 
Lab Results Component Value Date/Time Sodium 130 (L) 03/04/2019 11:31 AM  
 Potassium 4.3 03/04/2019 11:31 AM  
 Chloride 96 (L) 03/04/2019 11:31 AM  
 CO2 26 03/04/2019 11:31 AM  
 Anion gap 8 03/04/2019 11:31 AM  
 Glucose 139 (H) 03/04/2019 11:31 AM  
 BUN 27 (H) 03/04/2019 11:31 AM  
 Creatinine 1.64 (H) 03/04/2019 11:31 AM  
 BUN/Creatinine ratio 16 03/04/2019 11:31 AM  
 GFR est AA 53 (L) 03/04/2019 11:31 AM  
 GFR est non-AA 43 (L) 03/04/2019 11:31 AM  
 Calcium 9.8 03/04/2019 11:31 AM  
 Bilirubin, total 0.8 03/04/2019 11:31 AM  
 AST (SGOT) 40 (H) 03/04/2019 11:31 AM  
 Alk. phosphatase 89 03/04/2019 11:31 AM  
 Protein, total 7.8 03/04/2019 11:31 AM  
 Albumin 3.2 (L) 03/04/2019 11:31 AM  
 Globulin 4.6 (H) 03/04/2019 11:31 AM  
 A-G Ratio 0.7 (L) 03/04/2019 11:31 AM  
 ALT (SGPT) 41 03/04/2019 11:31 AM  
 
 
 
 
Assessment:  
 
1. T3 N3 M1a (Stage IV) NSCLC of the lung, likely adenoca 
  
> EGFR/ALK/ROS1/MET/BRAF/TRK/PD-L1 pending > Unresectable disease ECOG PS 1 Intent of Treatment - palliative Prognosis poor S/p right occipital craniotomy at [facility] on 1/29/2019 Completed SBRT to brain lesions and tumor bed by Dr. Analy Miranda at [facility] on 2/27/2019 Due to hypotension and poor performance status, we shall hold therapy 2. Hypotension 
 
> Bolus 1L NS in [facility] today 
> Hold amolodipine and lisinopril 3. Severe protein calorie malnutrition 
 
> Dietician consult 
> protein supplementation 4. Acute renal insufficiency IV fluid Plan:  
 
 
> Fluid bolus in [facility] today 
> Hold therapy 
> Hold amlodipine and lisinopril > Awaiting PD-L1 and foundation one testing 
> Dietician consult 
> Follow-up in 3 weeks Signed by: Bella Jay MD 
                   March 4, 2019 
 
 
CC. Nirmal Louie MD 
CC. Jeffrey Cranker, MD 
CC. Kia Sanders MD 
CC.  Kev Carvalho MD

## 2019-03-04 NOTE — PROGRESS NOTES
Kayy Mcknight is a 62 y.o. male here today for metastatic lung carcinoma f/u. S/P SBRT to brain lesions; pt saw Dr. Nestor Phoenix today to have yonathan removed. Kaia Collet will be placed on 2/12/19 and pt completed rads on 2/27/19. Patient starting Carbo/Alimta/Keytruda today. Patient seeing palliative. Low B/P noted and elevated pule noted; pt reports taking his B/P medication today; provider notified; other VS stable. Patient reports right shoulder pain. Decreased appetite; 20lb weight loss noted; pt reports eating soup. Visit Vitals BP (!) 74/56 Pulse (!) 113 Temp 98.6 °F (37 °C) (Oral) Resp 18 Ht 5' 10\" (1.778 m) Wt 143 lb 4.8 oz (65 kg) SpO2 95% BMI 20.56 kg/m² Pain Scale: 10 - Worst pain ever/10 Pain Location: Shoulder (c/o rt shoulder pain) Health Maintenance Review: Patient reminded of \"due or due soon\" health maintenance. I have asked the patient to contact his/her primary care provider (PCP) for follow-up on his/her health maintenance.

## 2019-03-04 NOTE — PROGRESS NOTES
Pt arrived to [facility] ambulatory in no acute distress at 1115 for Hydration. CHEMO HELD (Keytruda/Alimta/Carbo C! D1.  Assessment unremarkable except pt c/o of generalized fatigue and decreased BP. Patient Vitals for the past 12 hrs:   Temp Pulse Resp BP SpO2   03/04/19 1302 -- 91 16 90/61 --   03/04/19 1246 -- 81 16 (!) 87/56 --   03/04/19 1118 97.8 °F (36.6 °C) (!) 108 18 (!) 77/59 98 %       Left  chest port accessed without issue and positive blood return noted    @ 1030 Received a call from Letitia Perdomo NP made aware that pt has stopped BP meds as of today and will received a 1 Liter Bolus of NS. Re-check pt BP upon completion of bolus. Made Zca Calvin NP aware pt's BP remained low and abnormal CMP labs. Order received to defer Chemotherapy until 3/11/19. .        Labs obtained:   Recent Results (from the past 12 hour(s))   CBC WITH AUTOMATED DIFF    Collection Time: 03/04/19 11:31 AM   Result Value Ref Range    WBC 10.7 4.1 - 11.1 K/uL    RBC 4.23 4. 10 - 5.70 M/uL    HGB 12.2 12.1 - 17.0 g/dL    HCT 37.2 36.6 - 50.3 %    MCV 87.9 80.0 - 99.0 FL    MCH 28.8 26.0 - 34.0 PG    MCHC 32.8 30.0 - 36.5 g/dL    RDW 14.4 11.5 - 14.5 %    PLATELET 265 (H) 794 - 400 K/uL    MPV 9.1 8.9 - 12.9 FL    NRBC 0.0 0  WBC    ABSOLUTE NRBC 0.00 0.00 - 0.01 K/uL    NEUTROPHILS 68 32 - 75 %    LYMPHOCYTES 16 12 - 49 %    MONOCYTES 13 5 - 13 %    EOSINOPHILS 0 0 - 7 %    BASOPHILS 1 0 - 1 %    IMMATURE GRANULOCYTES 2 (H) 0.0 - 0.5 %    ABS. NEUTROPHILS 7.3 1.8 - 8.0 K/UL    ABS. LYMPHOCYTES 1.7 0.8 - 3.5 K/UL    ABS. MONOCYTES 1.4 (H) 0.0 - 1.0 K/UL    ABS. EOSINOPHILS 0.0 0.0 - 0.4 K/UL    ABS. BASOPHILS 0.1 0.0 - 0.1 K/UL    ABS. IMM.  GRANS. 0.2 (H) 0.00 - 0.04 K/UL    DF SMEAR SCANNED      RBC COMMENTS NORMOCYTIC, NORMOCHROMIC     METABOLIC PANEL, COMPREHENSIVE    Collection Time: 03/04/19 11:31 AM   Result Value Ref Range    Sodium 130 (L) 136 - 145 mmol/L    Potassium 4.3 3.5 - 5.1 mmol/L    Chloride 96 (L) 97 - 108 mmol/L    CO2 26 21 - 32 mmol/L    Anion gap 8 5 - 15 mmol/L    Glucose 139 (H) 65 - 100 mg/dL    BUN 27 (H) 6 - 20 MG/DL    Creatinine 1.64 (H) 0.70 - 1.30 MG/DL    BUN/Creatinine ratio 16 12 - 20      GFR est AA 53 (L) >60 ml/min/1.73m2    GFR est non-AA 43 (L) >60 ml/min/1.73m2    Calcium 9.8 8.5 - 10.1 MG/DL    Bilirubin, total 0.8 0.2 - 1.0 MG/DL    ALT (SGPT) 41 12 - 78 U/L    AST (SGOT) 40 (H) 15 - 37 U/L    Alk. phosphatase 89 45 - 117 U/L    Protein, total 7.8 6.4 - 8.2 g/dL    Albumin 3.2 (L) 3.5 - 5.0 g/dL    Globulin 4.6 (H) 2.0 - 4.0 g/dL    A-G Ratio 0.7 (L) 1.1 - 2.2     TSH 3RD GENERATION    Collection Time: 03/04/19 11:31 AM   Result Value Ref Range    TSH 2.26 0.36 - 3.74 uIU/mL       The following medications administered:  NS 1 Liter IV over 1 hour  NS Flush  Heparin Flush    Pt tolerated treatment well. No adverse reactions noted.  IV flushed per policy and removed, 2x2 and paper tape placed.  Pt discharged ambulatory in no acute distress at 1305, accompanied by Spouse.   Next appointment 3/11/19 @ 11 am.

## 2019-03-04 NOTE — PROGRESS NOTES
[employer] Social Work Navigator Encounter Patient Name:  Jessa Jones Medical History: dx metastatic lung cancer Advance Directives: 
 
Narrative: pt is uninsured. Barriers to Care: uninsured, no income (except wifes SSDI and Soc security for twin 6year old. The twins were adopted since they were 4 months. Father of the twins is Roby Cunningham her 39year old son- mother of children is not involved. CHANTE frustrated that social security is not LOUISE completed a Medicaid application with PT and faxed it to [employer]. LOUISE advised pt wife to go and apply for food stamps as pt income has changed. SW encouraged spouse to come up with family activities such as movie night or game night and have quality time with the family. Spouse states pt has not felt like it. SW discussed not letting \"stinking thinking\" or spouse mind fixate on the worst case scenario. LOUISE relayed some of the services at [facility] for relaxation therapy, massage, and counseling. LOUISE spoke with pt - encouraged him that his one job is to maintain or gain weight. Per CHANTE- family is not behind on the mortgaged at this time. Pt father has asked if he can help or they can move in with him but pt and pt spouse want to stay in their home. Per spouse pt has a $250 life insurance policy. Plan: 1.  FU to understand soc security determination, Medicaid, food stamps, etc. 
2.  FU to ensure family is addressing mortgage due to lack of income as well as car and truck payments.

## 2019-03-04 NOTE — PROGRESS NOTES
Problem: Patient Education:  Go to Education Activity  Goal: Patient/Family Education  Outcome: Not Progressing Towards Goal  Patient did not receive chemotherapy today r/t low BP.

## 2019-03-05 NOTE — TELEPHONE ENCOUNTER
[facility] re Pt Shara Clark   ([place] is on [address])    Has questions re hi Rx.    Also these were prescribed by another Doctor and now should these be refilled by Dr Meme Franco [phone]  Cell [phone]    3/5/19   luke

## 2019-03-08 NOTE — TELEPHONE ENCOUNTER
Called patient, reminder call for appt. on 03/11/19 states patient is not eating has no appetite. Blood pressure is low.  He doesn't want to be bothered only sits in chair

## 2019-03-08 NOTE — TELEPHONE ENCOUNTER
Spoke with Mr. Siri Bills sister-in-law, she shared that he has not been doing well. He was in the [facility] on 3/5 for chemo but due to low blood pressure he was unable to get it. He has lost about 20 pounds in about 2 weeks and she shared that he is short tempered with everyone including his children and the dogs. Pm nurse explained that if he gets worse over the weekend to please call the on-call physician. She was told to keep doing the things they are doing such as gently encourage him to eat and drink without forcing him. She said that she will definitely have it at the appointment Monday at 9:30.         Hannah Pearce, RN  Palliative Medicine

## 2019-03-11 NOTE — PATIENT INSTRUCTIONS
Dear Linn Ryder ,    It was a pleasure seeing you today at [facility] office      This is the plan we talked about:    1. Right sided chest pain radiating to the shoulder  - You are doing really well with Oxycodone 20 mg every 4 hours. - You have not been taking any Oxycontin because you felt sleepy with it once. - We agreed that you will re start it to help with pain. 2. Constipation  - I am glad you are taking stool softners regularly but still very constipated. - Please take Miralax daily and I am starting Lactulose 30 ml every 4 hours until you have a bowel movement  - Please continue this bowel regimen religiously. 3. Cognitive impairment  - You seem very angry, very poor relationship with family. - We talked about how this is all part of the disease and the impairment that comes with radiation.  - But we do need to get you eating better so your strength will improve. - Dexamethasone 2 mg three times a day- this will help with energy and appetite. 4. Appetite  - I am also starting you on Megace- appetite stimulant- we will try and get this through the foundation. 5. ACP  - We completed an advance medical directive and medical power of . - Financial poa is all taken care of as well. - Your wife is working with jl RAVI and Alessandra Lubin for [facility] and Medicaid. 6. Goals of care  - We discussed your wishes regarding cpr/ intubation and you want to remain FULL code for now and think about it more. - You are willing to take more responsibility for your own care and attempt to eat better. - You will be getting chemo today.       This is what you have shared with us about Advance Care Planning:    Primary Decision Maker ([address]): Raina Smith  Relationship to patient:Wife  Phone number:[phone]  [x] Named in a scanned document   [x] Legal Next of Kin  [] Guardian    Secondary Decision Maker ([address]):  Jua nR Cano  Relationship to patient: Father  Phone number: [phone]  [x] Named in a scanned document   [] Legal Next of Kin  [] Guardian    ACP documents you current have include:  [x] Advance Directive or Living Will  [] Durable Do Not Resuscitate  [] Physician Orders for Scope of Treatment (POST)  [] Medical Power of   [] Other      The Palliative Medicine Team is here to support you and your family.      We will see you again in 3 weeks  Sincerely,      April Horowitz MD and the Palliative Medicine Team

## 2019-03-11 NOTE — PROGRESS NOTES
Pt arrived to [facility] ambulatory for Keytruda/Alimta/Carboplatin C1 in no acute distress at 1120.  Assessment unremarkable except constipation from pain meds and fatigue. L chest port accessed without issue and positive blood return noted.  Labs obtained- CBC with diff, CMP, Magnesium, and Phosphorus. Patient Vitals for the past 12 hrs:   Temp Pulse Resp BP   03/11/19 1607 -- 91 18 130/76   03/11/19 1115 98.8 °F (37.1 °C) (!) 105 18 118/80     Recent Results (from the past 12 hour(s))   CBC WITH AUTOMATED DIFF    Collection Time: 03/11/19 11:28 AM   Result Value Ref Range    WBC 7.1 4.1 - 11.1 K/uL    RBC 4.07 (L) 4.10 - 5.70 M/uL    HGB 11.7 (L) 12.1 - 17.0 g/dL    HCT 35.1 (L) 36.6 - 50.3 %    MCV 86.2 80.0 - 99.0 FL    MCH 28.7 26.0 - 34.0 PG    MCHC 33.3 30.0 - 36.5 g/dL    RDW 13.8 11.5 - 14.5 %    PLATELET 851 373 - 953 K/uL    MPV 9.1 8.9 - 12.9 FL    NRBC 0.0 0  WBC    ABSOLUTE NRBC 0.00 0.00 - 0.01 K/uL    NEUTROPHILS 66 32 - 75 %    LYMPHOCYTES 16 12 - 49 %    MONOCYTES 16 (H) 5 - 13 %    EOSINOPHILS 0 0 - 7 %    BASOPHILS 1 0 - 1 %    IMMATURE GRANULOCYTES 1 (H) 0.0 - 0.5 %    ABS. NEUTROPHILS 4.7 1.8 - 8.0 K/UL    ABS. LYMPHOCYTES 1.2 0.8 - 3.5 K/UL    ABS. MONOCYTES 1.1 (H) 0.0 - 1.0 K/UL    ABS. EOSINOPHILS 0.0 0.0 - 0.4 K/UL    ABS. BASOPHILS 0.0 0.0 - 0.1 K/UL    ABS. IMM.  GRANS. 0.1 (H) 0.00 - 0.04 K/UL    DF AUTOMATED     METABOLIC PANEL, COMPREHENSIVE    Collection Time: 03/11/19 11:28 AM   Result Value Ref Range    Sodium 131 (L) 136 - 145 mmol/L    Potassium 3.9 3.5 - 5.1 mmol/L    Chloride 99 97 - 108 mmol/L    CO2 26 21 - 32 mmol/L    Anion gap 6 5 - 15 mmol/L    Glucose 135 (H) 65 - 100 mg/dL    BUN 21 (H) 6 - 20 MG/DL    Creatinine 1.04 0.70 - 1.30 MG/DL    BUN/Creatinine ratio 20 12 - 20      GFR est AA >60 >60 ml/min/1.73m2    GFR est non-AA >60 >60 ml/min/1.73m2    Calcium 9.5 8.5 - 10.1 MG/DL    Bilirubin, total 0.5 0.2 - 1.0 MG/DL    ALT (SGPT) 41 12 - 78 U/L    AST (SGOT) 39 (H) 15 - 37 U/L    Alk. phosphatase 114 45 - 117 U/L    Protein, total 7.7 6.4 - 8.2 g/dL    Albumin 3.0 (L) 3.5 - 5.0 g/dL    Globulin 4.7 (H) 2.0 - 4.0 g/dL    A-G Ratio 0.6 (L) 1.1 - 2.2     TSH 3RD GENERATION    Collection Time: 03/11/19 11:28 AM   Result Value Ref Range    TSH 1.86 0.36 - 3.74 uIU/mL       The following medications administered:  NS @ KVO  Aloxi 0.25 mg IVP  Vitamin B12 1,000 mcg IM in left deltoid  Emend 150 mg IV over 20 minutes  Decadron 12 mg IV over 10 minutes  Keytruda 200 mg IV over 30 minutes  Alimta 945 mg IV over 10 minutes  Carboplatin 350 mg IV over 30 minutes    Pt tolerated treatment well.  No adverse reaction noted. Reviewed side effects of chemotherapy and given discharge instructions. Port flushed per policy and needle removed, 2x2 and paper tape placed.  Pt discharged ambulatory in no acute distress at 1620, accompanied by family member. Next appointment 4/1/19 @ 1100.

## 2019-03-11 NOTE — DISCHARGE INSTRUCTIONS

## 2019-03-11 NOTE — PROGRESS NOTES

## 2019-03-11 NOTE — PROGRESS NOTES
[address], [address] [facility], [facility] Shay Man, [address]  [phone]      Follow-up Note        Patient: Dianne Huynh MRN: [id number]  SSN: [id number]    YOB: 1960  Age: 62 y.o. Sex: male        Diagnosis:     1. T3 N3 M1a (Stage IV) NSCLC of the lung, likely adenocarcinoma    EGFR/ALK/ROS1/MET/BRAF/TRK/PD-L1 pending    Treatment:     1. Right occipital craniotomy at [address] [place] [address] on 1/29/2019.   2. SBRT to the brain lesions   3. Palliative chemotherapy   Carbo/Alimta/Keytruda, cycle 1 day 1    Subjective:      Dianne Huynh is a 62 y.o. male with a new diagnosis of metastatic lung carcinoma. He has a over 30 pack years of cigarette smoking. He works in the construction business. He started experiencing pain in the right upper chest for last 2 months. Its works with movement. It radiates to the back. He is also experiencing worsening cough and sputum production. He has lost about 10 lbs. A bronchoscopy was non-diagnostic. A CT guided Bx has scant cells s/o a diagnosis of malignancy. MRI brain revealed 5 focus of metastatic disease and he underwent a right occipital craniotomy at [address] [place] [address] on 1/29/2019. He completed SBRT to the brain lesions and tumor bed at [facility] last week. He is here today to start palliative treatment. He feels poorly and has lost a significant amount of weight in the last several weeks. He is  following with Palliative Medicine. Review of Systems:    Constitutional: fatigue, weight loss  Eyes: negative  Ears, Nose, Mouth, Throat, and Face: negative  Respiratory: cough  Cardiovascular: negative  Gastrointestinal: negative  Genitourinary:negative  Integument/Breast: negative  Hematologic/Lymphatic: negative  Musculoskeletal: right upper chest pain  Neurological: negative      History reviewed. No pertinent history of prior cancer  History reviewed. No pertinent surgical history.    History reviewed. No pertinent family history of cancer    Social History     Tobacco Use    Smoking status: Former Smoker     Packs/day: 1.50     Years: 25.00     Pack years: 37.50     Last attempt to quit: 1/21/2009     Years since quitting: 10.1    Smokeless tobacco: Never Used   Substance Use Topics    Alcohol use: No     Frequency: Never      Prior to Admission medications    Medication Sig Start Date End Date Taking? Authorizing Provider   dexamethasone (DECADRON) 2 mg tablet Take 1 Tab by mouth three (3) times daily. 3/11/19  Yes Mey Spears MD   oxyCODONE IR (ROXICODONE) 20 mg immediate release tablet Take 1 Tab by mouth every four (4) hours as needed for Pain for up to 15 days. Max Daily Amount: 120 mg. 3/11/19 3/26/19 Yes Mey Spears MD   oxyCODONE IR (ROXICODONE) 20 mg immediate release tablet Take 1 Tab by mouth every four (4) hours as needed for Pain for up to 15 days. Max Daily Amount: 120 mg. 3/26/19 4/10/19 Yes Mey Spears MD   megestrol (MEGACE) 40 mg tablet Take 1 Tab by mouth two (2) times a day. 3/11/19  Yes Mey Spears MD   lactulose (CHRONULAC) 10 gram/15 mL solution Take 30 mL by mouth three (3) times daily. 3/11/19  Yes Mey Spears MD   pantoprazole (PROTONIX) 40 mg tablet Take 1 Tab by mouth daily. 3/5/19  Yes Georgette Barragan MD   oxyCODONE ER (OXYCONTIN) 20 mg ER tablet Take 1 Tab by mouth every twelve (12) hours for 30 days. Max Daily Amount: 40 mg. 2/14/19 3/16/19 Yes Mey Spears MD   lidocaine-prilocaine (EMLA) topical cream Apply  to affected area as needed for Pain. 2/13/19  Yes Georgette Barragan MD   ondansetron (ZOFRAN ODT) 4 mg disintegrating tablet Take 1 Tab by mouth every eight (8) hours as needed for Nausea. 2/13/19  Yes Georgette Barragan MD   senna (SENNA) 8.6 mg tablet Take 2 Tabs by mouth nightly. Yes Provider, Gita   docusate sodium (COL-RITE) 100 mg capsule Take 100 mg by mouth two (2) times a day.    Yes Provider, Historical butalbital-acetaminophen-caff (FIORICET) -40 mg per capsule Take 2 Caps by mouth every four (4) hours as needed for Pain or Headache. Yes Provider, Historical   insulin glargine (LANTUS) 100 unit/mL injection 14 units qhs--increase Lantus 2 units q day until fasting 150 7/3/18  Yes Provider, Historical   insulin lispro (HUMALOG U-100 INSULIN) 100 unit/mL injection INJECT 5 UNITS SUBCUTANEOUSLY BEFORE MEALS-INCREASE HUMALOG 1 UNIT EVERY OTHER DAY UNTIL 2 HR PP  OR LESS 4/24/18  Yes Provider, Historical   fenofibrate (LOFIBRA) 160 mg tablet take 1 tablet by mouth once daily 12/10/18  Yes Provider, Historical   ascorbic acid, vitamin C, (VITAMIN C) 250 mg tablet Take 250 mg by mouth daily. Yes Provider, Historical   TURMERIC PO Take 500 mg by mouth daily. Yes Provider, Historical   flaxseed oil 1,000 mg cap Take 1 Cap by mouth daily. Yes Provider, Historical   acetaminophen (TYLENOL) 325 mg tablet Take 325 mg by mouth every four (4) hours as needed for Pain. Yes Provider, Historical   ibuprofen 200 mg cap Take 1-2 Tabs by mouth as needed. Yes Provider, Historical   simvastatin (ZOCOR) 40 mg tablet Take 40 mg by mouth daily. Yes Provider, Historical          No Known Allergies        Objective:     Visit Vitals  BP 92/66   Pulse 78   Temp 98.1 °F (36.7 °C)   Resp 18   Ht 5' 10\" (1.778 m)   Wt 143 lb (64.9 kg)   SpO2 97%   BMI 20.52 kg/m²       Pain Scale: 4/10  Pain Location:       Physical Exam:    GENERAL: alert, cooperative, no distress, appears stated age  EYE: conjunctivae/corneas clear. PERRL, EOM's intact. Fundi benign  LYMPHATIC: Cervical, supraclavicular, and axillary nodes normal.   THROAT & NECK: normal and no erythema or exudates noted. LUNG: clear to auscultation bilaterally  HEART: regular rate and rhythm, S1, S2 normal, no murmur, click, rub or gallop  ABDOMEN: soft, non-tender.  Bowel sounds normal. No masses,  no organomegaly  EXTREMITIES:  extremities normal, atraumatic, no cyanosis or edema  SKIN: Normal.  NEUROLOGIC: AOx3. Gait normal. Reflexes and motor strength normal and symmetric. Cranial nerves 2-12 and sensation grossly intact. EXAM:  CTA CHEST W OR W WO CONT     INDICATION: Chest pain, acute, pulmonary embolism (PE) suspected     COMPARISON: Chest radiograph 12/31/2018.     CONTRAST:  80 mL of Isovue-370.  ? Technique: Following the uneventful intravenous administration of contrast, thin  axial images were obtained through the chest. Coronal and sagittal  reconstructions were generated. MIP image reconstructions were also performed. CT dose reduction was achieved through use of a standardized protocol tailored  for this examination and automatic exposure control for dose modulation. ? Findings:  Vascular:  No evidence of acute or chronic pulmonary embolism. The pulmonary arteries are  normal in size. The thoracic aorta is normal in size. ? Chest:  Lungs/Pleura: Large right upper lobe mass measuring 5.6 x 4.7 x 4.8 cm. Small  satellite nodule in the right upper lobe measuring 6 mm (series 2 image 88). The  mass is in continuity with the conglomerate right paratracheal adenopathy as  described. There is occlusion of the posterior segmental and subsegmental  bronchi of the right upper lobe.     Axilla/Soft Tissue: No pathologic axillary adenopathy.     Mediastinum: The heart is normal in size. No pericardial effusion. Conglomerate  right paratracheal adenopathy measuring 6.8 x 3.9 x 7.2 cm. Right hilar  adenopathy measuring 3.0 x 2.2 cm. Enlarged subcarinal adenopathy measuring 5.5  x 2.4 cm. There are enlarged left lower paratracheal lymph nodes measuring 2.7 x  1.1 cm.     Upper Abdomen: Indeterminant hypodensities within the left hepatic lobe segment  IVb measuring 3.5 x 1.6 cm in aggregate (series 2 image 20).  Simple cyst in the  upper pole of the left kidney measuring 2.5 cm, with a posterior peripheral  calcification.     Bones: No evidence of acute fracture, dislocation, or aggressive osseous  abnormality. ?  IMPRESSION  Impression:     1. Large right upper lobe mass measuring 5.6 x 4.7 x 4.8 cm, which is in  continuity with extensive conglomerate right paratracheal and right hilar  adenopathy. Findings are consistent with primary lung malignancy with extensive  metastatic adenopathy. The right upper lobe mass occludes the segmental and  subsegmental bronchi of the posterior segment of the right upper lobe. 2. Metastatic subcarinal adenopathy. Enlarged left lower paratracheal  adenopathy, which is also favored to be metastatic. 3. A 6 mm satellite nodule in the right upper lobe, consistent with metastasis. 4. Indeterminate hypodensities within hepatic segment IVb, which may represent  metastatic disease. 5. No evidence of pulmonary embolism. I personally reviewed the images. MRI brain: 5 lesions: right occipital, right lateral ventricular arm, right temporal, right anterior temporal and left frontal    PET CT: large RUL mass with extensive mediastinal adenopathy, metastatic disease to the liver, brain    Lab Results   Component Value Date/Time    WBC 7.1 03/11/2019 11:28 AM    HGB 11.7 (L) 03/11/2019 11:28 AM    HCT 35.1 (L) 03/11/2019 11:28 AM    PLATELET 310 84/81/8806 11:28 AM    MCV 86.2 03/11/2019 11:28 AM       Lab Results   Component Value Date/Time    Sodium 131 (L) 03/11/2019 11:28 AM    Potassium 3.9 03/11/2019 11:28 AM    Chloride 99 03/11/2019 11:28 AM    CO2 26 03/11/2019 11:28 AM    Anion gap 6 03/11/2019 11:28 AM    Glucose 135 (H) 03/11/2019 11:28 AM    BUN 21 (H) 03/11/2019 11:28 AM    Creatinine 1.04 03/11/2019 11:28 AM    BUN/Creatinine ratio 20 03/11/2019 11:28 AM    GFR est AA >60 03/11/2019 11:28 AM    GFR est non-AA >60 03/11/2019 11:28 AM    Calcium 9.5 03/11/2019 11:28 AM    Bilirubin, total 0.5 03/11/2019 11:28 AM    AST (SGOT) 39 (H) 03/11/2019 11:28 AM    Alk.  phosphatase 114 03/11/2019 11:28 AM    Protein, total 7.7 03/11/2019 11:28 AM    Albumin 3.0 (L) 03/11/2019 11:28 AM    Globulin 4.7 (H) 03/11/2019 11:28 AM    A-G Ratio 0.6 (L) 03/11/2019 11:28 AM    ALT (SGPT) 41 03/11/2019 11:28 AM           Assessment:     1. T3 N3 M1a (Stage IV) NSCLC of the lung, likely adenoca     NGS - no targetable mutation  PD-L1 pending    > Unresectable disease     ECOG PS 1  Intent of Treatment - palliative  Prognosis poor    S/p right occipital craniotomy at [facility] on 1/29/2019    Completed SBRT to brain lesions and tumor bed by Dr. Rosamaria Sandhoff at [place] on 2/27/2019    Starting palliative chemotherapy   Carbo/Alimta/Pembrolizumab    Blood counts are adequate. I explained to him the usual side effects of the treatment and ways to manage it. He vocalized understanding      2. Hypotension    > Hold amolodipine and lisinopril      3. Severe protein calorie malnutrition    > Dietician consult  > protein supplementation        Plan:       1. Start systemic therapy  2. Return in 3 weeks      Signed by: Yimi Caldera MD                     March 11, 2019        CC. Sujatha Gomez MD  CC. Kristi Welch MD  CC. Iraj Silvestre MD  CC. Minda Ivan MD  CC.  Cortez Love MD

## 2019-03-11 NOTE — PROGRESS NOTES
Palliative Medicine Office Visit  Palliative Medicine Nurse Check In  [phone]    Patient Name: Jonathan Huertas  YOB: 1960      Date of Office Visit: 3/11/2019   Patient states: \"  \"    From Check In Sheet (scanned in Media):  Has a medical provider talked with you about cardiopulmonary resuscitation (CPR)? [x] Yes   [] No   [] Unable to obtain    Nurse reminder to complete or update ACP FlowSheet:    Is ACP on the Problem List?    [x] Yes    [] No  IF ACP Document is ON FILE; Nurse to place ACP on Problem List     Is there an ACP Note in Chart Review/Note? [x] Yes    [] No   If NO: ALERT PROVIDER       Primary Decision Maker: Dilcia Mercedes - Spouse - [phone]    Secondary Decision Maker: Ana Scott - Father - [phone]  Advance Care Planning 3/11/2019   Patient's Healthcare Decision Maker is: Named in scanned ACP document   Confirm Advance Directive Yes, on file   Does the patient have other document types -         Is there anything that we should know about you as a person in order to provide you the best care possible? Have you been to the ER, urgent care clinic since your last visit? [] Yes   [x] No   [] Unable to obtain    Have you been hospitalized since your last visit? [] Yes   [x] No   [] Unable to obtain    Have you seen or consulted any other health care providers outside of the [address] [place] [address] since your last visit? [] Yes   [x] No   [] Unable to obtain    Functional status (describe):         Last BM: 3/9/2019      accessed (date): 3/11/2019     Bottle review (for opioid pain medication): Patient did not bring any medications to today's visit.

## 2019-03-11 NOTE — PROGRESS NOTES
Pt is a 62 yr old here for routine follow up for Met NSCLC. He is starting cycle one of Carbo/Alimta/Keytruda today. Pt reports not eating well, \"just doesn't feel like it\" . Saw Dr Belkis Cortez today and will be started on new meds. to help his apetite. Pt reports pain 10/10, sitting comfortably in chair talking with wife and sister, reviewed how pain scale works, Pt reports pain 3/10 now, but can get as bad as 10/10. Taking pain meds as prescribed by Parminder. Visit Vitals  BP 92/66   Pulse 78   Temp 98.1 °F (36.7 °C)   Resp 18   Ht 5' 10\" (1.778 m)   Wt 143 lb (64.9 kg)   SpO2 97%   BMI 20.52 kg/m²       Pain Scale: 4/10  Pain Location: shoulder    1. Have you been to the ER, urgent care clinic since your last visit? Hospitalized since your last visit? no    2. Have you seen or consulted any other health care providers outside of the [address] [place] [address] since your last visit? Include any pap smears or colon screening.   No    Health Maintenance reviewed

## 2019-03-11 NOTE — PROGRESS NOTES
Palliative Medicine Outpatient Services  [facility]: [phone] ([phone])    Patient Name: Kari Do  YOB: 1960    Date of Current Visit: 03/11/19  Location of Current Visit:    [] [facility] [place] Office  [] [facility] Office  [x] [facility] Office  [] Home  [] Other:      Date of Initial Visit: 1/28/19  Requesting Physician: Dr. Anirudh Barnes  Primary Care Physician: Alice Miramontes MD      SUMMARY:   Kari Do is a 62y.o. 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 Anirudh Barnes for management of symptoms and support. The patients social history includes worked in construction, has 30 plus year smoking history but quit in 2008, lives with wife who is stage 2 breast cancer survivor for over 8 yeas. Wife Vernell Kang has 2 children and one with Po Su. Twin grand children live with them, Po Su has 3 children. Status post brain tumor removal at [facility], SBR T to the brain tumor, about to start chemotherapy. PALLIATIVE DIAGNOSES:       ICD-10-CM ICD-9-CM    1. Anorexia R63.0 783.0 megestrol (MEGACE) 40 mg tablet   2. Acute pain of right shoulder M25.511 719.41 oxyCODONE IR (ROXICODONE) 20 mg immediate release tablet      oxyCODONE IR (ROXICODONE) 20 mg immediate release tablet   3. Lung cancer metastatic to brain (HCC) C34.90 162.9 oxyCODONE IR (ROXICODONE) 20 mg immediate release tablet    C79.31 198.3 oxyCODONE IR (ROXICODONE) 20 mg immediate release tablet   4. Drug-induced constipation K59.03 564.09      E980.5           PLAN:   Patient Instructions   Dear Kari Do ,    It was a pleasure seeing you today at [facility] office      This is the plan we talked about:    1. Right sided chest pain radiating to the shoulder  - You are doing really well with Oxycodone 20 mg every 4 hours. - You have not been taking any Oxycontin because you felt sleepy with it once.   - We agreed that you will re start it to help with pain. 2. Constipation  - I am glad you are taking stool softners regularly but still very constipated. - Please take Miralax daily and I am starting Lactulose 30 ml every 4 hours until you have a bowel movement  - Please continue this bowel regimen religiously. 3. Cognitive impairment  - You seem very angry, very poor relationship with family. - We talked about how this is all part of the disease and the impairment that comes with radiation.  - But we do need to get you eating better so your strength will improve. - Dexamethasone 2 mg three times a day- this will help with energy and appetite. 4. Appetite  - I am also starting you on Megace- appetite stimulant- we will try and get this through the foundation. 5. ACP  - We completed an advance medical directive and medical power of . - Financial poa is all taken care of as well. - Your wife is working with cancer BREONNA and Devan Yusuf for [place] [employer] and Medicaid. 6. Goals of care  - We discussed your wishes regarding cpr/ intubation and you want to remain FULL code for now and think about it more. - You are willing to take more responsibility for your own care and attempt to eat better. - You will be getting chemo today.       This is what you have shared with us about Advance Care Planning:    Primary Decision Maker ([address]): Tana White  Relationship to patient:Wife  Phone number:[phone]  [x] Named in a scanned document   [x] Legal Next of Kin  [] Guardian    Secondary Decision Maker ([address]):  Florian Worthy  Relationship to patient: Father  Phone number: [phone]  [x] Named in a scanned document   [] Legal Next of Kin  [] Guardian    ACP documents you current have include:  [x] Advance Directive or Living Will  [] Durable Do Not Resuscitate  [] Physician Orders for Scope of Treatment (POST)  [] Medical Power of   [] Other      The Palliative Medicine Team is here to support you and your family. We will see you again in 3 weeks  Sincerely,      Edgar Oh MD and the Palliative Medicine Team      Counseling and Coordination:   See above  Opioid safety counseling      GOALS OF CARE / TREATMENT PREFERENCES:   [====Goals of Care====]  GOALS OF CARE:  Patient / health care proxy stated goals: FULL      TREATMENT PREFERENCES:   Code Status:  [x] Attempt Resuscitation       [] Do Not Attempt Resuscitation    Advance Care Planning:  [x] The [facility] Interdisciplinary Team has updated the ACP Navigator with [address] and Patient Capacity        The palliative care team has discussed with patient / health care proxy about goals of care / treatment preferences for patient.  [====Goals of Care====]         PRESCRIPTIONS GIVEN:     Medications Ordered Today   Medications    dexamethasone (DECADRON) 2 mg tablet     Sig: Take 1 Tab by mouth three (3) times daily. Dispense:  90 Tab     Refill:  0    oxyCODONE IR (ROXICODONE) 20 mg immediate release tablet     Sig: Take 1 Tab by mouth every four (4) hours as needed for Pain for up to 15 days. Max Daily Amount: 120 mg. Dispense:  90 Tab     Refill:  0    oxyCODONE IR (ROXICODONE) 20 mg immediate release tablet     Sig: Take 1 Tab by mouth every four (4) hours as needed for Pain for up to 15 days. Max Daily Amount: 120 mg. Dispense:  90 Tab     Refill:  0    megestrol (MEGACE) 40 mg tablet     Sig: Take 1 Tab by mouth two (2) times a day. Dispense:  60 Tab     Refill:  0    lactulose (CHRONULAC) 10 gram/15 mL solution     Sig: Take 30 mL by mouth three (3) times daily.      Dispense:  480 mL     Refill:  1           FOLLOW UP:     Future Appointments   Date Time Provider Marlo Cummins   3/25/2019 10:15 AM Deric Joseph NP [facility] ESTELA SCHED   4/1/2019 11:00 AM [place] FT CHAIR 1 [facility]   4/3/2019  2:30 PM Edgar Oh MD [facility] EST[facility] SCHED   4/22/2019 11:00 AM [place] FT CHAIR 1 [facility] REG 5/13/2019 11:00 AM [facility] CHAIR 1 [facility]           PHYSICIANS INVOLVED IN CARE:   Patient Care Team:  Joe Molina MD as PCP - General (Family Practice)  An Ellsworth MD (Pulmonary Disease)  Koffi Stockton MD (Hematology and Oncology)  Pratibha Gaffney MD (Neurosurgery)  Aretha Franco MD as Surgeon (Thoracic (non-cardiac) Surgery)       HISTORY:   Nursing documentation from date of visit reviewed. Reviewed patient-completed ESAS and advance care planning form. Reviewed patient record in prescription monitoring program.    CHIEF COMPLAINT: right sided chest pain    HPI/SUBJECTIVE:    The patient is: [] Verbal / [] Nonverbal     Patient here with his s sister-in-law Rosalba and wife Rosa Diaz. Per family patient has been almost bed/chair bound over the last 3 weeks, very poor attitude towards everybody, very short tempered, eating almost nothing every day except for 2 bottles of Ensure. They admit that they never called us to report the symptoms. He continues to have pain and takes oxycodone every 4 hours but felt OxyContin made him very sleepy and therefore did not take any OxyContin. He was found to have very low blood pressure last week and Dr. Serrato Donate office and therefore his antihypertensives have been discontinued. Patient admits to being constipated and not drinking enough water. We spent over 60 minutes counseling regarding his symptoms, how his change in attitude can be part of cognitive impairment that comes with the brain radiation. Patient feels like that is more the case because he is not in control of how he reacts. He also does not want his wife to constantly tell him what to do. There his significant amount of stress in the family because wife is also going to start radiation to her ribs for question cancer but not sure because they could not biopsy it.   We spent much time talking about what his wishes are and if he continues to    Not eat and be like this then he will not be a candidate for chemotherapy. Patient says that from now on he will  try and listen to what his family says about eating and understand that his behavior/anger is because of radiation side effect and not necessarily him.    ----------    Patient here today along with his wife Alis Mchugh and Dilcia's sister from [facility]. Magalis Blank reports severe right-sided chest wall pain that radiates to his shoulder. He has been on oxycodone 5 mg which was increased from every 4 hours to every 3 hours. He reports that this does not work in any way and he is in excruciating pain throughout the day. He does not have past history of opioids. He quit smoking and drinking in 2008. He is constipated. Taking prune juice. On dexamethasone 4 mg 2 times a day for brain metastases    Significant amount of distress in the family with expected grief. He was diagnosed with cancer on New Year's Shari and it has been overwhelming since then. We talked about prognosis at length and what to expect with brain surgery, radiation and chemotherapy. Advised and encouraged them to take it one day at a time instead of being overwhelmed. Encourage communication within the family and involve adult children and helping out with appointments support at home and care for the twin 6year-old grandchildren.     Clinical Pain Assessment (nonverbal scale for nonverbal patients):   [++++ Clinical Pain Assessment++++]  [++++Pain Severity++++]: Pain: 10  [++++Pain Character++++]: digging  [++++Pain Duration++++]: weeks   [++++Pain Effect++++]: functional and emotional  [++++Pain Factors++++]: none in particular  [++++Pain Frequency++++]: constant  [++++Pain Location++++]: right-sided chest wall and shoulder  [++++ Clinical Pain Assessment++++]       FUNCTIONAL ASSESSMENT:     Palliative Performance Scale (PPS):  PPS: 70       PSYCHOSOCIAL/SPIRITUAL SCREENING:     Any spiritual / Religion concerns:  [] Yes /  [x] No    Caregiver Burnout:  [] Yes /  [x] No /  [] No Caregiver Present      Anticipatory grief assessment:   [x] Normal  / [] Maladaptive       ESAS Anxiety: Anxiety: 5    ESAS Depression: Depression: 1       REVIEW OF SYSTEMS:     The following systems were [] reviewed / [] unable to be reviewed  Systems: constitutional, ears/nose/mouth/throat, respiratory, gastrointestinal, genitourinary, musculoskeletal, integumentary, neurologic, psychiatric, endocrine. Positive findings noted below. Modified ESAS Completed by: provider   Fatigue: 9 Drowsiness: 9   Depression: 1 Pain: 10   Anxiety: 5 Nausea: 5   Anorexia: 9 Dyspnea: 0   Best Well-Bein Constipation: Yes   Other Problem (Comment): 0          PHYSICAL EXAM:     Wt Readings from Last 3 Encounters:   19 143 lb (64.9 kg)   19 143 lb (64.9 kg)   19 142 lb 12.8 oz (64.8 kg)     Blood pressure 103/73, pulse (!) 112, temperature 98.6 °F (37 °C), temperature source Oral, resp. rate 18, height 5' 10\" (1.778 m), weight 142 lb 12.8 oz (64.8 kg), SpO2 97 %.   Last bowel movement: See Nursing Note    Constitutional: Alert, oriented  Eyes: pupils equal, anicteric  ENMT: no nasal discharge, moist mucous membranes  Cardiovascular: regular rhythm, distal pulses intact  Respiratory: breathing not labored, symmetric with poor air entry on the right side  Gastrointestinal: soft non-tender, +bowel sounds  Musculoskeletal: no deformity, no tenderness to palpation, obvious muscle wasting in the chest area  Skin: warm, dry  Neurologic: following commands, moving all extremities  Psychiatric: full affect, no hallucinations  Other:       HISTORY:     Past Medical History:   Diagnosis Date    Cancer ([facility] [address]     LUNG RT. SIDE METS TO BRAIN, LIVER    Chronic obstructive pulmonary disease (HCC)     MILD PER WIFE    Diabetes ([facility] [address]     TYPE II    GERD (gastroesophageal reflux disease)     Hypertension       Past Surgical History:   Procedure Laterality Date    HX CERVICAL FUSION      HX CRANIOTOMY 01/29/2019    REMOVAL OF BRAIN METS AT [facility]    HX GI      COLONOSCOPY    HX HERNIA REPAIR Right 1972    INGUINAL    VASCULAR SURGERY PROCEDURE UNLIST Bilateral     LEGS      Family History   Problem Relation Age of Onset    Stroke Father     Diabetes Father     Other Mother 58        SUDDEN DEATH    Cancer Maternal Grandfather     Anesth Problems Neg Hx       History reviewed, no pertinent family history. Social History     Tobacco Use    Smoking status: Former Smoker     Packs/day: 1.50     Years: 25.00     Pack years: 37.50     Last attempt to quit: 1/21/2009     Years since quitting: 10.1    Smokeless tobacco: Never Used   Substance Use Topics    Alcohol use: No     Frequency: Never     No Known Allergies   Current Outpatient Medications   Medication Sig    dexamethasone (DECADRON) 2 mg tablet Take 1 Tab by mouth three (3) times daily.  oxyCODONE IR (ROXICODONE) 20 mg immediate release tablet Take 1 Tab by mouth every four (4) hours as needed for Pain for up to 15 days. Max Daily Amount: 120 mg.    [START ON 3/26/2019] oxyCODONE IR (ROXICODONE) 20 mg immediate release tablet Take 1 Tab by mouth every four (4) hours as needed for Pain for up to 15 days. Max Daily Amount: 120 mg.    megestrol (MEGACE) 40 mg tablet Take 1 Tab by mouth two (2) times a day.  lactulose (CHRONULAC) 10 gram/15 mL solution Take 30 mL by mouth three (3) times daily.  pantoprazole (PROTONIX) 40 mg tablet Take 1 Tab by mouth daily.  oxyCODONE ER (OXYCONTIN) 20 mg ER tablet Take 1 Tab by mouth every twelve (12) hours for 30 days. Max Daily Amount: 40 mg.    lidocaine-prilocaine (EMLA) topical cream Apply  to affected area as needed for Pain.  ondansetron (ZOFRAN ODT) 4 mg disintegrating tablet Take 1 Tab by mouth every eight (8) hours as needed for Nausea.  senna (SENNA) 8.6 mg tablet Take 2 Tabs by mouth nightly.  docusate sodium (COL-RITE) 100 mg capsule Take 100 mg by mouth two (2) times a day.     butalbital-acetaminophen-caff (FIORICET) -40 mg per capsule Take 2 Caps by mouth every four (4) hours as needed for Pain or Headache.  insulin glargine (LANTUS) 100 unit/mL injection 14 units qhs--increase Lantus 2 units q day until fasting 150    insulin lispro (HUMALOG U-100 INSULIN) 100 unit/mL injection INJECT 5 UNITS SUBCUTANEOUSLY BEFORE MEALS-INCREASE HUMALOG 1 UNIT EVERY OTHER DAY UNTIL 2 HR PP  OR LESS    fenofibrate (LOFIBRA) 160 mg tablet take 1 tablet by mouth once daily    ascorbic acid, vitamin C, (VITAMIN C) 250 mg tablet Take 250 mg by mouth daily.  TURMERIC PO Take 500 mg by mouth daily.  flaxseed oil 1,000 mg cap Take 1 Cap by mouth daily.  acetaminophen (TYLENOL) 325 mg tablet Take 325 mg by mouth every four (4) hours as needed for Pain.  ibuprofen 200 mg cap Take 1-2 Tabs by mouth as needed.  simvastatin (ZOCOR) 40 mg tablet Take 40 mg by mouth daily. No current facility-administered medications for this visit.       Facility-Administered Medications Ordered in Other Visits   Medication Dose Route Frequency    0.9% sodium chloride infusion  25 mL/hr IntraVENous CONTINUOUS    PEMEtrexed (ALIMTA) 945 mg in 0.9% sodium chloride 100 mL chemo infusion  500 mg/m2 (Treatment Plan Recorded) IntraVENous ONCE    CARBOplatin (PARAPLATIN) 350 mg in 0.9% sodium chloride 250 mL, overfill volume 25 mL chemo infusion  350 mg IntraVENous ONCE    saline peripheral flush soln 10 mL  10 mL InterCATHeter PRN    sodium chloride 0.9% injection 10 mL  10 mL IntraVENous PRN    heparin (porcine) pf 300-500 Units  300-500 Units InterCATHeter PRN          LAB DATA REVIEWED:     Lab Results   Component Value Date/Time    WBC 7.1 03/11/2019 11:28 AM    HGB 11.7 (L) 03/11/2019 11:28 AM    PLATELET 322 37/27/2356 11:28 AM     Lab Results   Component Value Date/Time    Sodium 131 (L) 03/11/2019 11:28 AM    Potassium 3.9 03/11/2019 11:28 AM    Chloride 99 03/11/2019 11:28 AM    CO2 26 03/11/2019 11:28 AM    BUN 21 (H) 03/11/2019 11:28 AM    Creatinine 1.04 03/11/2019 11:28 AM    Calcium 9.5 03/11/2019 11:28 AM    Magnesium 2.2 12/31/2018 01:13 PM    Phosphorus 2.6 03/31/2010 06:43 AM      Lab Results   Component Value Date/Time    AST (SGOT) 39 (H) 03/11/2019 11:28 AM    Alk. phosphatase 114 03/11/2019 11:28 AM    Protein, total 7.7 03/11/2019 11:28 AM    Albumin 3.0 (L) 03/11/2019 11:28 AM    Globulin 4.7 (H) 03/11/2019 11:28 AM     Lab Results   Component Value Date/Time    INR >9.2 (HH) 05/28/2010 04:43 PM    Prothrombin time >75.0 (H) 05/28/2010 04:43 PM    aPTT 144.2 (H) 05/28/2010 04:43 PM      No results found for: IRON, FE, TIBC, IBCT, PSAT, FERR   Impression:     1. Large right upper lobe mass measuring 5.6 x 4.7 x 4.8 cm, which is in  continuity with extensive conglomerate right paratracheal and right hilar  adenopathy. Findings are consistent with primary lung malignancy with extensive  metastatic adenopathy. The right upper lobe mass occludes the segmental and  subsegmental bronchi of the posterior segment of the right upper lobe. 2. Metastatic subcarinal adenopathy. Enlarged left lower paratracheal  adenopathy, which is also favored to be metastatic. 3. A 6 mm satellite nodule in the right upper lobe, consistent with metastasis. 4. Indeterminate hypodensities within hepatic segment IVb, which may represent  metastatic disease.   5. No evidence of pulmonary embolism.     I personally reviewed the images.     MRI brain: 5 lesions: right occipital, right lateral ventricular arm, right temporal, right anterior temporal and left frontal     PET CT: large RUL mass with extensive mediastinal adenopathy, metastatic disease to the liver, brain            CONTROLLED SUBSTANCES SAFETY ASSESSMENT (IF ON CONTROLLED SUBSTANCES):     Reviewed opioid safety handout:  [x] Yes   [] No  24 hour opioid dose >150mg morphine equivalent/day:  [] Yes   [x] No  Benzodiazepines:  [] Yes   [x] No  Sleep apnea:  [] Yes   [x] No  Urine Toxicology Testing within last 6 months:  [] Yes   [x] No  History of or new aberrant medication taking behaviors:  [] Yes   [x] No  Has Narcan been prescribed [] Yes   [x] No          Total time: 90m  Counseling / coordination time: 70m  > 50% counseling / coordination?: y

## 2019-03-12 NOTE — TELEPHONE ENCOUNTER
Ms. Shaheed Mathis is calling to speak to nurse regarding questions they   forgot to ask yesterday. Advised nurse would call them back.

## 2019-03-12 NOTE — TELEPHONE ENCOUNTER
Spoke with Ms. Nick Payne about the steroids that Brock Shannon has ordered for Mr. Nick Payne. She thought that PM nurse told them that the medication can be bought over the counter. PM nurse explained that the steroid, dexamethasone 2 mg three times a day, has been called into the the [facility] in [facility]. PM nurse also told her that when they go to  the medication to be sure and use the Good Rx Discount card. She verbalized understanding of this. She was confused about where they could get the medication. Also PM nurse was able to get foundation assistance for Mr. Nick Payne through [facility] for Oxycodone, Megace, and Lactulose. They picked the medication up today.       Gin Tian RN   Palliative Medicine

## 2019-03-25 NOTE — PROGRESS NOTES
[facility]  [address], [address] [place] [address] Shay [place], [address] [facility]  [phone]      Follow-up Note        Patient: Milan Tripathi MRN: [id number]  SSN: [id number]    YOB: 1960  Age: 62 y.o. Sex: male        Diagnosis:     1. T3 N3 M1a (Stage IV) NSCLC of the lung, likely adenocarcinoma    NGS no actionable mutation   PD-L1 90%    Treatment:     1. Right occipital craniotomy at [facility] on 1/29/2019.   2. SBRT to the brain lesions   3. Palliative chemotherapy   Carbo/Alimta/Keytruda - Cycle 2 Day 1    Subjective:      Milan Tripathi is a 62 y.o. male with a new diagnosis of metastatic lung carcinoma. He has a over 30 pack years of cigarette smoking. He works in the construction business. He started experiencing pain in the right upper chest for last 2 months. Its works with movement. It radiates to the back. He is also experiencing worsening cough and sputum production. He has lost about 10 lbs. A bronchoscopy was non-diagnostic. A CT guided Bx has scant cells s/o a diagnosis of malignancy. MRI brain revealed 5 focus of metastatic disease and he underwent a right occipital craniotomy at [facility] on 1/29/2019. He completed SBRT to the brain lesions and tumor bed at [facility] last week. He is following with Palliative Medicine. He is receiving palliative treatment. He is eating better and gaining weight. He notes some diarrhea after the first treatment that has improved. Review of Systems:    Constitutional: fatigue  Eyes: negative  Ears, Nose, Mouth, Throat, and Face: negative  Respiratory: cough  Cardiovascular: negative  Gastrointestinal: diarrhea  Genitourinary:negative  Integument/Breast: negative  Hematologic/Lymphatic: negative  Musculoskeletal: right upper chest pain  Neurological: negative      History reviewed. No pertinent history of prior cancer  History reviewed. No pertinent surgical history.    History reviewed. No pertinent family history of cancer    Social History     Tobacco Use    Smoking status: Former Smoker     Packs/day: 1.50     Years: 25.00     Pack years: 37.50     Last attempt to quit: 1/21/2009     Years since quitting: 10.1    Smokeless tobacco: Never Used   Substance Use Topics    Alcohol use: No     Frequency: Never      Prior to Admission medications    Medication Sig Start Date End Date Taking? Authorizing Provider   dexamethasone (DECADRON) 2 mg tablet Take 1 Tab by mouth three (3) times daily. 3/11/19  Yes Melva Borrero MD   oxyCODONE IR (ROXICODONE) 20 mg immediate release tablet Take 1 Tab by mouth every four (4) hours as needed for Pain for up to 15 days. Max Daily Amount: 120 mg. 3/11/19 3/26/19 Yes Melva Borrero MD   oxyCODONE IR (ROXICODONE) 20 mg immediate release tablet Take 1 Tab by mouth every four (4) hours as needed for Pain for up to 15 days. Max Daily Amount: 120 mg. 3/26/19 4/10/19 Yes Melva Borrero MD   megestrol (MEGACE) 40 mg tablet Take 1 Tab by mouth two (2) times a day. 3/11/19  Yes Melva Borrero MD   lactulose (CHRONULAC) 10 gram/15 mL solution Take 30 mL by mouth three (3) times daily. 3/11/19  Yes Melva Borrero MD   pantoprazole (PROTONIX) 40 mg tablet Take 1 Tab by mouth daily. 3/5/19  Yes Arpan Wilburn MD   lidocaine-prilocaine (EMLA) topical cream Apply  to affected area as needed for Pain. 2/13/19  Yes Arpan Wilburn MD   ondansetron (ZOFRAN ODT) 4 mg disintegrating tablet Take 1 Tab by mouth every eight (8) hours as needed for Nausea. 2/13/19  Yes Arpan Wilburn MD   senna (SENNA) 8.6 mg tablet Take 2 Tabs by mouth nightly. Yes Provider, Historical   docusate sodium (COL-RITE) 100 mg capsule Take 100 mg by mouth two (2) times a day. Yes Provider, Historical   butalbital-acetaminophen-caff (FIORICET) -40 mg per capsule Take 2 Caps by mouth every four (4) hours as needed for Pain or Headache.    Yes Provider, Historical   insulin glargine (LANTUS) 100 unit/mL injection 14 units qhs--increase Lantus 2 units q day until fasting 150 7/3/18  Yes Provider, Historical   insulin lispro (HUMALOG U-100 INSULIN) 100 unit/mL injection INJECT 5 UNITS SUBCUTANEOUSLY BEFORE MEALS-INCREASE HUMALOG 1 UNIT EVERY OTHER DAY UNTIL 2 HR PP  OR LESS 4/24/18  Yes Provider, Historical   fenofibrate (LOFIBRA) 160 mg tablet take 1 tablet by mouth once daily 12/10/18  Yes Provider, Historical   ascorbic acid, vitamin C, (VITAMIN C) 250 mg tablet Take 250 mg by mouth daily. Yes Provider, Historical   TURMERIC PO Take 500 mg by mouth daily. Yes Provider, Historical   flaxseed oil 1,000 mg cap Take 1 Cap by mouth daily. Yes Provider, Historical   acetaminophen (TYLENOL) 325 mg tablet Take 325 mg by mouth every four (4) hours as needed for Pain. Yes Provider, Historical   ibuprofen 200 mg cap Take 1-2 Tabs by mouth as needed. Yes Provider, Historical   simvastatin (ZOCOR) 40 mg tablet Take 40 mg by mouth daily. Provider, Historical          No Known Allergies        Objective:     Visit Vitals  /84 (BP 1 Location: Left arm, BP Patient Position: Sitting)   Pulse 93   Resp 16   Ht 5' 10\" (1.778 m)   Wt 149 lb 12.8 oz (67.9 kg)   SpO2 98%   BMI 21.49 kg/m²       Pain Scale: 4/10  Pain Location:       Physical Exam:    GENERAL: alert, cooperative, no distress, appears stated age  EYE: conjunctivae/corneas clear. PERRL, EOM's intact. Fundi benign  LYMPHATIC: Cervical, supraclavicular, and axillary nodes normal.   THROAT & NECK: normal and no erythema or exudates noted. LUNG: clear to auscultation bilaterally  HEART: regular rate and rhythm, S1, S2 normal, no murmur, click, rub or gallop  ABDOMEN: soft, non-tender. Bowel sounds normal. No masses,  no organomegaly  EXTREMITIES:  extremities normal, atraumatic, no cyanosis or edema  SKIN: Normal.  NEUROLOGIC: AOx3. Gait normal. Reflexes and motor strength normal and symmetric.  Cranial nerves 2-12 and sensation grossly intact. CT Results (most recent):  Results from Hospital Encounter encounter on 01/14/19   CT BX LUNG NDL PERC    Narrative Clinical indication: Right lung mass. With CT guidance, posterior approach, after adequate skin preparation local  anesthesia, 20-gauge core biopsy needle was placed into a right upper lobe mass. Multiple cores were obtained, pathologist's assistance. Intravenous sedation was  performed with 2 mg of Versed 100 MCG of fentanyl. Pulse oximetry and nursing  assistance for monitoring. CT dose reduction was achieved through the use of a  standardized protocol tailored for this examination and automatic exposure  control for dose modulation . Reg Mathis Impression IMPRESSION: CT-guided right lung mass biopsy. MRI brain: 5 lesions: right occipital, right lateral ventricular arm, right temporal, right anterior temporal and left frontal    PET CT: large RUL mass with extensive mediastinal adenopathy, metastatic disease to the liver, brain    Lab Results   Component Value Date/Time    WBC 7.1 03/11/2019 11:28 AM    HGB 11.7 (L) 03/11/2019 11:28 AM    HCT 35.1 (L) 03/11/2019 11:28 AM    PLATELET 013 80/97/7840 11:28 AM    MCV 86.2 03/11/2019 11:28 AM       Lab Results   Component Value Date/Time    Sodium 131 (L) 03/11/2019 11:28 AM    Potassium 3.9 03/11/2019 11:28 AM    Chloride 99 03/11/2019 11:28 AM    CO2 26 03/11/2019 11:28 AM    Anion gap 6 03/11/2019 11:28 AM    Glucose 135 (H) 03/11/2019 11:28 AM    BUN 21 (H) 03/11/2019 11:28 AM    Creatinine 1.04 03/11/2019 11:28 AM    BUN/Creatinine ratio 20 03/11/2019 11:28 AM    GFR est AA >60 03/11/2019 11:28 AM    GFR est non-AA >60 03/11/2019 11:28 AM    Calcium 9.5 03/11/2019 11:28 AM    Bilirubin, total 0.5 03/11/2019 11:28 AM    AST (SGOT) 39 (H) 03/11/2019 11:28 AM    Alk.  phosphatase 114 03/11/2019 11:28 AM    Protein, total 7.7 03/11/2019 11:28 AM    Albumin 3.0 (L) 03/11/2019 11:28 AM    Globulin 4.7 (H) 03/11/2019 11:28 AM    A-G Ratio 0.6 (L) 03/11/2019 11:28 AM    ALT (SGPT) 41 03/11/2019 11:28 AM           Assessment:     1. T3 N3 M1a (Stage IV) NSCLC of the lung, likely adenoca     NGS - no targetable mutation  PD-L1 90%    > Unresectable disease     ECOG PS 1  Intent of Treatment - palliative  Prognosis poor    S/p right occipital craniotomy at [facility] on 1/29/2019    Completed SBRT to brain lesions and tumor bed by Dr. Belia Pagan at [facility] on 2/27/2019    Receiving palliative chemotherapy   Carbo/Alimta/Pembrolizumab - Cycle 2 Day 1    Tolerating treatment   A detailed system by system evaluation of side effect was performed to assess chemotherapy related toxicity. Blood counts are acceptable. Results reviewed with the patient. Symptom management form reviewed with patient. 2. Severe protein calorie malnutrition    > Dietician consult  > protein supplementation        Plan:       > Continue Carbo/Alimta/Pembrolizumab  > Continue to follow with Palliative Medicine  > Follow-up in 3 weeks      Signed by: Anoop Mace MD                     March 25, 2019          CC. Cem Scott MD  CC. Bruno Poon MD  CC. Aleksandra Frias MD  CC. Marques López MD  CC.  Jean Paul Betts MD

## 2019-03-25 NOTE — PROGRESS NOTES
[address]  Social Work Navigator Encounter     Patient Name:  Bernie Stuart    Medical History: dx metastatic lung ca - brain mets    Advance Directives:    Narrative: PT doing well today, been chopping wood. SW inquired about status of Medicaid appl. -  SW called [facility] [phone] and spoke with Ambika Harris. Per  supervisor - Dilcia Mercedes/wife called today and got stuff straight. Pt  is Ms Rosendo Alonzo. Pat asked this SW to call back on April 2 for status update.      Barriers to Care:     Plan:

## 2019-03-25 NOTE — PROGRESS NOTES
Jessica Flores a 62 y. o. male here today for metastatic lung carcinoma f/u. S/P SBRT to brain lesions. Patient receiving Carbo/Alimta/Keytruda ; cycle 2; day 1 on 4/1/19. Completed radiation a month ago. VS stable. Patient c/o pain to right shoulder and diarrhea; no relief with Imodium per pt. Good appetite. Patient denies N/V/ and constipation. Patient denies numbness and tingling. Patient denies mouth ulcers. Patient denies cough. Patient denies SOB. Patient 's worried about getting his medication d/t  PCP refusing to see him d/t no insurance; SW notified; SW seeing pt. Visit Vitals  /84 (BP 1 Location: Left arm, BP Patient Position: Sitting)   Pulse 93   Resp 16   Ht 5' 10\" (1.778 m)   Wt 149 lb 12.8 oz (67.9 kg)   SpO2 98%   BMI 21.49 kg/m²       Pain Scale: 10 - Worst pain ever/10  Pain Location: (c/o pain in right shoulder)     1. Have you been to the ER, urgent care clinic since your last visit? Hospitalized since your last visit? No    2. Have you seen or consulted any other health care providers outside of the [address] [place] [address] since your last visit? Include any pap smears or colon screening. No   Health Maintenance Review: Patient reminded of \"due or due soon\" health maintenance. I have asked the patient to contact his/her primary care provider (PCP) for follow-up on his/her health maintenance.

## 2019-04-01 NOTE — PROGRESS NOTES
Pt arrived to [facility] ambulatory in no acute distress at 1135 for Keytruda/Alimta/Carbo C2.  Assessment unremarkable no complaints or concerns voiced. Left chest port accessed without issue and positive blood return noted.      Patient Vitals for the past 12 hrs:   Temp Pulse Resp BP   04/01/19 1646 -- 79 16 150/83   04/01/19 1136 98.9 °F (37.2 °C) 90 16 154/84         Labs obtained,   Recent Results (from the past 12 hour(s))   CBC WITH AUTOMATED DIFF    Collection Time: 04/01/19 11:44 AM   Result Value Ref Range    WBC 9.1 4.1 - 11.1 K/uL    RBC 3.49 (L) 4.10 - 5.70 M/uL    HGB 10.6 (L) 12.1 - 17.0 g/dL    HCT 32.9 (L) 36.6 - 50.3 %    MCV 94.3 80.0 - 99.0 FL    MCH 30.4 26.0 - 34.0 PG    MCHC 32.2 30.0 - 36.5 g/dL    RDW 19.3 (H) 11.5 - 14.5 %    PLATELET 403 697 - 935 K/uL    MPV 8.6 (L) 8.9 - 12.9 FL    NRBC 0.3 (H) 0  WBC    ABSOLUTE NRBC 0.03 (H) 0.00 - 0.01 K/uL    NEUTROPHILS 65 32 - 75 %    LYMPHOCYTES 15 12 - 49 %    MONOCYTES 14 (H) 5 - 13 %    EOSINOPHILS 0 0 - 7 %    BASOPHILS 0 0 - 1 %    IMMATURE GRANULOCYTES 6 (H) 0.0 - 0.5 %    ABS. NEUTROPHILS 5.9 1.8 - 8.0 K/UL    ABS. LYMPHOCYTES 1.4 0.8 - 3.5 K/UL    ABS. MONOCYTES 1.3 (H) 0.0 - 1.0 K/UL    ABS. EOSINOPHILS 0.0 0.0 - 0.4 K/UL    ABS. BASOPHILS 0.0 0.0 - 0.1 K/UL    ABS. IMM.  GRANS. 0.5 (H) 0.00 - 0.04 K/UL    DF SMEAR SCANNED      RBC COMMENTS NORMOCYTIC, NORMOCHROMIC     METABOLIC PANEL, COMPREHENSIVE    Collection Time: 04/01/19 11:44 AM   Result Value Ref Range    Sodium 135 (L) 136 - 145 mmol/L    Potassium 3.7 3.5 - 5.1 mmol/L    Chloride 104 97 - 108 mmol/L    CO2 23 21 - 32 mmol/L    Anion gap 8 5 - 15 mmol/L    Glucose 142 (H) 65 - 100 mg/dL    BUN 20 6 - 20 MG/DL    Creatinine 0.68 (L) 0.70 - 1.30 MG/DL    BUN/Creatinine ratio 29 (H) 12 - 20      GFR est AA >60 >60 ml/min/1.73m2    GFR est non-AA >60 >60 ml/min/1.73m2    Calcium 8.6 8.5 - 10.1 MG/DL    Bilirubin, total 0.3 0.2 - 1.0 MG/DL    ALT (SGPT) 33 12 - 78 U/L AST (SGOT) 15 15 - 37 U/L    Alk. phosphatase 68 45 - 117 U/L    Protein, total 6.9 6.4 - 8.2 g/dL    Albumin 3.2 (L) 3.5 - 5.0 g/dL    Globulin 3.7 2.0 - 4.0 g/dL    A-G Ratio 0.9 (L) 1.1 - 2.2     . The following medications administered:  NS KVO  Aloxi IV  Decadron IV  Emend IV  Keytruda IV  Alimta IV  Carboplatin IV (Dose could be increased ,but only slightly increased dose increments r/t renal function per YANCY HERNANDEZ and Dr. Martha Trevino)    NS Flush  Heparin Flush    Pt tolerated treatment well. No adverse reactions noted. IV flushed per policy and removed, 2x2 and paper tape placed.  Pt discharged ambulatory in no acute distress at 1650, accompanied by Self.   Next appointment 4/22/19 @ 11 am.

## 2019-04-01 NOTE — DISCHARGE INSTRUCTIONS
OUTPATIENT INFUSION CENTER    DISCHARGE INSTRUCTIONS FOR:  CHEMOTHERAPY / BIOTHERAPY    Chemotherapy has the potential to cause many side effects. The following are general precautions that chemo patients should take:    1. Practice good hand washing:   * Use soap and water for at least 15 seconds, covering all areas of hands. * Always wash hands before eating. * Wash hands after contact with public surfaces such as door knobs and         handles, shopping carts, telephones and elevator buttons. 2. Get plenty of rest:    * You will likely experience fatigue three to five days following your treatment. It may last as long as seven days. 3. Drink plenty of fluids. Water is best.    4. Eat a well balanced diet:  * Small frequent meals may help if you are having trouble with nausea or your  appetite. Some people also do well with nutritional supplements. 5. Pace yourself with daily activities:   * Take frequent breaks and ask for help if you need it. 6. Exercise is very important:  * It will increase circulation and will help the fatigue. Do what you can each day. 7. If your regimen results in hair loss:  *  You will likely notice effects between two and three weeks following your first treatment. Some lose all hair while others only experience thinning. 8. Practice good oral hygiene:   *  Notify your M.D. immediately if any mouth sores or discomfort develop. 9. Protect yourself from the sun. Signs/Symptoms of an allergic reaction and/or some side effects may require immediate medical attention. Notify your physician if you develop one or more of the following:     Temperature of 100.5 degrees or greater;   Skin redness, itching, swelling, blistering, weeping, crusting, rash, or hives;    Wheezing, chest tightness, cough, or shortness of breath;   Swelling of the face, eyelids, lips, tongue, or throat;  Severe, persistent headache;  Stuffy nose, runny nose, sneezing;   Red (bloodshot), itchy, swollen, or watery eyes;   Stomach  pain, nausea, vomiting, diarrhea, or bloody stools;  Mouth sores        Your physician should also be aware of the following symptoms:    Persistent and unresolved nausea and/or vomiting;   Persistent and unresolved diarrhea or constipation;   Numbness/tingling/burning of the extremities, including the fingers and toes; Bleeding or unexplained bruising; Unexplained redness/swelling/pain in the arms or legs; Shortness of breath or fatigue that worsens;   Pain with urination or blood in the urine; Chills;  Cough, especially a productive cough;  Mouth sores or a white coating of the tongue; Redness, swelling, pain or drainage at the port-a-cath or IV site; Increased feeling of bloating or water retention; Excessive weight loss or gain;  Ringing in the ears; Difficulty swallowing;  Dizziness, vertigo, lightheadedness or fainting. Derek Acosta Signature: ____________________________ 4/1/2019  Griselda Grey, RN     Patient Education   Carboplatin (By injection)   Carboplatin ([id number])  Treats cancer, including cancer of the ovaries, head, and neck. Sometimes used in combination with other medicines. Brand Name(s): Amerinet Choice CARBOplatin, CARBOplatin N+ Novaplus, CARBOplatin Novaplus, PremierPro RX CARBOplatin   There may be other brand names for this medicine. When This Medicine Should Not Be Used: This medicine is not right for everyone. Do not receive it if you had an allergic reaction to carboplatin, mannitol, or platinum, or if you are pregnant or have bone marrow or bleeding problems. How to Use This Medicine:   Injectable  · Medicines used to treat cancer are very strong and can have many side effects. Before receiving this medicine, make sure you understand all the risks and benefits. It is important for you to work closely with your doctor during your treatment.   · You will receive this medicine while you are in a hospital or cancer treatment center. A nurse or other trained health professional will give you this medicine. · Your doctor will prescribe your dose and schedule. This medicine is given through a needle placed in a vein. · You may also receive medicines to help prevent nausea and vomiting. · Missed dose: This medicine needs to be given on a fixed schedule. If you miss a dose, call your doctor, home health caregiver, or treatment clinic for instructions. Drugs and Foods to Avoid:   Ask your doctor or pharmacist before using any other medicine, including over-the-counter medicines, vitamins, and herbal products. · Some medicines can affect how carboplatin works. Tell your doctor if you are taking any other cancer medicines, or if you took cancer medicines in the past (especially cisplatin or other platinum medicines). · Talk to your doctor before you get a flu shot or other vaccine while you are receiving carboplatin. Warnings While Using This Medicine:   · It is not safe to take this medicine during pregnancy. It could harm an unborn baby. Tell your doctor right away if you become pregnant. · Tell your doctor if you are breastfeeding, or if you have kidney disease, hearing problems, or an infection of any kind. Tell your doctor if you have been around anyone who has chickenpox or any other infections. Stay away from anyone who has recently received an oral polio vaccine. · This medicine may make you bleed, bruise, or get infections more easily. Take precautions to prevent illness and injury. Wash your hands often. · Cancer medicine can cause nausea or vomiting, sometimes even after you receive medicine to prevent these effects. Ask your doctor or nurse about other ways to control any nausea or vomiting that might happen.   Possible Side Effects While Using This Medicine:   Call your doctor right away if you notice any of these side effects:  · Allergic reaction: Itching or hives, swelling in your face or hands, swelling or tingling in your mouth or throat, chest tightness, trouble breathing  · Bloody or black, tarry stools, or blood in your urine  · Changes in vision  · Fever, chills, or cough  · Numbness, tingling, or burning pain in your hands, arms, legs, or feet  · Redness, pain, or swelling where the IV needle is placed  · Ringing in your ears or trouble hearing  · Unusual bleeding, bruising, or weakness  If you notice these less serious side effects, talk with your doctor:   · Hair loss  · Nausea, vomiting, loss of appetite  If you notice other side effects that you think are caused by this medicine, tell your doctor. Call your doctor for medical advice about side effects. You may report side effects to FDA at 7-806-GDF-7970  © 2017 [facility] Information is for End User's use only and may not be sold, redistributed or otherwise used for commercial purposes. The above information is an  only. It is not intended as medical advice for individual conditions or treatments. Talk to your doctor, nurse or pharmacist before following any medical regimen to see if it is safe and effective for you. Patient Education      Pemetrexed (Alimta) - (By injection)   Why this medicine is used:   Treats certain types of cancer in or near the lungs.   Contact a nurse or doctor right away if you have:  · Blistering, peeling, red skin rash  · Unusual bleeding, bruising, weakness, lower back or side pain  · Decrease in how much or how often you urinate  · Swelling in your hands, ankles, or feet, weight gain  · Dry mouth or skin, increased thirst, lightheadedness, dizziness, fainting  · Fever, chills, cough, sore throat, and body aches     Common side effects:  · Constipation, nausea, or vomiting, loss of appetite  · Sores or white patches on your lips, mouth, or throat  © 2017 [address] is for End User's use only and may not be sold, redistributed or otherwise used for commercial purposes. Patient Education      Pembrolizumab Stephon Flax) - (By injection)   Why this medicine is used:   Treats cancer. Contact a nurse or doctor right away if you have:  · Blistering, peeling, or red skin rash  · Chest tightness, trouble breathing, cough  · Dark urine or pale stools, yellow skin or eyes, bloody or cloudy urine, swelling  · Increased hunger or thirst, dry mouth, sweating, changes in amount of urine  · Lightheadedness, dizziness, fainting, joint or muscle pain  · Weakness, headaches, tiredness, weight changes, feeling cold  · Nausea, vomiting, loss of appetite, stomach pain, bloody or black, tarry stools, diarrhea  · Fever, chills, shaking, rash, itchy skin, blurred vision or vision changes   © 2017 [facility] Information is for End User's use only and may not be sold, redistributed or otherwise used for commercial purposes.

## 2019-04-03 NOTE — PATIENT INSTRUCTIONS
Dear Victor Manuel Cota ,    It was a pleasure seeing you today at [facility] office      This is the plan we talked about:    1. Right sided chest pain radiating to the shoulder  - You are doing really well with Oxycodone 20 mg every 4 hours. - You are taking Oxycontin 20 mg at bedtime only to help with pain control overnight. - You want to continue the same for now. 2. Constipation  - I am glad you are taking stool softners regularly but still very constipated. - Please take Miralax daily and I am starting Lactulose 30 ml every 4 hours until you have a bowel movement  - Please continue this bowel regimen religiously. 3. Cognitive impairment  - We talked about how this is all part of the disease and the impairment that comes with radiation.  - You are on dexamethasone 2 mg three times a day and we need to start decreasing this. - Please decrease to 2 mg two times a day for 5 days, one tab a day for 5 days and then stop. - You are getting an MRI on April 16th and following up with Dr. Sonya Carballo at [facility] after that. 4. Diabetes  - I am sorry your pcp does not want to see you anymore because of lack of insurance. - I am providing you with Lantus insulin and syringes to get you through your pcp visit with Dr. Jo Ann Anglin on 4/11    5. ACP  - We completed an advance medical directive and medical power of . - Financial poa is all taken care of as well. - Your wife is working with cancer BREONNA and Paul May for [facility] and Medicaid. 6. Goals of care  - We discussed your wishes regarding cpr/ intubation and you want to remain FULL code for now and think about it more. - You are willing to take more responsibility for your own care and attempt to eat better. - You are tolerating chemo well.       This is what you have shared with us about Advance Care Planning:    Primary Decision Maker ([address]): Liya Huang  Relationship to patient:Wife  Phone number:[phone]  [x] Named in a scanned document [x] Legal Next of Kin  [] Guardian    Secondary Decision Maker (First Alternate Health Care Agent):  Lyudmila Villanueva  Relationship to patient: Father  Phone number: [phone]  [x] Named in a scanned document   [] Legal Next of Kin  [] Guardian    ACP documents you current have include:  [x] Advance Directive or Living Will  [] Durable Do Not Resuscitate  [] Physician Orders for Scope of Treatment (POST)  [] Medical Power of   [] Other      The Palliative Medicine Team is here to support you and your family.      We will see you again in 4 weeks  Sincerely,      John Forbes MD and the Palliative Medicine Team

## 2019-04-03 NOTE — PROGRESS NOTES
Palliative Medicine Outpatient Services  Ramer: [phone] ([phone])    Patient Name: Coy Gallegos  YOB: 1960    Date of Current Visit: 04/03/19  Location of Current Visit:    [] [facility] Office  [] [facility] Office  [x] [facility] Office  [] Home  [] Other:      Date of Initial Visit: 1/28/19  Requesting Physician: Dr. Azra Madden  Primary Care Physician: Linda Cruz MD      SUMMARY:   Coy Gallegos is a 62y.o. 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 Azra Madden for management of symptoms and support. The patients social history includes worked in construction, has 30 plus year smoking history but quit in 2008, lives with wife who is stage 2 breast cancer survivor for over 8 yeas. Wife Ulisses Mccrary has 2 children and one with Brit Santizo. Twin grand children live with them, Brit Santizo has 3 children. Status post brain tumor removal at [facility], SBR T to the brain tumor, about to start chemotherapy. PALLIATIVE DIAGNOSES:       ICD-10-CM ICD-9-CM    1. Malignant neoplasm of right lung, unspecified part of lung (HCC) C34.91 162.9    2. Acute pain of right shoulder M25.511 719.41 oxyCODONE IR (ROXICODONE) 20 mg immediate release tablet      oxyCODONE IR (ROXICODONE) 20 mg immediate release tablet      oxyCODONE ER (OXYCONTIN) 20 mg ER tablet   3. Lung cancer metastatic to brain (HCC) C34.90 162.9 oxyCODONE IR (ROXICODONE) 20 mg immediate release tablet    C79.31 198.3 oxyCODONE IR (ROXICODONE) 20 mg immediate release tablet      oxyCODONE ER (OXYCONTIN) 20 mg ER tablet   4. Cognitive impairment R41.89 294.9           PLAN:   Patient Instructions   Dear Coy Gallegos ,    It was a pleasure seeing you today at [facility] office      This is the plan we talked about:    1. Right sided chest pain radiating to the shoulder  - You are doing really well with Oxycodone 20 mg every 4 hours.    - You are taking Oxycontin 20 mg at bedtime only to help with pain control overnight. - You want to continue the same for now. 2. Constipation  - I am glad you are taking stool softners regularly but still very constipated. - Please take Miralax daily and I am starting Lactulose 30 ml every 4 hours until you have a bowel movement  - Please continue this bowel regimen religiously. 3. Cognitive impairment  - We talked about how this is all part of the disease and the impairment that comes with radiation.  - You are on dexamethasone 2 mg three times a day and we need to start decreasing this. - Please decrease to 2 mg two times a day for 5 days, one tab a day for 5 days and then stop. - You are getting an MRI on April 16th and following up with Dr. Emely Howard at [facility] after that. 4. Diabetes  - I am sorry your pcp does not want to see you anymore because of lack of insurance. - I am providing you with Lantus insulin and syringes to get you through your pcp visit with Dr. Shasha Dixon on 4/11    5. ACP  - We completed an advance medical directive and medical power of . - Financial poa is all taken care of as well. - Your wife is working with cancer [facility] and Trino Dong for [facility] and Medicaid. 6. Goals of care  - We discussed your wishes regarding cpr/ intubation and you want to remain FULL code for now and think about it more. - You are willing to take more responsibility for your own care and attempt to eat better. - You are tolerating chemo well.       This is what you have shared with us about Advance Care Planning:    Primary Decision Maker ([address]): Porter Sergo  Relationship to patient:Wife  Phone number:[phone]  [x] Named in a scanned document   [x] Legal Next of Kin  [] Guardian    Secondary Decision Maker ([address]):  González Russ  Relationship to patient: Father  Phone number: [phone]  [x] Named in a scanned document   [] Legal Next of Kin  [] Guardian    ACP documents you current have include:  [x] Advance Directive or Living Will  [] Durable Do Not Resuscitate  [] Physician Orders for Scope of Treatment (POST)  [] Medical Power of   [] Other      The Palliative Medicine Team is here to support you and your family. We will see you again in 4 weeks  Sincerely,      Kenyatta Bagley MD and the Palliative Medicine Team      Counseling and Coordination:   See above  Opioid safety counseling      GOALS OF CARE / TREATMENT PREFERENCES:   [====Goals of Care====]  GOALS OF CARE:  Patient / health care proxy stated goals: FULL      TREATMENT PREFERENCES:   Code Status:  [x] Attempt Resuscitation       [] Do Not Attempt Resuscitation    Advance Care Planning:  [x] The [employer] Interdisciplinary Team has updated the ACP Navigator with [address] and Patient Capacity        The palliative care team has discussed with patient / health care proxy about goals of care / treatment preferences for patient.  [====Goals of Care====]         PRESCRIPTIONS GIVEN:     Medications Ordered Today   Medications    oxyCODONE IR (ROXICODONE) 20 mg immediate release tablet     Sig: Take 1 Tab by mouth every four (4) hours as needed for Pain for up to 15 days. Max Daily Amount: 120 mg. Dispense:  90 Tab     Refill:  0    oxyCODONE IR (ROXICODONE) 20 mg immediate release tablet     Sig: Take 1 Tab by mouth every four (4) hours as needed for Pain for up to 15 days. Max Daily Amount: 120 mg. Dispense:  90 Tab     Refill:  0    oxyCODONE ER (OXYCONTIN) 20 mg ER tablet     Sig: Take 1 Tab by mouth every twelve (12) hours for 30 days. Max Daily Amount: 40 mg. Dispense:  60 Tab     Refill:  0    insulin glargine (LANTUS) 100 unit/mL injection     Si Units by SubCUTAneous route nightly. Dispense:  1 Vial     Refill:  1    Insulin Syringes, Disposable, 1 mL syrg     Si mL by Does Not Apply route four (4) times daily for 30 days.      Dispense:  120 Syringe Refill:  5           FOLLOW UP:     Future Appointments   Date Time Provider Marlo Cummins   4/11/2019  9:50 AM Buster Smith MD [id number]   4/22/2019 11:00 AM [facility] CHAIR 1 [facility]   4/29/2019  8:30 AM Haroon Crook MD [facility] [address] [facility]   5/13/2019 11:00 AM [facility] CHAIR 1 [facility]           PHYSICIANS INVOLVED IN CARE:   Patient Care Team:  Jb Resendiz MD as PCP - General (Family Practice)  Jak Haas MD (Pulmonary Disease)  Kanchan Yanez MD (Hematology and Oncology)  Ortiz Antonio MD (Neurosurgery)  Jessica Runner, MD as Surgeon (Thoracic (non-cardiac) Surgery)       HISTORY:   Nursing documentation from date of visit reviewed. Reviewed patient-completed ESAS and advance care planning form. Reviewed patient record in prescription monitoring program.    CHIEF COMPLAINT: right sided chest pain    HPI/SUBJECTIVE:    The patient is: [] Verbal / [] Nonverbal     Patient here with wife. Per wife, his attitude has improved quite well, he is eating better, still some situational anger and memory problems but overall better. Her sister had to fly back to [facility] but will be back in 1 month. Pain is better with Oxycodone and Oxycontin at night time only. No constipation. His pcp refused to see him because he has not insurance. He is running out of his Lantus and I provided him with scripts.    ----------    Patient here today along with his wife Lamar Germain and Ruffin Bowels sister from [facility]. Darryle Notch reports severe right-sided chest wall pain that radiates to his shoulder. He has been on oxycodone 5 mg which was increased from every 4 hours to every 3 hours. He reports that this does not work in any way and he is in excruciating pain throughout the day. He does not have past history of opioids. He quit smoking and drinking in 2008. He is constipated. Taking prune juice.   On dexamethasone 4 mg 2 times a day for brain metastases    Significant amount of distress in the family with expected grief. He was diagnosed with cancer on New Year's Shari and it has been overwhelming since then. We talked about prognosis at length and what to expect with brain surgery, radiation and chemotherapy. Advised and encouraged them to take it one day at a time instead of being overwhelmed. Encourage communication within the family and involve adult children and helping out with appointments support at home and care for the twin 6year-old grandchildren. Clinical Pain Assessment (nonverbal scale for nonverbal patients):   [++++ Clinical Pain Assessment++++]  [++++Pain Severity++++]: Pain: 10  [++++Pain Character++++]: digging  [++++Pain Duration++++]: weeks   [++++Pain Effect++++]: functional and emotional  [++++Pain Factors++++]: none in particular  [++++Pain Frequency++++]: constant  [++++Pain Location++++]: right-sided chest wall and shoulder  [++++ Clinical Pain Assessment++++]       FUNCTIONAL ASSESSMENT:     Palliative Performance Scale (PPS):  PPS: 70       PSYCHOSOCIAL/SPIRITUAL SCREENING:     Any spiritual / Anabaptism concerns:  [] Yes /  [x] No    Caregiver Burnout:  [] Yes /  [x] No /  [] No Caregiver Present      Anticipatory grief assessment:   [x] Normal  / [] Maladaptive       ESAS Anxiety: Anxiety: 0    ESAS Depression: Depression: 0       REVIEW OF SYSTEMS:     The following systems were [] reviewed / [] unable to be reviewed  Systems: constitutional, ears/nose/mouth/throat, respiratory, gastrointestinal, genitourinary, musculoskeletal, integumentary, neurologic, psychiatric, endocrine. Positive findings noted below.   Modified ESAS Completed by: provider   Fatigue: 3 Drowsiness: 0   Depression: 0 Pain: 10   Anxiety: 0 Nausea: 2   Anorexia: 0 Dyspnea: 0   Best Well-Bein Constipation: No              PHYSICAL EXAM:     Wt Readings from Last 3 Encounters:   19 153 lb (69.4 kg)   19 153 lb 4 oz (69.5 kg)   19 149 lb 12.8 oz (67.9 kg) Blood pressure (!) 153/97, pulse 90, temperature 98.8 °F (37.1 °C), resp. rate 20, height 5' 10\" (1.778 m), weight 153 lb (69.4 kg), SpO2 98 %. Last bowel movement: See Nursing Note    Constitutional: Alert, oriented  Eyes: pupils equal, anicteric  ENMT: no nasal discharge, moist mucous membranes  Cardiovascular: regular rhythm, distal pulses intact  Respiratory: breathing not labored, symmetric with poor air entry on the right side  Gastrointestinal: soft non-tender, +bowel sounds  Musculoskeletal: no deformity, no tenderness to palpation, obvious muscle wasting in the chest area  Skin: warm, dry  Neurologic: following commands, moving all extremities  Psychiatric: full affect, no hallucinations  Other:       HISTORY:     Past Medical History:   Diagnosis Date    Cancer ([facility] [address]     LUNG RT. SIDE METS TO BRAIN, LIVER    Chronic obstructive pulmonary disease (HCC)     MILD PER WIFE    Diabetes ([facility] [address]     TYPE II    GERD (gastroesophageal reflux disease)     Hypertension       Past Surgical History:   Procedure Laterality Date    HX CERVICAL FUSION      HX CRANIOTOMY  01/29/2019    REMOVAL [facility] BRAIN METS [facility]    HX GI      COLONOSCOPY    HX HERNIA REPAIR Right 1972    INGUINAL    VASCULAR SURGERY PROCEDURE UNLIST Bilateral     LEGS      Family History   Problem Relation Age of Onset    Stroke Father     Diabetes Father     Other Mother 58        SUDDEN DEATH    Cancer Maternal Grandfather     Anesth Problems Neg Hx       History reviewed, no pertinent family history.   Social History     Tobacco Use    Smoking status: Former Smoker     Packs/day: 1.50     Years: 25.00     Pack years: 37.50     Last attempt to quit: 1/21/2009     Years since quitting: 10.2    Smokeless tobacco: Never Used   Substance Use Topics    Alcohol use: No     Frequency: Never     No Known Allergies   Current Outpatient Medications   Medication Sig    [START ON 4/12/2019] oxyCODONE IR (ROXICODONE) 20 mg immediate release tablet Take 1 Tab by mouth every four (4) hours as needed for Pain for up to 15 days. Max Daily Amount: 120 mg.    [START ON 4/26/2019] oxyCODONE IR (ROXICODONE) 20 mg immediate release tablet Take 1 Tab by mouth every four (4) hours as needed for Pain for up to 15 days. Max Daily Amount: 120 mg.    oxyCODONE ER (OXYCONTIN) 20 mg ER tablet Take 1 Tab by mouth every twelve (12) hours for 30 days. Max Daily Amount: 40 mg.    insulin glargine (LANTUS) 100 unit/mL injection 14 Units by SubCUTAneous route nightly.  Insulin Syringes, Disposable, 1 mL syrg 1 mL by Does Not Apply route four (4) times daily for 30 days.  megestrol (MEGACE) 40 mg tablet Take 1 Tab by mouth two (2) times a day.  pantoprazole (PROTONIX) 40 mg tablet Take 1 Tab by mouth daily.  ondansetron (ZOFRAN ODT) 4 mg disintegrating tablet Take 1 Tab by mouth every eight (8) hours as needed for Nausea.  senna (SENNA) 8.6 mg tablet Take 2 Tabs by mouth nightly.  docusate sodium (COL-RITE) 100 mg capsule Take 100 mg by mouth two (2) times a day.  insulin lispro (HUMALOG U-100 INSULIN) 100 unit/mL injection INJECT 5 UNITS SUBCUTANEOUSLY BEFORE MEALS-INCREASE HUMALOG 1 UNIT EVERY OTHER DAY UNTIL 2 HR PP  OR LESS    TURMERIC PO Take 500 mg by mouth daily.  dexamethasone (DECADRON) 2 mg tablet Take 1 Tab by mouth three (3) times daily.  lactulose (CHRONULAC) 10 gram/15 mL solution Take 30 mL by mouth three (3) times daily.  lidocaine-prilocaine (EMLA) topical cream Apply  to affected area as needed for Pain.  butalbital-acetaminophen-caff (FIORICET) -40 mg per capsule Take 2 Caps by mouth every four (4) hours as needed for Pain or Headache.  fenofibrate (LOFIBRA) 160 mg tablet take 1 tablet by mouth once daily    ascorbic acid, vitamin C, (VITAMIN C) 250 mg tablet Take 250 mg by mouth daily.  flaxseed oil 1,000 mg cap Take 1 Cap by mouth daily.     acetaminophen (TYLENOL) 325 mg tablet Take 325 mg by mouth every four (4) hours as needed for Pain.  ibuprofen 200 mg cap Take 1-2 Tabs by mouth as needed.  simvastatin (ZOCOR) 40 mg tablet Take 40 mg by mouth daily. No current facility-administered medications for this visit. LAB DATA REVIEWED:     Lab Results   Component Value Date/Time    WBC 9.1 04/01/2019 11:44 AM    HGB 10.6 (L) 04/01/2019 11:44 AM    PLATELET 387 27/59/3102 11:44 AM     Lab Results   Component Value Date/Time    Sodium 135 (L) 04/01/2019 11:44 AM    Potassium 3.7 04/01/2019 11:44 AM    Chloride 104 04/01/2019 11:44 AM    CO2 23 04/01/2019 11:44 AM    BUN 20 04/01/2019 11:44 AM    Creatinine 0.68 (L) 04/01/2019 11:44 AM    Calcium 8.6 04/01/2019 11:44 AM    Magnesium 2.2 12/31/2018 01:13 PM    Phosphorus 2.6 03/31/2010 06:43 AM      Lab Results   Component Value Date/Time    AST (SGOT) 15 04/01/2019 11:44 AM    Alk. phosphatase 68 04/01/2019 11:44 AM    Protein, total 6.9 04/01/2019 11:44 AM    Albumin 3.2 (L) 04/01/2019 11:44 AM    Globulin 3.7 04/01/2019 11:44 AM     Lab Results   Component Value Date/Time    INR >9.2 (HH) 05/28/2010 04:43 PM    Prothrombin time >75.0 (H) 05/28/2010 04:43 PM    aPTT 144.2 (H) 05/28/2010 04:43 PM      No results found for: IRON, FE, TIBC, IBCT, PSAT, FERR   Impression:     1. Large right upper lobe mass measuring 5.6 x 4.7 x 4.8 cm, which is in  continuity with extensive conglomerate right paratracheal and right hilar  adenopathy. Findings are consistent with primary lung malignancy with extensive  metastatic adenopathy. The right upper lobe mass occludes the segmental and  subsegmental bronchi of the posterior segment of the right upper lobe. 2. Metastatic subcarinal adenopathy. Enlarged left lower paratracheal  adenopathy, which is also favored to be metastatic. 3. A 6 mm satellite nodule in the right upper lobe, consistent with metastasis. 4. Indeterminate hypodensities within hepatic segment IVb, which may represent  metastatic disease.   5. No evidence of pulmonary embolism.     I personally reviewed the images.     MRI brain: 5 lesions: right occipital, right lateral ventricular arm, right temporal, right anterior temporal and left frontal     PET CT: large RUL mass with extensive mediastinal adenopathy, metastatic disease to the liver, brain            CONTROLLED SUBSTANCES SAFETY ASSESSMENT (IF ON CONTROLLED SUBSTANCES):     Reviewed opioid safety handout:  [x] Yes   [] No  24 hour opioid dose >150mg morphine equivalent/day:  [] Yes   [x] No  Benzodiazepines:  [] Yes   [x] No  Sleep apnea:  [] Yes   [x] No  Urine Toxicology Testing within last 6 months:  [] Yes   [x] No  History of or new aberrant medication taking behaviors:  [] Yes   [x] No  Has Narcan been prescribed [] Yes   [x] No          Total time: 90m  Counseling / coordination time: 70m  > 50% counseling / coordination?: y

## 2019-04-03 NOTE — PROGRESS NOTES
Palliative Medicine Office Visit  Palliative Medicine Nurse Check In  [phone]    Patient Name: Ishan Page  YOB: 1960      Date of Office Visit: 4/3/2019    Patient states: \"needs insulin and other medication       From Check In Sheet (scanned in Media):  Has a medical provider talked with you about cardiopulmonary resuscitation (CPR)? [] Yes   [] No   [] Unable to obtain    Nurse reminder to complete or update ACP FlowSheet:    Is ACP on the Problem List?    [x] Yes    [] No  IF ACP Document is ON FILE; Nurse to place ACP on Problem List     Is there an ACP Note in Chart Review/Note? [x] Yes    [] No   If NO: ALERT PROVIDER       Primary Decision Maker: Soco Mercedese - Spouse - [phone]    Secondary Decision Maker: Eriberto Knight - Father - [phone]  Advance Care Planning 4/3/2019   Patient's Healthcare Decision Maker is: Named in scanned ACP document   Confirm Advance Directive Yes, on file   Does the patient have other document types -       Is there anything that we should know about you as a person in order to provide you the best care possible? Needs medicine    Have you been to the ER, urgent care clinic since your last visit? [] Yes   [x] No   [] Unable to obtain    Have you been hospitalized since your last visit? [] Yes   [x] No   [] Unable to obtain    Have you seen or consulted any other health care providers outside of the [address] [place] [address] since your last visit?    [] Yes   [x] No   [] Unable to obtain    Functional status (describe):         Last BM: 4/3/2019     accessed (date): 4/3/2019     Bottle review (for opioid pain medication):  Medication 1:   Date filled:   Directions:   # filled:   # left: 24  # pills taking per day:  Last dose taken:    Medicatad:   Directions:   # filled:   # left:   # pills taking per day:  Last dose taken:

## 2019-04-09 NOTE — TELEPHONE ENCOUNTER
Júnior Destini is calling to advise nurse that she is at [facility] [place] and will be coming up to office to see her. Advised would send nurse a note.

## 2019-04-09 NOTE — TELEPHONE ENCOUNTER
Favian Portillo, . Adelso Jay' wife, came to the [facility] office to  his MS Contin prescription to take to [facility].       Colleen Boogie RN   Palliative Medicine

## 2019-04-11 NOTE — PATIENT INSTRUCTIONS
DASH Diet: Care Instructions  Your Care Instructions    The DASH diet is an eating plan that can help lower your blood pressure. DASH stands for Dietary Approaches to Stop Hypertension. Hypertension is high blood pressure. The DASH diet focuses on eating foods that are high in calcium, potassium, and magnesium. These nutrients can lower blood pressure. The foods that are highest in these nutrients are fruits, vegetables, low-fat dairy products, nuts, seeds, and legumes. But taking calcium, potassium, and magnesium supplements instead of eating foods that are high in those nutrients does not have the same effect. The DASH diet also includes whole grains, fish, and poultry. The DASH diet is one of several lifestyle changes your doctor may recommend to lower your high blood pressure. Your doctor may also want you to decrease the amount of sodium in your diet. Lowering sodium while following the DASH diet can lower blood pressure even further than just the DASH diet alone. Follow-up care is a key part of your treatment and safety. Be sure to make and go to all appointments, and call your doctor if you are having problems. It's also a good idea to know your test results and keep a list of the medicines you take. How can you care for yourself at home? Following the DASH diet  · Eat 4 to 5 servings of fruit each day. A serving is 1 medium-sized piece of fruit, ½ cup chopped or canned fruit, 1/4 cup dried fruit, or 4 ounces (½ cup) of fruit juice. Choose fruit more often than fruit juice. · Eat 4 to 5 servings of vegetables each day. A serving is 1 cup of lettuce or raw leafy vegetables, ½ cup of chopped or cooked vegetables, or 4 ounces (½ cup) of vegetable juice. Choose vegetables more often than vegetable juice. · Get 2 to 3 servings of low-fat and fat-free dairy each day. A serving is 8 ounces of milk, 1 cup of yogurt, or 1 ½ ounces of cheese. · Eat 6 to 8 servings of grains each day.  A serving is 1 slice of bread, 1 ounce of dry cereal, or ½ cup of cooked rice, pasta, or cooked cereal. Try to choose whole-grain products as much as possible. · Limit lean meat, poultry, and fish to 2 servings each day. A serving is 3 ounces, about the size of a deck of cards. · Eat 4 to 5 servings of nuts, seeds, and legumes (cooked dried beans, lentils, and split peas) each week. A serving is 1/3 cup of nuts, 2 tablespoons of seeds, or ½ cup of cooked beans or peas. · Limit fats and oils to 2 to 3 servings each day. A serving is 1 teaspoon of vegetable oil or 2 tablespoons of salad dressing. · Limit sweets and added sugars to 5 servings or less a week. A serving is 1 tablespoon jelly or jam, ½ cup sorbet, or 1 cup of lemonade. · Eat less than 2,300 milligrams (mg) of sodium a day. If you limit your sodium to 1,500 mg a day, you can lower your blood pressure even more. Tips for success  · Start small. Do not try to make dramatic changes to your diet all at once. You might feel that you are missing out on your favorite foods and then be more likely to not follow the plan. Make small changes, and stick with them. Once those changes become habit, add a few more changes. · Try some of the following:  ? Make it a goal to eat a fruit or vegetable at every meal and at snacks. This will make it easy to get the recommended amount of fruits and vegetables each day. ? Try yogurt topped with fruit and nuts for a snack or healthy dessert. ? Add lettuce, tomato, cucumber, and onion to sandwiches. ? Combine a ready-made pizza crust with low-fat mozzarella cheese and lots of vegetable toppings. Try using tomatoes, squash, spinach, broccoli, carrots, cauliflower, and onions. ? Have a variety of cut-up vegetables with a low-fat dip as an appetizer instead of chips and dip. ? Sprinkle sunflower seeds or chopped almonds over salads. Or try adding chopped walnuts or almonds to cooked vegetables.   ? Try some vegetarian meals using beans and peas. Add garbanzo or kidney beans to salads. Make burritos and tacos with mashed maldonado beans or black beans. Where can you learn more? Go to http://raymon-sergo.info/. Enter S631 in the search box to learn more about \"DASH Diet: Care Instructions. \"  Current as of: July 22, 2018  Content Version: 11.9  © 8869-5931 [employer]. Care instructions adapted under license by [employer] (which disclaims liability or warranty for this information). If you have questions about a medical condition or this instruction, always ask your healthcare professional. [address] any warranty or liability for your use of this information.

## 2019-04-11 NOTE — PROGRESS NOTES
Chief Complaint   Patient presents with   [id number] 53  No insurance, needs help with medication

## 2019-04-11 NOTE — PROGRESS NOTES
Chief Complaint   Patient presents with    [facility]     HPI:  Mesfin Munoz is a 62 y.o. male with h/o lung cancer, diabetes type 2, hypertension, gerd, hypercholesterolemia presents to establish care. Patient follows with Nida Franco in palliative and Dr. Rani Gomran in hem/on.     Review of Systems  Constitutional: negative for fevers/chills  Respiratory:  positive for cough, dyspnea, wheezing  CV:   negative for chest pain, palpitations, lower extremity edema  GI:   negative for nausea, vomiting, diarrhea, abdominal pain  Endo:               negative for polyuria,polydipsia,polyphagia,heat intolerance  Genitourinary: negative for frequency, dysuria   Integument:  negative for rash and pruritus  Musculoskel: negative for myalgias, arthralgias, back pain  Neurological:  negative for headaches, dizziness  Behavl/Psych: negative for feelings of anxiety, depression, mood changes    Past Medical History:   Diagnosis Date    Cancer ([facility]     LUNG RT. SIDE METS TO BRAIN, LIVER    Chronic obstructive pulmonary disease (HCC)     MILD PER WIFE    Diabetes ([facility]     TYPE II    GERD (gastroesophageal reflux disease)     Hypertension      Past Surgical History:   Procedure Laterality Date    HX CERVICAL FUSION      HX CRANIOTOMY  01/29/2019    REMOVAL OF BRAIN METS AT [facility]    HX GI      COLONOSCOPY    HX HERNIA REPAIR Right 1972    INGUINAL    VASCULAR SURGERY PROCEDURE UNLIST Bilateral     LEGS     Social History     Socioeconomic History    Marital status:      Spouse name: Not on file    Number of children: Not on file    Years of education: Not on file    Highest education level: Not on file   Tobacco Use    Smoking status: Former Smoker     Packs/day: 1.50     Years: 25.00     Pack years: 37.50     Last attempt to quit: 1/21/2009     Years since quitting: 10.2    Smokeless tobacco: Never Used   Substance and Sexual Activity    Alcohol use: No     Frequency: Never    Drug use: No     Family History   Problem Relation Age of Onset    Stroke Father     Diabetes Father     Other Mother 58        SUDDEN DEATH    Cancer Maternal Grandfather     Anesth Problems Neg Hx      Current Outpatient Medications   Medication Sig Dispense Refill    morphine CR (MS CONTIN) 30 mg CR tablet Take 1 Tab by mouth every twelve (12) hours for 30 days. Max Daily Amount: 60 mg. 60 Tab 0    [START ON 4/12/2019] oxyCODONE IR (ROXICODONE) 20 mg immediate release tablet Take 1 Tab by mouth every four (4) hours as needed for Pain for up to 15 days. Max Daily Amount: 120 mg. 90 Tab 0    insulin glargine (LANTUS) 100 unit/mL injection 14 Units by SubCUTAneous route nightly. 1 Vial 1    Insulin Syringes, Disposable, 1 mL syrg 1 mL by Does Not Apply route four (4) times daily for 30 days. 120 Syringe 5    dexamethasone (DECADRON) 2 mg tablet Take 1 Tab by mouth three (3) times daily. (Patient taking differently: Take 2 mg by mouth two (2) times a day.) 90 Tab 0    megestrol (MEGACE) 40 mg tablet Take 1 Tab by mouth two (2) times a day. 60 Tab 0    pantoprazole (PROTONIX) 40 mg tablet Take 1 Tab by mouth daily. 30 Tab 6    lidocaine-prilocaine (EMLA) topical cream Apply  to affected area as needed for Pain. 30 g 0    ondansetron (ZOFRAN ODT) 4 mg disintegrating tablet Take 1 Tab by mouth every eight (8) hours as needed for Nausea. 40 Tab 1    insulin lispro (HUMALOG U-100 INSULIN) 100 unit/mL injection INJECT 5 UNITS SUBCUTANEOUSLY BEFORE MEALS-INCREASE HUMALOG 1 UNIT EVERY OTHER DAY UNTIL 2 HR PP  OR LESS      acetaminophen (TYLENOL) 325 mg tablet Take 325 mg by mouth every four (4) hours as needed for Pain.       dexamethasone 1.5 mg (41 tabs) DsPk        No Known Allergies    Objective:  Visit Vitals  /87 (BP 1 Location: Right arm, BP Patient Position: Sitting)   Pulse 93   Temp 99.3 °F (37.4 °C) (Oral)   Resp 16   Ht 5' 10\" (1.778 m)   Wt 153 lb 6.4 oz (69.6 kg)   SpO2 98%   BMI 22.01 kg/m²     Physical Exam:   General appearance - alert, well appearing in no distress  Mental status - alert, oriented to person, place, and time  EYE-PERRL, EOMI  Neck - supple, no significant adenopathy   Chest - decreased air entry, no wheezes, rales or rhonchi   Heart - normal rate, regular rhythm  Abdomen - soft, nontender, nondistended, no organomegaly  Ext-peripheral pulses normal, no pedal edema  Neuro -alert, oriented, normal speech, no focal findings  Back-full range of motion, no tenderness, palpable spasm or pain on motion     Results for orders placed or performed during the hospital encounter of 04/01/19   CBC WITH AUTOMATED DIFF   Result Value Ref Range    WBC 9.1 4.1 - 11.1 K/uL    RBC 3.49 (L) 4.10 - 5.70 M/uL    HGB 10.6 (L) 12.1 - 17.0 g/dL    HCT 32.9 (L) 36.6 - 50.3 %    MCV 94.3 80.0 - 99.0 FL    MCH 30.4 26.0 - 34.0 PG    MCHC 32.2 30.0 - 36.5 g/dL    RDW 19.3 (H) 11.5 - 14.5 %    PLATELET 955 173 - 947 K/uL    MPV 8.6 (L) 8.9 - 12.9 FL    NRBC 0.3 (H) 0  WBC    ABSOLUTE NRBC 0.03 (H) 0.00 - 0.01 K/uL    NEUTROPHILS 65 32 - 75 %    LYMPHOCYTES 15 12 - 49 %    MONOCYTES 14 (H) 5 - 13 %    EOSINOPHILS 0 0 - 7 %    BASOPHILS 0 0 - 1 %    IMMATURE GRANULOCYTES 6 (H) 0.0 - 0.5 %    ABS. NEUTROPHILS 5.9 1.8 - 8.0 K/UL    ABS. LYMPHOCYTES 1.4 0.8 - 3.5 K/UL    ABS. MONOCYTES 1.3 (H) 0.0 - 1.0 K/UL    ABS. EOSINOPHILS 0.0 0.0 - 0.4 K/UL    ABS. BASOPHILS 0.0 0.0 - 0.1 K/UL    ABS. IMM.  GRANS. 0.5 (H) 0.00 - 0.04 K/UL    DF SMEAR SCANNED      RBC COMMENTS NORMOCYTIC, NORMOCHROMIC     METABOLIC PANEL, COMPREHENSIVE   Result Value Ref Range    Sodium 135 (L) 136 - 145 mmol/L    Potassium 3.7 3.5 - 5.1 mmol/L    Chloride 104 97 - 108 mmol/L    CO2 23 21 - 32 mmol/L    Anion gap 8 5 - 15 mmol/L    Glucose 142 (H) 65 - 100 mg/dL    BUN 20 6 - 20 MG/DL    Creatinine 0.68 (L) 0.70 - 1.30 MG/DL    BUN/Creatinine ratio 29 (H) 12 - 20      GFR est AA >60 >60 ml/min/1.73m2    GFR est non-AA >60 >60 ml/min/1.73m2    Calcium 8.6 8.5 - 10.1 MG/DL    Bilirubin, total 0.3 0.2 - 1.0 MG/DL    ALT (SGPT) 33 12 - 78 U/L    AST (SGOT) 15 15 - 37 U/L    Alk. phosphatase 68 45 - 117 U/L    Protein, total 6.9 6.4 - 8.2 g/dL    Albumin 3.2 (L) 3.5 - 5.0 g/dL    Globulin 3.7 2.0 - 4.0 g/dL    A-G Ratio 0.9 (L) 1.1 - 2.2         Assessment/Plan:  Diagnoses and all orders for this visit:    1. Malignant neoplasm of hilus of right lung (HCC)  -     AMB POC HEMOGLOBIN A1C  -     AMB POC GLUCOSE BLOOD, BY GLUCOSE MONITORING DEVICE    2. Lung cancer metastatic to brain ([facility] [address]  -     METABOLIC PANEL, COMPREHENSIVE    3. Normochromic normocytic anemia  -     CBC WITH AUTOMATED DIFF    4. Controlled type 2 diabetes mellitus with diabetic nephropathy, unspecified whether long term insulin use (formerly [facility])  -     insulin glargine (LANTUS) 100 unit/mL injection; 14 Units by SubCUTAneous route nightly. -     Insulin Syringes, Disposable, 1 mL syrg; 1 mL by Does Not Apply route four (4) times daily for 30 days. -     insulin lispro (HUMALOG U-100 INSULIN) 100 unit/mL injection; INJECT 5 UNITS SUBCUTANEOUSLY BEFORE MEALS-INCREASE HUMALOG 1 UNIT EVERY OTHER DAY UNTIL 2 HR PP  OR LESS  -     METABOLIC PANEL, COMPREHENSIVE    5. Vitamin D deficiency  -     VITAMIN D, 25 HYDROXY    6. Hypertension goal BP (blood pressure) < 531/45  -     METABOLIC PANEL, COMPREHENSIVE    7. Establishing care with new doctor, encounter for    8. Frequency of urination  -     URINALYSIS W/ RFLX MICROSCOPIC    9. Hypercholesterolemia  -     LIPID PANEL    10. Need for hepatitis C screening test  -     HEPATITIS C AB      Patient Instructions        DASH Diet: Care Instructions  Your Care Instructions    The DASH diet is an eating plan that can help lower your blood pressure. DASH stands for Dietary Approaches to Stop Hypertension. Hypertension is high blood pressure. The DASH diet focuses on eating foods that are high in calcium, potassium, and magnesium.  These nutrients can lower blood pressure. The foods that are highest in these nutrients are fruits, vegetables, low-fat dairy products, nuts, seeds, and legumes. But taking calcium, potassium, and magnesium supplements instead of eating foods that are high in those nutrients does not have the same effect. The DASH diet also includes whole grains, fish, and poultry. The DASH diet is one of several lifestyle changes your doctor may recommend to lower your high blood pressure. Your doctor may also want you to decrease the amount of sodium in your diet. Lowering sodium while following the DASH diet can lower blood pressure even further than just the DASH diet alone. Follow-up care is a key part of your treatment and safety. Be sure to make and go to all appointments, and call your doctor if you are having problems. It's also a good idea to know your test results and keep a list of the medicines you take. How can you care for yourself at home? Following the DASH diet  · Eat 4 to 5 servings of fruit each day. A serving is 1 medium-sized piece of fruit, ½ cup chopped or canned fruit, 1/4 cup dried fruit, or 4 ounces (½ cup) of fruit juice. Choose fruit more often than fruit juice. · Eat 4 to 5 servings of vegetables each day. A serving is 1 cup of lettuce or raw leafy vegetables, ½ cup of chopped or cooked vegetables, or 4 ounces (½ cup) of vegetable juice. Choose vegetables more often than vegetable juice. · Get 2 to 3 servings of low-fat and fat-free dairy each day. A serving is 8 ounces of milk, 1 cup of yogurt, or 1 ½ ounces of cheese. · Eat 6 to 8 servings of grains each day. A serving is 1 slice of bread, 1 ounce of dry cereal, or ½ cup of cooked rice, pasta, or cooked cereal. Try to choose whole-grain products as much as possible. · Limit lean meat, poultry, and fish to 2 servings each day. A serving is 3 ounces, about the size of a deck of cards.   · Eat 4 to 5 servings of nuts, seeds, and legumes (cooked dried beans, lentils, and split peas) each week. A serving is 1/3 cup of nuts, 2 tablespoons of seeds, or ½ cup of cooked beans or peas. · Limit fats and oils to 2 to 3 servings each day. A serving is 1 teaspoon of vegetable oil or 2 tablespoons of salad dressing. · Limit sweets and added sugars to 5 servings or less a week. A serving is 1 tablespoon jelly or jam, ½ cup sorbet, or 1 cup of lemonade. · Eat less than 2,300 milligrams (mg) of sodium a day. If you limit your sodium to 1,500 mg a day, you can lower your blood pressure even more. Tips for success  · Start small. Do not try to make dramatic changes to your diet all at once. You might feel that you are missing out on your favorite foods and then be more likely to not follow the plan. Make small changes, and stick with them. Once those changes become habit, add a few more changes. · Try some of the following:  ? Make it a goal to eat a fruit or vegetable at every meal and at snacks. This will make it easy to get the recommended amount of fruits and vegetables each day. ? Try yogurt topped with fruit and nuts for a snack or healthy dessert. ? Add lettuce, tomato, cucumber, and onion to sandwiches. ? Combine a ready-made pizza crust with low-fat mozzarella cheese and lots of vegetable toppings. Try using tomatoes, squash, spinach, broccoli, carrots, cauliflower, and onions. ? Have a variety of cut-up vegetables with a low-fat dip as an appetizer instead of chips and dip. ? Sprinkle sunflower seeds or chopped almonds over salads. Or try adding chopped walnuts or almonds to cooked vegetables. ? Try some vegetarian meals using beans and peas. Add garbanzo or kidney beans to salads. Make burritos and tacos with mashed maldonado beans or black beans. Where can you learn more? Go to http://raymon-sergo.info/. Enter F490 in the search box to learn more about \"DASH Diet: Care Instructions. \"  Current as of: July 22, 2018  Content Version: 11.9  © 5016-7603 [employer]. Care instructions adapted under license by [employer] (which disclaims liability or warranty for this information). If you have questions about a medical condition or this instruction, always ask your healthcare professional. [address] any warranty or liability for your use of this information. Follow-up and Dispositions    · Return in about 3 months (around 7/11/2019), or if symptoms worsen or fail to improve, for routine follow up.

## 2019-04-16 NOTE — PROGRESS NOTES
[id number]  Social Work Navigator Encounter     Patient Name:  Nadine Fuentes Narciso    Medical History:     Advance Directives:    Narrative: SW spoke with pt wife who is going to bring a bank statement to office for care card application. Pt also needs to provide cost of burial plot to  as Medicaid has been denied because info not turned in.     LOUISE spoke Jordin Pena who is going to fax over a form for pt  / pt wife to sign for consent to exchange information or ? SW will need pt / pt wife to sign. Barriers to Care:     Plan:   1. Continue to follow up to help pt obtain medicaid.

## 2019-04-16 NOTE — PROGRESS NOTES
[id number]  Social Work Navigator Encounter     Patient Name:  Myah Stuart     Medical History: dx metastatic lung cancer - brain mets    Advance Directives:on file     Narrative: Araseli Gusman called Keyonna Mercedes's sister from [facility]) called to say Darwin Vicente and Augusto Colvin are back speaking with her and [facility] after she sees her doctors for fibromyalgia in [facility]? Pt states Darwin Vicente was turned down for Medicaid because pt did not list burial plots. SW conveyed care card has not been submitted because need bank statements and pt was concerned about she had taken interest out for mortgage payment. Barriers to Care:     Plan:   1.  SW to follow up.

## 2019-04-16 NOTE — TELEPHONE ENCOUNTER
Yosi Hernandez is calling for a refill on :    Megestrol 40 mg tab. Asking to please send to [facility]. Any questions please call .

## 2019-04-17 NOTE — TELEPHONE ENCOUNTER
V/m left today to inform of rx at local pharmacy per request and if foundation assistance will be needed.

## 2019-04-17 NOTE — PROGRESS NOTES
CC:  Diabetes medication access    S/O: Ishan Page is a 62 y.o. male referred by Dr. Charlette Oreilly MD for diabetes management. HPI: Patient's wife, Jennifer Frias, presents today for a meeting regarding medication access and patient assistance. Patient has been diagnosed with metastatic lung cancer with mets to the brain and liver, and he is s/p brain tumor removal in January. Wife states that the patient has his \"good and bad days\" but overall she has noticed an (expected) mental decline after the surgery. Per Oncology, patient has a poor prognosis and has been referred to palliative - he is currently receiving palliative chemo and immunotherapy. Patient is seeing Dr. Darren Acosta for palliative care. She provided him with Lantus during their visit on 4/3 to bridge him until he established with Dr. Valente Menezes last week. Patient had been attempting to apply for Medicaid, but wife states that they had filled the application out incorrectly. They received a new application in the mail and are planning to try again. Wife called patient during the visit so that we could talk to him about his insulin and diabetes management. Current Diabetes Regimen:  Lantus 15 units SQ daily  Humalog 14-17 units SQ daily before meals (an average uses 14 units)    ROS:   Patient denies hypoglycemic and hyperglycemic signs/symptoms, chest pain, shortness of breath, neuropathy, vision changes, and any foot changes. Self-Monitoring Blood Glucose:  Per patient (via call): BG ranges from 120-180 everyday (FBG on the lower end of the range, post-prandial on the higher end). Patient states that he occasionally sees how readings around 80-85 (last one 2-4 weeks ago) - he drinks 1/2 cup of juice when this happens. This morning he states his FBG was 119. Data reviewed:  Lab Results   Component Value Date/Time    Hemoglobin A1c 14.3 (H) 03/30/2010 03:50 AM    Hemoglobin A1c (POC) 6.5 04/11/2019 01:03 PM       Assessment/Plan:     1. Diabetes:  A1c at goal of <7%. Patient states that he has always been on insulin (no oral antidiabetic meds) and is comfortable with continuing injections if that is what is decided/affordable. Instructed patient to be mindful of low blood sugars and to let us know if he is experiencing any lows. Provided patient's wife with patient assistance application to fill out for insulin. Gave her coupon cards forTresiba and Humalog and instructed her to give us a call if she has any issues. We will also look into other medications on patient's profile to see if there are other more affordable options. Simvastatin and lisinopril were recently discontinued, but patient was started on fenofibrate. May be able to discontinue this medication also depending on lipid panel. Will follow-up regarding Medicaid application and other assistance opportunities. Patient verbalized understanding of the information presented and all of the patients questions were answered. AVS was handed to the patient and information reviewed. Patient advised to call Elder Quiroz PharmD or Dr. Елена Michel MD with any additional questions or concerns. Follow-up: TBD. Notification of recommendations will be sent to Dr. Елена Michel MD for review. Thank you,  Malachi Adam.  [facility]  PGY-1 Pharmacy Resident

## 2019-04-19 NOTE — PROGRESS NOTES
Pt's spouse came in for medication access issues, specifically the insulin, but also looking at other medications for lowest cost options. Pt unable to come today. Spouse states they can't afford any copays at this time; pt has Cancer, and spouse states she does as well; they are raising 2 grand children - asked if she has met with SW and she states that she has; medicaid application in process. Reviewed all medications and called pt during visit to see exactly how he is using his insulin. He is also checking his BG consistently. He seems to be aware and managing the insulin appropriately. Recent A1c at goal. Educated on continuing to check BG as insulin requirements can change given any changes that may occur with steroid, eating, etc.     Per PCP, provided with a zero copay voucher for Humalog and for Toujeo (will change from Lantus solostar to [employer], same dose, per PCP and Diabetes CPA with Dr. Jose Guadalupe Brunson, when out of Lantus); provided with pen needles and taught spouse how to use the pen device. Reviewed in detail the different insulin products. Patient assistance applications completed by spouse for insulin. Will research lowest cost options for other medications and follow up with patient / spouse. Not sure if patient needs to stay on gemfibrozil; spouse states labs were not drawn as they don't have insurance. After get Lipid panel re assess need. Patient verbalized understanding of information presented. Answered all of the patient's questions.       Divina Petit, PHARMD, CDE

## 2019-04-22 NOTE — PROGRESS NOTES
[address] [facility] 
at [facility], [facility], [address] 
[phone] Follow-up Note Patient: Hair Gomez MRN: [id number]  SSN: [id number] YOB: 1960  Age: 62 y.o. Sex: male Diagnosis:  
 
1. T3 N3 M1a (Stage IV) NSCLC of the lung, likely adenocarcinoma NGS no actionable mutation PD-L1 90% Treatment: 1. Right occipital craniotomy at [facility] on 1/29/2019.  
2. SBRT to the brain lesions 3. Palliative chemotherapy Carbo/Alimta/Keytruda - Cycle 3 Day 1 Subjective:  
  
Hair Gomez is a 62 y.o. male with a new diagnosis of metastatic lung carcinoma. He has a over 30 pack years of cigarette smoking. He works in the construction business. He started experiencing pain in the right upper chest for last 2 months. Its works with movement. It radiates to the back. He is also experiencing worsening cough and sputum production. He has lost about 10 lbs. A bronchoscopy was non-diagnostic. A CT guided Bx has scant cells s/o a diagnosis of malignancy. MRI brain revealed 5 focus of metastatic disease and he underwent a right occipital craniotomy at [facility] on 1/29/2019. He completed SBRT to the brain lesions and tumor bed at [facility] last week. He is following with Palliative Medicine. He is receiving palliative treatment. He is eating better and gaining weight. Overall he continues to do well and does not verbalize any new complaints. Review of Systems: 
 
Constitutional: fatigue Eyes: negative Ears, Nose, Mouth, Throat, and Face: negative Respiratory: cough Cardiovascular: negative Gastrointestinal: diarrhea Genitourinary:negative Integument/Breast: negative Hematologic/Lymphatic: negative Musculoskeletal: right upper chest pain Neurological: negative History reviewed. No pertinent history of prior cancer History reviewed. No pertinent surgical history. History reviewed. No pertinent family history of cancer Social History Tobacco Use  Smoking status: Former Smoker Packs/day: 1.50 Years: 25.00 Pack years: 37.50 Last attempt to quit: 1/21/2009 Years since quitting: 10.2  Smokeless tobacco: Never Used Substance Use Topics  Alcohol use: No  
  Frequency: Never Prior to Admission medications Medication Sig Start Date End Date Taking? Authorizing Provider  
insulin lispro (HUMALOG) 100 unit/mL kwikpen Inject 5 to 15 units three times daily as directed with each meal based on carb intake and blood sugar. Use zero copay voucher. 4/18/19  Yes Bianca Ceballos MD  
insulin lispro (HUMALOG U-100 INSULIN) 100 unit/mL injection INJECT 5 UNITS SUBCUTANEOUSLY BEFORE MEALS-INCREASE HUMALOG 1 UNIT EVERY OTHER DAY UNTIL 2 HR PP  OR LESS. Use Humalog kwik pen after done with vial. 4/18/19  Yes Bianca Ceballos MD  
insulin glargine (LANTUS) 100 unit/mL injection 14 Units by SubCUTAneous route nightly. Use toujeo pen when out of lantus vial. 4/18/19  Yes Bianca Ceballos MD  
megestrol (MEGACE) 40 mg tablet Take 1 Tab by mouth two (2) times a day. 4/16/19  Yes Alice Wells MD  
Insulin Syringes, Disposable, 1 mL syrg 1 mL by Does Not Apply route four (4) times daily for 30 days. 4/11/19 5/11/19 Yes Bianca Ceballos MD  
morphine CR (MS CONTIN) 30 mg CR tablet Take 1 Tab by mouth every twelve (12) hours for 30 days. Max Daily Amount: 60 mg. 4/8/19 5/8/19 Yes Alice Wells MD  
oxyCODONE IR (ROXICODONE) 20 mg immediate release tablet Take 1 Tab by mouth every four (4) hours as needed for Pain for up to 15 days. Max Daily Amount: 120 mg. 4/12/19 4/27/19 Yes Alice Wells MD  
dexamethasone (DECADRON) 2 mg tablet Take 1 Tab by mouth three (3) times daily. Patient taking differently: Take 2 mg by mouth two (2) times a day. 3/11/19  Yes Alice Wells MD  
pantoprazole (PROTONIX) 40 mg tablet Take 1 Tab by mouth daily.  3/5/19 Yes Asha Balderas MD  
lidocaine-prilocaine (EMLA) topical cream Apply  to affected area as needed for Pain. 2/13/19  Yes Asha Balderas MD  
ondansetron (ZOFRAN ODT) 4 mg disintegrating tablet Take 1 Tab by mouth every eight (8) hours as needed for Nausea. 2/13/19  Yes Asha Balderas MD  
acetaminophen (TYLENOL) 325 mg tablet Take 325 mg by mouth every four (4) hours as needed for Pain. Yes Provider, Historical  
insulin glargine U-300 conc (TOUJEO SOLOSTAR U-300 INSULIN) 300 unit/mL (1.5 mL) inpn Inject 14 units every night. Use zero copay voucher. Use this when out of Lantus. 4/18/19   Andrew Dow MD  
fenofibrate (LOFIBRA) 160 mg tablet Take 160 mg by mouth daily. Provider, Historical  
  
 
 
No Known Allergies Objective:  
 
Visit Vitals /79 (BP 1 Location: Left arm, BP Patient Position: Sitting) Pulse 97 Temp 99 °F (37.2 °C) (Oral) Resp 18 Ht 5' 10\" (1.778 m) Wt 161 lb (73 kg) SpO2 96% BMI 23.10 kg/m² Pain: None Physical Exam: 
 
GENERAL: alert, cooperative, no distress, appears stated age EYE: conjunctivae/corneas clear. PERRL, EOM's intact. Fundi benign LYMPHATIC: Cervical, supraclavicular, and axillary nodes normal.  
THROAT & NECK: normal and no erythema or exudates noted. LUNG: clear to auscultation bilaterally HEART: regular rate and rhythm, S1, S2 normal, no murmur, click, rub or gallop ABDOMEN: soft, non-tender. Bowel sounds normal. No masses,  no organomegaly EXTREMITIES:  extremities normal, atraumatic, no cyanosis or edema SKIN: Normal. 
NEUROLOGIC: AOx3. Gait normal. Reflexes and motor strength normal and symmetric. Cranial nerves 2-12 and sensation grossly intact. MRI brain: 5 lesions: right occipital, right lateral ventricular arm, right temporal, right anterior temporal and left frontal 
 
PET CT: large RUL mass with extensive mediastinal adenopathy, metastatic disease to the liver, brain Lab Results Component Value Date/Time WBC 3.3 (L) 04/22/2019 11:21 AM  
 HGB 10.4 (L) 04/22/2019 11:21 AM  
 HCT 31.8 (L) 04/22/2019 11:21 AM  
 PLATELET 138 75/27/4803 11:21 AM  
 MCV 97.2 04/22/2019 11:21 AM  
 
 
Lab Results Component Value Date/Time Sodium 138 04/22/2019 11:21 AM  
 Potassium 3.9 04/22/2019 11:21 AM  
 Chloride 108 04/22/2019 11:21 AM  
 CO2 26 04/22/2019 11:21 AM  
 Anion gap 4 (L) 04/22/2019 11:21 AM  
 Glucose 151 (H) 04/22/2019 11:21 AM  
 BUN 17 04/22/2019 11:21 AM  
 Creatinine 0.78 04/22/2019 11:21 AM  
 BUN/Creatinine ratio 22 (H) 04/22/2019 11:21 AM  
 GFR est AA >60 04/22/2019 11:21 AM  
 GFR est non-AA >60 04/22/2019 11:21 AM  
 Calcium 8.6 04/22/2019 11:21 AM  
 Bilirubin, total 0.3 04/22/2019 11:21 AM  
 AST (SGOT) 15 04/22/2019 11:21 AM  
 Alk. phosphatase 46 04/22/2019 11:21 AM  
 Protein, total 6.6 04/22/2019 11:21 AM  
 Albumin 3.4 (L) 04/22/2019 11:21 AM  
 Globulin 3.2 04/22/2019 11:21 AM  
 A-G Ratio 1.1 04/22/2019 11:21 AM  
 ALT (SGPT) 26 04/22/2019 11:21 AM  
 
 
 
 
Assessment:  
 
1. T3 N3 M1a (Stage IV) NSCLC of the lung, likely adenoca NGS - no targetable mutation PD-L1 90% 
 
> Unresectable disease ECOG PS 1 Intent of Treatment - palliative Prognosis poor S/p right occipital craniotomy at [facility] on 1/29/2019 Completed SBRT to brain lesions and tumor bed by Dr. Rhonda Morse at [facility] on 2/27/2019 Receiving palliative chemotherapy Carbo/Alimta/Pembrolizumab - Cycle 3 Day 1 Tolerating treatment A detailed system by system evaluation of side effect was performed to assess chemotherapy related toxicity. Blood counts are acceptable. Results reviewed with the patient. Symptom management form reviewed with patient. 2. Severe protein calorie malnutrition 
 
> Dietician consult 
> protein supplementation Plan:  
 
 
> Continue Carbo/Alimta/Pembrolizumab 
> Continue to follow with Palliative Medicine 
> Follow-up in 3 weeks Signed by: Oli Lucas MD 
                   April 22, 2019 
 
 
 
CC. Cristel Russell MD 
CC. Bertram Canchola MD 
CC. Nohemy Carl MD 
CC. Zeny Blanton MD 
CC.  Iraj Soler MD

## 2019-04-22 NOTE — PROGRESS NOTES
Pt arrived to [facility] ambulatory for Keytruda/Alimta/Carboplatin C3 in no acute distress at 1110.  Assessment unremarkable. R chest port accessed without issue and positive blood return noted.  Labs obtained- CBC with diff, CMP, and TSH. Pt to MD office for follow up appointment. Visit Vitals  /86 (BP 1 Location: Left arm, BP Patient Position: Sitting)   Pulse 91   Temp 99 °F (37.2 °C)   Resp 18   Ht 5' 10\" (1.778 m)   Wt 73.5 kg (162 lb 1.6 oz)   SpO2 99%   BMI 23.26 kg/m²     Recent Results (from the past 12 hour(s))   CBC WITH AUTOMATED DIFF    Collection Time: 04/22/19 11:21 AM   Result Value Ref Range    WBC 3.3 (L) 4.1 - 11.1 K/uL    RBC 3.27 (L) 4.10 - 5.70 M/uL    HGB 10.4 (L) 12.1 - 17.0 g/dL    HCT 31.8 (L) 36.6 - 50.3 %    MCV 97.2 80.0 - 99.0 FL    MCH 31.8 26.0 - 34.0 PG    MCHC 32.7 30.0 - 36.5 g/dL    RDW 21.1 (H) 11.5 - 14.5 %    PLATELET 574 756 - 689 K/uL    MPV 8.5 (L) 8.9 - 12.9 FL    NRBC 0.0 0  WBC    ABSOLUTE NRBC 0.00 0.00 - 0.01 K/uL    NEUTROPHILS 62 32 - 75 %    LYMPHOCYTES 22 12 - 49 %    MONOCYTES 14 (H) 5 - 13 %    EOSINOPHILS 0 0 - 7 %    BASOPHILS 0 0 - 1 %    IMMATURE GRANULOCYTES 2 (H) 0.0 - 0.5 %    ABS. NEUTROPHILS 2.0 1.8 - 8.0 K/UL    ABS. LYMPHOCYTES 0.7 (L) 0.8 - 3.5 K/UL    ABS. MONOCYTES 0.5 0.0 - 1.0 K/UL    ABS. EOSINOPHILS 0.0 0.0 - 0.4 K/UL    ABS. BASOPHILS 0.0 0.0 - 0.1 K/UL    ABS. IMM.  GRANS. 0.1 (H) 0.00 - 0.04 K/UL    DF AUTOMATED     METABOLIC PANEL, COMPREHENSIVE    Collection Time: 04/22/19 11:21 AM   Result Value Ref Range    Sodium 138 136 - 145 mmol/L    Potassium 3.9 3.5 - 5.1 mmol/L    Chloride 108 97 - 108 mmol/L    CO2 26 21 - 32 mmol/L    Anion gap 4 (L) 5 - 15 mmol/L    Glucose 151 (H) 65 - 100 mg/dL    BUN 17 6 - 20 MG/DL    Creatinine 0.78 0.70 - 1.30 MG/DL    BUN/Creatinine ratio 22 (H) 12 - 20      GFR est AA >60 >60 ml/min/1.73m2    GFR est non-AA >60 >60 ml/min/1.73m2    Calcium 8.6 8.5 - 10.1 MG/DL    Bilirubin, total 0.3 0.2 - 1.0 MG/DL    ALT (SGPT) 26 12 - 78 U/L    AST (SGOT) 15 15 - 37 U/L    Alk. phosphatase 46 45 - 117 U/L    Protein, total 6.6 6.4 - 8.2 g/dL    Albumin 3.4 (L) 3.5 - 5.0 g/dL    Globulin 3.2 2.0 - 4.0 g/dL    A-G Ratio 1.1 1.1 - 2.2     TSH 3RD GENERATION    Collection Time: 04/22/19 11:21 AM   Result Value Ref Range    TSH 1.21 0.36 - 3.74 uIU/mL       The following medications administered:  NS @ KVO  Aloxi 0.25 mg IVP  Emend 150 mg IV over 20 minutes  Decadron 12 mg IV over 15 minutes  Keytruda 200 mg IV over 30 minutes  Alimta 945 mg IV over 10 minutes  Carboplatin 662 mg IV over 30 minutes      Visit Vitals  /84 (BP 1 Location: Left arm, BP Patient Position: Sitting)   Pulse 90   Temp 99 °F (37.2 °C)   Resp 18   Ht 5' 10\" (1.778 m)   Wt 73.5 kg (162 lb 1.6 oz)   SpO2 99%   BMI 23.26 kg/m²       Pt tolerated treatment well.  No adverse reaction noted. Port flushed per policy and needle removed, 2x2 and paper tape placed.  Pt discharged ambulatory in no acute distress at 1605, accompanied by self. Next appointment 5/13/19 @ 1100.

## 2019-04-23 NOTE — PROGRESS NOTES
Pt without insurance. Working on patient assistance for his insulin products. Below is an assessment of medication costs for his current medication regimen at his current  Pharmacy and then other cost options completed on Monday 4/22/19. Please note that this list does not take into account what the cost would be at the [facility] pharmacy, or with assistance that may be provided through palliative. And costs are subject to change. Will forward to PCP and also mail to patient so can take into consideration if indicated.      Juliette Motta, PharmD  Emely Arana, ANGUSD, CDE     MW Medication Pricing Comparison   Paul Crespo at [facility] AT [facility] at [address] Days Supply  Other Options   Insulin Lispro   (Humalog) 100 units/mL, 10 mL vial: $325 100 units/mL, 10 mL vial: $172.15 Same across pharmacies 22-66d based on   5-15 units TID Patient assistance program   Insulin glargine   (Lantus) 100 units/mL, 10 mL vial: $321 100 units/mL, 10 mL vial: $199.15 Same across pharmacies 70d based on 14 units daily Patient assistance program    Fenofibrate (TriCor)   160 mg tablet $79 $35.21 [facility]: $18.92 30d Re-assess need after lipid panel  or  Decrease dose to 145 mg daily -   [facility]: $9   Megestrol (Megace)   40 mg tablet $44 $28.47 [facility]: $20.40 30d Megestrol 40 mg, 90 tablets - [facility]: $15.99 through savings program (45 day supply)   Morphine CR   30 mg tablet $129 $22.66 [facility]: $20.60 30d --   Oxycodone IR   20 mg tablet PRN $76 $28.85 [facility]: $22.24 60 tablets  --   Dexamethasone   2 mg tablet $62 $19.51 [facility]: $18.95 30d Dexamethasone 4 mg tablet, ½ tab BID - [facility]: $9.99 through savings program    Pantoprazole   40 mg tablet $104 $21.85 [facility]: $14.88 30d Switch to omeprazole 20 mg daily - [facility]: $9, [facility]: $7.50 (for 90d) through savings program   Ondansetron   4 mg ODT PRN $219 $79.75 [facility]: $16.39 30 tablets --   Deandre Kellen and [facility] generally have more affordable [employer] with a membership  **Does not include prices from [employer] [facility] or take into account costs if there is assistance provided by palliative

## 2019-04-30 NOTE — PROGRESS NOTES
Palliative Medicine Outpatient Services [place]: [phone] Patient Name: Alec Gregory YOB: 1960 Date of Current Visit: 04/30/19 Location of Current Visit:   
[] [facility] [place] Office 
[] [facility] Office [x] [facility] Office 
[] Home 
[] Other:   
 
Date of Initial Visit: 1/28/19 Requesting Physician: Dr. Konrad Redman Primary Care Physician: Tasia Vences MD 
  
 SUMMARY:  
Alec Gregory is a 62y.o. 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 Konrad Redman for management of symptoms and support. The patients social history includes worked in construction, has 30 plus year smoking history but quit in 2008, lives with wife who is stage 2 breast cancer survivor for over 8 yeas. Wife Khushbu Garibay has 2 children and one with Nancy Hernandez. Twin grand children live with them, Nancy Hernandez has 3 children. Status post brain tumor removal at [facility], SBR T to the brain tumor, about to start chemotherapy. PALLIATIVE DIAGNOSES:  
 
  ICD-10-CM ICD-9-CM 1. Acute pain of right shoulder M25.511 719.41   
2. Malignant neoplasm of upper lobe of lung, unspecified laterality (HCC) C34.10 162.3 morphine CR (MS CONTIN) 30 mg CR tablet  
   oxyCODONE IR (ROXICODONE) 20 mg immediate release tablet  
   oxyCODONE IR (ROXICODONE) 20 mg immediate release tablet 3. Drug-induced constipation K59.03 564.09   
  E980.5 4. Gastroesophageal reflux disease with esophagitis K21.0 530.11   
5. Lung cancer metastatic to brain ([facility] [address] C34.90 162.9   
 C79.31 198.3 PLAN:  
Patient Instructions Dear Alec Gregory , It was a pleasure seeing you today at [facility] office This is the plan we talked about: 1. Right sided chest pain radiating to the shoulder - You are doing really well with Oxycodone 20 mg every 3-4 hours. - You are taking Morphine ER 30 mg two times a day - Let us increase Morphine Er to 30 mg three times a day in order to decrease the every 3 hour need for Oxycodone. With this increase, I hope you can decrease your Oxycodone to 4 tabs or less per day. 2. Constipation - I am glad you are taking stool softners regularly but still very constipated. - Please take Miralax daily and I am starting Lactulose 30 ml every 4 hours until you have a bowel movement - Please continue this bowel regimen religiously. 3. Acid reflux - You are still struggling with this. Let us increase Protonix to 40 mg two times a day. 4. Cognitive impairment - We have been decreasing your dexamethasone 1 mg daily for 7 days and then stop. 5. Diabetes - You have established care with Dr. Sonali Jacob as your pcp and he is taking over your management of Diabetes. 5. ACP 
- We completed an advance medical directive and medical power of . - Financial poa is all taken care of as well. - Your wife is working with jl RAVI and Westley An for [facility] and Medicaid. 6. Goals of care - We discussed your wishes regarding cpr/ intubation and you want to remain FULL code for now and think about it more. - You are willing to take more responsibility for your own care and attempt to eat better. - You are tolerating chemo well. This is what you have shared with us about Advance Care Planning: 
 
Primary Decision [address] ([address]): [place] Relationship to patient:Wife 
Phone Chick Ran [x] Named in a scanned document  
[x] Legal Next of Kin 
[] Guardian Secondary Decision Maker (First Alternate Health Care Agent):  Gina Mike Relationship to patient: Father Phone number: [phone] [x] Named in a scanned document  
[] Legal Next of Kin 
[] Guardian ACP documents you current have include: 
[x] Advance Directive or Living Will 
[] Durable Do Not Resuscitate 
[] Physician Orders for Scope of Treatment (POST) [] Medical Power of [address] [place] [address] [] Other The Palliative Medicine Team is here to support you and your family. We will see you again in 4 weeks Sincerely, 
 
 
Sarah Guaman MD and the Palliative Medicine Team 
 
 
Counseling and Coordination:  
See above Opioid safety counseling GOALS OF CARE / TREATMENT PREFERENCES:  
[====Goals of Care====] GOALS OF CARE: 
Patient / health care proxy stated goals: FULL 
 
 
TREATMENT PREFERENCES:  
Code Status:  [x] Attempt Resuscitation       [] Do Not Attempt Resuscitation Advance Care Planning: 
[x] [facility] Interdisciplinary Team has updated the ACP Navigator with [address] and Patient Capacity The palliative care team has discussed with patient / health care proxy about goals of care / treatment preferences for patient. 
[====Goals of Care====] PRESCRIPTIONS GIVEN:  
 
Medications Ordered Today Medications  morphine CR (MS CONTIN) 30 mg CR tablet Sig: Take 1 Tab by mouth every eight (8) hours for 30 days. Max Daily Amount: 90 mg. Dispense:  90 Tab Refill:  0  
 DISCONTD: dexamethasone (DECADRON) 1 mg tablet Sig: Take 2 Tabs by mouth daily. Take 2 mg daily for 7 days, then decrease to 1 mg daily for 7 days and then stop Dispense:  30 Tab Refill:  0  
 oxyCODONE IR (ROXICODONE) 20 mg immediate release tablet Sig: Take 1 Tab by mouth every six (6) hours as needed for Pain for up to 15 days. Max Daily Amount: 80 mg. Dispense:  60 Tab Refill:  0  
 oxyCODONE IR (ROXICODONE) 20 mg immediate release tablet Sig: Take 1 Tab by mouth every six (6) hours as needed for Pain for up to 15 days. Max Daily Amount: 80 mg. Dispense:  60 Tab Refill:  0  
 DISCONTD: dexamethasone (DECADRON) 1 mg tablet Sig: Take 1 Tab by mouth daily for 7 days. Dispense:  7 Tab Refill:  0  
 pantoprazole (PROTONIX) 40 mg tablet Sig: Take 1 Tab by mouth two (2) times a day. Indications: gastroesophageal reflux disease Dispense:  60 Tab Refill:  6  
 dexamethasone (DECADRON) 1 mg tablet Sig: Take 1 Tab by mouth daily for 7 days. Dispense:  7 Tab Refill:  0  
  
 
 
 FOLLOW UP: Future Appointments Date Time Provider Marlo Cummins 5/13/2019 10:45 AM Ludwin Pacheco NP [facility] [place] SCHED  
5/13/2019 11:00 AM [facility] CHAIR 1 [facility]  
7/11/2019 10:50 AM Renetta Rodriguez MD [address] [facility] PHYSICIANS INVOLVED IN CARE:  
Patient Care Team: 
Renetta Rodriguez MD as PCP - General (Internal Medicine) Stephen Durham MD (Pulmonary Disease) Michael Mcdaniel MD (Hematology and Oncology) Marisela Valerio MD (Neurosurgery) Dayron Boone MD as Surgeon (Thoracic (non-cardiac) Surgery) HISTORY:  
Nursing documentation from date of visit reviewed. Reviewed patient-completed ESAS and advance care planning form. Reviewed patient record in prescription monitoring program. 
 
CHIEF COMPLAINT: right sided chest pain HPI/SUBJECTIVE: The patient is: [] Verbal / [] Nonverbal  
 
Patient here with sister-in-law David Choe who returned from [facility]. Shoulder pain-he is on morphine extended release 2 times a day but still needs oxycodone 20 mg every 3-4 hours during the day and night. His activity level has increased with improvement in cognition and therefore he feels activity correlates with increased pain. Constipation-on bowel regimen and still having bowel movement once every 2 to 3 days. Acid reflux-has significant amount of heartburn almost every day. He takes Protonix 40 mg in the morning but still has significant heartburn. Cognition-his memory and cognition is improved tremendously. He feels better overall, gaining some weight. But continues to have severe fatigue. We talked about vitamin B12 tablets and preferably injection from PCP. 
 
---------- Patient here today along with his wife Fabrice Howe and Zane Manley sister from [facility]. Melva Stearns reports severe right-sided chest wall pain that radiates to his shoulder. He has been on oxycodone 5 mg which was increased from every 4 hours to every 3 hours. He reports that this does not work in any way and he is in excruciating pain throughout the day. He does not have past history of opioids. He quit smoking and drinking in 2008. He is constipated. Taking prune juice. On dexamethasone 4 mg 2 times a day for brain metastases Significant amount of distress in the family with expected grief. He was diagnosed with cancer on New Year's Shari and it has been overwhelming since then. We talked about prognosis at length and what to expect with brain surgery, radiation and chemotherapy. Advised and encouraged them to take it one day at a time instead of being overwhelmed. Encourage communication within the family and involve adult children and helping out with appointments support at home and care for the twin 6year-old grandchildren. Clinical Pain Assessment (nonverbal scale for nonverbal patients):  
[++++ Clinical Pain Assessment++++] [++++Pain Severity++++]: Pain: 10 
[++++Pain Character++++]: digging 
[++++Pain Duration++++]: weeks [++++Pain Effect++++]: functional and emotional 
[++++Pain Factors++++]: none in particular 
[++++Pain Frequency++++]: constant [++++Pain Location++++]: right-sided chest wall and shoulder 
[++++ Clinical Pain Assessment++++] FUNCTIONAL ASSESSMENT:  
 
Palliative Performance Scale (PPS): PPS: 70 PSYCHOSOCIAL/SPIRITUAL SCREENING:  
 
Any spiritual / Nondenominational concerns: 
[] Yes /  [x] No 
 
Caregiver Burnout: 
[] Yes /  [x] No /  [] No Caregiver Present Anticipatory grief assessment:  
[x] Normal  / [] Maladaptive ESAS Anxiety: Anxiety: 0 
 
ESAS Depression: Depression: 0 REVIEW OF SYSTEMS:  
 
The following systems were [] reviewed / [] unable to be reviewed Systems: constitutional, ears/nose/mouth/throat, respiratory, gastrointestinal, genitourinary, musculoskeletal, integumentary, neurologic, psychiatric, endocrine. Positive findings noted below. Modified ESAS Completed by: provider Fatigue: 10 Drowsiness: 0 Depression: 0 Pain: 10 Anxiety: 0 Nausea: 5 Anorexia: 0 Dyspnea: 0 Best Well-Bein Constipation: Yes Other Problem (Comment): 0 PHYSICAL EXAM:  
 
Wt Readings from Last 3 Encounters:  
19 155 lb 8 oz (70.5 kg) 19 162 lb 1.6 oz (73.5 kg) 19 161 lb (73 kg) Blood pressure 132/79, pulse 98, temperature 98.1 °F (36.7 °C), temperature source Oral, resp. rate 18, height 5' 10\" (1.778 m), weight 155 lb 8 oz (70.5 kg), SpO2 99 %. Last bowel movement: See Nursing Note Constitutional: Alert, oriented Eyes: pupils equal, anicteric ENMT: no nasal discharge, moist mucous membranes Cardiovascular: regular rhythm, distal pulses intact Respiratory: breathing not labored, symmetric with poor air entry on the right side Gastrointestinal: soft non-tender, +bowel sounds Musculoskeletal: no deformity, no tenderness to palpation, obvious muscle wasting in the chest area Skin: warm, dry Neurologic: following commands, moving all extremities Psychiatric: full affect, no hallucinations Other: 
 
 
 HISTORY:  
 
Past Medical History:  
Diagnosis Date  Cancer ([facility] [address] LUNG RT. SIDE METS TO BRAIN, LIVER  Chronic obstructive pulmonary disease ([facility] [address] MILD PER WIFE  
 Diabetes ([facility] [address] TYPE II  
 GERD (gastroesophageal reflux disease)  Hypertension Past Surgical History:  
Procedure Laterality Date  HX CERVICAL FUSION    
 HX CRANIOTOMY  2019 REMOVAL OF BRAIN METS AT [facility]  HX GI    
 COLONOSCOPY  
 HX HERNIA REPAIR Right  INGUINAL  VASCULAR SURGERY PROCEDURE UNLIST Bilateral   
 LEGS Family History Problem Relation Age of Onset  Stroke Father  Diabetes Father  Other Mother 58      SUDDEN DEATH  
  Cancer Maternal Grandfather  Anesth Problems Neg Hx History reviewed, no pertinent family history. Social History Tobacco Use  Smoking status: Former Smoker Packs/day: 1.50 Years: 25.00 Pack years: 37.50 Last attempt to quit: 1/21/2009 Years since quitting: 10.2  Smokeless tobacco: Never Used Substance Use Topics  Alcohol use: No  
  Frequency: Never No Known Allergies Current Outpatient Medications Medication Sig  morphine CR (MS CONTIN) 30 mg CR tablet Take 1 Tab by mouth every eight (8) hours for 30 days. Max Daily Amount: 90 mg.  
 oxyCODONE IR (ROXICODONE) 20 mg immediate release tablet Take 1 Tab by mouth every six (6) hours as needed for Pain for up to 15 days. Max Daily Amount: 80 mg.  
 oxyCODONE IR (ROXICODONE) 20 mg immediate release tablet Take 1 Tab by mouth every six (6) hours as needed for Pain for up to 15 days. Max Daily Amount: 80 mg.  pantoprazole (PROTONIX) 40 mg tablet Take 1 Tab by mouth two (2) times a day. Indications: gastroesophageal reflux disease  dexamethasone (DECADRON) 1 mg tablet Take 1 Tab by mouth daily for 7 days.  insulin lispro (HUMALOG) 100 unit/mL kwikpen Inject 5 to 15 units three times daily as directed with each meal based on carb intake and blood sugar. Use zero copay voucher.  insulin lispro (HUMALOG U-100 INSULIN) 100 unit/mL injection INJECT 5 UNITS SUBCUTANEOUSLY BEFORE MEALS-INCREASE HUMALOG 1 UNIT EVERY OTHER DAY UNTIL 2 HR PP  OR LESS. Use Humalog kwik pen after done with vial.  
 insulin glargine (LANTUS) 100 unit/mL injection 14 Units by SubCUTAneous route nightly. Use toujeo pen when out of lantus vial.  
 megestrol (MEGACE) 40 mg tablet Take 1 Tab by mouth two (2) times a day.  Insulin Syringes, Disposable, 1 mL syrg 1 mL by Does Not Apply route four (4) times daily for 30 days.  lidocaine-prilocaine (EMLA) topical cream Apply  to affected area as needed for Pain.  ondansetron (ZOFRAN ODT) 4 mg disintegrating tablet Take 1 Tab by mouth every eight (8) hours as needed for Nausea.  acetaminophen (TYLENOL) 325 mg tablet Take 325 mg by mouth every four (4) hours as needed for Pain.  insulin glargine U-300 conc (TOUJEO SOLOSTAR U-300 INSULIN) 300 unit/mL (1.5 mL) inpn Inject 14 units every night. Use zero copay voucher. Use this when out of Lantus.  fenofibrate (LOFIBRA) 160 mg tablet Take 160 mg by mouth daily. No current facility-administered medications for this visit. LAB DATA REVIEWED:  
 
Lab Results Component Value Date/Time WBC 3.3 (L) 04/22/2019 11:21 AM  
 HGB 10.4 (L) 04/22/2019 11:21 AM  
 PLATELET 163 17/75/8912 11:21 AM  
 
Lab Results Component Value Date/Time Sodium 138 04/22/2019 11:21 AM  
 Potassium 3.9 04/22/2019 11:21 AM  
 Chloride 108 04/22/2019 11:21 AM  
 CO2 26 04/22/2019 11:21 AM  
 BUN 17 04/22/2019 11:21 AM  
 Creatinine 0.78 04/22/2019 11:21 AM  
 Calcium 8.6 04/22/2019 11:21 AM  
 Magnesium 2.2 12/31/2018 01:13 PM  
 Phosphorus 2.6 03/31/2010 06:43 AM  
  
Lab Results Component Value Date/Time AST (SGOT) 15 04/22/2019 11:21 AM  
 Alk. phosphatase 46 04/22/2019 11:21 AM  
 Protein, total 6.6 04/22/2019 11:21 AM  
 Albumin 3.4 (L) 04/22/2019 11:21 AM  
 Globulin 3.2 04/22/2019 11:21 AM  
 
Lab Results Component Value Date/Time  
 INR >9.2 (HH) 05/28/2010 04:43 PM  
 Prothrombin time >75.0 (H) 05/28/2010 04:43 PM  
 aPTT 144.2 (H) 05/28/2010 04:43 PM  
  
No results found for: IRON, FE, TIBC, IBCT, PSAT, FERR Impression: 
  
1. Large right upper lobe mass measuring 5.6 x 4.7 x 4.8 cm, which is in 
continuity with extensive conglomerate right paratracheal and right hilar 
adenopathy. Findings are consistent with primary lung malignancy with extensive 
metastatic adenopathy. The right upper lobe mass occludes the segmental and 
subsegmental bronchi of the posterior segment of the right upper lobe. 2. Metastatic subcarinal adenopathy. Enlarged left lower paratracheal 
adenopathy, which is also favored to be metastatic. 3. A 6 mm satellite nodule in the right upper lobe, consistent with metastasis. 4. Indeterminate hypodensities within hepatic segment IVb, which may represent 
metastatic disease. 5. No evidence of pulmonary embolism. 
  
I personally reviewed the images. 
  
MRI brain: 5 lesions: right occipital, right lateral ventricular arm, right temporal, right anterior temporal and left frontal 
  
PET CT: large RUL mass with extensive mediastinal adenopathy, metastatic disease to the liver, brain 
  
 
 
 
 CONTROLLED SUBSTANCES SAFETY ASSESSMENT (IF ON CONTROLLED SUBSTANCES):  
 
Reviewed opioid safety handout:  [x] Yes   [] No 
24 hour opioid dose >150mg morphine equivalent/day:  [] Yes   [x] No 
Benzodiazepines:  [] Yes   [x] No 
Sleep apnea:  [] Yes   [x] No 
Urine Toxicology Testing within last 6 months:  [] Yes   [x] No 
History of or new aberrant medication taking behaviors:  [] Yes   [x] No 
Has Narcan been prescribed [] Yes   [x] No 
 
   
 
Total time: 90m Counseling / coordination time: 70m 
> 50% counseling / coordination?: y

## 2019-04-30 NOTE — PATIENT INSTRUCTIONS
Dear Jone Choi , It was a pleasure seeing you today at [address] [facility] office This is the plan we talked about: 1. Right sided chest pain radiating to the shoulder - You are doing really well with Oxycodone 20 mg every 3-4 hours. - You are taking Morphine ER 30 mg two times a day - Let us increase Morphine Er to 30 mg three times a day in order to decrease the every 3 hour need for Oxycodone. With this increase, I hope you can decrease your Oxycodone to 4 tabs or less per day. 2. Constipation - I am glad you are taking stool softners regularly but still very constipated. - Please take Miralax daily and I am starting Lactulose 30 ml every 4 hours until you have a bowel movement - Please continue this bowel regimen religiously. 3. Acid reflux - You are still struggling with this. Let us increase Protonix to 40 mg two times a day. 4. Cognitive impairment - We have been decreasing your dexamethasone 1 mg daily for 7 days and then stop. 5. Diabetes - You have established care with Dr. Shannon Huang as your pcp and he is taking over your management of Diabetes. 5. ACP 
- We completed an advance medical directive and medical power of . - Financial poa is all taken care of as well. - Your wife is working with jl RAVI and Rima Acosta for [facility] and Medicaid. 6. Goals of care - We discussed your wishes regarding cpr/ intubation and you want to remain FULL code for now and think about it more. - You are willing to take more responsibility for your own care and attempt to eat better. - You are tolerating chemo well. This is what you have shared with us about Advance Care Planning: 
 
Primary Decision [facility] ([address]): [place] Relationship to patient:Wife 
Phone Cyndie Tam [x] Named in a scanned document  
[x] Legal Next of Kin 
[] Guardian Secondary Decision Maker (First Alternate Health Care Agent):  Sonya Hooker Relationship to patient: Father Phone number: [phone] [x] Named in a scanned document  
[] Legal Next of Kin 
[] Guardian ACP documents you current have include: 
[x] Advance Directive or Living Will 
[] Durable Do Not Resuscitate 
[] Physician Orders for Scope of Treatment (POST) [] Medical Power of  
[] Other The Palliative Medicine Team is here to support you and your family. We will see you again in 4 weeks Sincerely, 
 
 
Veronica Avila MD and the Palliative Medicine Team

## 2019-04-30 NOTE — PROGRESS NOTES
Palliative Medicine Office Visit Palliative Medicine Nurse Check In 
[phone] Patient Name: lAfa Chan YOB: 1960 Date of Office Visit: 4/30/2019 Patient states: \"  \" 
 
From Check In Sheet (scanned in Media): 
Has a medical provider talked with you about cardiopulmonary resuscitation (CPR)? [x] Yes   [] No   [] Unable to obtain Nurse reminder to complete or update ACP FlowSheet: 
 
Is ACP on the Problem List?    [x] Yes    [] No 
IF ACP Document is ON FILE; Nurse to place ACP on Problem List  
 
Is there an ACP Note in Chart Review/Note? [x] Yes    [] No  
If NO: ALERT PROVIDER Primary Decision Maker: Dilcia Mercedes - Spouse - [phone] Secondary Decision Maker: Ramesh Velásquez - Father - [phone] Advance Care Planning 4/30/2019 Patient's Healthcare Decision Maker is: Named in scanned ACP document Confirm Advance Directive Yes, on file Does the patient have other document types - Is there anything that we should know about you as a person in order to provide you the best care possible? Have you been to the ER, urgent care clinic since your last visit? [] Yes   [x] No   [] Unable to obtain Have you been hospitalized since your last visit? [] Yes   [x] No   [] Unable to obtain Have you seen or consulted any other health care providers outside of the [address] [place] [address] since your last visit? [] Yes   [x] No   [] Unable to obtain Functional status (describe):  
 
 
 
Last BM: 4/30/2019  accessed (date): 4/30/2019 Bottle review (for opioid pain medication): 
Medication 1:  
Date filled:  
Directions:  
# filled: # left: # pills taking per day: 
Last dose taken: 
 
Medication 2:  
Date filled:  
Directions:  
# filled: # left: # pills taking per day: 
Last dose taken: 
 
Medication 3:  
Date filled:  
Directions:  
# filled: # left: # pills taking per day: 
Last dose taken: Medication 4:  
Date filled:  
Directions:  
# filled: # left: # pills taking per day: 
Last dose taken:

## 2019-04-30 NOTE — PROGRESS NOTES
Palliative Medicine Social Work Narrative Mariana Garcia is a 62 y.o. male with stage 4 lung cancer with brain and liver mets who is presenting for a follow up appointment with Dr. Kimberli Espinoza for symptom management and support. This writer attended part of the appointment to introduce self to patient. Patient's sister in law was also present. Assessment / Action: 
1. Introduced self and role of this  on the Palliative Medicine Team.   
2.  Informed the patient of the ability to connect with resources if needed. 3.  Continue to meet with the patient when he returns to the clinic for ongoing assessment of the patients adjustment to treatment. Plan:  
Ongoing psychosocial support as desired by patient. Vernell Gaines LCSW Palliative Medicine,  [phone]

## 2019-05-08 NOTE — TELEPHONE ENCOUNTER
Received a message regarding patient having issues affording medications. We had submitted patient assistance for insulin, and when I called [employer] to check on status today, company needs proof of income faxed. When [employer] did their income verification it looks like they had pt's income while working. Also had sent information about cost effective options for meds (see note dated 2.20.21)  Tried to call pt / spouse back and home number not in service / cell phone no answer and \"mailbox full\"     Will try back.   aMlik Cohen, ANGUSD, CDE

## 2019-05-13 NOTE — PROGRESS NOTES
Identified pt with two pt identifiers(name and ). Reviewed record in preparation for visit and have obtained necessary documentation. Chief Complaint Patient presents with  Lung Cancer D1 C4 Carboplatin AUC 5 + pemetrexed + Pertubrolizumab Visit Vitals /75 (BP 1 Location: Left arm, BP Patient Position: Sitting) Pulse 88 Temp 98.8 °F (37.1 °C) (Oral) Resp 18 Ht 5' 10\" (1.778 m) Wt 163 lb (73.9 kg) SpO2 97% BMI 23.39 kg/m² Health Maintenance Due Topic  Hepatitis C Screening  Pneumococcal 0-64 years (1 of 3 - PCV13)  FOOT EXAM Q1   
 MICROALBUMIN Q1   
 EYE EXAM RETINAL OR DILATED  DTaP/Tdap/Td series (1 - Tdap)  Shingrix Vaccine Age 50> (1 of 2)  LIPID PANEL Q1 Coordination of Care Questionnaire: 
:  
1) Have you been to an emergency room, urgent care, or hospitalized since your last visit? If yes, where when, and reason for visit? no  
 
 
2. Have seen or consulted any other health care provider since your last visit? If yes, where when, and reason for visit? NO 
 
 
3) Do you have an Advanced Directive/ Living Will in place? YES If yes, do we have a copy on file YES If no, would you like information YES Patient is accompanied by wife I have received verbal consent from Yasmine Merino to discuss any/all medical information while they are present in the room.

## 2019-05-13 NOTE — PROGRESS NOTES
Pt arrived to [facility] [place] ambulatory in no acute distress at 1050 for Keytruda/Alimta/Cisplatin/B12 C4.  Assessment unremarkable except c/o generalized fatigue. Left chest port accessed without issue and positive blood return noted. Pt to office at [phone] for an appointment. Received a call from Meño Wang NP that pt would be going to radiology to have a CTA and then return to [facility] for chemo. @1440 Pt returned from CTA to began chemo treatment as ordered. Patient Vitals for the past 12 hrs:   Temp Pulse Resp BP SpO2   05/13/19 1722 -- 95 16 140/86 --   05/13/19 1457 98.4 °F (36.9 °C) 100 16 123/75 99 %   05/13/19 1055 98.7 °F (37.1 °C) 100 18 135/84 99 %        Labs obtained:   Recent Results (from the past 12 hour(s))   CBC WITH 3 PART DIFF    Collection Time: 05/13/19 11:05 AM   Result Value Ref Range    WBC 2.8 (L) 4.1 - 11.1 K/uL    RBC 3.38 (L) 4.10 - 5.70 M/uL    HGB 10.9 (L) 12.1 - 17.0 g/dL    HCT 33.0 (L) 36.6 - 50.3 %    MCV 97.6 80.0 - 99.0 FL    MCH 32.2 26.0 - 34.0 PG    MCHC 33.0 30.0 - 36.5 g/dL    RDW 19.4 (H) 11.8 - 15.8 %    PLATELET 445 695 - 241 K/uL    NEUTROPHILS 69 32 - 75 %    MIXED CELLS 13 3.2 - 16.9 %    LYMPHOCYTES 19 12 - 49 %    ABS. NEUTROPHILS 1.9 1.8 - 8.0 K/UL    ABS. MIXED CELLS 0.4 0.2 - 1.2 K/uL    ABS. LYMPHOCYTES 0.5 (L) 0.8 - 3.5 K/UL    DF AUTOMATED     METABOLIC PANEL, COMPREHENSIVE    Collection Time: 05/13/19 11:05 AM   Result Value Ref Range    Sodium 135 (L) 136 - 145 mmol/L    Potassium 4.2 3.5 - 5.1 mmol/L    Chloride 103 97 - 108 mmol/L    CO2 24 21 - 32 mmol/L    Anion gap 8 5 - 15 mmol/L    Glucose 230 (H) 65 - 100 mg/dL    BUN 14 6 - 20 MG/DL    Creatinine 0.80 0.70 - 1.30 MG/DL    BUN/Creatinine ratio 18 12 - 20      GFR est AA >60 >60 ml/min/1.73m2    GFR est non-AA >60 >60 ml/min/1.73m2    Calcium 9.0 8.5 - 10.1 MG/DL    Bilirubin, total 0.4 0.2 - 1.0 MG/DL    ALT (SGPT) 23 12 - 78 U/L    AST (SGOT) 21 15 - 37 U/L    Alk.  phosphatase 59 45 - 117 U/L Protein, total 7.5 6.4 - 8.2 g/dL    Albumin 3.5 3.5 - 5.0 g/dL    Globulin 4.0 2.0 - 4.0 g/dL    A-G Ratio 0.9 (L) 1.1 - 2.2         The following medications administered:  B12 IM injection to Left arm  NS KVO  Aloxi  Decadron IV  Emend  Keytruda  Alimita  Carboplatin  NS Flush  Heparin Flush    Pt tolerated treatment well.  No adverse reactions noted. IV flushed per policy and removed, 2x2 and paper tape placed.  Pt discharged ambulatory in no acute distress at 1725, accompanied by family.   Next appointment 6/3/19 @ 9 am.

## 2019-05-13 NOTE — PROGRESS NOTES
[facility] 
at [facility], [facility] [address] 
[phone] Follow-up Note Patient: Padmini Paez MRN: [id number]  SSN: [id number] YOB: 1960  Age: 62 y.o. Sex: male Diagnosis:  
 
1. T3 N3 M1a (Stage IV) NSCLC of the lung, likely adenocarcinoma NGS no actionable mutation PD-L1 90% Treatment: 1. Right occipital craniotomy at [facility] on 1/29/2019.  
2. SBRT to the brain lesions 3. Palliative chemotherapy Carbo/Alimta/Keytruda - Cycle 4 Day 1 Subjective:  
  
Padmini Paez is a 62 y.o. male with a diagnosis of metastatic lung carcinoma. He has a over 30 pack years of cigarette smoking. He works in the construction business. MRI brain revealed 5 focus of metastatic disease and he underwent a right occipital craniotomy at [facility] on 1/29/2019. He completed SBRT to the brain lesions and tumor bed at [facility]. He is following with Palliative Medicine. He is receiving palliative treatment. He is now complaining of acute shortness of breath x 2 days, worse with exertion. Review of Systems: 
 
Constitutional: fatigue Eyes: negative Ears, Nose, Mouth, Throat, and Face: negative Respiratory: cough, worsening SOB Cardiovascular: negative Gastrointestinal: diarrhea Genitourinary:negative Integument/Breast: negative Hematologic/Lymphatic: negative Musculoskeletal: right upper chest pain Neurological: negative History reviewed. No pertinent history of prior cancer History reviewed. No pertinent surgical history. History reviewed. No pertinent family history of cancer Social History Tobacco Use  Smoking status: Former Smoker Packs/day: 1.50 Years: 25.00 Pack years: 37.50 Last attempt to quit: 1/21/2009 Years since quitting: 10.3  Smokeless tobacco: Never Used Substance Use Topics  Alcohol use:  No  
 Frequency: Never Prior to Admission medications Medication Sig Start Date End Date Taking? Authorizing Provider  
morphine CR (MS CONTIN) 30 mg CR tablet Take 1 Tab by mouth every eight (8) hours for 30 days. Max Daily Amount: 90 mg. 4/30/19 5/30/19 Yes Kenyatta Bagley MD  
oxyCODONE IR (ROXICODONE) 20 mg immediate release tablet Take 1 Tab by mouth every six (6) hours as needed for Pain for up to 15 days. Max Daily Amount: 80 mg. 4/30/19 5/15/19 Yes Kenyatta Bagley MD  
oxyCODONE IR (ROXICODONE) 20 mg immediate release tablet Take 1 Tab by mouth every six (6) hours as needed for Pain for up to 15 days. Max Daily Amount: 80 mg. 4/30/19 5/15/19 Yes Kenyatta Bagley MD  
pantoprazole (PROTONIX) 40 mg tablet Take 1 Tab by mouth two (2) times a day. Indications: gastroesophageal reflux disease 4/30/19  Yes Kenyatta Bagley MD  
insulin lispro (HUMALOG) 100 unit/mL kwikpen Inject 5 to 15 units three times daily as directed with each meal based on carb intake and blood sugar. Use zero copay voucher. 4/18/19  Yes Judy Basilio MD  
insulin glargine U-300 conc (TOUJEO SOLOSTAR U-300 INSULIN) 300 unit/mL (1.5 mL) inpn Inject 14 units every night. Use zero copay voucher. Use this when out of Lantus. 4/18/19  Yes Judy Basilio MD  
insulin lispro (HUMALOG U-100 INSULIN) 100 unit/mL injection INJECT 5 UNITS SUBCUTANEOUSLY BEFORE MEALS-INCREASE HUMALOG 1 UNIT EVERY OTHER DAY UNTIL 2 HR PP  OR LESS. Use Humalog kwik pen after done with vial. 4/18/19  Yes Judy Basilio MD  
insulin glargine (LANTUS) 100 unit/mL injection 14 Units by SubCUTAneous route nightly. Use toujeo pen when out of lantus vial. 4/18/19  Yes Judy Basilio MD  
fenofibrate (LOFIBRA) 160 mg tablet Take 160 mg by mouth daily. Yes Provider, Historical  
megestrol (MEGACE) 40 mg tablet Take 1 Tab by mouth two (2) times a day.  4/16/19  Yes Kenyatta Bagley MD  
lidocaine-prilocaine (EMLA) topical cream Apply  to affected area as needed for Pain. 2/13/19  Yes Natalie Salazar MD  
ondansetron (ZOFRAN ODT) 4 mg disintegrating tablet Take 1 Tab by mouth every eight (8) hours as needed for Nausea. 2/13/19  Yes Natalie Salazar MD  
acetaminophen (TYLENOL) 325 mg tablet Take 325 mg by mouth every four (4) hours as needed for Pain. Yes Provider, Historical  
  
 
 
No Known Allergies Objective:  
 
Visit Vitals /75 (BP 1 Location: Left arm, BP Patient Position: Sitting) Pulse 88 Temp 98.8 °F (37.1 °C) (Oral) Resp 18 Ht 5' 10\" (1.778 m) Wt 163 lb (73.9 kg) SpO2 97% BMI 23.39 kg/m² Pain Scale: 0 - No pain/10 Physical Exam: 
 
GENERAL: alert, cooperative, no distress, appears stated age EYE: conjunctivae/corneas clear. PERRL, EOM's intact. Fundi benign LYMPHATIC: Cervical, supraclavicular, and axillary nodes normal.  
THROAT & NECK: normal and no erythema or exudates noted. LUNG: clear to auscultation bilaterally HEART: regular rate and rhythm, S1, S2 normal, no murmur, click, rub or gallop ABDOMEN: soft, non-tender. Bowel sounds normal. No masses,  no organomegaly EXTREMITIES:  extremities normal, atraumatic, no cyanosis or edema SKIN: Normal. 
NEUROLOGIC: AOx3. Gait normal. Reflexes and motor strength normal and symmetric. Cranial nerves 2-12 and sensation grossly intact. CT Results (most recent): 
Results from Hospital Encounter encounter on 05/13/19 CTA CHEST W OR W WO CONT Narrative INDICATION: 
 
EXAM: CT Angio Chest: 
 
TECHNIQUE: Unenhanced localizing CT imaging of the pulmonary arteries is 
followed by bolus injection of 100 mL Isovue 370 contrast IV, with thin section 
axial Chest CT obtained and 3D image post processing performed including coronal 
MIPS. CT dose reduction was achieved through use of a standardized protocol 
tailored for this examination and automatic exposure control for dose 
modulation. COMPARISON: 12/31/2018 FINDINGS: 
 There is no pulmonary embolism. There is no aortic dissection or aneurysm. Right upper lobe mass measures 4.2 x 3.1 cm (image 4-113), previously 5.6 x 4.7 
cm. Subcentimeter satellite nodule has resolved. No new/acute pulmonary finding. Mediastinal adenopathy is decreased with right paratracheal conglomerate 
adenopathy measuring 4.7 x 3.0 cm (image 2-105), previously 6.8 x 3.9 cm. Visualized thyroid and lower neck soft tissues are unremarkable for age. Impression IMPRESSION:  
1. No pulmonary embolus. 2. Decreased size of RUL mass. 3. Decreased size of mediastinal adenopathy. 4. No new/acute finding. MRI brain: 5 lesions: right occipital, right lateral ventricular arm, right temporal, right anterior temporal and left frontal 
 
PET CT: large RUL mass with extensive mediastinal adenopathy, metastatic disease to the liver, brain Lab Results Component Value Date/Time WBC 2.8 (L) 05/13/2019 11:05 AM  
 HGB 10.9 (L) 05/13/2019 11:05 AM  
 HCT 33.0 (L) 05/13/2019 11:05 AM  
 PLATELET 914 44/59/7194 11:05 AM  
 MCV 97.6 05/13/2019 11:05 AM  
 
 
Lab Results Component Value Date/Time Sodium 138 04/22/2019 11:21 AM  
 Potassium 3.9 04/22/2019 11:21 AM  
 Chloride 108 04/22/2019 11:21 AM  
 CO2 26 04/22/2019 11:21 AM  
 Anion gap 4 (L) 04/22/2019 11:21 AM  
 Glucose 151 (H) 04/22/2019 11:21 AM  
 BUN 17 04/22/2019 11:21 AM  
 Creatinine 0.78 04/22/2019 11:21 AM  
 BUN/Creatinine ratio 22 (H) 04/22/2019 11:21 AM  
 GFR est AA >60 04/22/2019 11:21 AM  
 GFR est non-AA >60 04/22/2019 11:21 AM  
 Calcium 8.6 04/22/2019 11:21 AM  
 Bilirubin, total 0.3 04/22/2019 11:21 AM  
 AST (SGOT) 15 04/22/2019 11:21 AM  
 Alk. phosphatase 46 04/22/2019 11:21 AM  
 Protein, total 6.6 04/22/2019 11:21 AM  
 Albumin 3.4 (L) 04/22/2019 11:21 AM  
 Globulin 3.2 04/22/2019 11:21 AM  
 A-G Ratio 1.1 04/22/2019 11:21 AM  
 ALT (SGPT) 26 04/22/2019 11:21 AM  
 
 
 
 
Assessment: 1. T3 N3 M1a (Stage IV) NSCLC of the lung, likely adenoca NGS - no targetable mutation PD-L1 90% 
 
> Unresectable disease ECOG PS 1 Intent of Treatment - palliative Prognosis poor S/p right occipital craniotomy at [facility] on 1/29/2019 Completed SBRT to brain lesions and tumor bed by Dr. Kimberlee Teague at [facility] on 2/27/2019 Receiving palliative chemotherapy Carbo/Alimta/Pembrolizumab - Cycle 4 Day 1 Tolerating treatment A detailed system by system evaluation of side effect was performed to assess chemotherapy related toxicity. Blood counts are acceptable. Results reviewed with the patient. Symptom management form reviewed with patient. CT - shrinkage of disease in the lungs and mediastinum I will simplify treatment by dropping Carboplatin off Restage with CT abd and MRI brain. 2. Severe protein calorie malnutrition 
 
> Dietician consult 
> protein supplementation 3. Shortness of breath 
 
? COPD No PE Plan:  
 
 
> D/C Carbo and continue Alimta/Pembrolizumab from the next cycle 
> CT abd and MRI brain 
> Continue to follow with Palliative Medicine 
> Follow-up in 3 weeks Signed by: Dagmar William MD 
                   May 13, 2019 
 
 
CC. Kyung Monroe MD 
CC. Delicia Regalado MD 
CC. Luanne Gómez MD 
CC. Ade Johnson MD 
CC.  Melissa Lee MD

## 2019-05-28 NOTE — PROGRESS NOTES
[facility]   Social Work Navigator Encounter     Patient Name:  Naldo Stuart    Medical History: dx metastatic lung cancer - brain mets    Advance Directives:    Narrative: Mrs. Marixa Hawk returned SW call. Says pt  Needs to provide the following for Medicaid application to be processed - and they have 45 days once everything is received. 1.  Bank statements  2. Life insurance policies (2) - Current cast value  3 . # of vehicles - if there are liens on them - then balance (documentation)  - 2 have liens , one is old but all they would have if those vehicles are taken  4. Burial plot -     Pt wife said someone called and they have an appt on June 4 - [place] and [address] [place] [address] Rd     LOUISE encourage pt wife to gather everything needed for  and ask if it is alright to turn in. Barriers to Care:     Plan:   1. Continue to follow up.

## 2019-05-29 NOTE — TELEPHONE ENCOUNTER
#[phone] Juliocesar Elisabeth from the [facility] called stating that the two scripts that were just sent over, are showing up as not covered. Also, she states that they still have a script for oxycodone that the patient never picked up. She wants to know if she should continue to hold on to it or if she should discontinue it.

## 2019-05-29 NOTE — PATIENT INSTRUCTIONS
Dear Alejandra Duggan , It was a pleasure seeing you today at [facility] office This is the plan we talked about: 1. Right sided chest pain radiating to the shoulder - You are doing really well with Oxycodone 20 mg every 3-4 hours. - You are taking Morphine ER 30 mg two times a day - Let us increase Morphine Er to 30 mg three times a day in order to decrease the every 3 hour need for Oxycodone. - This has helped and you are only on 3 tabs of Oxycodone per day. We agreed on decreasing the dose to 10 mg tabs due to excessive fatigue. 
- You are not taking Morphine ER three times a day, go ahead and continue only 2 times a day. 2. Constipation - I am glad you are taking stool softners regularly but still very constipated. - Please take Miralax daily and I am starting Lactulose 30 ml every 4 hours until you have a bowel movement - Please continue this bowel regimen religiously. 3. Acid reflux - You are still struggling with this. Let us increase Protonix to 40 mg two times a day. 4. Cognitive impairment - You are off the dexamethasone and doing fairly okay. 5. Diabetes - You have established care with Dr. Jamarcus Sandoval as your pcp and he is taking over your management of Diabetes. 5. ACP 
- We completed an advance medical directive and medical power of . - Financial poa is all taken care of as well. - Your wife is working with cancer [facility] and [facility] for [facility] and Medicaid. 6. Goals of care - We discussed your wishes regarding cpr/ intubation and you want to remain FULL code for now and think about it more. - You are willing to take more responsibility for your own care and attempt to eat better. - You are tolerating chemo well. This is what you have shared with us about Advance Care Planning: 
 
Primary Decision [facility] ([address]): Moustapha Breen Relationship to patient:Wife 
Phone Soheila Wood [x] Named in a scanned document  
[x] Legal Next of Kin [] Guardian Secondary Decision Maker (First Alternate Health Care Agent):  Jeremy Lindsay Relationship to patient: Father Phone number: [phone] [x] Named in a scanned document  
[] Legal Next of Kin 
[] Guardian ACP documents you current have include: 
[x] Advance Directive or Living Will 
[] Durable Do Not Resuscitate 
[] Physician Orders for Scope of Treatment (POST) [] Medical Power of  
[] Other The Palliative Medicine Team is here to support you and your family. We will see you again in 4 weeks Sincerely, 
 
 
Ghada Felix MD and the Palliative Medicine Team

## 2019-05-29 NOTE — PROGRESS NOTES
Palliative Medicine Outpatient Services Jerel: [phone]) Patient Name: Jeanna Sequeira YOB: 1960 Date of Current Visit: 05/29/19 Location of Current Visit:   
[] [facility] Office 
[] [facility] Office [x] [facility] Office 
[] Home 
[] Other:   
 
Date of Initial Visit: 1/28/19 Requesting Physician: Dr. Sena Yadav Primary Care Physician: Renetta Rodriguez MD 
  
 SUMMARY:  
Jeanna Sequeira is a 62y.o. 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 Sena Yadav for management of symptoms and support. The patients social history includes worked in construction, has 30 plus year smoking history but quit in 2008, lives with wife who is stage 2 breast cancer survivor for over 8 yeas. Wife Fabrice Howe has 2 children and one with Jeannine Diaz. Twin grand children live with them, Jeannine Diaz has 3 children. Status post brain tumor removal at [facility], SBR T to the brain tumor, about to start chemotherapy. Recent scan with good tumor response. PALLIATIVE DIAGNOSES:  
 
  ICD-10-CM ICD-9-CM 1. Acute pain of right shoulder M25.511 719.41   
2. Malignant neoplasm of upper lobe of lung, unspecified laterality (HCC) C34.10 162.3 morphine CR (MS CONTIN) 30 mg CR tablet  
   oxyCODONE IR (ROXICODONE) 10 mg tab immediate release tablet 3. Drug-induced constipation K59.03 564.09   
  E980.5 4. Gastroesophageal reflux disease with esophagitis K21.0 530.11   
5. Financial problems Z59.8 V60.2 PLAN:  
Patient Instructions Dear Jeanna Sequeira , It was a pleasure seeing you today at [facility] office This is the plan we talked about: 1. Right sided chest pain radiating to the shoulder - You are doing really well with Oxycodone 20 mg every 3-4 hours. - You are taking Morphine ER 30 mg two times a day - Let us increase Morphine Er to 30 mg three times a day in order to decrease the every 3 hour need for Oxycodone. - This has helped and you are only on 3 tabs of Oxycodone per day. We agreed on decreasing the dose to 10 mg tabs due to excessive fatigue. 
- You are not taking Morphine ER three times a day, go ahead and continue only 2 times a day. 2. Constipation - I am glad you are taking stool softners regularly but still very constipated. - Please take Miralax daily and I am starting Lactulose 30 ml every 4 hours until you have a bowel movement - Please continue this bowel regimen religiously. 3. Acid reflux - You are still struggling with this. Let us increase Protonix to 40 mg two times a day. 4. Cognitive impairment - You are off the dexamethasone and doing fairly okay. 5. Diabetes - You have established care with Dr. Homero Duncan as your pcp and he is taking over your management of Diabetes. 5. ACP 
- We completed an advance medical directive and medical power of . - Financial poa is all taken care of as well. - Your wife is working with jl RAVI and [place] Rubin for [facility] and Medicaid. 6. Goals of care - We discussed your wishes regarding cpr/ intubation and you want to remain FULL code for now and think about it more. - You are willing to take more responsibility for your own care and attempt to eat better. - You are tolerating chemo well. This is what you have shared with us about Advance Care Planning: 
 
Primary Decision [facility] ([address]): Carolee Loaiza Relationship to patient:Wife 
Phone Letitia Yancey [x] Named in a scanned document  
[x] Legal Next of Kin 
[] Guardian Secondary Decision Maker (First Alternate Health Care Agent):  Nasim Dickinson Relationship to patient: Father Phone number: [phone] [x] Named in a scanned document  
[] Legal Next of Kin 
[] Guardian ACP documents you current have include: 
[x] Advance Directive or Living Will 
[] Durable Do Not Resuscitate [] Physician Orders for Scope of Treatment (POST) [] Medical Power of  
[] Other The Palliative Medicine Team is here to support you and your family. We will see you again in 4 weeks Sincerely, 
 
 
Ryan Braden MD and the Palliative Medicine Team 
 
 
Counseling and Coordination:  
See above Opioid safety counseling GOALS OF CARE / TREATMENT PREFERENCES:  
[====Goals of Care====] GOALS OF CARE: 
Patient / health care proxy stated goals: FULL 
 
 
TREATMENT PREFERENCES:  
Code Status:  [x] Attempt Resuscitation       [] Do Not Attempt Resuscitation Advance Care Planning: 
[x] The [facility] Interdisciplinary Team has updated the ACP Navigator with [address] and Patient Capacity The palliative care team has discussed with patient / health care proxy about goals of care / treatment preferences for patient. 
[====Goals of Care====] PRESCRIPTIONS GIVEN:  
 
Medications Ordered Today Medications  morphine CR (MS CONTIN) 30 mg CR tablet Sig: Take 1 Tab by mouth every twelve (12) hours for 30 days. Max Daily Amount: 60 mg. Dispense:  60 Tab Refill:  0  
 oxyCODONE IR (ROXICODONE) 10 mg tab immediate release tablet Sig: Take 1 Tab by mouth every eight (8) hours as needed for Pain for up to 15 days. Max Daily Amount: 30 mg. Dispense:  60 Tab Refill:  0  
  
 
 
 FOLLOW UP: Future Appointments Date Time Provider Marlo Sweeneyi 6/3/2019  9:00 AM [place] INFUSION NURSE [address] REG  
7/11/2019 10:50 AM Maryam Zapien MD [address] [place] [address] PHYSICIANS INVOLVED IN CARE:  
Patient Care Team: 
Maryam Zapien MD as PCP - General (Internal Medicine) Evertt Runner, MD (Pulmonary Disease) Smitha Hairston MD (Hematology and Oncology) Raymundo Arrington MD (Neurosurgery) Sacha Porter MD as Surgeon (Thoracic (non-cardiac) Surgery) HISTORY:  
Nursing documentation from date of visit reviewed. Reviewed patient-completed ESAS and advance care planning form. Reviewed patient record in prescription monitoring program. 
 
CHIEF COMPLAINT: right sided chest pain HPI/SUBJECTIVE: The patient is: [] Verbal / [] Nonverbal  
 
Patient here with wife Ruth Gonzalez. Shoulder pain-still has some acute pain for which he takes Oxycodone 20 mg. He sleeps after every dose. He take Morphine ER three times a day only on certain days because he sleeps through the afternoon dose on most days. Constipation-on bowel regimen and still having bowel movement once every 2 to 3 days. Acid reflux- much improved with increasing protonix to BID. Pam Swathi Cognition-his memory and cognition is improved tremendously. Family continues to struggle with significant financial stressors. Social workers are involved. 
 
---------- Patient here today along with his wife Ruth Gonzalez and Apple Salas sister from [facility]. Araceli Hong reports severe right-sided chest wall pain that radiates to his shoulder. He has been on oxycodone 5 mg which was increased from every 4 hours to every 3 hours. He reports that this does not work in any way and he is in excruciating pain throughout the day. He does not have past history of opioids. He quit smoking and drinking in 2008. He is constipated. Taking prune juice. On dexamethasone 4 mg 2 times a day for brain metastases Significant amount of distress in the family with expected grief. He was diagnosed with cancer on New Year's Shari and it has been overwhelming since then. We talked about prognosis at length and what to expect with brain surgery, radiation and chemotherapy. Advised and encouraged them to take it one day at a time instead of being overwhelmed. Encourage communication within the family and involve adult children and helping out with appointments support at home and care for the twin 6year-old grandchildren.  
 
Clinical Pain Assessment (nonverbal scale for nonverbal patients):  
 [++++ Clinical Pain Assessment++++] [++++Pain Severity++++]: Pain: 9 
[++++Pain Character++++]: digging 
[++++Pain Duration++++]: weeks [++++Pain Effect++++]: functional and emotional 
[++++Pain Factors++++]: none in particular 
[++++Pain Frequency++++]: constant [++++Pain Location++++]: right-sided chest wall and shoulder 
[++++ Clinical Pain Assessment++++] FUNCTIONAL ASSESSMENT:  
 
Palliative Performance Scale (PPS): PPS: 70 PSYCHOSOCIAL/SPIRITUAL SCREENING:  
 
Any spiritual / Zoroastrian concerns: 
[] Yes /  [x] No 
 
Caregiver Burnout: 
[] Yes /  [x] No /  [] No Caregiver Present Anticipatory grief assessment:  
[x] Normal  / [] Maladaptive ESAS Anxiety: Anxiety: 0 
 
ESAS Depression: Depression: 0 REVIEW OF SYSTEMS:  
 
The following systems were [] reviewed / [] unable to be reviewed Systems: constitutional, ears/nose/mouth/throat, respiratory, gastrointestinal, genitourinary, musculoskeletal, integumentary, neurologic, psychiatric, endocrine. Positive findings noted below. Modified ESAS Completed by: provider Fatigue: 8 Drowsiness: 0 Depression: 0 Pain: 9 Anxiety: 0 Nausea: 8 Anorexia: 0 Dyspnea: 0 Best Well-Bein Constipation: No  
     
 
 
 PHYSICAL EXAM:  
 
Wt Readings from Last 3 Encounters:  
19 160 lb 12.8 oz (72.9 kg) 19 161 lb 7 oz (73.2 kg) 19 163 lb (73.9 kg) Blood pressure 120/73, pulse 98, temperature 98.5 °F (36.9 °C), temperature source Oral, resp. rate 18, height 5' 10\" (1.778 m), weight 160 lb 12.8 oz (72.9 kg), SpO2 98 %. Last bowel movement: See Nursing Note Constitutional: Alert, oriented Eyes: pupils equal, anicteric ENMT: no nasal discharge, moist mucous membranes Cardiovascular: regular rhythm, distal pulses intact Respiratory: breathing not labored, symmetric with poor air entry on the right side Gastrointestinal: soft non-tender, +bowel sounds Musculoskeletal: no deformity, no tenderness to palpation, obvious muscle wasting in the chest area Skin: warm, dry Neurologic: following commands, moving all extremities Psychiatric: full affect, no hallucinations Other: 
 
 
 HISTORY:  
 
Past Medical History:  
Diagnosis Date  Cancer ([facility] [address] LUNG RT. SIDE METS TO BRAIN, LIVER  Chronic obstructive pulmonary disease ([facility] [address] MILD PER WIFE  
 Diabetes ([facility] [address] TYPE II  
 GERD (gastroesophageal reflux disease)  Hypertension Past Surgical History:  
Procedure Laterality Date  HX CERVICAL FUSION    
 HX CRANIOTOMY  01/29/2019 REMOVAL OF BRAIN METS AT [facility]  HX GI    
 COLONOSCOPY  
 HX HERNIA REPAIR Right 1972 INGUINAL  VASCULAR SURGERY PROCEDURE UNLIST Bilateral   
 LEGS Family History Problem Relation Age of Onset  Stroke Father  Diabetes Father  Other Mother 58 SUDDEN DEATH  
 Cancer Maternal Grandfather  Anesth Problems Neg Hx History reviewed, no pertinent family history. Social History Tobacco Use  Smoking status: Former Smoker Packs/day: 1.50 Years: 25.00 Pack years: 37.50 Last attempt to quit: 1/21/2009 Years since quitting: 10.3  Smokeless tobacco: Never Used Substance Use Topics  Alcohol use: No  
  Frequency: Never No Known Allergies Current Outpatient Medications Medication Sig  morphine CR (MS CONTIN) 30 mg CR tablet Take 1 Tab by mouth every twelve (12) hours for 30 days. Max Daily Amount: 60 mg.  
 oxyCODONE IR (ROXICODONE) 10 mg tab immediate release tablet Take 1 Tab by mouth every eight (8) hours as needed for Pain for up to 15 days. Max Daily Amount: 30 mg.  pantoprazole (PROTONIX) 40 mg tablet Take 1 Tab by mouth two (2) times a day. Indications: gastroesophageal reflux disease  insulin lispro (HUMALOG U-100 INSULIN) 100 unit/mL injection INJECT 5 UNITS SUBCUTANEOUSLY BEFORE MEALS-INCREASE HUMALOG 1 UNIT EVERY OTHER DAY UNTIL 2 HR PP  OR LESS. Use Humalog kwik pen after done with vial.  
 insulin glargine (LANTUS) 100 unit/mL injection 14 Units by SubCUTAneous route nightly. Use toujeo pen when out of lantus vial.  
 fenofibrate (LOFIBRA) 160 mg tablet Take 160 mg by mouth daily.  megestrol (MEGACE) 40 mg tablet Take 1 Tab by mouth two (2) times a day.  lidocaine-prilocaine (EMLA) topical cream Apply  to affected area as needed for Pain.  ondansetron (ZOFRAN ODT) 4 mg disintegrating tablet Take 1 Tab by mouth every eight (8) hours as needed for Nausea.  acetaminophen (TYLENOL) 325 mg tablet Take 325 mg by mouth every four (4) hours as needed for Pain.  insulin lispro (HUMALOG) 100 unit/mL kwikpen Inject 5 to 15 units three times daily as directed with each meal based on carb intake and blood sugar. Use zero copay voucher.  insulin glargine U-300 conc (TOUJEO SOLOSTAR U-300 INSULIN) 300 unit/mL (1.5 mL) inpn Inject 14 units every night. Use zero copay voucher. Use this when out of Lantus. No current facility-administered medications for this visit. LAB DATA REVIEWED:  
 
Lab Results Component Value Date/Time WBC 2.8 (L) 05/13/2019 11:05 AM  
 HGB 10.9 (L) 05/13/2019 11:05 AM  
 PLATELET 846 74/78/0133 11:05 AM  
 
Lab Results Component Value Date/Time Sodium 135 (L) 05/13/2019 11:05 AM  
 Potassium 4.2 05/13/2019 11:05 AM  
 Chloride 103 05/13/2019 11:05 AM  
 CO2 24 05/13/2019 11:05 AM  
 BUN 14 05/13/2019 11:05 AM  
 Creatinine 0.80 05/13/2019 11:05 AM  
 Calcium 9.0 05/13/2019 11:05 AM  
 Magnesium 2.2 12/31/2018 01:13 PM  
 Phosphorus 2.6 03/31/2010 06:43 AM  
  
Lab Results Component Value Date/Time AST (SGOT) 21 05/13/2019 11:05 AM  
 Alk. phosphatase 59 05/13/2019 11:05 AM  
 Protein, total 7.5 05/13/2019 11:05 AM  
 Albumin 3.5 05/13/2019 11:05 AM  
 Globulin 4.0 05/13/2019 11:05 AM  
 
Lab Results Component Value Date/Time  
 INR >9.2 () 05/28/2010 04:43 PM  
 Prothrombin time >75.0 (H) 05/28/2010 04:43 PM  
 aPTT 144.2 (H) 05/28/2010 04:43 PM  
  
No results found for: IRON, FE, TIBC, IBCT, PSAT, FERR Impression: 
  
1. Large right upper lobe mass measuring 5.6 x 4.7 x 4.8 cm, which is in 
continuity with extensive conglomerate right paratracheal and right hilar 
adenopathy. Findings are consistent with primary lung malignancy with extensive 
metastatic adenopathy. The right upper lobe mass occludes the segmental and 
subsegmental bronchi of the posterior segment of the right upper lobe. 2. Metastatic subcarinal adenopathy. Enlarged left lower paratracheal 
adenopathy, which is also favored to be metastatic. 3. A 6 mm satellite nodule in the right upper lobe, consistent with metastasis. 4. Indeterminate hypodensities within hepatic segment IVb, which may represent 
metastatic disease. 5. No evidence of pulmonary embolism. 
  
I personally reviewed the images. 
  
MRI brain: 5 lesions: right occipital, right lateral ventricular arm, right temporal, right anterior temporal and left frontal 
  
PET CT: large RUL mass with extensive mediastinal adenopathy, metastatic disease to the liver, brain 
  
 
 
 
 CONTROLLED SUBSTANCES SAFETY ASSESSMENT (IF ON CONTROLLED SUBSTANCES):  
 
Reviewed opioid safety handout:  [x] Yes   [] No 
24 hour opioid dose >150mg morphine equivalent/day:  [] Yes   [x] No 
Benzodiazepines:  [] Yes   [x] No 
Sleep apnea:  [] Yes   [x] No 
Urine Toxicology Testing within last 6 months:  [] Yes   [x] No 
History of or new aberrant medication taking behaviors:  [] Yes   [x] No 
Has Narcan been prescribed [] Yes   [x] No 
 
   
 
Total time: 90m Counseling / coordination time: 70m 
> 50% counseling / coordination?: y

## 2019-05-29 NOTE — PROGRESS NOTES
Palliative Medicine Office Visit Palliative Medicine Nurse Check In 
[phone] Patient Name: Jeff Roman YOB: 1960 Date of Office Visit: 5/29/2019 Patient states: \"  \" 
 
From Check In Sheet (scanned in Media): 
Has a medical provider talked with you about cardiopulmonary resuscitation (CPR)? [x] Yes   [] No   [] Unable to obtain Nurse reminder to complete or update ACP FlowSheet: 
 
Is ACP on the Problem List?    [x] Yes    [] No 
IF ACP Document is ON FILE; Nurse to place ACP on Problem List  
 
Is there an ACP Note in Chart Review/Note? [x] Yes    [] No  
If NO: ALERT PROVIDER Primary Decision Maker: MercedesDilcia - Spouse - [phone] Secondary Decision Maker: Devan Dowell - Father - [phone] Advance Care Planning 5/29/2019 Patient's Healthcare Decision Maker is: Named in scanned ACP document Confirm Advance Directive Yes, on file Does the patient have other document types - Is there anything that we should know about you as a person in order to provide you the best care possible? Have you been to the ER, urgent care clinic since your last visit? [] Yes   [x] No   [] Unable to obtain Have you been hospitalized since your last visit? [] Yes   [x] No   [] Unable to obtain Have you seen or consulted any other health care providers outside of the [address] [place] [address] since your last visit? [] Yes   [x] No   [] Unable to obtain Functional status (describe):  
 
 
 
Last BM: 5/29/2019  accessed (date): 5/29/2019 Bottle review (for opioid pain medication): 
Medication 1:  
Date filled:  
Directions:  
# filled: # left: # pills taking per day: 
Last dose taken: 
 
Medication 2:  
Date filled:  
Directions:  
# filled: # left: # pills taking per day: 
Last dose taken: 
 
Medication 3:  
Date filled:  
Directions:  
# filled: # left: # pills taking per day: 
Last dose taken: 
 
Medication 4:  
 Date filled:  
Directions:  
# filled: # left: # pills taking per day: 
Last dose taken:

## 2019-05-29 NOTE — TELEPHONE ENCOUNTER
Spoke with Maira Schulz in the [facility], she states that the medications ordered are ready for  and the patient has been notified.     Burgess Nay RN  Palliative Medicine

## 2019-05-30 NOTE — PROGRESS NOTES
[address]  Social Work Navigator Encounter     Patient Name:  Alejandrina Stuart    Medical History: dx metastatic lung / brain mets    Advance Directives:    Narrative: LOUISE reached out to Mely Manishkarely of [address] [place] [address]  [phone] and provided her date of Medicaid application that SW faxed is May 17th. Pt scan on 12/31 showed lung mass. Dr Tobar Comment pulmonologist biopsy. Jan 21, 2019   [facility] removed brain tumors / radiation. Medicaid application submitted by pt and clposed because burial plot info not provided. Pt needs to supply cash value of both life insurance policies. Devon Loomis believes pt will have a spend down. 3 cars will be a wash because of the liens on the vehicles. BELKYS is used to doing [employer] and not ABD Medicaid. Mtg is June 3rd. Barriers to Care:     Plan:   1. FU with pt.

## 2019-05-30 NOTE — TELEPHONE ENCOUNTER
Patients sister [place] called stating that the patient needs a refill on his syringes.      #[phone]

## 2019-05-31 NOTE — PROGRESS NOTES
[facility]  Social Work Navigator Encounter     Patient Name:  Lupe Stuart    Medical History: dx metastatic lung cancer - brain mets    Advance Directives:    Narrative: SW contacted by Reddy Beckwith who requested info - SW faxed 42 pages ([phone] statements and medicaid application   from [place] is Kody Campuzano - made aware pt/spouse have 2 life insurance policies and made her aware of twin 6year olds. SW left her a VM stating 42 pages have been faxed. A msg was left on pt spouse cell phone asking for two life insurance policies to be brought to appt on Monday and burial plot, liens on cars etc.  And SW would fax to the  so that mtg on Tuesday will be more productive. Barriers to Care:     Plan:   1.  FU on Monday.

## 2019-06-03 NOTE — PROGRESS NOTES
[facility] Progress Note    Date: Emily 3, 2019    Name: Mt Gruber    MRN: [id number]         : 1960    Mr. Thu Moore Arrived ambulatory and in no distress for cycle 5 day 1 of chemo regimen. Assessment was completed, no acute issues at this time, no new complaints voiced. L chest PAC accessed without difficulty, labs drawn and in process. Chemotherapy Flowsheet 6/3/2019   Cycle C5   Date 6/3/2019   Drug / Regimen Keytruda/Alimta   Pre Meds given   Notes given     Mr. Keely Garcia vitals were reviewed. Visit Vitals  /87 (BP 1 Location: Left arm, BP Patient Position: Sitting)   Pulse 93   Temp 98.3 °F (36.8 °C)   Resp 18   Ht 5' 10\" (1.778 m)   Wt 74.4 kg (164 lb)   BMI 23.53 kg/m²       Lab results were obtained and reviewed. Recent Results (from the past 12 hour(s))   CBC WITH AUTOMATED DIFF    Collection Time: 19  9:12 AM   Result Value Ref Range    WBC 3.0 (L) 4.1 - 11.1 K/uL    RBC 3.25 (L) 4.10 - 5.70 M/uL    HGB 10.6 (L) 12.1 - 17.0 g/dL    HCT 32.3 (L) 36.6 - 50.3 %    MCV 99.4 (H) 80.0 - 99.0 FL    MCH 32.6 26.0 - 34.0 PG    MCHC 32.8 30.0 - 36.5 g/dL    RDW 18.5 (H) 11.5 - 14.5 %    PLATELET 666 899 - 408 K/uL    MPV 9.1 8.9 - 12.9 FL    NRBC 0.0 0  WBC    ABSOLUTE NRBC 0.00 0.00 - 0.01 K/uL    NEUTROPHILS 39 32 - 75 %    LYMPHOCYTES 40 12 - 49 %    MONOCYTES 20 (H) 5 - 13 %    EOSINOPHILS 0 0 - 7 %    BASOPHILS 0 0 - 1 %    IMMATURE GRANULOCYTES 1 (H) 0.0 - 0.5 %    ABS. NEUTROPHILS 1.2 (L) 1.8 - 8.0 K/UL    ABS. LYMPHOCYTES 1.2 0.8 - 3.5 K/UL    ABS. MONOCYTES 0.6 0.0 - 1.0 K/UL    ABS. EOSINOPHILS 0.0 0.0 - 0.4 K/UL    ABS. BASOPHILS 0.0 0.0 - 0.1 K/UL    ABS. IMM.  GRANS. 0.0 0.00 - 0.04 K/UL    DF AUTOMATED     METABOLIC PANEL, COMPREHENSIVE    Collection Time: 19  9:12 AM   Result Value Ref Range    Sodium 135 (L) 136 - 145 mmol/L    Potassium 3.7 3.5 - 5.1 mmol/L    Chloride 108 97 - 108 mmol/L    CO2 23 21 - 32 mmol/L    Anion gap 4 (L) 5 - 15 mmol/L    Glucose 182 (H) 65 - 100 mg/dL    BUN 18 6 - 20 MG/DL    Creatinine 0.93 0.70 - 1.30 MG/DL    BUN/Creatinine ratio 19 12 - 20      GFR est AA >60 >60 ml/min/1.73m2    GFR est non-AA >60 >60 ml/min/1.73m2    Calcium 8.7 8.5 - 10.1 MG/DL    Bilirubin, total 0.3 0.2 - 1.0 MG/DL    ALT (SGPT) 22 12 - 78 U/L    AST (SGOT) 14 (L) 15 - 37 U/L    Alk. phosphatase 50 45 - 117 U/L    Protein, total 7.0 6.4 - 8.2 g/dL    Albumin 3.6 3.5 - 5.0 g/dL    Globulin 3.4 2.0 - 4.0 g/dL    A-G Ratio 1.1 1.1 - 2.2     TSH 3RD GENERATION    Collection Time: 06/03/19  9:12 AM   Result Value Ref Range    TSH 1.69 0.36 - 3.74 uIU/mL     Creatinine clearance 89.4    Pre-medications  were administered as ordered and chemotherapy was initiated. NS 25cc/hr  Keytruda  Decadron  Alimta      Mr. Saida Thompson tolerated treatment well. PAC flushed and [facility], per protocol. Was discharged from [place] [id number] in stable condition at 1225. He is not scheduled at this time for his next appointment. Advised to stop at  for appt. States he will.      Paulette Thacker  Emily 3, 2019

## 2019-06-10 NOTE — TELEPHONE ENCOUNTER
Spoke with Mr. Aleksey Hall, he needs a refill on his oxycodone 10 mg IR every 8 hours as needed for pain.  checked and he is due for refill. Dr. Keena Henriquez approved and family will  prescription on 6/12/2019.      Jasmine Coleman RN  Palliative Medicine

## 2019-06-13 NOTE — TELEPHONE ENCOUNTER
PM nurse left a message for Ms. Ledezma to let her know that Mr. Xiao Green has 5 refills on the prescription for his syringes, and to call back if she has any questions or concerns regarding this.     Miguelangel Rodrigues RN  Palliative Medicine rib pain/injury

## 2019-06-13 NOTE — PROGRESS NOTES
[employer]  Social Work Navigator Encounter     Patient Name:  Nico PowellEmma Stuart    Medical History:     Advance Directives:    Narrative: Pt sister-in-law Bayron Collier will leave to go back to [facility] June 17th and be back on July 1 and leave again on July 18. Wont be back unless really needed after August.    [email or web address]    [phone]   [place]  [address] [facility]   Mobile     Barriers to Care:     Plan:

## 2019-06-19 NOTE — TELEPHONE ENCOUNTER
Jerryl Siemens is calling to speak to Modesta Mandi. States sister in law advised them that patient needs refills on the following medications:    Protonix  Megace    Advised Jerryl Siemens would call her back.

## 2019-06-19 NOTE — TELEPHONE ENCOUNTER
Spoke with Niels Daugherty in the pharmacy to let her know that we have foundation approval for Mr. Yumiko Westbrook protonix and megace. She will fill them and will let Mr Yumiko Westbrook know when they are ready.     Ruddy Claire RN  Palliative Medicine

## 2019-06-24 NOTE — PROGRESS NOTES
Palliative Medicine Office Visit Palliative Medicine Nurse Check In 
[phone] Patient Name: Alec Gregory YOB: 1960 Date of Office Visit: 6/24/2019 Patient states: \"  \" 
 
From Check In Sheet (scanned in Media): 
Has a medical provider talked with you about cardiopulmonary resuscitation (CPR)? [x] Yes   [] No   [] Unable to obtain Nurse reminder to complete or update ACP FlowSheet: 
 
Is ACP on the Problem List?    [x] Yes    [] No 
IF ACP Document is ON FILE; Nurse to place ACP on Problem List  
 
Is there an ACP Note in Chart Review/Note? [x] Yes    [] No  
If NO: ALERT PROVIDER Primary Decision Maker: Dilcia Mercedes - Spouse - [phone] Secondary Decision Maker: Kerry Cardenas - Father - [phone] Advance Care Planning 6/24/2019 Patient's Healthcare Decision Maker is: Named in scanned ACP document Confirm Advance Directive Yes, on file Does the patient have other document types - Is there anything that we should know about you as a person in order to provide you the best care possible? Have you been to the ER, urgent care clinic since your last visit? [] Yes   [x] No   [] Unable to obtain Have you been hospitalized since your last visit? [] Yes   [x] No   [] Unable to obtain Have you seen or consulted any other health care providers outside of the [address] [place] [address] since your last visit? [] Yes   [x] No   [] Unable to obtain Functional status (describe):  
 
 
 
Last BM: 6/24/2019  accessed (date): 6/24/2019 Bottle review (for opioid pain medication): 
Medication 1:  
Date filled:  
Directions:  
# filled: # left: # pills taking per day: 
Last dose taken: 
 
Medication 2:  
Date filled:  
Directions:  
# filled: # left: # pills taking per day: 
Last dose taken: 
 
Medication 3:  
Date filled:  
Directions:  
# filled: # left: # pills taking per day: 
Last dose taken: Medication 4:  
Date filled:  
Directions:  
# filled: # left: # pills taking per day: 
Last dose taken:

## 2019-06-25 NOTE — PATIENT INSTRUCTIONS
Dear Mesfin Munoz , It was a pleasure seeing you today at [address] [facility] office This is the plan we talked about: 1. Right sided chest pain radiating to the shoulder 
-You are able to tolerate oxycodone 10 mg every 4-6 hours but the early morning pain is quite severe and you end up taking 2 oxycodone's at that time. 
-You feel your pain control overnight is not enough. We agreed on increasing the MS Contin 30 mg to 3 times a day to allow for more even pain control. -MS Contin and oxycodone refills provided today 2. Constipation 
-You are doing much better with regular Rowena-Colace and MiraLAX every other day 3. Acid reflux - You are still struggling with this. Let us increase Protonix to 40 mg two times a day. 4. Cognitive impairment - You are off the dexamethasone and doing fairly okay. 5. Diabetes - You have established care with Dr. Herminio Jaimes as your pcp and he is taking over your management of Diabetes. 5. ACP 
- We completed an advance medical directive and medical power of . - Financial poa is all taken care of as well. - Your wife is working with cancer [facility] and Puma Sanchez for [facility] and Medicaid. 6.  Psychosocial stressors 
-We spent a long time today talking about your home situation. You and Liya Colon are having a hard time living with each other and you admit that your anger has gotten out of hand. He admits that you are unable to accept the fact that you have cancer and feel very debilitated. Not being able to work every day is making you very angry. At times you take the anger out on therapy and the children.   We talked about ways to cope with that anger along with our  today.  
-I do think it is a good idea to have some separation between you and Liya Colon in the children at this time and you agree. 
-I highly encouraged to be to take the children and move away from you for a few weeks or live in the same house but live separate lives as communicating with each other only makes both of you angry. This is what you have shared with us about Advance Care Planning: 
 
Primary Decision [facility] ([address]): Shania Quinn Relationship to patient:Wife 
Phone Kenyatta Ok [x] Named in a scanned document  
[x] Legal Next of Kin 
[] Guardian Secondary Decision Maker (First Alternate Health Care Agent):  Hawk Robertson Relationship to patient: Father Phone number: [phone] [x] Named in a scanned document  
[] Legal Next of Kin 
[] Guardian ACP documents you current have include: 
[x] Advance Directive or Living Will 
[] Durable Do Not Resuscitate 
[] Physician Orders for Scope of Treatment (POST) [] Medical Power of  
[] Other The Palliative Medicine Team is here to support you and your family. We will see you again in 4 weeks Sincerely, 
 
 
Iraida Galvin MD and the Palliative Medicine Team
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
## 2019-06-25 NOTE — PROGRESS NOTES
[facility] Palliative Medicine Social Work [phone] Narrative Met with patient and his wife along with Dr. Inge Wilson during patient's appointment. Spent some time talking about patient's and his wife's current home situation which has been extremely stressful as the two have not been getting along. Patient acknowledged that he's very angry due to his health situation and his financial stressors and that he's been taking his anger out on the family by yelling at wife and children. Encouraged patient and his wife to take some time and live apart as the two are having a difficult time communicating without arguing. Stressed the importance of not fighting in front of the children and stressed the importance of providing a safe place for children in the home. Assessment / Actions Patient was alert and oriented x4. Patient's mood was irritable and affect congruent. Patient's insight and judgment were fair. Patient's  
 
Plan Will continue to meet with patient's wife to provide supportive counseling and encourage her to take steps to set healthy boundaries. Maikel Thibodeaux [facility] Palliative Medicine,  [phone]

## 2019-07-01 NOTE — PROGRESS NOTES
Pt arrived to [facility] in no acute distress at 1115 for Keytruda/Alimta C6D1.  Assessment unremarkable no new complaints or concerns. Left chest port accessed without issue and positive blood return noted.      Patient Vitals for the past 12 hrs:   Temp Pulse Resp BP SpO2   07/01/19 1555 -- 66 18 134/83 --   07/01/19 1114 98.8 °F (37.1 °C) 97 18 140/90 100 %       Labs obtained:   Recent Results (from the past 12 hour(s))   CBC WITH AUTOMATED DIFF    Collection Time: 07/01/19 11:25 AM   Result Value Ref Range    WBC 5.4 4.1 - 11.1 K/uL    RBC 3.50 (L) 4.10 - 5.70 M/uL    HGB 11.6 (L) 12.1 - 17.0 g/dL    HCT 35.0 (L) 36.6 - 50.3 %    .0 (H) 80.0 - 99.0 FL    MCH 33.1 26.0 - 34.0 PG    MCHC 33.1 30.0 - 36.5 g/dL    RDW 13.9 11.5 - 14.5 %    PLATELET 343 687 - 518 K/uL    MPV 10.3 8.9 - 12.9 FL    NRBC 0.4 (H) 0  WBC    ABSOLUTE NRBC 0.02 (H) 0.00 - 0.01 K/uL    NEUTROPHILS 56 32 - 75 %    LYMPHOCYTES 26 12 - 49 %    MONOCYTES 16 (H) 5 - 13 %    EOSINOPHILS 0 0 - 7 %    BASOPHILS 1 0 - 1 %    IMMATURE GRANULOCYTES 1 (H) 0.0 - 0.5 %    ABS. NEUTROPHILS 3.1 1.8 - 8.0 K/UL    ABS. LYMPHOCYTES 1.4 0.8 - 3.5 K/UL    ABS. MONOCYTES 0.9 0.0 - 1.0 K/UL    ABS. EOSINOPHILS 0.0 0.0 - 0.4 K/UL    ABS. BASOPHILS 0.0 0.0 - 0.1 K/UL    ABS. IMM. GRANS. 0.0 0.00 - 0.04 K/UL    DF AUTOMATED     METABOLIC PANEL, COMPREHENSIVE    Collection Time: 07/01/19 11:25 AM   Result Value Ref Range    Sodium 137 136 - 145 mmol/L    Potassium 4.1 3.5 - 5.1 mmol/L    Chloride 108 97 - 108 mmol/L    CO2 22 21 - 32 mmol/L    Anion gap 7 5 - 15 mmol/L    Glucose 85 65 - 100 mg/dL    BUN 25 (H) 6 - 20 MG/DL    Creatinine 1.12 0.70 - 1.30 MG/DL    BUN/Creatinine ratio 22 (H) 12 - 20      GFR est AA >60 >60 ml/min/1.73m2    GFR est non-AA >60 >60 ml/min/1.73m2    Calcium 9.0 8.5 - 10.1 MG/DL    Bilirubin, total 0.4 0.2 - 1.0 MG/DL    ALT (SGPT) 22 12 - 78 U/L    AST (SGOT) 22 15 - 37 U/L    Alk.  phosphatase 53 45 - 117 U/L Protein, total 7.7 6.4 - 8.2 g/dL    Albumin 4.0 3.5 - 5.0 g/dL    Globulin 3.7 2.0 - 4.0 g/dL    A-G Ratio 1.1 1.1 - 2.2         The following medications administered:  NS Flush  Decadron IVP  Keytruda  Alimta IV   NS Flush IV  Heparin IV    Pt tolerated treatment well. No adverse. IV flushed per policy and removed, 2x2 and coban placed.  Pt discharged ambulatory in no acute distress at 1600, accompanied by Self.   Next appointment 7/15/19 @ 10 am.

## 2019-07-01 NOTE — PROGRESS NOTES
Pt presents today for follow up on metastatic lung cancer. Pt due for C6D1 of Carbo/Pemetrexed/Keytruda. Pt feeling well today for treatment. Eating/drinking well. Denies N/V. Right shoulder pain taking Oxycodone with relief. No swelling noted. No complaints today. Visit Vitals  BP (!) 148/97 (BP 1 Location: Left arm, BP Patient Position: Sitting)   Pulse 97   Temp 98.6 °F (37 °C) (Oral)   Resp 20   Ht 5' 10\" (1.778 m)   Wt 158 lb 3.2 oz (71.8 kg)   SpO2 98%   BMI 22.70 kg/m²       Chief Complaint   Patient presents with    Lung Cancer     follow up metastatic lung cancer with brain mets/C6D1      1. Have you been to the ER, urgent care clinic since your last visit? Hospitalized since your last visit?no    2. Have you seen or consulted any other health care providers outside of the [address] [place] [address] since your last visit? Include any pap smears or colon screening.  no

## 2019-07-01 NOTE — PROGRESS NOTES
[address], [address] [place] [address] Shay [place], [address] [facility]  [phone]      Follow-up Note        Patient: Soumya Shah MRN: [id number]  SSN: [id number]    YOB: 1960  Age: 61 y.o. Sex: male        Diagnosis:     1. T3 N3 M1a (Stage IV) NSCLC of the lung, likely adenocarcinoma    NGS no actionable mutation   PD-L1 90%    Treatment:     1. Right occipital craniotomy at [facility] on 1/29/2019.   2. SBRT to the brain lesions   3. Palliative chemotherapy   Carbo/Alimta/Keytruda - s/p 4 cycles   Alimta/Pembrolizumab - Cycle 6 Day 1    Subjective:      Soumya Shah is a 61 y.o. male with a diagnosis of metastatic lung carcinoma. He has a over 30 pack years of cigarette smoking. He works in the construction business. MRI brain revealed 5 focus of metastatic disease and he underwent a right occipital craniotomy at [facility] on 1/29/2019. He completed SBRT to the brain lesions and tumor bed at [facility]. He is following with Palliative Medicine and pain is controlled with medication. He is receiving palliative chemotherapy and tolerating it well. Review of Systems:    Constitutional: fatigue  Eyes: negative  Ears, Nose, Mouth, Throat, and Face: negative  Respiratory: cough, SOB   Cardiovascular: negative  Gastrointestinal: diarrhea  Genitourinary:negative  Integument/Breast: negative  Hematologic/Lymphatic: negative  Musculoskeletal: right upper chest pain  Neurological: negative      History reviewed. No pertinent history of prior cancer  History reviewed. No pertinent surgical history. History reviewed. No pertinent family history of cancer    Social History     Tobacco Use    Smoking status: Former Smoker     Packs/day: 1.50     Years: 25.00     Pack years: 37.50     Last attempt to quit: 1/21/2009     Years since quitting: 10.4    Smokeless tobacco: Never Used   Substance Use Topics    Alcohol use:  No Frequency: Never      Prior to Admission medications    Medication Sig Start Date End Date Taking? Authorizing Provider   ibuprofen (MOTRIN) 200 mg tablet Take  by mouth. Yes Provider, Historical   morphine CR (MS CONTIN) 30 mg CR tablet Take 1 Tab by mouth every eight (8) hours for 30 days. Max Daily Amount: 90 mg. 6/24/19 7/24/19 Yes Manisha Ornelas MD   oxyCODONE IR (ROXICODONE) 10 mg tab immediate release tablet Take 1 Tab by mouth every six (6) hours as needed for Pain for up to 15 days. Max Daily Amount: 40 mg. 7/9/19 7/24/19 Yes Manisha Ornelas MD   pantoprazole (PROTONIX) 40 mg tablet Take 1 Tab by mouth two (2) times a day. Indications: gastroesophageal reflux disease 4/30/19  Yes Manisha Ornelas MD   insulin lispro (HUMALOG U-100 INSULIN) 100 unit/mL injection INJECT 5 UNITS SUBCUTANEOUSLY BEFORE MEALS-INCREASE HUMALOG 1 UNIT EVERY OTHER DAY UNTIL 2 HR PP  OR LESS. Use Humalog kwik pen after done with vial. 4/18/19  Yes Aditya Thayer MD   insulin glargine (LANTUS) 100 unit/mL injection 14 Units by SubCUTAneous route nightly. Use toujeo pen when out of lantus vial. 4/18/19  Yes Aditya Thayer MD   fenofibrate (LOFIBRA) 160 mg tablet Take 160 mg by mouth daily. Yes Provider, Historical   megestrol (MEGACE) 40 mg tablet Take 1 Tab by mouth two (2) times a day. 4/16/19  Yes Manisha Ornelas MD   lidocaine-prilocaine (EMLA) topical cream Apply  to affected area as needed for Pain. 2/13/19  Yes Venkat Santoyo MD   ondansetron (ZOFRAN ODT) 4 mg disintegrating tablet Take 1 Tab by mouth every eight (8) hours as needed for Nausea. 2/13/19  Yes Venkat Santoyo MD   acetaminophen (TYLENOL) 325 mg tablet Take 325 mg by mouth every four (4) hours as needed for Pain. Yes Provider, Historical   oxyCODONE IR (ROXICODONE) 10 mg tab immediate release tablet Take 1 Tab by mouth every six (6) hours as needed for Pain for up to 15 days.  Max Daily Amount: 40 mg. 6/24/19 7/9/19  Manisha Ornelas MD   insulin lispro (HUMALOG) 100 unit/mL kwikpen Inject 5 to 15 units three times daily as directed with each meal based on carb intake and blood sugar. Use zero copay voucher. 4/18/19   Leon Dow MD   insulin glargine U-300 conc (TOUJEO SOLOSTAR U-300 INSULIN) 300 unit/mL (1.5 mL) inpn Inject 14 units every night. Use zero copay voucher. Use this when out of Lantus. 4/18/19   Leon Dow MD          No Known Allergies        Objective:     Visit Vitals  BP (!) 148/97 (BP 1 Location: Left arm, BP Patient Position: Sitting)   Pulse 97   Temp 98.6 °F (37 °C) (Oral)   Resp 20   Ht 5' 10\" (1.778 m)   Wt 158 lb 3.2 oz (71.8 kg)   SpO2 98%   BMI 22.70 kg/m²       Pain Scale: 0 - No pain/10    Physical Exam:    GENERAL: alert, cooperative, no distress, appears stated age  EYE: conjunctivae/corneas clear. PERRL, EOM's intact. Fundi benign  LYMPHATIC: Cervical, supraclavicular, and axillary nodes normal.   THROAT & NECK: normal and no erythema or exudates noted. LUNG: clear to auscultation bilaterally  HEART: regular rate and rhythm, S1, S2 normal, no murmur, click, rub or gallop  ABDOMEN: soft, non-tender. Bowel sounds normal. No masses,  no organomegaly  EXTREMITIES:  extremities normal, atraumatic, no cyanosis or edema  SKIN: Normal.  NEUROLOGIC: AOx3. Gait normal. Reflexes and motor strength normal and symmetric. Cranial nerves 2-12 and sensation grossly intact. CT Results (most recent):  Results from Hospital Encounter encounter on 05/13/19   CTA CHEST W OR W WO CONT    Narrative INDICATION:    EXAM: CT Angio Chest:    TECHNIQUE: Unenhanced localizing CT imaging of the pulmonary arteries is  followed by bolus injection of 100 mL Isovue 370 contrast IV, with thin section  axial Chest CT obtained and 3D image post processing performed including coronal  MIPS.  CT dose reduction was achieved through use of a standardized protocol  tailored for this examination and automatic exposure control for dose  modulation. COMPARISON: 12/31/2018    FINDINGS:  There is no pulmonary embolism. There is no aortic dissection or aneurysm. Right upper lobe mass measures 4.2 x 3.1 cm (image 4-113), previously 5.6 x 4.7  cm. Subcentimeter satellite nodule has resolved. No new/acute pulmonary finding. Mediastinal adenopathy is decreased with right paratracheal conglomerate  adenopathy measuring 4.7 x 3.0 cm (image 2-105), previously 6.8 x 3.9 cm. Visualized thyroid and lower neck soft tissues are unremarkable for age. Impression IMPRESSION:   1. No pulmonary embolus. 2. Decreased size of RUL mass. 3. Decreased size of mediastinal adenopathy. 4. No new/acute finding. MRI brain: 5 lesions: right occipital, right lateral ventricular arm, right temporal, right anterior temporal and left frontal    PET CT: large RUL mass with extensive mediastinal adenopathy, metastatic disease to the liver, brain    Lab Results   Component Value Date/Time    WBC 3.0 (L) 06/03/2019 09:12 AM    HGB 10.6 (L) 06/03/2019 09:12 AM    HCT 32.3 (L) 06/03/2019 09:12 AM    PLATELET 552 26/21/9358 09:12 AM    MCV 99.4 (H) 06/03/2019 09:12 AM       Lab Results   Component Value Date/Time    Sodium 135 (L) 06/03/2019 09:12 AM    Potassium 3.7 06/03/2019 09:12 AM    Chloride 108 06/03/2019 09:12 AM    CO2 23 06/03/2019 09:12 AM    Anion gap 4 (L) 06/03/2019 09:12 AM    Glucose 182 (H) 06/03/2019 09:12 AM    BUN 18 06/03/2019 09:12 AM    Creatinine 0.93 06/03/2019 09:12 AM    BUN/Creatinine ratio 19 06/03/2019 09:12 AM    GFR est AA >60 06/03/2019 09:12 AM    GFR est non-AA >60 06/03/2019 09:12 AM    Calcium 8.7 06/03/2019 09:12 AM    Bilirubin, total 0.3 06/03/2019 09:12 AM    AST (SGOT) 14 (L) 06/03/2019 09:12 AM    Alk.  phosphatase 50 06/03/2019 09:12 AM    Protein, total 7.0 06/03/2019 09:12 AM    Albumin 3.6 06/03/2019 09:12 AM    Globulin 3.4 06/03/2019 09:12 AM    A-G Ratio 1.1 06/03/2019 09:12 AM    ALT (SGPT) 22 06/03/2019 09:12 AM           Assessment:     1. T3 N3 M1a (Stage IV) NSCLC of the lung, likely adenoca     NGS - no targetable mutation  PD-L1 90%    > Unresectable disease     ECOG PS 1  Intent of Treatment - palliative  Prognosis poor    S/p right occipital craniotomy at [facility] on 1/29/2019  Completed SBRT to brain lesions and tumor bed by Dr. Chloe Kwong at [facility] on 2/27/2019    Receiving palliative chemotherapy   Carbo/Alimta/Pembrolizumab - s/p 4 cycles   Alimta/Pembrolizumab - Cycle 6 Day 1    Tolerating treatment   A detailed system by system evaluation of side effect was performed to assess chemotherapy related toxicity. Blood counts are acceptable. Results reviewed with the patient. Symptom management form reviewed with patient. CT - shrinkage of disease in the lungs and mediastinum   I will simplify treatment by dropping Carboplatin off  Restage with CT abd and MRI brain      2. Severe protein calorie malnutrition    > Dietician consult  > protein supplementation      3. Chemotherapy related anemia    Observation      Plan:       > Continue Alimta/Pembrolizumab  > CT abd and MRI brain  > Continue to follow with Palliative Medicine  > Follow-up in 3 weeks        Signed by: Tamika Zavaleta MD                     July 1, 2019        CC. Isauro Worrell MD  CC. Yvon Flores MD  CC. Renea Alpers, MD  CC. Arcelia Prasad MD  CC.  Sadaf Booker MD English

## 2019-07-08 NOTE — TELEPHONE ENCOUNTER
Patient is calling to speak to nurse regarding getting his Oxycodone IR 10mg filled. States nurse calls it into pharmacy here at [facility]. Advised would have nurse call him back to discuss.

## 2019-07-08 NOTE — TELEPHONE ENCOUNTER
Left message for Jules Pizano to call PM to make sure he has the prescription for Oxycodone 10 mg disp: 60, he will be receiving foundation assistance and it was approved through [employer].     Akosua Duarte RN  Palliative Medicine

## 2019-07-22 NOTE — PROGRESS NOTES
Pt arrived to [facility] ambulatory in no acute distress at 0910 for Keytruda/Alimta C7D1 & B12.      Assessment unremarkable except c/o chronic back and R shoulder pain. Left chest port accessed without issue and positive blood return noted.      Pt to office for an appointment. Tavia Fitzgerald NP notified of increase change in TSH level, no new orders received proceed with treatment as ordered. Visit Vitals  /86 (BP 1 Location: Left arm, BP Patient Position: At rest)   Pulse 91   Temp 98.2 °F (36.8 °C)   Resp 18   Ht 5' 10\" (1.778 m)   Wt 72.4 kg (159 lb 9 oz)   SpO2 100%   BMI 22.89 kg/m²       Labs obtained:   Recent Results (from the past 12 hour(s))   CBC WITH AUTOMATED DIFF    Collection Time: 07/22/19  9:20 AM   Result Value Ref Range    WBC 4.3 4.1 - 11.1 K/uL    RBC 3.65 (L) 4.10 - 5.70 M/uL    HGB 11.7 (L) 12.1 - 17.0 g/dL    HCT 36.2 (L) 36.6 - 50.3 %    MCV 99.2 (H) 80.0 - 99.0 FL    MCH 32.1 26.0 - 34.0 PG    MCHC 32.3 30.0 - 36.5 g/dL    RDW 13.1 11.5 - 14.5 %    PLATELET 321 637 - 189 K/uL    MPV 9.1 8.9 - 12.9 FL    NRBC 0.0 0  WBC    ABSOLUTE NRBC 0.00 0.00 - 0.01 K/uL    NEUTROPHILS 58 32 - 75 %    LYMPHOCYTES 26 12 - 49 %    MONOCYTES 15 (H) 5 - 13 %    EOSINOPHILS 0 0 - 7 %    BASOPHILS 1 0 - 1 %    IMMATURE GRANULOCYTES 0 0.0 - 0.5 %    ABS. NEUTROPHILS 2.5 1.8 - 8.0 K/UL    ABS. LYMPHOCYTES 1.1 0.8 - 3.5 K/UL    ABS. MONOCYTES 0.6 0.0 - 1.0 K/UL    ABS. EOSINOPHILS 0.0 0.0 - 0.4 K/UL    ABS. BASOPHILS 0.0 0.0 - 0.1 K/UL    ABS. IMM.  GRANS. 0.0 0.00 - 0.04 K/UL    DF AUTOMATED     METABOLIC PANEL, COMPREHENSIVE    Collection Time: 07/22/19  9:20 AM   Result Value Ref Range    Sodium 139 136 - 145 mmol/L    Potassium 3.9 3.5 - 5.1 mmol/L    Chloride 108 97 - 108 mmol/L    CO2 23 21 - 32 mmol/L    Anion gap 8 5 - 15 mmol/L    Glucose 91 65 - 100 mg/dL    BUN 21 (H) 6 - 20 MG/DL    Creatinine 1.04 0.70 - 1.30 MG/DL    BUN/Creatinine ratio 20 12 - 20      GFR est AA >60 >60 ml/min/1.73m2    GFR est non-AA >60 >60 ml/min/1.73m2    Calcium 9.1 8.5 - 10.1 MG/DL    Bilirubin, total 0.6 0.2 - 1.0 MG/DL    ALT (SGPT) 34 12 - 78 U/L    AST (SGOT) 29 15 - 37 U/L    Alk. phosphatase 61 45 - 117 U/L    Protein, total 7.6 6.4 - 8.2 g/dL    Albumin 3.8 3.5 - 5.0 g/dL    Globulin 3.8 2.0 - 4.0 g/dL    A-G Ratio 1.0 (L) 1.1 - 2.2     TSH 3RD GENERATION    Collection Time: 07/22/19  9:20 AM   Result Value Ref Range    TSH 4.06 (H) 0.36 - 3.74 uIU/mL       The following medications administered:  B12 1,000mcg IM injection to Left arm  NS KVO  Decadron 8 mg po  Keytruda  Alimta  NS Flush  Heparin Flush      Visit Vitals  /86   Pulse 84   Temp 98.2 °F (36.8 °C)   Resp 16   Ht 5' 10\" (1.778 m)   Wt 72.4 kg (159 lb 9 oz)   SpO2 100%   BMI 22.89 kg/m²       Pt tolerated treatment well.  IV flushed per policy and removed, 2x2 and paper tape placed.  Pt discharged ambulatory in no acute distress at 1230, accompanied by Self.       Next appointment:   Future Appointments   Date Time Provider Marlo Sweeneyi   7/24/2019  9:30 AM Jose Antonio Rodríguez MD [facility] 10.   7/30/2019  9:50 AM Ellen Wakefield MD [address]   8/12/2019 10:00 AM ANNA INFUSION NURSE [address] REG   8/12/2019 10:15 AM Randeen Bosworth, NP [facility] ESTELA SCHED   9/3/2019 11:00 AM ANNA INFUSION NURSE 4 [facility] REG   9/23/2019 10:00 AM ANNA INFUSION NURSE 4 [facility] REG

## 2019-07-22 NOTE — TELEPHONE ENCOUNTER
Called patient to advise/confirm upcoming appt with Dr. Deyvi Andrade on    7/24/19  at 9:30am at [id number] Mrs. Sorensen Scriver confirmed. Also advised to please bring in your Drivers License and Insurance Card with you to appointment as well as any prescription pain medication in the original container with you to appt.

## 2019-07-22 NOTE — PROGRESS NOTES
[address], [address] [place] [address] Shay Man, [address]  [phone]      Follow-up Note        Patient: Donato Aviles MRN: [id number]  SSN: [id number]    YOB: 1960  Age: 61 y.o. Sex: male        Diagnosis:     1. T3 N3 M1a (Stage IV) NSCLC of the lung, likely adenocarcinoma    NGS no actionable mutation   PD-L1 90%    Treatment:     1. Right occipital craniotomy at [address] [place] [address] on 1/29/2019.   2. SBRT to the brain lesions   3. Palliative chemotherapy   Carbo/Alimta/Keytruda - s/p 4 cycles   Alimta/Pembrolizumab - Cycle 7 Day 1    Subjective:      Donato Aviles is a 61 y.o. male with a diagnosis of metastatic lung carcinoma. He has a over 30 pack years of cigarette smoking. He works in the construction business. MRI brain revealed 5 focus of metastatic disease and he underwent a right occipital craniotomy at [address] [place] [address] on 1/29/2019. He completed SBRT to the brain lesions and tumor bed at [address] [place] [address]. He is following with Palliative Medicine and pain is controlled with medication. He is receiving palliative chemotherapy and tolerating it well. He had his MRI brain but did not get the CT scans completed. Review of Systems:    Constitutional: fatigue  Eyes: negative  Ears, Nose, Mouth, Throat, and Face: negative  Respiratory: negative   Cardiovascular: negative  Gastrointestinal: diarrhea  Genitourinary:negative  Integument/Breast: negative  Hematologic/Lymphatic: negative  Musculoskeletal: right upper chest pain  Neurological: negative      History reviewed. No pertinent history of prior cancer  History reviewed. No pertinent surgical history. History reviewed.  No pertinent family history of cancer    Social History     Tobacco Use    Smoking status: Former Smoker     Packs/day: 1.50     Years: 25.00     Pack years: 37.50     Last attempt to quit: 1/21/2009     Years since quitting: 10.5    Smokeless tobacco: Never Used   Substance Use Topics    Alcohol use: No     Frequency: Never      Prior to Admission medications    Medication Sig Start Date End Date Taking? Authorizing Provider   ibuprofen (MOTRIN) 200 mg tablet Take  by mouth. Yes Provider, Historical   morphine CR (MS CONTIN) 30 mg CR tablet Take 1 Tab by mouth every eight (8) hours for 30 days. Max Daily Amount: 90 mg. 6/24/19 7/24/19 Yes Stan Bautista MD   oxyCODONE IR (ROXICODONE) 10 mg tab immediate release tablet Take 1 Tab by mouth every six (6) hours as needed for Pain for up to 15 days. Max Daily Amount: 40 mg. 7/9/19 7/24/19 Yes Stan Bautista MD   pantoprazole (PROTONIX) 40 mg tablet Take 1 Tab by mouth two (2) times a day. Indications: gastroesophageal reflux disease 4/30/19  Yes Stan Bautista MD   insulin lispro (HUMALOG) 100 unit/mL kwikpen Inject 5 to 15 units three times daily as directed with each meal based on carb intake and blood sugar. Use zero copay voucher. 4/18/19  Yes Dee Abernathy MD   insulin glargine U-300 conc (TOUJEO SOLOSTAR U-300 INSULIN) 300 unit/mL (1.5 mL) inpn Inject 14 units every night. Use zero copay voucher. Use this when out of Lantus. 4/18/19  Yes Dee Abernathy MD   insulin lispro (HUMALOG U-100 INSULIN) 100 unit/mL injection INJECT 5 UNITS SUBCUTANEOUSLY BEFORE MEALS-INCREASE HUMALOG 1 UNIT EVERY OTHER DAY UNTIL 2 HR PP  OR LESS. Use Humalog kwik pen after done with vial. 4/18/19  Yes Dee Abernathy MD   insulin glargine (LANTUS) 100 unit/mL injection 14 Units by SubCUTAneous route nightly. Use toujeo pen when out of lantus vial. 4/18/19  Yes Dee Abernathy MD   fenofibrate (LOFIBRA) 160 mg tablet Take 160 mg by mouth daily. Yes Provider, Historical   megestrol (MEGACE) 40 mg tablet Take 1 Tab by mouth two (2) times a day. 4/16/19  Yes Stan Bautista MD   lidocaine-prilocaine (EMLA) topical cream Apply  to affected area as needed for Pain.  2/13/19  Yes Larissa Vaughan MD   ondansetron (ZOFRAN ODT) 4 mg disintegrating tablet Take 1 Tab by mouth every eight (8) hours as needed for Nausea. 2/13/19  Yes Sanjay Lawrence MD   acetaminophen (TYLENOL) 325 mg tablet Take 325 mg by mouth every four (4) hours as needed for Pain. Yes Provider, Historical          No Known Allergies        Objective:     Visit Vitals  /90 (BP 1 Location: Left arm, BP Patient Position: Sitting)   Pulse 91   Temp 98.8 °F (37.1 °C) (Oral)   Resp 16   Ht 5' 10\" (1.778 m)   Wt 159 lb 9 oz (72.4 kg)   SpO2 98%   BMI 22.89 kg/m²       Pain Scale: 10 - Worst pain ever/10    Physical Exam:    GENERAL: alert, cooperative, no distress, appears stated age  EYE: conjunctivae/corneas clear. PERRL, EOM's intact  LYMPHATIC: Cervical, supraclavicular, and axillary nodes normal.   THROAT & NECK: normal and no erythema or exudates noted. LUNG: clear to auscultation bilaterally  HEART: regular rate and rhythm, S1, S2 normal, no murmur, click, rub or gallop  ABDOMEN: soft, non-tender. Bowel sounds normal. No masses,  no organomegaly  EXTREMITIES:  extremities normal, atraumatic, no cyanosis or edema  SKIN: Normal.  NEUROLOGIC: AOx3. Gait normal. Reflexes and motor strength normal and symmetric. Cranial nerves 2-12 and sensation grossly intact. CT Results (most recent):  Results from Hospital Encounter encounter on 05/13/19   CTA CHEST W OR W WO CONT    Narrative INDICATION:    EXAM: CT Angio Chest:    TECHNIQUE: Unenhanced localizing CT imaging of the pulmonary arteries is  followed by bolus injection of 100 mL Isovue 370 contrast IV, with thin section  axial Chest CT obtained and 3D image post processing performed including coronal  MIPS. CT dose reduction was achieved through use of a standardized protocol  tailored for this examination and automatic exposure control for dose  modulation. COMPARISON: 12/31/2018    FINDINGS:  There is no pulmonary embolism. There is no aortic dissection or aneurysm.     Right upper lobe mass measures 4.2 x 3.1 cm (image 4-113), previously 5.6 x 4.7  cm. Subcentimeter satellite nodule has resolved. No new/acute pulmonary finding. Mediastinal adenopathy is decreased with right paratracheal conglomerate  adenopathy measuring 4.7 x 3.0 cm (image 2-105), previously 6.8 x 3.9 cm. Visualized thyroid and lower neck soft tissues are unremarkable for age. Impression IMPRESSION:   1. No pulmonary embolus. 2. Decreased size of RUL mass. 3. Decreased size of mediastinal adenopathy. 4. No new/acute finding. MRI Results (most recent):  Results from [place] encounter on 07/03/19   MRI BRAIN W WO CONT    Narrative EXAM: MRI BRAIN W WO CONT    TECHNIQUE: Sagittal and axial T1-weighted images axial FLAIR, T2-weighted,  diffusion weighted, gradient echo,  precontrast. Multiplanar postcontrast  T1-weighted images. The study was performed on the 3 T MRI unit. IV CONTRAST:  19 cc Dotarem    INDICATION:  restaging disease, lung cancer with brain metastases, status post  resection of an occipital lobe metastasis and SRT of other lesions    COMPARISON:  Images of 1/15/2019 from [facility]. Report not  available. Comparison of current measurements is made to measurements made by me  on the images of the previous study. FINDINGS:  BRAIN PARENCHYMA:    1. Status post resection of the largest metastasis which was demonstrated on the  previous study, located in the right parieto-occipital region. Resection cavity  noted with mild marginal linear enhancement. No local recurrence demonstrated. 2. Right periatrial lesion currently 5 x 4 mm (10-20) and solidly enhancing,  previously 11 x 7 mm and ring-enhancing. 3. Ring enhancing 11 x 5 mm lesion adjacent to the posterior right temporal horn  (10-15), previously 11 x 7 mm.   4. Previously demonstrated left inferior frontal lesion now seen at a 2 mm focus  of signal abnormality which has features of a chronic hematoma and which does  not clearly enhance (axial images 23), previously measuring 17 x 11 mm.  5. Previously demonstrated ring enhancing 6 mm right anterior temporal lobe  lesion no longer clearly demonstrated. 6. 3 mm enhancing lesion along the anterior aspect of the right temporal horn  more clearly seen today but probably unchanged   7. No new enhancing metastases. No new intracranial abnormalities. No  significant white matter disease. INTRACRANIAL HEMORRHAGE: Negative hemorrhage. Hemosiderin deposition at the  operative site and tiny focus of chronic hemorrhage in the left inferior frontal  lobe region in the location of the prior metastasis. CSF SPACES:  Normal in size and morphology for the patient's age. BASAL CISTERNS:  Patent. MIDLINE SHIFT: None. VASCULAR SYSTEM:  Normal flow voids. PARANASAL SINUSES AND MASTOID AIR CELLS:  No significant opacirication. VISUALIZED ORBITS:  No significant abnormalities. VISUALIZED UPPER CERVICAL SPINE:  No significant abnormalities. SELLA:  No enlargement or  focal abnormality. SKULL BASE:  No significant abnormalities. Cerebellar tonsils in normal  position. CALVARIUM:  Previous right occipital craniotomy. No destructive lesions. Impression IMPRESSION:    1. Status post resection of previously demonstrated right occipital parietal  large metastasis. 2. Decrease in size of the other intracranial metastases, as described in detail  above. No new lesions or new intracranial findings.            MRI brain: Decrease in the intracranial metastasis    PET CT: large RUL mass with extensive mediastinal adenopathy, metastatic disease to the liver, brain    Lab Results   Component Value Date/Time    WBC 4.3 07/22/2019 09:20 AM    HGB 11.7 (L) 07/22/2019 09:20 AM    HCT 36.2 (L) 07/22/2019 09:20 AM    PLATELET 304 89/66/4254 09:20 AM    MCV 99.2 (H) 07/22/2019 09:20 AM       Lab Results   Component Value Date/Time    Sodium 137 07/01/2019 11:25 AM    Potassium 4.1 07/01/2019 11:25 AM    Chloride 108 07/01/2019 11:25 AM    CO2 22 07/01/2019 11:25 AM    Anion gap 7 07/01/2019 11:25 AM    Glucose 85 07/01/2019 11:25 AM    BUN 25 (H) 07/01/2019 11:25 AM    Creatinine 1.12 07/01/2019 11:25 AM    BUN/Creatinine ratio 22 (H) 07/01/2019 11:25 AM    GFR est AA >60 07/01/2019 11:25 AM    GFR est non-AA >60 07/01/2019 11:25 AM    Calcium 9.0 07/01/2019 11:25 AM    Bilirubin, total 0.4 07/01/2019 11:25 AM    AST (SGOT) 22 07/01/2019 11:25 AM    Alk. phosphatase 53 07/01/2019 11:25 AM    Protein, total 7.7 07/01/2019 11:25 AM    Albumin 4.0 07/01/2019 11:25 AM    Globulin 3.7 07/01/2019 11:25 AM    A-G Ratio 1.1 07/01/2019 11:25 AM    ALT (SGPT) 22 07/01/2019 11:25 AM           Assessment:     1. T3 N3 M1a (Stage IV) NSCLC of the lung, likely adenoca     NGS - no targetable mutation  PD-L1 90%    > Unresectable disease     ECOG PS 1  Intent of Treatment - palliative  Prognosis poor    S/p right occipital craniotomy at [facility] on 1/29/2019  Completed SBRT to brain lesions and tumor bed by Dr. Candance Jay at [facility] on 2/27/2019    Receiving palliative chemotherapy   Carbo/Alimta/Pembrolizumab - s/p 4 cycles   Alimta/Pembrolizumab - Cycle 7 Day 1    Tolerating treatment   A detailed system by system evaluation of side effect was performed to assess chemotherapy related toxicity. Blood counts are acceptable. Results reviewed with the patient. Symptom management form reviewed with patient. CT - shrinkage of disease in the lungs and mediastinum   MRI - continued good response  Restage with CT chest/abd      2. Severe protein calorie malnutrition    > Dietician consult  > protein supplementation      3. Chemotherapy related anemia    Observation      Plan:       > Continue Alimta/Pembrolizumab  > CT chest and abd/pelvis  > Continue to follow with Palliative Medicine  > Follow-up in 3 weeks        Signed by: Robbie Baumgarten, MD                     July 22, 2019        CC. Teressa Hong MD  CC. Mame Wilson MD  CC.  Sorin Greenberg Mason Wallace MD  CC. Brittani Salas MD  CC.  Libra Oshea MD

## 2019-07-22 NOTE — PROGRESS NOTES
Donato Aviles is a 61 y.o. male here today for metastatic lung carcinoma to brain f/u. Patient seeing palliative.

## 2019-07-23 NOTE — PROGRESS NOTES
[facility]  Social Work Navigator Encounter     Patient Name:  Agnieszka Stuart     Medical History: dx metastatic lung cancer    Advance Directives:    Narrative: Pt emailed SW to state that he was being told they couldn't schedule CT Scan due to insurance. SW spoke with Vanesa Junior and shared pt has a $22K spenddown with Medicaid but pt also has care card. Vanesa Junior will move forward with scheduling. Barriers to Care:     Plan:   1. Continue to assist pt with bills to provide to [employer].

## 2019-07-24 NOTE — PROGRESS NOTES
Palliative Medicine Office Visit  Palliative Medicine Nurse Check In  [phone]    Patient Name: León Goodwin  YOB: 1960      Date of Office Visit: 7/24/2019     Patient states: \"  \"    From Check In Sheet (scanned in Media):  Has a medical provider talked with you about cardiopulmonary resuscitation (CPR)? [x] Yes   [] No   [] Unable to obtain    Nurse reminder to complete or update ACP FlowSheet:    Is ACP on the Problem List?    [x] Yes    [] No  IF ACP Document is ON FILE; Nurse to place ACP on Problem List     Is there an ACP Note in Chart Review/Note? [x] Yes    [] No   If NO: ALERT PROVIDER       Primary Decision Maker: Jessica Mercedesbie - Spouse - [phone]    Secondary Decision Maker: Madai Bloom - Father - [phone]  Advance Care Planning 7/24/2019   Patient's Healthcare Decision Maker is: Named in scanned ACP document   Confirm Advance Directive Yes, on file   Does the patient have other document types -         Is there anything that we should know about you as a person in order to provide you the best care possible? Have you been to the ER, urgent care clinic since your last visit? [] Yes   [x] No   [] Unable to obtain    Have you been hospitalized since your last visit? [] Yes   [x] No   [] Unable to obtain    Have you seen or consulted any other health care providers outside of the [address] [place] [address] since your last visit?    [] Yes   [x] No   [] Unable to obtain    Functional status (describe):         Last BM: 7/24/2019n     accessed (date): 7/24/2019     Bottle review (for opioid pain medication):  Medication 1:   Date filled:   Directions:   # filled:   # left:   # pills taking per day:  Last dose taken:    Medication 2:   Date filled:   Directions:   # filled:   # left:   # pills taking per day:  Last dose taken:    Medication 3:   Date filled:   Directions:   # filled:   # left:   # pills taking per day:  Last dose taken:    Medication 4:   Date filled:   Directions:   # filled:   # left:   # pills taking per day:  Last dose taken:

## 2019-07-24 NOTE — TELEPHONE ENCOUNTER
Spoke with Michael lewis at [email or web address], she gave PM nurse pricing for foundation assistance, email sent to Roderick Gannon for approval.  Information faxed to the pharmacy.     William Collier RN  Palliative Medicine
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
## 2019-07-24 NOTE — PATIENT INSTRUCTIONS
Dear Rita Padron ,    It was a pleasure seeing you today at [facility] office      This is the plan we talked about:    1. Right sided chest pain radiating to the shoulder  -You are having more pain in the right arm than usual. You have scans scheduled for July 31st. We will follow up on that before making any changes. - Continue MS Contin 30 mg to 3 times a day and Oxycodone 10 mg every 6 hours as needed for breakthrough pain. 2. Constipation  -You are doing much better with Lactulose and MiraLAX as needed. 3. Acid reflux  - You are still struggling with this. Let us increase Protonix to 40 mg two times a day. 4. Cognitive impairment  - You are off the dexamethasone and doing fairly okay. 5. Diabetes  - You have established care with Dr. Steph Burnett as your pcp and he is taking over your management of Diabetes. 5. ACP  - We completed an advance medical directive and medical power of . - Financial poa is all taken care of as well. - I am you got Medicaid. This is what you have shared with us about Advance Care Planning:    Primary Decision Maker ([address]): Odilia Holland  Relationship to patient:Wife  Phone number:[phone]  [x] Named in a scanned document   [x] Legal Next of Kin  [] Guardian    Secondary Decision Maker ([address]):  Demarcus Brambila  Relationship to patient: Father  Phone number: [phone]  [x] Named in a scanned document   [] Legal Next of Kin  [] Guardian    ACP documents you current have include:  [x] Advance Directive or Living Will  [] Durable Do Not Resuscitate  [] Physician Orders for Scope of Treatment (POST)  [] Medical Power of   [] Other      The Palliative Medicine Team is here to support you and your family.      We will see you again in 4 weeks  Sincerely,      Kalen Pedraza MD and the Palliative Medicine Team

## 2019-08-01 NOTE — PROGRESS NOTES
[facility]  Social Work Navigator Encounter     Patient Name:  Casper Zhao     Medical History: dx metastatic lung cancer    Advance Directives:    Narrative: Two bills date July 3 for $7921.37 (MRI) + July 22 infusion ($53,030.19) =$[phone] towards $20K spenddown. SSN provided on cover sheet. Bills faxed to [address] [place] [address] ([facility] [address] [phone]   (see MEDIA)    Barriers to Care:     Assessment/Action:    Plan/Referral:     Other referral  - Assisting pt with bills for Medicaid Spenddown. Pt,  emailed and faxed detail bills- successful fax in scan folder.

## 2019-08-06 NOTE — TELEPHONE ENCOUNTER
Dalia Fulling with [facility] is calling to advise that patient is requesting the following refills that she states the theresa program usually covers:    1)  Syringes  2)  Humalog  3) Lantus. Advised that nurse would call her back to discuss.

## 2019-08-06 NOTE — TELEPHONE ENCOUNTER
Returned call to Marisol Handley at [facility] , Advised patient will need to get help with DM medication from PCP as we are only taking care of approvals for cancer medication with our foundation funds ,patient and jasmin verbalized understanding and will contact PCP

## 2019-08-08 PROBLEM — C34.90 NON-SMALL CELL LUNG CANCER (HCC): Status: ACTIVE | Noted: 2019-01-01

## 2019-08-08 NOTE — TELEPHONE ENCOUNTER
Called patient back and didn't get an answer. PM nurse had previously left a VM for him.     Marshall Agustin RN  Palliative Medicine

## 2019-08-08 NOTE — PROGRESS NOTES
Chief Complaint Patient presents with  Follow-up 3 month HPI: 
Max Charles is a 61 y.o. AA male with h/o lung cancer, diabetes type 2, hypertension, gerd, hypercholesterolemia presents for 3 month follow up Patient follows with Evelin Ferreira in palliative and Dr. Tess Santos in hem/on. He says the cancer has shrinking on repeat ct scans. Physically, he has been doing well. Blood pressure is elevated. He denies headaches, dizziness, ankle swelling. Patient says antihypertensives were stopped because his blood pressure persistently dropped during chemotherapy. He agreed to resume low dose antihypertensive. Review of Systems As per hpi Past Medical History:  
Diagnosis Date  Cancer ([facility] LUNG RT. SIDE METS TO BRAIN, LIVER  Chronic obstructive pulmonary disease ([facility] MILD PER WIFE  
 Diabetes ([facility] TYPE II  
 GERD (gastroesophageal reflux disease)  Hypertension Past Surgical History:  
Procedure Laterality Date  HX CERVICAL FUSION    
 HX CRANIOTOMY  01/29/2019 REMOVAL OF BRAIN METS AT [facility]  HX GI    
 COLONOSCOPY  
 HX HERNIA REPAIR Right 1972 INGUINAL  VASCULAR SURGERY PROCEDURE UNLIST Bilateral   
 LEGS Social History Socioeconomic History  Marital status:  Spouse name: Not on file  Number of children: Not on file  Years of education: Not on file  Highest education level: Not on file Tobacco Use  Smoking status: Former Smoker Packs/day: 1.50 Years: 25.00 Pack years: 37.50 Last attempt to quit: 1/21/2009 Years since quitting: 10.5  Smokeless tobacco: Never Used Substance and Sexual Activity  Alcohol use: No  
  Frequency: Never  Drug use: No  
 
Family History Problem Relation Age of Onset  Stroke Father  Diabetes Father  Other Mother 58 SUDDEN DEATH  
 Cancer Maternal Grandfather  Anesth Problems Neg Hx Current Outpatient Medications Medication Sig Dispense Refill  morphine CR (MS CONTIN) 30 mg CR tablet Take 1 Tab by mouth every eight (8) hours for 30 days. Max Daily Amount: 90 mg. 90 Tab 0  
 oxyCODONE IR (ROXICODONE) 10 mg tab immediate release tablet Take 1 Tab by mouth every six (6) hours as needed for Pain for up to 15 days. Max Daily Amount: 40 mg. 60 Tab 0  ibuprofen (MOTRIN) 200 mg tablet Take  by mouth.  pantoprazole (PROTONIX) 40 mg tablet Take 1 Tab by mouth two (2) times a day. Indications: gastroesophageal reflux disease 60 Tab 6  
 insulin lispro (HUMALOG U-100 INSULIN) 100 unit/mL injection INJECT 5 UNITS SUBCUTANEOUSLY BEFORE MEALS-INCREASE HUMALOG 1 UNIT EVERY OTHER DAY UNTIL 2 HR PP  OR LESS. Use Humalog kwik pen after done with vial. 1 Vial 3  
 insulin glargine (LANTUS) 100 unit/mL injection 14 Units by SubCUTAneous route nightly. Use toujeo pen when out of lantus vial. 1 Vial 3  
 megestrol (MEGACE) 40 mg tablet Take 1 Tab by mouth two (2) times a day. 60 Tab 2  
 lidocaine-prilocaine (EMLA) topical cream Apply  to affected area as needed for Pain. 30 g 0  
 ondansetron (ZOFRAN ODT) 4 mg disintegrating tablet Take 1 Tab by mouth every eight (8) hours as needed for Nausea. 40 Tab 1 No Known Allergies Objective: 
Visit Vitals BP (!) 154/92 Pulse 100 Temp 99.7 °F (37.6 °C) (Oral) Resp 20 Ht 5' 10\" (1.778 m) Wt 162 lb (73.5 kg) SpO2 97% BMI 23.24 kg/m² Physical Exam:  
General appearance - alert, well appearing in no distress Mental status - alert, oriented to person, place, and time EYE-PERRL, EOMI Chest - clear to auscultation, no wheezes, rales or rhonchi Heart - normal rate, regular rhythm, normal blood pressure Abdomen - soft, nontender, nondistended, no organomegaly Ext-peripheral pulses normal, no pedal edema Neuro - no focal findings Back-full range of motion, no tenderness, palpable spasm or pain on motion Results for orders placed or performed during the hospital encounter of 07/22/19 CBC WITH AUTOMATED DIFF Result Value Ref Range WBC 4.3 4.1 - 11.1 K/uL  
 RBC 3.65 (L) 4.10 - 5.70 M/uL  
 HGB 11.7 (L) 12.1 - 17.0 g/dL HCT 36.2 (L) 36.6 - 50.3 % MCV 99.2 (H) 80.0 - 99.0 FL  
 MCH 32.1 26.0 - 34.0 PG  
 MCHC 32.3 30.0 - 36.5 g/dL  
 RDW 13.1 11.5 - 14.5 % PLATELET 239 989 - 631 K/uL MPV 9.1 8.9 - 12.9 FL  
 NRBC 0.0 0  WBC ABSOLUTE NRBC 0.00 0.00 - 0.01 K/uL NEUTROPHILS 58 32 - 75 % LYMPHOCYTES 26 12 - 49 % MONOCYTES 15 (H) 5 - 13 % EOSINOPHILS 0 0 - 7 % BASOPHILS 1 0 - 1 % IMMATURE GRANULOCYTES 0 0.0 - 0.5 % ABS. NEUTROPHILS 2.5 1.8 - 8.0 K/UL  
 ABS. LYMPHOCYTES 1.1 0.8 - 3.5 K/UL  
 ABS. MONOCYTES 0.6 0.0 - 1.0 K/UL  
 ABS. EOSINOPHILS 0.0 0.0 - 0.4 K/UL  
 ABS. BASOPHILS 0.0 0.0 - 0.1 K/UL  
 ABS. IMM. GRANS. 0.0 0.00 - 0.04 K/UL  
 DF AUTOMATED METABOLIC PANEL, COMPREHENSIVE Result Value Ref Range Sodium 139 136 - 145 mmol/L Potassium 3.9 3.5 - 5.1 mmol/L Chloride 108 97 - 108 mmol/L  
 CO2 23 21 - 32 mmol/L Anion gap 8 5 - 15 mmol/L Glucose 91 65 - 100 mg/dL BUN 21 (H) 6 - 20 MG/DL Creatinine 1.04 0.70 - 1.30 MG/DL  
 BUN/Creatinine ratio 20 12 - 20 GFR est AA >60 >60 ml/min/1.73m2 GFR est non-AA >60 >60 ml/min/1.73m2 Calcium 9.1 8.5 - 10.1 MG/DL Bilirubin, total 0.6 0.2 - 1.0 MG/DL  
 ALT (SGPT) 34 12 - 78 U/L  
 AST (SGOT) 29 15 - 37 U/L Alk. phosphatase 61 45 - 117 U/L Protein, total 7.6 6.4 - 8.2 g/dL Albumin 3.8 3.5 - 5.0 g/dL Globulin 3.8 2.0 - 4.0 g/dL A-G Ratio 1.0 (L) 1.1 - 2.2 TSH 3RD GENERATION Result Value Ref Range TSH 4.06 (H) 0.36 - 3.74 uIU/mL Assessment/Plan: 
Diagnoses and all orders for this visit: 
 
Controlled type 2 diabetes mellitus with diabetic nephropathy, unspecified whether long term insulin use ([facility] 
-     HEMOGLOBIN A1C WITH EAG 
 
 Lung cancer metastatic to brain [facility]) Hypertension goal BP (blood pressure) < 603/61 
-     METABOLIC PANEL, COMPREHENSIVE 
-     lisinopril (PRINIVIL, ZESTRIL) 10 mg tablet; Take 1 Tab by mouth daily. , Normal, Disp-30 Tab, R-5 Vitamin D deficiency 
-     VITAMIN D, 25 HYDROXY Normochromic normocytic anemia 
-     CBC WITH AUTOMATED DIFF Frequency of urination 
-     URINALYSIS W/ RFLX MICROSCOPIC Screening for thyroid disorder 
-     TSH 3RD GENERATION Patient Instructions Low Sodium Diet (2,000 Milligram): Care Instructions Your Care Instructions Too much sodium causes your body to hold on to extra water. This can raise your blood pressure and force your heart and kidneys to work harder. In very serious cases, this could cause you to be put in the hospital. It might even be life-threatening. By limiting sodium, you will feel better and lower your risk of serious problems. The most common source of sodium is salt. People get most of the salt in their diet from canned, prepared, and packaged foods. Fast food and restaurant meals also are very high in sodium. Your doctor will probably limit your sodium to less than 2,000 milligrams (mg) a day. This limit counts all the sodium in prepared and packaged foods and any salt you add to your food. Follow-up care is a key part of your treatment and safety. Be sure to make and go to all appointments, and call your doctor if you are having problems. It's also a good idea to know your test results and keep a list of the medicines you take. How can you care for yourself at home? Read food labels · Read labels on cans and food packages. The labels tell you how much sodium is in each serving. Make sure that you look at the serving size. If you eat more than the serving size, you have eaten more sodium. · Food labels also tell you the Percent Daily Value for sodium. Choose products with low Percent Daily Values for sodium. · Be aware that sodium can come in forms other than salt, including monosodium glutamate (MSG), sodium citrate, and sodium bicarbonate (baking soda). MSG is often added to Asian food. When you eat out, you can sometimes ask for food without MSG or added salt. Buy low-sodium foods · Buy foods that are labeled \"unsalted\" (no salt added), \"sodium-free\" (less than 5 mg of sodium per serving), or \"low-sodium\" (less than 140 mg of sodium per serving). Foods labeled \"reduced-sodium\" and \"light sodium\" may still have too much sodium. Be sure to read the label to see how much sodium you are getting. · Buy fresh vegetables, or frozen vegetables without added sauces. Buy low-sodium versions of canned vegetables, soups, and other canned goods. Prepare low-sodium meals · Cut back on the amount of salt you use in cooking. This will help you adjust to the taste. Do not add salt after cooking. One teaspoon of salt has about 2,300 mg of sodium. · Take the salt shaker off the table. · Flavor your food with garlic, lemon juice, onion, vinegar, herbs, and spices. Do not use soy sauce, lite soy sauce, steak sauce, onion salt, garlic salt, celery salt, mustard, or ketchup on your food. · Use low-sodium salad dressings, sauces, and ketchup. Or make your own salad dressings and sauces without adding salt. · Use less salt (or none) when recipes call for it. You can often use half the salt a recipe calls for without losing flavor. Other foods such as rice, pasta, and grains do not need added salt. · Rinse canned vegetables, and cook them in fresh water. This removes somebut not allof the salt. · Avoid water that is naturally high in sodium or that has been treated with water softeners, which add sodium. Call your local water company to find out the sodium content of your water supply. If you buy bottled water, read the label and choose a sodium-free brand. Avoid high-sodium foods · Avoid eating: ? Smoked, cured, salted, and canned meat, fish, and poultry. ? Ham, barrera, hot dogs, and luncheon meats. ? Regular, hard, and processed cheese and regular peanut butter. ? Crackers with salted tops, and other salted snack foods such as pretzels, chips, and salted popcorn. ? Frozen prepared meals, unless labeled low-sodium. ? Canned and dried soups, broths, and bouillon, unless labeled sodium-free or low-sodium. ? Canned vegetables, unless labeled sodium-free or low-sodium. ? [place] fries, pizza, tacos, and other fast foods. ? Pickles, olives, ketchup, and other condiments, especially soy sauce, unless labeled sodium-free or low-sodium. Where can you learn more? Go to http://raymon-sergo.info/. Enter Q443 in the search box to learn more about \"Low Sodium Diet (2,000 Milligram): Care Instructions. \" Current as of: November 7, 2018 Content Version: 12.1 © 3570-2994 [employer]. Care instructions adapted under license by [employer] (which disclaims liability or warranty for this information). If you have questions about a medical condition or this instruction, always ask your healthcare professional. [address] any warranty or liability for your use of this information. Follow-up and Dispositions · Return in about 3 months (around 11/8/2019), or if symptoms worsen or fail to improve, for routine f/u.

## 2019-08-08 NOTE — PATIENT INSTRUCTIONS
Low Sodium Diet (2,000 Milligram): Care Instructions Your Care Instructions Too much sodium causes your body to hold on to extra water. This can raise your blood pressure and force your heart and kidneys to work harder. In very serious cases, this could cause you to be put in the hospital. It might even be life-threatening. By limiting sodium, you will feel better and lower your risk of serious problems. The most common source of sodium is salt. People get most of the salt in their diet from canned, prepared, and packaged foods. Fast food and restaurant meals also are very high in sodium. Your doctor will probably limit your sodium to less than 2,000 milligrams (mg) a day. This limit counts all the sodium in prepared and packaged foods and any salt you add to your food. Follow-up care is a key part of your treatment and safety. Be sure to make and go to all appointments, and call your doctor if you are having problems. It's also a good idea to know your test results and keep a list of the medicines you take. How can you care for yourself at home? Read food labels · Read labels on cans and food packages. The labels tell you how much sodium is in each serving. Make sure that you look at the serving size. If you eat more than the serving size, you have eaten more sodium. · Food labels also tell you the Percent Daily Value for sodium. Choose products with low Percent Daily Values for sodium. · Be aware that sodium can come in forms other than salt, including monosodium glutamate (MSG), sodium citrate, and sodium bicarbonate (baking soda). MSG is often added to Asian food. When you eat out, you can sometimes ask for food without MSG or added salt. Buy low-sodium foods · Buy foods that are labeled \"unsalted\" (no salt added), \"sodium-free\" (less than 5 mg of sodium per serving), or \"low-sodium\" (less than 140 mg of sodium per serving).  Foods labeled \"reduced-sodium\" and \"light sodium\" may still have too much sodium. Be sure to read the label to see how much sodium you are getting. · Buy fresh vegetables, or frozen vegetables without added sauces. Buy low-sodium versions of canned vegetables, soups, and other canned goods. Prepare low-sodium meals · Cut back on the amount of salt you use in cooking. This will help you adjust to the taste. Do not add salt after cooking. One teaspoon of salt has about 2,300 mg of sodium. · Take the salt shaker off the table. · Flavor your food with garlic, lemon juice, onion, vinegar, herbs, and spices. Do not use soy sauce, lite soy sauce, steak sauce, onion salt, garlic salt, celery salt, mustard, or ketchup on your food. · Use low-sodium salad dressings, sauces, and ketchup. Or make your own salad dressings and sauces without adding salt. · Use less salt (or none) when recipes call for it. You can often use half the salt a recipe calls for without losing flavor. Other foods such as rice, pasta, and grains do not need added salt. · Rinse canned vegetables, and cook them in fresh water. This removes somebut not allof the salt. · Avoid water that is naturally high in sodium or that has been treated with water softeners, which add sodium. Call your local water company to find out the sodium content of your water supply. If you buy bottled water, read the label and choose a sodium-free brand. Avoid high-sodium foods · Avoid eating: 
? Smoked, cured, salted, and canned meat, fish, and poultry. ? Ham, barrera, hot dogs, and luncheon meats. ? Regular, hard, and processed cheese and regular peanut butter. ? Crackers with salted tops, and other salted snack foods such as pretzels, chips, and salted popcorn. ? Frozen prepared meals, unless labeled low-sodium. ? Canned and dried soups, broths, and bouillon, unless labeled sodium-free or low-sodium. ? Canned vegetables, unless labeled sodium-free or low-sodium. ? [place] fries, pizza, tacos, and other fast foods. ? Pickles, olives, ketchup, and other condiments, especially soy sauce, unless labeled sodium-free or low-sodium. Where can you learn more? Go to http://raymon-sergo.info/. Enter B470 in the search box to learn more about \"Low Sodium Diet (2,000 Milligram): Care Instructions. \" Current as of: November 7, 2018 Content Version: 12.1 © 4917-0377 [employer]. Care instructions adapted under license by [employer] (which disclaims liability or warranty for this information). If you have questions about a medical condition or this instruction, always ask your healthcare professional. [address] any warranty or liability for your use of this information.

## 2019-08-08 NOTE — TELEPHONE ENCOUNTER
Patient calling to ask nurse to call [facility] to approve for medication to be filled. States he will be in area and would like to . Advised nurse would call him back to advise.

## 2019-08-08 NOTE — TELEPHONE ENCOUNTER
Luan Hairstonr with [facility] is calling to speak to nurse regarding time to fill patient's Oxycodone and wants to know if its ok to fill and if its ok to bill [facility]. Advised nurse would call her back.

## 2019-08-08 NOTE — TELEPHONE ENCOUNTER
Spoke with Tom Martínez at the [facility] to let her know the medication was approved by the [facility] and the paper work was being faxed to her. Message was left for Demarcus Rogers to let him know this as well.     Marc Mari RN  Palliative Medicine

## 2019-08-12 NOTE — PROGRESS NOTES
\[facility]\"" Progress Note    Date: 2019    Name: Farideh Yanes    MRN: [id number]         : 1960    Mr. Vreenice Naranjo Arrived 6917 ambulatory and in no distress for cycle 8 day 1 of chemo regimen. Assessment was completed, no acute issues at this time, no new complaints voiced. L chest PAC  accessed without difficulty, labs drawn and in process. Chemotherapy Flowsheet 2019   Cycle C8D1   Date 2019   Drug / Regimen Keytruda/Alimta   Pre Meds given   Notes given       1005:  Proceeded to appt with Dr. Tamara Goodwin. 1015:  Back from [facility]. Awaiting rest of lab results. Mr. Jalil Dickinson vitals were reviewed. Visit Vitals  /87 (BP 1 Location: Left arm, BP Patient Position: Sitting)   Pulse 95   Temp 98.9 °F (37.2 °C)   Resp 18   Ht 5' 10\" (1.778 m)   Wt 73.4 kg (161 lb 12.8 oz)   SpO2 100%   BMI 23.22 kg/m²     Lab results were obtained and reviewed. Recent Results (from the past 12 hour(s))   CBC WITH AUTOMATED DIFF    Collection Time: 19 10:04 AM   Result Value Ref Range    WBC 4.2 4.1 - 11.1 K/uL    RBC 3.41 (L) 4.10 - 5.70 M/uL    HGB 10.9 (L) 12.1 - 17.0 g/dL    HCT 33.0 (L) 36.6 - 50.3 %    MCV 96.8 80.0 - 99.0 FL    MCH 32.0 26.0 - 34.0 PG    MCHC 33.0 30.0 - 36.5 g/dL    RDW 13.0 11.5 - 14.5 %    PLATELET 819 261 - 804 K/uL    MPV 9.0 8.9 - 12.9 FL    NRBC 0.0 0  WBC    ABSOLUTE NRBC 0.00 0.00 - 0.01 K/uL    NEUTROPHILS 61 32 - 75 %    LYMPHOCYTES 25 12 - 49 %    MONOCYTES 12 5 - 13 %    EOSINOPHILS 0 0 - 7 %    BASOPHILS 1 0 - 1 %    IMMATURE GRANULOCYTES 1 (H) 0.0 - 0.5 %    ABS. NEUTROPHILS 2.6 1.8 - 8.0 K/UL    ABS. LYMPHOCYTES 1.1 0.8 - 3.5 K/UL    ABS. MONOCYTES 0.5 0.0 - 1.0 K/UL    ABS. EOSINOPHILS 0.0 0.0 - 0.4 K/UL    ABS. BASOPHILS 0.0 0.0 - 0.1 K/UL    ABS. IMM.  GRANS. 0.0 0.00 - 0.04 K/UL    DF AUTOMATED     METABOLIC PANEL, COMPREHENSIVE    Collection Time: 19 10:04 AM   Result Value Ref Range    Sodium 140 136 - 145 mmol/L    Potassium 4.1 3.5 - 5.1 mmol/L    Chloride 107 97 - 108 mmol/L    CO2 24 21 - 32 mmol/L    Anion gap 9 5 - 15 mmol/L    Glucose 147 (H) 65 - 100 mg/dL    BUN 23 (H) 6 - 20 MG/DL    Creatinine 1.01 0.70 - 1.30 MG/DL    BUN/Creatinine ratio 23 (H) 12 - 20      GFR est AA >60 >60 ml/min/1.73m2    GFR est non-AA >60 >60 ml/min/1.73m2    Calcium 8.9 8.5 - 10.1 MG/DL    Bilirubin, total 0.5 0.2 - 1.0 MG/DL    ALT (SGPT) 31 12 - 78 U/L    AST (SGOT) 27 15 - 37 U/L    Alk. phosphatase 74 45 - 117 U/L    Protein, total 7.6 6.4 - 8.2 g/dL    Albumin 3.6 3.5 - 5.0 g/dL    Globulin 4.0 2.0 - 4.0 g/dL    A-G Ratio 0.9 (L) 1.1 - 2.2       Pt states he has not been taking Folic Acid for a while now and has not been taking Dexamethasone prior to chemo. Onc pharmacist notified. Pre-medications  were administered as ordered and chemotherapy was initiated. NS 25cc/hr  Keytruda 200mg  Zofran 8mg IV  Alimta 945mg IV over 10 min. Mr. Marcus Osuna tolerated treatment well. PAC flushed per protocol and Guerra needle DCd. Pt was discharged from [place] [id number] in stable condition at 1300. He is to return on 9/3/19 at 1100 for his next appointment.     Ruth Ann Peralta  August 12, 2019

## 2019-08-12 NOTE — PROGRESS NOTES
[facility]  [address], [facility], [address]  [phone]      Follow-up Note        Patient: Paul Lopez MRN: [id number]  SSN: [id number]    YOB: 1960  Age: 61 y.o. Sex: male        Diagnosis:     1. T3 N3 M1a (Stage IV) NSCLC of the lung, likely adenocarcinoma    NGS no actionable mutation   PD-L1 90%    Treatment:     1. Right occipital craniotomy at [address] [place] [address] on 1/29/2019.   2. SBRT to the brain lesions   3. Palliative chemotherapy   Carbo/Alimta/Keytruda - s/p 4 cycles   Alimta/Pembrolizumab - Cycle 8 Day 1    Subjective:      Paul Lopez is a 61 y.o. male with a diagnosis of metastatic lung carcinoma. He has a over 30 pack years of cigarette smoking. He works in the construction business. MRI brain revealed 5 focus of metastatic disease and he underwent a right occipital craniotomy at [address] [place] [address] on 1/29/2019. He completed SBRT to the brain lesions and tumor bed at [address] [place] [address]. He is following with Palliative Medicine and pain is controlled with medication. He is receiving palliative chemotherapy and tolerating it well. Recent scans show good response to treatment. Review of Systems:    Constitutional: fatigue  Eyes: negative  Ears, Nose, Mouth, Throat, and Face: negative  Respiratory: negative   Cardiovascular: negative  Gastrointestinal: diarrhea  Genitourinary:negative  Integument/Breast: negative  Hematologic/Lymphatic: negative  Musculoskeletal: right upper chest pain  Neurological: negative      History reviewed. No pertinent history of prior cancer  History reviewed. No pertinent surgical history. History reviewed.  No pertinent family history of cancer    Social History     Tobacco Use    Smoking status: Former Smoker     Packs/day: 1.50     Years: 25.00     Pack years: 37.50     Last attempt to quit: 1/21/2009     Years since quitting: 10.5    Smokeless tobacco: Never Used Substance Use Topics    Alcohol use: No     Frequency: Never      Prior to Admission medications    Medication Sig Start Date End Date Taking? Authorizing Provider   lisinopril (PRINIVIL, ZESTRIL) 10 mg tablet Take 1 Tab by mouth daily. 8/8/19   Sonja Dow MD   morphine CR (MS CONTIN) 30 mg CR tablet Take 1 Tab by mouth every eight (8) hours for 30 days. Max Daily Amount: 90 mg. 7/24/19 8/23/19  Juliet Olivo MD   ibuprofen (MOTRIN) 200 mg tablet Take  by mouth. Provider, Gita   pantoprazole (PROTONIX) 40 mg tablet Take 1 Tab by mouth two (2) times a day. Indications: gastroesophageal reflux disease 4/30/19   Juliet Olivo MD   insulin lispro (HUMALOG U-100 INSULIN) 100 unit/mL injection INJECT 5 UNITS SUBCUTANEOUSLY BEFORE MEALS-INCREASE HUMALOG 1 UNIT EVERY OTHER DAY UNTIL 2 HR PP  OR LESS. Use Humalog kwik pen after done with vial. 4/18/19   Tk Dow MD   insulin glargine (LANTUS) 100 unit/mL injection 14 Units by SubCUTAneous route nightly. Use toujeo pen when out of lantus vial. 4/18/19   Sonja Dow MD   megestrol (MEGACE) 40 mg tablet Take 1 Tab by mouth two (2) times a day. 4/16/19   Juliet Olivo MD   lidocaine-prilocaine (EMLA) topical cream Apply  to affected area as needed for Pain. 2/13/19   Veena Sharma MD   ondansetron (ZOFRAN ODT) 4 mg disintegrating tablet Take 1 Tab by mouth every eight (8) hours as needed for Nausea. 2/13/19   Veena Sharma MD          No Known Allergies        Objective: There were no vitals taken for this visit. Pain Scale: /10    Physical Exam:    GENERAL: alert, cooperative, no distress, appears stated age  EYE: conjunctivae/corneas clear. PERRL, EOM's intact  LYMPHATIC: Cervical, supraclavicular, and axillary nodes normal.   THROAT & NECK: normal and no erythema or exudates noted.    LUNG: clear to auscultation bilaterally  HEART: regular rate and rhythm, S1, S2 normal, no murmur, click, rub or gallop  ABDOMEN: soft, non-tender. Bowel sounds normal. No masses,  no organomegaly  EXTREMITIES:  extremities normal, atraumatic, no cyanosis or edema  SKIN: Normal.  NEUROLOGIC: AOx3. Gait normal. Reflexes and motor strength normal and symmetric. Cranial nerves 2-12 and sensation grossly intact. CT Results (most recent):  Results from Hospital Encounter encounter on 07/31/19   CT CHEST W CONT    Narrative INDICATION: lung cancer    COMPARISON: May 13, 2019    TECHNIQUE:  Following the uneventful intravenous administration of 100 cc  Isovue-300, 5 mm axial images were obtained through the chest abdomen and  pelvis. Coronal and sagittal reconstructions were generated. CT dose reduction  was achieved through use of a standardized protocol tailored for this  examination and automatic exposure control for dose modulation. Oral contrast  was given. FINDINGS:    THYROID: No nodule. MEDIASTINUM: Decrease in size of mediastinal adenopathy, a paratracheal node  measures 29 mm, it was 46. No other new mediastinal findings. . Infusion  catheter in place. GURVINDER: No mass or lymphadenopathy. THORACIC AORTA: No dissection or aneurysm. MAIN PULMONARY ARTERY: Normal in caliber. TRACHEA/BRONCHI: Patent. ESOPHAGUS: No wall thickening or dilatation. HEART: Normal in size. New small pericardial effusion. PLEURA: No effusion or pneumothorax. LUNGS:  decrease in the size of the right upper lobe mass which measures at this  time 34 x 14 mm, it was 40 x 25. No additional parenchymal abnormalities. .    No evidence to suggest bone metastases by this technique. Multiple liver masses compatible with metastases, those were not seen well on  the recent CT of the chest. Liver and spleen are normal in size. The gallbladder is not distended. The adrenals appear normal. Pancreas appears  unremarkable. There is no adenopathy. The left kidney is atrophic. No definite renal obstruction. No bowel wall  thickening or obstruction no free air or free fluid. Prostate is enlarged. No  inguinal adenopathy. The bladder is midline. Impression IMPRESSION: Decreased size of a right upper lobe mass and adenopathy since the  prior examination. Next line new small pericardial effusion. Liver metastases. MRI Results (most recent):  Results from [facility] encounter on 07/03/19   MRI BRAIN W WO CONT    Narrative EXAM: MRI BRAIN W WO CONT    TECHNIQUE: Sagittal and axial T1-weighted images axial FLAIR, T2-weighted,  diffusion weighted, gradient echo,  precontrast. Multiplanar postcontrast  T1-weighted images. The study was performed on the 3 T MRI unit. IV CONTRAST:  19 cc Dotarem    INDICATION:  restaging disease, lung cancer with brain metastases, status post  resection of an occipital lobe metastasis and SRT of other lesions    COMPARISON:  [facility] of 1/15/2019 from [facility]. Report not  available. Comparison of current measurements is made to measurements made by me  on the images of the previous study. FINDINGS:  BRAIN PARENCHYMA:    1. Status post resection of the largest metastasis which was demonstrated on the  previous study, located in the right parieto-occipital region. Resection cavity  noted with mild marginal linear enhancement. No local recurrence demonstrated. 2. Right periatrial lesion currently 5 x 4 mm (10-20) and solidly enhancing,  previously 11 x 7 mm and ring-enhancing. 3. Ring enhancing 11 x 5 mm lesion adjacent to the posterior right temporal horn  (10-15), previously 11 x 7 mm. 4. Previously demonstrated left inferior frontal lesion now seen at a 2 mm focus  of signal abnormality which has features of a chronic hematoma and which does  not clearly enhance (axial images 23), previously measuring 17 x 11 mm.  5. Previously demonstrated ring enhancing 6 mm right anterior temporal lobe  lesion no longer clearly demonstrated.   6. 3 mm enhancing lesion along the anterior aspect of the right temporal horn  more clearly seen today but probably unchanged   7. No new enhancing metastases. No new intracranial abnormalities. No  significant white matter disease. INTRACRANIAL HEMORRHAGE: Negative hemorrhage. Hemosiderin deposition at the  operative site and tiny focus of chronic hemorrhage in the left inferior frontal  lobe region in the location of the prior metastasis. CSF SPACES:  Normal in size and morphology for the patient's age. BASAL CISTERNS:  Patent. MIDLINE SHIFT: None. VASCULAR SYSTEM:  Normal flow voids. PARANASAL SINUSES AND MASTOID AIR CELLS:  No significant opacirication. VISUALIZED ORBITS:  No significant abnormalities. VISUALIZED UPPER CERVICAL SPINE:  No significant abnormalities. SELLA:  No enlargement or  focal abnormality. SKULL BASE:  No significant abnormalities. Cerebellar tonsils in normal  position. CALVARIUM:  Previous right occipital craniotomy. No destructive lesions. Impression IMPRESSION:    1. Status post resection of previously demonstrated right occipital parietal  large metastasis. 2. Decrease in size of the other intracranial metastases, as described in detail  above. No new lesions or new intracranial findings.            MRI brain: Decrease in the intracranial metastasis    PET CT: large RUL mass with extensive mediastinal adenopathy, metastatic disease to the liver, brain    Lab Results   Component Value Date/Time    WBC 4.3 07/22/2019 09:20 AM    HGB 11.7 (L) 07/22/2019 09:20 AM    HCT 36.2 (L) 07/22/2019 09:20 AM    PLATELET 600 56/81/1458 09:20 AM    MCV 99.2 (H) 07/22/2019 09:20 AM       Lab Results   Component Value Date/Time    Sodium 139 07/22/2019 09:20 AM    Potassium 3.9 07/22/2019 09:20 AM    Chloride 108 07/22/2019 09:20 AM    CO2 23 07/22/2019 09:20 AM    Anion gap 8 07/22/2019 09:20 AM    Glucose 91 07/22/2019 09:20 AM    BUN 21 (H) 07/22/2019 09:20 AM    Creatinine 1.04 07/22/2019 09:20 AM    BUN/Creatinine ratio 20 07/22/2019 09:20 AM    GFR est AA >60 07/22/2019 09:20 AM    GFR est non-AA >60 07/22/2019 09:20 AM    Calcium 9.1 07/22/2019 09:20 AM    Bilirubin, total 0.6 07/22/2019 09:20 AM    AST (SGOT) 29 07/22/2019 09:20 AM    Alk. phosphatase 61 07/22/2019 09:20 AM    Protein, total 7.6 07/22/2019 09:20 AM    Albumin 3.8 07/22/2019 09:20 AM    Globulin 3.8 07/22/2019 09:20 AM    A-G Ratio 1.0 (L) 07/22/2019 09:20 AM    ALT (SGPT) 34 07/22/2019 09:20 AM           Assessment:     1. T3 N3 M1a (Stage IV) NSCLC of the lung, likely adenoca     NGS - no targetable mutation  PD-L1 90%    > Unresectable disease     ECOG PS 1  Intent of Treatment - palliative  Prognosis poor    S/p right occipital craniotomy at [facility] on 1/29/2019  Completed SBRT to brain lesions and tumor bed by Dr. Gabriella Block at [facility] on 2/27/2019    Receiving palliative chemotherapy   Carbo/Alimta/Pembrolizumab - s/p 4 cycles   Alimta/Pembrolizumab - Cycle 8 Day 1    Tolerating treatment   A detailed system by system evaluation of side effect was performed to assess chemotherapy related toxicity. Blood counts are acceptable. Results reviewed with the patient. Symptom management form reviewed with patient. CT - shrinkage of disease in the lungs and mediastinum   MRI - continued good response  Restage with CT chest/abd      2. Severe protein calorie malnutrition    > Dietician consult  > protein supplementation      3. Chemotherapy related anemia    Observation      Plan:       > Continue Alimta/Pembrolizumab  > Continue to follow with Palliative Medicine  > Follow-up in 3 weeks        Signed by: Suzanna Madden MD                     August 12, 2019        CC. Josselyn Loco MD  CC. Jared Oscar MD  CC. Alyce Lundborg, MD  CC. Selina Alcazar MD  CC.  Mikhail Alcazar MD

## 2019-08-16 NOTE — TELEPHONE ENCOUNTER
Called patient to advise/confirm upcoming appt with Dr. Felix Molina on 8/20/19     at 10:30am at [facility]. No answer at home number, left voicemail, calling cell woman answered, asked if Mrs. Ihsan Ballesteros, states yes, she confirmed appt. Also advised to please bring in your Drivers License and Insurance Card with you to appointment as well as any prescription pain medication in the original container with you to appt.

## 2019-08-19 NOTE — TELEPHONE ENCOUNTER
Patient calling stating that he needs a script for lactulose called into pharmacy. Advised nurse would call him back to discuss. Reminded of appt 8/20/19 with Dr. Kristina Magaña at 10:30am at [facility]. Pt confirmed.

## 2019-08-19 NOTE — TELEPHONE ENCOUNTER
Eliana Weems with Pharmacy is calling regarding needing lactulose. States has script but needs approval.  Please call to discuss.

## 2019-08-21 NOTE — TELEPHONE ENCOUNTER
Medications approved by Gillian Jones from [facility] will  script and orders from [facility] office

## 2019-08-23 NOTE — TELEPHONE ENCOUNTER
Need script printed to fax to [facility] for [employer].

## 2019-09-03 NOTE — PROGRESS NOTES
Outpatient Infusion Center - Chemotherapy Progress Note    8535- Pt admit to [facility] for C9 ambulatory in stable condition. Assessment completed, pt reports fatigue. No new concerns voiced. L chest port accessed with positive blood return. Labs drawn per order and sent. Line flushed, clamped, Curos Cap applied to end clave. Chemotherapy Flowsheet 9/3/2019   Cycle C9D1   Date 9/3/2019   Drug / Regimen Keytruda/Alimta   Pre Meds given   Notes given      Patient Vitals for the past 12 hrs:   Temp Pulse Resp BP SpO2   09/03/19 1350 -- 86 16 132/87 --   09/03/19 1015 98.6 °F (37 °C) 92 16 115/75 100 %      Recent Results (from the past 12 hour(s))   CBC WITH AUTOMATED DIFF    Collection Time: 09/03/19 10:16 AM   Result Value Ref Range    WBC 3.9 (L) 4.1 - 11.1 K/uL    RBC 3.64 (L) 4.10 - 5.70 M/uL    HGB 11.1 (L) 12.1 - 17.0 g/dL    HCT 34.7 (L) 36.6 - 50.3 %    MCV 95.3 80.0 - 99.0 FL    MCH 30.5 26.0 - 34.0 PG    MCHC 32.0 30.0 - 36.5 g/dL    RDW 13.3 11.5 - 14.5 %    PLATELET 874 898 - 695 K/uL    MPV 9.1 8.9 - 12.9 FL    NRBC 0.0 0  WBC    ABSOLUTE NRBC 0.00 0.00 - 0.01 K/uL    NEUTROPHILS 57 32 - 75 %    LYMPHOCYTES 29 12 - 49 %    MONOCYTES 12 5 - 13 %    EOSINOPHILS 0 0 - 7 %    BASOPHILS 1 0 - 1 %    IMMATURE GRANULOCYTES 1 (H) 0.0 - 0.5 %    ABS. NEUTROPHILS 2.3 1.8 - 8.0 K/UL    ABS. LYMPHOCYTES 1.1 0.8 - 3.5 K/UL    ABS. MONOCYTES 0.5 0.0 - 1.0 K/UL    ABS. EOSINOPHILS 0.0 0.0 - 0.4 K/UL    ABS. BASOPHILS 0.0 0.0 - 0.1 K/UL    ABS. IMM.  GRANS. 0.0 0.00 - 0.04 K/UL    DF AUTOMATED     METABOLIC PANEL, COMPREHENSIVE    Collection Time: 09/03/19 10:16 AM   Result Value Ref Range    Sodium 138 136 - 145 mmol/L    Potassium 4.1 3.5 - 5.1 mmol/L    Chloride 106 97 - 108 mmol/L    CO2 25 21 - 32 mmol/L    Anion gap 7 5 - 15 mmol/L    Glucose 141 (H) 65 - 100 mg/dL    BUN 19 6 - 20 MG/DL    Creatinine 1.11 0.70 - 1.30 MG/DL    BUN/Creatinine ratio 17 12 - 20      GFR est AA >60 >60 ml/min/1.73m2    GFR est non-AA >60 >60 ml/min/1.73m2    Calcium 9.0 8.5 - 10.1 MG/DL    Bilirubin, total 0.4 0.2 - 1.0 MG/DL    ALT (SGPT) 27 12 - 78 U/L    AST (SGOT) 30 15 - 37 U/L    Alk. phosphatase 76 45 - 117 U/L    Protein, total 7.7 6.4 - 8.2 g/dL    Albumin 3.6 3.5 - 5.0 g/dL    Globulin 4.1 (H) 2.0 - 4.0 g/dL    A-G Ratio 0.9 (L) 1.1 - 2.2     TSH 3RD GENERATION    Collection Time: 09/03/19 10:16 AM   Result Value Ref Range    TSH 3.71 0.36 - 3.74 uIU/mL      Medications:  Medications Administered     0.9% sodium chloride infusion     Admin Date  09/03/2019 Action  New Bag Dose  25 mL/hr Rate  25 mL/hr Route  IntraVENous Administered By  Yarely Wood RN          heparin (porcine) pf 300-500 Units     Admin Date  09/03/2019 Action  Given Dose  500 Units Route  InterCATHeter Administered By  Yarely Wood RN          ondansetron [facility]) injection 8 mg     Admin Date  09/03/2019 Action  Given Dose  8 mg Route  IntraVENous Administered By  Yarely Wood RN          pembrolizumab [facility]) 200 mg in 0.9% sodium chloride 100 mL, overfill volume 10 mL IVPB     Admin Date  09/03/2019 Action  New Bag Dose  200 mg Rate  236 mL/hr Route  IntraVENous Administered By  Yarely Wood RN          PEMEtrexed (ALIMTA) 945 mg in 0.9% sodium chloride 100 mL chemo infusion     Admin Date  09/03/2019 Action  New Bag Dose  945 mg Route  IntraVENous Administered By  Yarely Wood RN          saline peripheral flush soln 10 mL     Admin Date  09/03/2019 Action  Given Dose  10 mL Route  InterCATHeter Administered By  Yarely Wood RN           Admin Date  09/03/2019 Action  Given Dose  10 mL Route  InterCATHeter Administered By  Yarely Wood RN          sodium chloride 0.9% injection 10 mL     Admin Date  09/03/2019 Action  Given Dose  10 mL Route  IntraVENous Administered By  Yarely Wood RN                 Pt tolerated treatment well.  Port maintained positive blood return throughout treatment, flushed with positive blood return at conclusion, and de-accessed. 1355- D/c home ambulatory in no distress.  Pt aware of next [facility] appointment scheduled for:    Future Appointments   Date Time Provider Marlo Maria G   9/4/2019 11:15 AM Carolee Messina MD [facility].    9/16/2019  9:30 AM David Bacon MD [facility]   9/23/2019 10:00 AM ANNA INFUSION NURSE 4 [address] [facility]   10/14/2019 10:00 AM ANNA INFUSION NURSE 4 [facility]   11/4/2019 10:00 AM ANNA INFUSION NURSE 4 [address] [place] [address]

## 2019-09-12 NOTE — TELEPHONE ENCOUNTER
Called patient to advise/confirm upcoming appt with Dr. Felix Molina on   9/16/19   at 9:30am at [facility] OFFICE. No answer so left voicemail. Also advised to please bring in your Drivers License and Insurance Card with you to appointment as well as any prescription pain medication in the original container with you to appt.

## 2019-09-16 NOTE — TELEPHONE ENCOUNTER
Spoke with wife who is aware that we will place foundation assistance as his medicaid is still not currently active

## 2019-09-16 NOTE — PATIENT INSTRUCTIONS
Dear Fransisco Bloom ,    It was a pleasure seeing you today at [facility] office      This is the plan we talked about:    1. Right sided chest pain radiating to the shoulder  -You are having more pain in the right arm than usual. You have scans in July do show decrease in disease.  - Continue MS Contin 30 mg to 3 times a day and Oxycodone 10 mg every 6 hours as needed for breakthrough pain. 2. Constipation  -You are doing much better with Lactulose and MiraLAX as needed. 3. Acid reflux  - You are still struggling with this. Let us increase Protonix to 40 mg two times a day. 4. Cognitive impairment  - You are off the dexamethasone, your wife is noticing more memory issues while you are working on a big plumbing project at home. This is also the first big project you are working on after your whole brain radiation. You had a brain scan in July and it looked good. If these memory issues continue, we will need to do another MRI. 5. Diabetes  - You have established care with Dr. Livia Nicholas as your pcp and he is taking over your management of Diabetes. 5. ACP  - We completed an advance medical directive and medical power of . - Financial poa is all taken care of as well. - I am you got Medicaid.       This is what you have shared with us about Advance Care Planning:    Primary Decision Maker ([address]): Sally Sanchez  Relationship to patient:Wife  Phone number:[phone]  [x] Named in a scanned document   [x] Legal Next of Kin  [] Guardian    Secondary Decision Maker ([address]):  Harper Orta  Relationship to patient: Father  Phone number: [phone]  [x] Named in a scanned document   [] Legal Next of Kin  [] Guardian    ACP documents you current have include:  [x] Advance Directive or Living Will  [] Durable Do Not Resuscitate  [] Physician Orders for Scope of Treatment (POST)  [] Medical Power of   [] Other      The Palliative Medicine Team is here to support you and your family.      We will see you again in 4 weeks  Sincerely,      Curry Nichols MD and the Palliative Medicine Team

## 2019-09-16 NOTE — PROGRESS NOTES
Palliative Medicine Office Visit  Palliative Medicine Nurse Check In  [phone]    Patient Name: Leanne Giraldo  YOB: 1960      Date of Office Visit: 9/16/2019     Patient states: \"  \"    From Check In Sheet (scanned in Media):  Has a medical provider talked with you about cardiopulmonary resuscitation (CPR)? [x] Yes   [] No   [] Unable to obtain    Nurse reminder to complete or update ACP FlowSheet:    Is ACP on the Problem List?    [] Yes    [] No  IF ACP Document is ON FILE; Nurse to place ACP on Problem List     Is there an ACP Note in Chart Review/Note? [] Yes    [] No   If NO: ALERT PROVIDER       Primary Decision Maker: DarenDilcia - Spouse - [phone]    Secondary Decision Maker: Yessenia Meng - Father - [phone]  Advance Care Planning 9/16/2019   Patient's Healthcare Decision Maker is: Named in scanned ACP document   Confirm Advance Directive Yes, on file   Does the patient have other document types -         Is there anything that we should know about you as a person in order to provide you the best care possible? Have you been to the ER, urgent care clinic since your last visit? [] Yes   [x] No   [] Unable to obtain    Have you been hospitalized since your last visit? [] Yes   [x] No   [] Unable to obtain    Have you seen or consulted any other health care providers outside of the [address] [place] [address] since your last visit?    [] Yes   [x] No   [] Unable to obtain    Functional status (describe):         Last BM: 9/16/2019      accessed (date): 9/16/2019     Bottle review (for opioid pain medication):  Medication 1:   Date filled:   Directions:   # filled:   # left:   # pills taking per day:  Last dose taken:    Medication 2:   Date filled:   Directions:   # filled:   # left:   # pills taking per day:  Last dose taken:    Medication 3:   Date filled:   Directions:   # filled:   # left:   # pills taking per day:  Last dose taken:    Medication 4:   Date filled:   Directions:   # filled:   # left:   # pills taking per day:  Last dose taken:
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
## 2019-09-16 NOTE — TELEPHONE ENCOUNTER
Calling to advise nurse that patient does not have effective Mediciad. Wants to know if scripts need to be filled with [facility]? Advised would inform nurse.

## 2019-09-19 NOTE — H&P
[address]  HISTORY AND PHYSICAL Name:  Darci Hurt 
MR#:  [id number] :  1960 ACCOUNT #:  [de-identified] ADMIT DATE:  2019 ADMITTING/ATTENDING PHYSICIAN:  Alistair Hong MD 
 
PRIMARY CARE PHYSICIAN:  Debra Martin MD 
 
CHIEF COMPLAINT:  Generalized weakness with left-sided weakness more than right, fall today. HISTORY OF PRESENT ILLNESS:  This is a 77-year-old  male with a past medical history of non-small cell lung cancer stage IV, being treated under Dr. Taras Gan and Palliative, and he comes over here because of generalized weakness that started about 2-3 days ago when he noted that he was having generalized weakness, but today in the morning at around 8 o'clock, he went to the bathroom when he slid on to the wall of his restroom, and he did not hit his head, but at that time he noted significant weakness on his left side. No chest pain, unusual shortness of breath, cough, fever, nausea, vomiting, dizziness. No changes in bowel movements. REVIEW OF SYSTEMS:  Pertinent positive as above. Rest of review of systems was done. PAST MEDICAL HISTORY: 
1.  Non-small cell lung cancer with metastasis to brain and liver. 2.  COPD. 3.  Diabetes mellitus type 2. 
4.  GERD. 5.  Hypertension. PAST SURGERIES: 
1. Craniotomy. 2.  Thoracic fusion. 3.  Hernia repair. FAMILY HISTORY:  Significant for stroke and diabetes. SOCIAL HISTORY:  The patient is a former smoker, quit in . At that time, he claims that he was alcoholic as well, and he quit both smoking and alcohol at the same time. Non-IV drug abuser. ALLERGIES:  NO KNOWN DRUG ALLERGIES. HOME MEDICATIONS:  The patient is on following medications: 1. Neurontin 100 mg p.o. t.i.d. 2.  Motrin 200 mg daily. 3.  Lantus 14 units subcutaneously at bedtime. 4.  Humalog 5 units before meals. 5.  Prinivil 10 mg p.o. daily. 6.  Megace 40 mg p.o. b.i.d. 7.  MS Contin 30 mg CR 1 tablet q.8 hours daily. 8.  Zofran ODT 4 mg q.8 hours p.r.n. 
9.  Oxycodone IR 15 mg q.6 hours p.r.n. 
10.  Oxycodone IR 10 mg tablet, 1-1/2 tablet q.6 hours p.r.n. 11.  Protonix 40 mg p.o. b.i.d. PHYSICAL EXAMINATION: 
VITAL SIGNS:  At the time when the patient was seen, the patient's vitals were as follows:  Blood pressure 118/72, pulse 95, afebrile, respiratory rate 12, saturating 99% on room air. GENERAL:  Not in acute distress. Comfortably sitting on bed. HEAD:  Normocephalic, atraumatic. Eyes:  PERRLA, EOMI. Ears:  Bilateral hearing normal.  No growth. Nose:  No polyp. No bleeding. Mouth:  Poor oral hygiene. NECK:  Supple. No JVD, no thyromegaly. RESPIRATORY:  Clear to auscultation bilaterally. No adventitious breath sounds. CARDIOVASCULAR:  S1 and S2 Normal.  No murmurs, rubs, or gallops. GI:  Bowel sounds present. Soft, nontender, and nondistended. NEUROLOGICAL:  I can certainly appreciate left-sided facial droop. He also has motor weakness. Left upper extremity is 4+/5, left lower extremity is 4+/5. Right upper extremity is 5+/5 and right lower extremity is 4+/5. Sensation normal. 
PSYCHIATRIC:  Alert, awake, oriented x3. Mood and affect appropriate. LABORATORY DATA AND RADIOLOGICAL TESTS:  WBC 4.5, hemoglobin 10.2, hematocrit 32, and platelet count of 173 . Urinalysis shows small amount of blood and positive bilirubin, but no signs of infection. INR is 1.2. Sodium 136, potassium 4.1, chloride 97, bicarb 25, BUN 23, creatinine 1.16, glucose of 64. X-ray of the chest was done, which shows right upper lobe lesion and right suprahilar lymphadenopathy unchanged. CT scan of the head was done which showed large new right brain metastasis with significant mass effect and shift. EKG shows normal sinus rhythm, no ST-T wave changes suggestive of ischemia.  
 
ASSESSMENT AND PLAN:  This is a 59-year-old  male with a past medical history of stage IV non-small cell lung cancer. He comes over here with left-sided weakness and generalized weakness and is found to have new brain metastasis. 1.  Left-sided weakness, likely secondary to new right brain metastasis. I will transfer the patient to intensive care unit and will get an MRI on the patient tomorrow. I have already spoken to Dr. Vanessa Boles, greatly appreciate her recommendations. We would put the patient on intravenous steroids and seek her further recommendations. 2.  Brain edema with midline shift. As mentioned above, we will put the patient on intravenous steroids for now. 3.  Stage IV lung cancer with metastasis to brain and liver. The patient is being followed by Dr. Saundra Gaines as well as Palliative. I will consult both of them. 4.  Diabetes mellitus type 2 with hypoglycemia. For now, the patient's blood sugars are fine, but will hold the patient's Lantus. We will put the patient on sliding scale insulin. If his blood sugar drops, then we will put the patient on D5 half normal saline. 5.  Mild protein-calorie malnutrition. The patient claims that since the last 1 month he has lost about 5 pounds unintentionally. We will continue the patient's Megace. We will consult the Nutrition. We will also put the patient on Glucerna shakes. 6.  Hypertension. We will continue the patient's home medication. I spent about 45 minutes in taking care of the patient. Genaro Garrison MD 
 
 
[id number] 
D:  09/19/2019 15:30 
T:  09/19/2019 15:38 JOB #:  [id number]

## 2019-09-19 NOTE — ED NOTES
TRANSFER - OUT REPORT: 
 
Verbal report given to Iris RN(name) on Sterling Reyes  being transferred to [facility] [place] for routine progression of care Report consisted of patients Situation, Background, Assessment and  
Recommendations(SBAR). Information from the following report(s) SBAR, ED Summary, [facility] and Recent Results was reviewed with the receiving nurse. Lines:  
Venous Access Device Upper chest (subclavicular area), left (Active) Peripheral IV 09/19/19 Left Antecubital (Active) Site Assessment Clean, dry, & intact 9/19/2019  8:55 AM  
Phlebitis Assessment 0 9/19/2019  8:55 AM  
Infiltration Assessment 0 9/19/2019  8:55 AM  
Dressing Status Clean, dry, & intact 9/19/2019  8:55 AM  
Hub Color/Line Status Pink 9/19/2019  8:55 AM  
  
 
Opportunity for questions and clarification was provided. Patient transported with: 
 [facility] transport

## 2019-09-19 NOTE — ED PROVIDER NOTES
EMERGENCY DEPARTMENT HISTORY AND PHYSICAL EXAM 
 
 
Date: 9/19/2019 Patient Name: Jorje Edwards History of Presenting Illness Chief Complaint Patient presents with  Extremity Weakness Arrives via EMS c/o generalized weakness L > R GLF today Pt with stage 4 Lung Ca History Provided By: Patient HPI: Jorje Edwards, 61 y.o. male  presents to the ED with cc of weakness and a fall this morning. Patient states he said 2 days of generalized weakness. He fell this morning. He states he fell up against the wall and he slid down the wall. He has pain in his tailbone. He denies any headache. He denies any history of having left-sided weakness. He has not had any fevers or chills. No shortness of breath or chest pain. No nausea vomiting or diarrhea. He does continue to get chemotherapy. He has had a history of mets to the brain which his last MRI shows a. He has undergone radiation treatment for these as well. He states he did not lose consciousness this morning. He did not hit his head. There are no other complaints, changes, or physical findings at this time. PCP: Letty Ruffin MD 
 
No current facility-administered medications on file prior to encounter. Current Outpatient Medications on File Prior to Encounter Medication Sig Dispense Refill  [START ON 9/30/2019] morphine CR (MS CONTIN) 30 mg CR tablet Take 1 Tab by mouth every eight (8) hours for 30 days. Max Daily Amount: 90 mg. 90 Tab 0  
 oxyCODONE IR (ROXICODONE) 10 mg tab immediate release tablet Take 1.5 Tabs by mouth every six (6) hours as needed for Pain for up to 15 days. Max Daily Amount: 60 mg. 60 Tab 0  
 [START ON 10/1/2019] oxyCODONE IR (OXY-IR) 15 mg immediate release tablet Take 1 Tab by mouth every six (6) hours as needed for Pain for up to 15 days.  Max Daily Amount: 60 mg. 60 Tab 0  
 gabapentin (NEURONTIN) 100 mg capsule Take 1 Cap by mouth three (3) times daily. Max Daily Amount: 300 mg. 90 Cap 0  
 insulin lispro (HUMALOG U-100 INSULIN) 100 unit/mL injection INJECT 5 UNITS SUBCUTANEOUSLY BEFORE MEALS-INCREASE HUMALOG 1 UNIT EVERY OTHER DAY UNTIL 2 HR PP  OR LESS. Use Humalog kwik pen after done with vial. 3 Vial 1  
 insulin glargine (LANTUS) 100 unit/mL injection 14 Units by SubCUTAneous route nightly. Use toujeo pen when out of lantus vial. 3 Vial 3  
 lisinopril (PRINIVIL, ZESTRIL) 10 mg tablet Take 1 Tab by mouth daily. 30 Tab 5  ibuprofen (MOTRIN) 200 mg tablet Take  by mouth.  pantoprazole (PROTONIX) 40 mg tablet Take 1 Tab by mouth two (2) times a day. Indications: gastroesophageal reflux disease 60 Tab 6  
 megestrol (MEGACE) 40 mg tablet Take 1 Tab by mouth two (2) times a day. 60 Tab 2  
 lidocaine-prilocaine (EMLA) topical cream Apply  to affected area as needed for Pain. 30 g 0  
 ondansetron (ZOFRAN ODT) 4 mg disintegrating tablet Take 1 Tab by mouth every eight (8) hours as needed for Nausea. 40 Tab 1 Past History Past Medical History: 
Past Medical History:  
Diagnosis Date  Cancer ([facility] LUNG RT. SIDE METS TO BRAIN, LIVER  Chronic obstructive pulmonary disease ([facility] MILD PER WIFE  
 Diabetes ([facility] TYPE II  
 GERD (gastroesophageal reflux disease)  Hypertension Past Surgical History: 
Past Surgical History:  
Procedure Laterality Date  HX CERVICAL FUSION    
 HX CRANIOTOMY  [place] [facility] [place] HX GI    
 COLONOSCOPY  
 HX HERNIA REPAIR Right 1972 INGUINAL  VASCULAR SURGERY PROCEDURE UNLIST Bilateral   
 LEGS Family History: 
Family History Problem Relation Age of Onset  Stroke Father  Diabetes Father  Other Mother 58 SUDDEN DEATH  
 Cancer Maternal Grandfather  Anesth Problems Neg Hx Social History: 
Social History Tobacco Use  Smoking status: Former Smoker Packs/day: 1.50   Years: 25.00  
 Pack years: 37.50 Last attempt to quit: 1/21/2009 Years since quitting: 10.6  Smokeless tobacco: Never Used Substance Use Topics  Alcohol use: No  
  Frequency: Never  Drug use: No  
 
 
Allergies: Allergies Allergen Reactions  Other Medication Nausea and Vomiting Pt states he is allergic to diuretics but unsure of name Review of Systems Review of Systems Constitutional: Negative for chills and fever. HENT: Negative for congestion, ear pain, rhinorrhea, sore throat and trouble swallowing. Eyes: Negative for visual disturbance. Respiratory: Negative for cough, chest tightness and shortness of breath. Cardiovascular: Negative for chest pain and palpitations. Gastrointestinal: Negative for abdominal pain, blood in stool, constipation, diarrhea, nausea and vomiting. Genitourinary: Negative for decreased urine volume, difficulty urinating, dysuria and frequency. Musculoskeletal: Negative for back pain and neck pain. Skin: Negative for color change and rash. Neurological: Positive for weakness. Negative for dizziness, light-headedness and headaches. Physical Exam  
Physical Exam  
Constitutional: He is oriented to person, place, and time. He appears well-developed and well-nourished. He does not appear ill. No distress. HENT:  
Mouth/Throat: Oropharynx is clear and moist.  
Eyes: Conjunctivae are normal.  
Neck: Neck supple. Cardiovascular: Normal rate and regular rhythm. Pulmonary/Chest: Effort normal and breath sounds normal. No accessory muscle usage. No respiratory distress. Abdominal: Soft. He exhibits no distension. There is no tenderness. Lymphadenopathy:  
  He has no cervical adenopathy. Neurological: He is alert and oriented to person, place, and time. Left facial droop. Left arm drift. Left leg weakness. Skin: Skin is warm and dry. Nursing note and vitals reviewed. Diagnostic Study Results Labs - 
  
 Recent Results (from the past 24 hour(s)) CBC WITH AUTOMATED DIFF Collection Time: 09/19/19  8:49 AM  
Result Value Ref Range WBC 4.5 4.1 - 11.1 K/uL  
 RBC 3.37 (L) 4.10 - 5.70 M/uL  
 HGB 10.2 (L) 12.1 - 17.0 g/dL HCT 32.0 (L) 36.6 - 50.3 % MCV 95.0 80.0 - 99.0 FL  
 MCH 30.3 26.0 - 34.0 PG  
 MCHC 31.9 30.0 - 36.5 g/dL  
 RDW 13.2 11.5 - 14.5 % PLATELET 397 361 - 069 K/uL MPV 9.5 8.9 - 12.9 FL  
 NRBC 0.0 0  WBC ABSOLUTE NRBC 0.00 0.00 - 0.01 K/uL NEUTROPHILS 72 32 - 75 % LYMPHOCYTES 10 (L) 12 - 49 % MONOCYTES 18 (H) 5 - 13 % EOSINOPHILS 0 0 - 7 % BASOPHILS 0 0 - 1 % IMMATURE GRANULOCYTES 0 0.0 - 0.5 % ABS. NEUTROPHILS 3.2 1.8 - 8.0 K/UL  
 ABS. LYMPHOCYTES 0.5 (L) 0.8 - 3.5 K/UL  
 ABS. MONOCYTES 0.8 0.0 - 1.0 K/UL  
 ABS. EOSINOPHILS 0.0 0.0 - 0.4 K/UL  
 ABS. BASOPHILS 0.0 0.0 - 0.1 K/UL  
 ABS. IMM. GRANS. 0.0 0.00 - 0.04 K/UL  
 DF AUTOMATED    
 RBC COMMENTS NORMOCYTIC, NORMOCHROMIC METABOLIC PANEL, COMPREHENSIVE Collection Time: 09/19/19  8:49 AM  
Result Value Ref Range Sodium 133 (L) 136 - 145 mmol/L Potassium 4.1 3.5 - 5.1 mmol/L Chloride 97 97 - 108 mmol/L  
 CO2 25 21 - 32 mmol/L Anion gap 11 5 - 15 mmol/L Glucose 64 (L) 65 - 100 mg/dL BUN 23 (H) 6 - 20 MG/DL Creatinine 1.16 0.70 - 1.30 MG/DL  
 BUN/Creatinine ratio 20 12 - 20 GFR est AA >60 >60 ml/min/1.73m2 GFR est non-AA >60 >60 ml/min/1.73m2 Calcium 9.1 8.5 - 10.1 MG/DL Bilirubin, total 0.7 0.2 - 1.0 MG/DL  
 ALT (SGPT) 47 12 - 78 U/L  
 AST (SGOT) 121 (H) 15 - 37 U/L Alk. phosphatase 73 45 - 117 U/L Protein, total 7.6 6.4 - 8.2 g/dL Albumin 3.5 3.5 - 5.0 g/dL Globulin 4.1 (H) 2.0 - 4.0 g/dL A-G Ratio 0.9 (L) 1.1 - 2.2    
TROPONIN I Collection Time: 09/19/19  8:49 AM  
Result Value Ref Range Troponin-I, Qt. <0.05 <0.05 ng/mL PROTHROMBIN TIME + INR Collection Time: 09/19/19  8:51 AM  
Result Value Ref Range INR 1.2 (H) 0.9 - 1.1 Prothrombin time 12.6 (H) 9.0 - 11.1 sec EKG, 12 LEAD, INITIAL Collection Time: 09/19/19  9:09 AM  
Result Value Ref Range Ventricular Rate 88 BPM  
 Atrial Rate 88 BPM  
 P-R Interval 166 ms  
 QRS Duration 86 ms  
 Q-T Interval 364 ms QTC Calculation (Bezet) 440 ms Calculated P Axis 51 degrees Calculated R Axis 16 degrees Calculated T Axis 31 degrees Diagnosis Baseline artifact ** Poor data quality, interpretation may be adversely affected Normal sinus rhythm Normal ECG When compared with ECG of 31-DEC-2018 13:06, No significant change was found Confirmed by Elier Smith ([id number]) on 9/19/2019 10:10:43 AM 
  
URINALYSIS W/ REFLEX CULTURE Collection Time: 09/19/19 10:16 AM  
Result Value Ref Range Color YELLOW/STRAW Appearance CLEAR CLEAR Specific gravity 1.020 1.003 - 1.030    
 pH (UA) 5.5 5.0 - 8.0 Protein NEGATIVE  NEG mg/dL Glucose NEGATIVE  NEG mg/dL Ketone 15 (A) NEG mg/dL Blood SMALL (A) NEG Urobilinogen 1.0 0.2 - 1.0 EU/dL Nitrites NEGATIVE  NEG Leukocyte Esterase NEGATIVE  NEG    
 UA:UC IF INDICATED CULTURE NOT INDICATED BY UA RESULT CNI    
 WBC 0-4 0 - 4 /hpf  
 RBC 0-5 0 - 5 /hpf Epithelial cells FEW FEW /lpf Bacteria NEGATIVE  NEG /hpf Hyaline cast 0-2 0 - 5 /lpf  
BILIRUBIN, CONFIRM Collection Time: 09/19/19 10:16 AM  
Result Value Ref Range Bilirubin UA, confirm POSITIVE (A) NEG    
GLUCOSE, POC Collection Time: 09/19/19 10:58 AM  
Result Value Ref Range Glucose (POC) 56 (L) 65 - 100 mg/dL Performed by Pablito Kapadia GLUCOSE, POC Collection Time: 09/19/19 11:14 AM  
Result Value Ref Range Glucose (POC) 64 (L) 65 - 100 mg/dL Performed by Pablito Kapadia GLUCOSE, POC Collection Time: 09/19/19 11:31 AM  
Result Value Ref Range Glucose (POC) 87 65 - 100 mg/dL Performed by Pablito Kapadia Radiologic Studies -  
CT HEAD W WO CONT Final Result impression: Large new right brain metastases with significant mass effect and  
shift as above. XR CHEST PA LAT Final Result IMPRESSION: Right upper lobe lesion and right suprahilar lymphadenopathy,  
unchanged. CT can be performed for further evaluation, as indicated. CT Results  (Last 48 hours) 09/19/19 0943  CT HEAD W WO CONT Final result Impression:  impression: Large new right brain metastases with significant mass effect and  
shift as above. Narrative:  EXAM: CT HEAD W WO CONT INDICATION: Lung cancer, unspecified; Stroke; left side weakness, h/o mets to  
brain COMPARISON: No CT, MRI July 2019 CONTRAST: 100 mL of Isovue-300. TECHNIQUE:CT of the head was performed prior to and following uneventful rapid  
bolus intravenous administration of contrast.  Brain and bone windows were  
generated. CT dose reduction was achieved through use of a standardized  
protocol tailored for this examination and automatic exposure control for dose  
modulation. FINDINGS:  
New right ring-enhancing temporal parietal intra-axial lesion measuring 30 x 20  
mm with a large amount of surrounding edema shift of the midline of at least 8  
mm. There is no extra-axial fluid collection or hemorrhage. CXR Results  (Last 48 hours) 09/19/19 0908  XR CHEST PA LAT Final result Impression:  IMPRESSION: Right upper lobe lesion and right suprahilar lymphadenopathy,  
unchanged. CT can be performed for further evaluation, as indicated. Narrative: Indication:  Hypoxia. Generalized weakness. Exam: PA and lateral views of the chest.  
   
Direct comparison is made to prior CT dated 7/2019. Findings: Cardiomediastinal silhouette is stable. Central venous port catheter  
extends to the right atrium. Right upper lobe lesion and right suprahilar  
lymphadenopathy is unchanged compared to prior CT dated 7/2019.  Left lung is  
clear. There is no pleural fluid. Medical Decision Making I am the first provider for this patient. I reviewed the vital signs, available nursing notes, past medical history, past surgical history, family history and social history. Vital Signs-Reviewed the patient's vital signs. Patient Vitals for the past 24 hrs: 
 Temp Pulse Resp BP SpO2  
09/19/19 1020  91   100 % 09/19/19 1018    118/56   
09/19/19 0911  90  115/71 100 % 09/19/19 0830  90  111/71 91 % 09/19/19 0816 98.6 °F (37 °C) 92 16 112/72 97 % EKG interpretation: (Preliminary) Rhythm: normal sinus rhythm; and regular . Rate (approx.): 88; Axis: normal; W[place] interval: normal; QRS interval: normal ; ST/T wave: normal. 
 
Records Reviewed: Nursing Notes and Old Medical Records Provider Notes (Medical Decision Making):  
Patient with a history of lung cancer with brain metastasis presents with fall and left-sided weakness. He has a new large brain metastasis on the right side. There is significant edema and midline shift. Start him on steroids. No hemorrhage. Neurosurgery is requesting that he be transferred to [facility] for further care. Here in the emergency department he did have some issues with hypoglycemia. Likely because he does take diabetes medicines but since he is been here in the hospital he has not been eating. Sugar corrected with D10 and a carbohydrate meal. 
 
ED Course:  
Initial assessment performed. The patients presenting problems have been discussed, and they are in agreement with the care plan formulated and outlined with them. I have encouraged them to ask questions as they arise throughout their visit. Orders Placed This Encounter  CT HEAD W WO CONT  XR CHEST PA LAT  CBC WITH AUTOMATED DIFF  
 METABOLIC PANEL, COMPREHENSIVE  
 URINALYSIS W/ REFLEX CULTURE  
 PROTHROMBIN TIME + INR  TROPONIN I  
 BILIRUBIN, CONFIRM  
 GLUCOSE, POC  
  GLUCOSE, POC  GLUCOSE, POC  EKG, 12 LEAD, INITIAL  
 INSERT PERIPHERAL IV ONE TIME STAT  
 iopamidol (ISOVUE-370) 76 % injection 100 mL  sodium chloride (NS) flush 10 mL  dexamethasone (DECADRON) 4 mg/mL injection 10 mg  
 dextrose 10 % infusion 100 mL  oxyCODONE IR (ROXICODONE) tablet 15 mg  IP CONSULT TO NEUROSURGERY Critical Care Time:  
0 Disposition: 
Transfer to [facility] Clinical Impression: 1. Brain metastasis ([address] 2. Malignant neoplasm of upper lobe of right lung ([address] 3. Acute left-sided weakness This note will not be viewable in [address]

## 2019-09-19 NOTE — TELEPHONE ENCOUNTER
Jeff Newsome reports patient was falling and , entire left side is swollen, Patient now at [facility] , [address] shows michelle  tumor on left side of brain and swelling , patient will be admitted , may have possible surgery , Requesting a wheel chair and  bed side commode for home use, veena reports she can not get him off the floor when he falls and wants something in home to help with ADL's

## 2019-09-19 NOTE — ROUTINE PROCESS
TRANSFER - IN REPORT:    Verbal report received from OLEKSANDR Simmons(name) on Tosin Addison  being received from [facility](unit) for urgent transfer      Report consisted of patients Situation, Background, Assessment and   Recommendations(SBAR). Information from the following report(s) SBAR, Kardex, MAR, Accordion, Med Rec Status and Cardiac Rhythm NSR was reviewed with the receiving nurse. Opportunity for questions and clarification was provided. Assessment completed upon patients arrival to unit and care assumed. Primary Nurse Ryan Martinez RN and Luis Miguel Archuleta RN performed a dual skin assessment on this patient No impairment noted  Angelito score is 21    Pt without evidence of skin breakdown, L shin noted to have abrasion which pt states is from dropping something on it PTA.

## 2019-09-19 NOTE — ED NOTES
Patients BG was 56 notified Dr. Gabrielle Whitman who reports it is okay to feed the patient because he does not need to be NPO right now. Gave patient Lelan Render. Dr. Gabrielle Whitman put in d10 if needed to keep patients BG up.

## 2019-09-19 NOTE — ED NOTES
Pt reports that he felt weak going to the bathroom today so he slid down the wall. Denies hitting head or LOC. Reports no injury from the fall.

## 2019-09-19 NOTE — TELEPHONE ENCOUNTER
Patient's wife called to let us know that patient is in the ER. She said he isn't able to walk anymore and is going to need assistance with getting wheelchair and other DME.   Please call

## 2019-09-19 NOTE — CONSULTS
65yo with metastatic lung CA with previous  surgery at [facility] per report. He is follwed by oncology at [facility]. He had a MRI in July that showed several small intracranial lesions. He presents now with left weakness and CT suggesting large mass. Agree with decadron and MRI. I have reached out to his oncologist.  We will follow with you. THank you for this consultation.

## 2019-09-19 NOTE — PROGRESS NOTES
TRANSFER - OUT REPORT:    Verbal report given to OLEKSANDR Mahajan(name) on Rashmi Vo  being transferred to ICU (unit) for routine progression of care       Report consisted of patients Situation, Background, Assessment and   Recommendations(SBAR). Information from the following report(s) SBAR, Kardex, [facility] and Cardiac Rhythm NSR was reviewed with the receiving nurse. Lines:   Venous Access Device Upper chest (subclavicular area), left (Active)       Peripheral IV 09/19/19 Left Antecubital (Active)   Site Assessment Clean, dry, & intact 9/19/2019  8:55 AM   Phlebitis Assessment 0 9/19/2019  8:55 AM   Infiltration Assessment 0 9/19/2019  8:55 AM   Dressing Status Clean, dry, & intact 9/19/2019  8:55 AM   Hub Color/Line Status Pink 9/19/2019  8:55 AM        Opportunity for questions and clarification was provided.       Patient transported with:   Monitor  Registered Nurse

## 2019-09-20 PROBLEM — C79.31 BRAIN METASTASES (HCC): Status: ACTIVE | Noted: 2019-01-01

## 2019-09-20 PROBLEM — G93.6 CEREBRAL EDEMA (HCC): Status: ACTIVE | Noted: 2019-01-01

## 2019-09-20 NOTE — DIABETES MGMT
[facility]    [facility] Consult Note    Recommendations/ Comments: Please consider the following: agree with current treatment of high sensitivity correctional lispro given patient with hypoglycemia on admission and mild protein-calorie malnutrition. Recommend to add Lantus if blood sugars trending over 180 mg/dL (would add the last 24 hours of correctional insulin needed to calculate Lantus dose to avoid hypoglycemia as home dose may be too much at this time given A1c of 5.7% and hypoglycemia on admission). Current hospital DM medication: Lispro Correctional insulin with \"high\"sensitivity      DTC will continue to follow patient as needed. ____________________________    Consult received for:                  [x]           Hospital Blood Glucose Management    Chart reviewed and initial evaluation complete on Frances Mcduffie. Patient is a 61 y.o. male with known diabetes- on Lantus 14 units nightly, lispro 5 units ac meals at home per PTA med list.     A1c:   Lab Results   Component Value Date/Time    Hemoglobin A1c 5.6 09/20/2019 05:31 AM       Recent Glucose Results:   Lab Results   Component Value Date/Time     (H) 09/20/2019 05:31 AM    GLUCPOC 155 (H) 09/20/2019 06:57 AM    GLUCPOC 190 (H) 09/19/2019 11:40 PM    GLUCPOC 222 (H) 09/19/2019 07:07 PM        Lab Results   Component Value Date/Time    Creatinine 1.03 09/20/2019 05:31 AM       Active Orders   Diet    DIET NPO        PO intake: No data found. Patient follow appointment for diabetes management: PCP      Thank you.   Dory Muñoz RD, [id number]  Office:  [phone]        Time spent: 15 minutes

## 2019-09-20 NOTE — CONSULTS
Palliative Medicine Consult  Jerel: [phone] ([phone])    Patient Name: Tylor Torrez  YOB: 1960    Date of Initial Consult: 9/20/19  Reason for Consult: Care decisions   Requesting Provider: Jie Godinez   Primary Care Physician: Haroon Alcantar MD     SUMMARY:   Tylor Torrez is a 61 y.o. with a past history of stage 4 lung cancer dx 1/2019 with brain and liver mets s/p brain tumor removal at [facility] [place], SBRT who was admitted on 9/19/2019 from home w/ L sided weakness- MRI brain showing progressive brain mets, large R sided lesion. Also w/ worsening disease on CT A/P. Followed by Dr Savana Olivera and Dr Karla Resendiz- seen in our clinic 9/16/19. Nsgy and Onc consulted. Current medical issues leading to Palliative Medicine involvement include: care decisions, support. On MSContin 30mg every 8h, Oxycodone 15mg every 6h prn pain, Gabapentin 100mg tid. Social: Worked in construction, lives w/ wife [place] who has stage 2 breast cancer. Twin grandchildren live with them. Complex social situation due to high level of stress, grief over loss of function and inability to work. Concern expressed by wife during recent clinic visit that pt is verbally abusive to family. PALLIATIVE DIAGNOSES:   1. R chest wall pain - neoplasm related  2. Neuropathic pain   3. L sided weakness w/ R sided brain mets  4. Fatigue  5. Constipation from opioids   6. Grief over loss of function   7. Memory deficits        PLAN:   1. Meet w/ pt who is aware of new brain mets. Discuss that I am a colleague of Dr Karla Resendiz. 2. Pt not interested in talking much further, as he says that he is waiting to hear back from Nsgy. He wishes that Nsgy communicates directly w/ his primary doctors- Dr Savana Olivera and Dr Bailey Doan, who are aware of situation. 3. Care goals~ Pt's goals are to do everything for treatment of his cancer that is recommended. Would be open to surgical or radiation treatment if indicated.  Currently on steroids as Nsgy reviewing scans. 4. Given complex social situation (grief, concern over verbal abuse)- further support and care goals to be done by outpatient team who knows pt and family well. Of course, will assist in the hospital as well if needed. 5. Cancer pain ~Pain currently controlled- agree w/ continuing home regimen of MsContin 30mg every 8h, Oxycodone 15mg po every 6h prn and Gabapentin 100mg tid. 6. Miralax daily. 7. If pt becomes npo for any interventions and cannot take po, consider Morphine PCA 1mg/h basal, 1.5mg IV every 10 min demand. 8. Initial consult note routed to primary continuity provider and/or primary health care team members  9. [facility], [facility], Dr Riggs May / TREATMENT PREFERENCES:     GOALS OF CARE:  Patient/Health Care Proxy Stated Goals: Prolong life    TREATMENT PREFERENCES:   Code Status: Full Code    Advance Care Planning:  [x] The [facility] Interdisciplinary Team has updated the ACP Navigator with Health Care Decision Maker and Patient Capacity      [facility] - [phone]    Secondary Decision Maker: Tip Anastasia - [facility] - [phone]  Advance Care Planning 9/16/2019   Patient's Healthcare Decision Maker is: Named in scanned ACP document   Confirm Advance Directive Yes, on file   Does the patient have other document types -       Medical Interventions: Full interventions       Other:    As far as possible, the palliative care team has discussed with patient / health care proxy about goals of care / treatment preferences for patient. HISTORY:     History obtained from: Pt, chart, staff    CHIEF COMPLAINT: Fatigue, L sided weakness    HPI/SUBJECTIVE:    The patient is:   [x] Verbal and participatory  [] Non-participatory due to: Pt awake, alert. Fatigued, prefers that he uses the restroom and then sleeps. Weak on L.  Chest wall pain currently controlled. Clinical Pain Assessment (nonverbal scale for severity on nonverbal patients):   Clinical Pain Assessment  Severity: 0          Duration: for how long has pt been experiencing pain (e.g., 2 days, 1 month, years)  Frequency: how often pain is an issue (e.g., several times per day, once every few days, constant)     FUNCTIONAL ASSESSMENT:     Palliative Performance Scale (PPS):  PPS: 40       PSYCHOSOCIAL/SPIRITUAL SCREENING:     Palliative IDT has assessed this patient for cultural preferences / practices and a referral made as appropriate to needs (Cultural Services, Patient Advocacy, Ethics, etc.)    Any spiritual / Episcopalian concerns:  [] Yes /  [x] No    Caregiver Burnout:  [] Yes /  [x] No /  [] No Caregiver Present      Anticipatory grief assessment:   [x] Normal  / [] Maladaptive       ESAS Anxiety: Anxiety: 3    ESAS Depression: Depression: 0        REVIEW OF SYSTEMS:     Positive and pertinent negative findings in ROS are noted above in HPI. The following systems were [x] reviewed / [] unable to be reviewed as noted in HPI  Other findings are noted below. Systems: constitutional, ears/nose/mouth/throat, respiratory, gastrointestinal, genitourinary, musculoskeletal, integumentary, neurologic, psychiatric, endocrine. Positive findings noted below. Modified ESAS Completed by: provider   Fatigue: 5 Drowsiness: 2   Depression: 0 Pain: 0   Anxiety: 3 Nausea: 0   Anorexia: 3 Dyspnea: 1                    PHYSICAL EXAM:     From RN flowsheet:  Wt Readings from Last 3 Encounters:   09/20/19 156 lb 4.9 oz (70.9 kg)   09/19/19 159 lb (72.1 kg)   09/16/19 155 lb 3.2 oz (70.4 kg)     Blood pressure 123/67, pulse 80, temperature 98.9 °F (37.2 °C), resp. rate 12, weight 156 lb 4.9 oz (70.9 kg), SpO2 98 %.     Pain Scale 1: Numeric (0 - 10)  Pain Intensity 1: 0                     Constitutional: awakens easily, irritable that I woke him up, oriented to situation  Eyes: pupils equal  ENMT: no nasal discharge, moist mucous membranes, NC in place  Cardiovascular: regular rhythm  Respiratory: breathing not labored  Gastrointestinal: soft   Musculoskeletal: no deformity  Skin: warm, dry  Neurologic: moving all extremities  Psychiatric: flat          HISTORY:     Active Problems:    * No active hospital problems. *    Past Medical History:   Diagnosis Date    Cancer ([address]     LUNG RT. SIDE METS TO BRAIN, LIVER    Chronic obstructive pulmonary disease (HCC)     MILD PER WIFE    Diabetes (HCC)     TYPE II    GERD (gastroesophageal reflux disease)     Hypertension       Past Surgical History:   Procedure Laterality Date    HX CERVICAL FUSION      [place]X [place]OTOM[place]29/[place] [facility] [place]    HX GI      COLONOSCOPY    HX HERNIA REPAIR Right 1972    INGUINAL    VASCULAR SURGERY PROCEDURE UNLIST Bilateral     LEGS      Family History   Problem Relation Age of Onset    Stroke Father     Diabetes Father     Other Mother 58        SUDDEN DEATH    Cancer Maternal Grandfather     Anesth Problems Neg Hx       History reviewed, no pertinent family history.   Social History     Tobacco Use    Smoking status: Former Smoker     Packs/day: 1.50     Years: 25.00     Pack years: 37.50     Last attempt to quit: 1/21/2009     Years since quitting: 10.6    Smokeless tobacco: Never Used   Substance Use Topics    Alcohol use: No     Frequency: Never     Allergies   Allergen Reactions    Other Medication Nausea and Vomiting     Pt states he is allergic to diuretics but unsure of name         Current Facility-Administered Medications   Medication Dose Route Frequency    polyethylene glycol (MIRALAX) packet 17 g  17 g Oral DAILY    sodium chloride (NS) flush 5-40 mL  5-40 mL IntraVENous Q8H    sodium chloride (NS) flush 5-40 mL  5-40 mL IntraVENous PRN    acetaminophen (TYLENOL) tablet 650 mg  650 mg Oral Q4H PRN    ondansetron (ZOFRAN) injection 4 mg  4 mg IntraVENous Q4H PRN    insulin lispro (HUMALOG) injection   SubCUTAneous AC&HS    glucose chewable tablet 16 g  4 Tab Oral PRN    glucagon (GLUCAGEN) injection 1 mg  1 mg IntraMUSCular PRN    dextrose 10% infusion 0-250 mL  0-250 mL IntraVENous PRN    dexamethasone (DECADRON) 4 mg/mL injection 4 mg  4 mg IntraVENous Q6H    ondansetron (ZOFRAN ODT) tablet 4 mg  4 mg Oral Q8H PRN    megestrol (MEGACE) tablet 40 mg  40 mg Oral BID    pantoprazole (PROTONIX) tablet 40 mg  40 mg Oral BID    lisinopril (PRINIVIL, ZESTRIL) tablet 10 mg  10 mg Oral DAILY    morphine CR (MS CONTIN) tablet 30 mg  30 mg Oral Q8H    oxyCODONE IR (ROXICODONE) tablet 15 mg  15 mg Oral Q6H PRN    gabapentin (NEURONTIN) capsule 100 mg  100 mg Oral TID          LAB AND IMAGING FINDINGS:     Lab Results   Component Value Date/Time    WBC 11.8 (H) 09/20/2019 05:31 AM    HGB 10.7 (L) 09/20/2019 05:31 AM    PLATELET 265 76/61/2736 05:31 AM     Lab Results   Component Value Date/Time    Sodium 135 (L) 09/20/2019 05:31 AM    Potassium 4.0 09/20/2019 05:31 AM    Chloride 100 09/20/2019 05:31 AM    CO2 28 09/20/2019 05:31 AM    BUN 17 09/20/2019 05:31 AM    Creatinine 1.03 09/20/2019 05:31 AM    Calcium 9.1 09/20/2019 05:31 AM    Magnesium 2.2 12/31/2018 01:13 PM    Phosphorus 2.6 03/31/2010 06:43 AM      Lab Results   Component Value Date/Time    AST (SGOT) 116 (H) 09/20/2019 05:31 AM    Alk.  phosphatase 73 09/20/2019 05:31 AM    Protein, total 7.3 09/20/2019 05:31 AM    Albumin 3.3 (L) 09/20/2019 05:31 AM    Globulin 4.0 09/20/2019 05:31 AM     Lab Results   Component Value Date/Time    INR 1.2 (H) 09/19/2019 08:51 AM    Prothrombin time 12.6 (H) 09/19/2019 08:51 AM    aPTT 144.2 (H) 05/28/2010 04:43 PM      No results found for: IRON, FE, TIBC, IBCT, PSAT, FERR   No results found for: PH, PCO2, PO2  No components found for: GLPOC   No results found for: CPK, CKMB             Total time:   Counseling / coordination time, spent as noted above:   > 50% counseling / coordination?: Prolonged service was provided for  []30 min   []75 min in face to face time in the presence of the patient, spent as noted above. Time Start:   Time End:   Note: this can only be billed with 08097 (initial) or 61882 (follow up). If multiple start / stop times, list each separately. \

## 2019-09-20 NOTE — PROGRESS NOTES
Transitions of care  TBD pending medical progress    Reason for Admission:   Presented to the ED with left sided weakness. Patient has a history of Lung Cancer with mets. Head CT : large intracranial mass. RRAT Score:   13 / moderate/ yellow              Do you (patient/family) have any concerns for transition/discharge? Not at this time              Plan for utilizing home health:  TBD     Current Advanced Directive/Advance Care Plan: On file wife Ariel Randhawa is MPOA C: [phone]. Care manager met with patient to introduce self and explain role. Patient lives independently with his wife in a 2 level home with 12-15 steps to enter. Per patient he has a walker and some canes. He has never had home health. Per patient he sees Dr Susanne Cheng twice a year and uses the pharmacy at [facility]. Patient is on disability, wife drives him to his appointments. Patient is interested in obtaining a WC, CM will check into pricing for him.  Artem Mccormack RN,CRM    Care Management Interventions  PCP Verified by CM: Yes(Dr Dow)  Palliative Care Criteria Met (RRAT>21 & CHF Dx)?: No  MyChart Signup: No  Discharge Durable Medical Equipment: No  Physical Therapy Consult: No  Occupational Therapy Consult: No  Speech Therapy Consult: No  Current Support Network: Lives with Spouse(Wife Susan Carvalho: [phone], C: [phone])

## 2019-09-20 NOTE — PROGRESS NOTES
Spiritual Care Assessment/Progress Note  [facility]      NAME: Samson Haskins      MRN: [id number]  AGE: 61 y.o. SEX: male  Adventist Affiliation: Orthodox   Language: English     9/20/2019     Total Time (in minutes): 5     Spiritual Assessment begun in [address] through conversation with:         []Patient        [] Family    [] Friend(s)        Reason for Consult: Palliative Care, Initial/Spiritual Assessment     Spiritual beliefs: (Please include comment if needed)     [] Identifies with a juan tradition:         [] Supported by a juan community:            [] Claims no spiritual orientation:           [] Seeking spiritual identity:                [] Adheres to an individual form of spirituality:           [x] Not able to assess:  See comments below                        Identified resources for coping:      [] Prayer                               [] Music                  [] Guided Imagery     [] Family/friends                 [] Pet visits     [] Devotional reading                         [x] Unknown     [] Other:                                            Interventions offered during this visit: (See comments for more details)    Patient Interventions: Initial visit           Plan of Care:     [] Support spiritual and/or cultural needs    [] Support AMD and/or advance care planning process      [] Support grieving process   [] Coordinate Rites and/or Rituals    [] Coordination with community clergy   [] No spiritual needs identified at this time   [] Detailed Plan of Care below (See Comments)  [] Make referral to Music Therapy  [] Make referral to Pet Therapy     [] Make referral to Addiction services  [] Make referral to [facility]  [] Make referral to Spiritual Care Partner  [] No future visits requested        [x] Follow up visits as needed     Comments: Attempted visit with Mr. Rose Ronn. Another staff member was present in room speaking with pt/family.   Chart reviewed prior to visit. Pt is seen by outpatient Palliative team and Palliative Sw spent some time with pt's wife today. Chaplains will attempt to visit again as able, and are available for support as needed.     Rosibel Cano, Palliative

## 2019-09-20 NOTE — PROGRESS NOTES
Bedside and Verbal shift change report given to Slick RN (oncoming nurse) by [facility], [facility] RN (offgoing nurse).  Report included the following information SBAR, Kardex, Intake/Output, MAR, Recent Results and Cardiac Rhythm Nsr.

## 2019-09-20 NOTE — PROGRESS NOTES
NUTRITION COMPLETE ASSESSMENT    RECOMMENDATIONS:   1. Assist with cutting food on trays if needed  2. Continue Megace  3. Weekly weights     Interventions/Plan:   Food/Nutrient Delivery:    Commercial supplement(Continue Glucerna)          Assessment:   Reason for Assessment: Provider Consult    Diet: consistent CHO, soft solids  Supplements: Glucerna 3x/day  Nutritionally Significant Medications: [x] Reviewed & Includes: decadron, SSI, megace, protonix, miralax; PRN: zofran   Meal Intake: No data found. Pre-Hospitalization:  Usual Appetite: Fair  Diet at Home: soft  Vitamins/Supplements: Yes(Glucerna)    Current Hospitalization:   Appetite: Fair  PO Ability: Independent Average po intake:   Average supplements intake:        Subjective: I'll make a smoothie with cottage cheese, PB, spinach, and whole milk sometimes    Objective:  Pt admitted for brain mets. PMHx: lung Ca with brain/liver mets, COPD, DM, GERD, HTN. Brain mets with large mass this admit with weakness. Steroids in placde for brain edema. Oncology and neurosurgery following. Seen by Palliative with plans to continue current care. Poor PO PTA. Megace at home. Seen by Oncology RD earlier this year. Glucerna shakes added TID on admit (660kcal, 30g protein). Pt and family visited. Good understanding of importance of good PO and protein/nutrient dense foods. drinking Glucerna at home and open trial trial of alternative shakes. Will add Boost Pudding (240kcal, 7g protein) and Magic Cup (290kcal, 9g protein) and snack. Requesting [facility] soft diet since trouble cutting and no dentures this admit. Constipation noted with bowel regimen rx. Adjust PRN. Notable wt loss of 4% x 1 month (5#) with poor PO intake. # last month. Will continue to follow for PO intake, constipation, wt trends.    Estimated Nutrition Needs:   Kcals/day: 2250 Kcals/day(2250-2625kcal)  Protein: 90 g(90-105g (1.2-1.4g/kg))  Fluid: 2250 ml(30ml/kg)  Based On: Kcal/kg - specify (Comment)(30-35kcal/kg)  Weight Used: IBW(75kg (166#))    Pt expected to meet estimated nutrient needs:  []   Yes     []  No [] Unable to predict at this time  Nutrition Diagnosis:   1. Unintended weight loss related to inadequate oral intake; increased energy expenditure as evidenced by 4% wt loss x 1 month; lung CA w/ mets    Goals:     Consumption of at least 75% meals and 2-3 supplements/day in 5-7days; wt maintenance/gain     Monitoring & Evaluation:    - Total energy intake, Liquid meal replacement, Protein intake   - Weight/weight change    Previous Nutrition Goals Met:   N/A  Previous Recommendations:    N/A    Education & Discharge Needs:   [x] None Identified   [] Identified and addressed    [] Participated in care plan, discharge planning, and/or interdisciplinary rounds        Cultural, Orthodoxy and ethnic food preferences identified: None    Skin Integrity: [x]Intact  []Other  Edema: [x]None []Other  Last BM: PTA  Food Allergies: [x]None []Other  Diet Restrictions: Cultural/Moravian Preference(s): None     Anthropometrics:    Weight Loss Metrics 9/20/2019 9/19/2019 9/19/2019 9/19/2019 9/16/2019 9/3/2019 8/12/2019   Today's Wt 156 lb 4.9 oz - 159 lb - 155 lb 3.2 oz 159 lb 14.4 oz 161 lb 12.8 oz   BMI - 22.43 kg/m2 - 22.81 kg/m2 22.27 kg/m2 22.94 kg/m2 23.22 kg/m2      Weight Source: Bed  Height: 5' 10\" (177.8 cm),    Body mass index is 22.43 kg/m².      IBW : 75.3 kg (166 lb), % IBW (Calculated): 94.16 %  Usual Body Weight: 76.2 kg (168 lb),      Labs:    Lab Results   Component Value Date/Time    Sodium 135 (L) 09/20/2019 05:31 AM    Potassium 4.0 09/20/2019 05:31 AM    Chloride 100 09/20/2019 05:31 AM    CO2 28 09/20/2019 05:31 AM    Glucose 143 (H) 09/20/2019 05:31 AM    BUN 17 09/20/2019 05:31 AM    Creatinine 1.03 09/20/2019 05:31 AM    Calcium 9.1 09/20/2019 05:31 AM    Magnesium 2.2 12/31/2018 01:13 PM    Phosphorus 2.6 03/31/2010 06:43 AM    Albumin 3.3 (L) 09/20/2019 05:31 AM     Lab Results   Component Value Date/Time    Hemoglobin A1c 5.6 09/20/2019 05:31 AM    Hemoglobin A1c (POC) 6.5 04/11/2019 01:03 PM     Dong [address] , Pager #[phone] or [phone]

## 2019-09-20 NOTE — PROGRESS NOTES
PCCM    60 yo  has a past medical history of Cancer ([facility] [address] Chronic obstructive pulmonary disease ([facility] [address] Diabetes ([facility] [address] GERD (gastroesophageal reflux disease), and Hypertension.   Has metastatic lung cancer with previous surgery at [facility]    Presented with L sided weakness and large intracranial mass     MRI has been ordered  Decadron has been ordered  Neurosurgery following    Will be available to see formally if needed    Larry Paget, MD

## 2019-09-20 NOTE — PROGRESS NOTES
Neurosurgery Progress Note  Nico [place]  [phone]        Admit Date: 2019   LOS: 1 day        Daily Progress Note: 2019        Subjective:   Pt has a hx of lung cancer with brain mets. He is followed by Dr. Aleksandra Vega at [facility]. He underwent a craniotomy with Dr. Merlyn Conner in 2019. He had stereotactic radiation to the brain lesions at [address] [place] [address]. He underwent palliative chemotherapy with Carbo/Alimta/Keytruda for 4 cycles and then Alimta/Pembrolizumab x 8 cycles. He is also followed by Dr. Lucy Conde of palliative medicine. The patient was seen in the ER at [facility] yesterday because of weakness and a fall. He had a head CT which showed a new brain met with cerebral edema. He transferred to [facility] for neurosurgical evaluation. His CT scans of his chest/abdomen/pelvis show progression of disease. He was started on steroids. Objective:     Vital signs  Temp (24hrs), Av.7 °F (37.1 °C), Min:98 °F (36.7 °C), Max:99 °F (37.2 °C)   701 - 1900  In: -   Out: 696 [Urine:775]  1901 -  07  In: 0   Out: 1200 [Urine:1200]    Visit Vitals  /66   Pulse 87   Temp 98.5 °F (36.9 °C)   Resp 15   Ht 5' 10\" (1.778 m)   Wt 70.9 kg (156 lb 4.9 oz)   SpO2 100%   BMI 22.43 kg/m²    O2 Flow Rate (L/min): 2 l/min O2 Device: Nasal cannula     Pain control  Pain Assessment  Pain Scale 1: Numeric (0 - 10)  Pain Intensity 1: 0    PT/OT  Gait                 Physical Exam:  Gen:NAD. Neuro: A&Ox3. Follows commands. Speech clear. Affect flat  PERRL. EOMI. Left central facial droop. Tongue midline. CHAVARRIA spontaneously R>L  Gait deferred. MRI brain with and without contrast on 19 shows interval progression of metastatic disease with an large metastatic foci in the periatrial region of the right parietal lobe and the posterior right temporal lobe. Interval increased associated vasogenic edema with right to left midline shift of approximately 9 mm.  Status post resection of previously demonstrated right occipital parietal large metastasis. Postprocedural changes related to a previous occipital craniotomy. No other new intracranial metastatic foci. CT chest/abd/pelvis with contrast on 09/20/19 shows imaging findings are consistent with interval progression of disease. There is extensive progression of metastatic disease in the hepatic parenchyma with numerous new and/or enlarged hepatic mass lesions. Possible new small satellite nodule in the right upper lobe of the lung. Mediastinal and primary lung mass/adenopathy not significantly changed. Intracranial metastatic disease progression as described in brain MRI report. 24 hour results:    Recent Results (from the past 24 hour(s))   GLUCOSE, POC    Collection Time: 09/19/19  7:07 PM   Result Value Ref Range    Glucose (POC) 222 (H) 65 - 100 mg/dL    Performed by Jennifer Hand    GLUCOSE, POC    Collection Time: 09/19/19 11:40 PM   Result Value Ref Range    Glucose (POC) 190 (H) 65 - 100 mg/dL    Performed by Bethel Khan Do [facility] 1263, COMPREHENSIVE    Collection Time: 09/20/19  5:31 AM   Result Value Ref Range    Sodium 135 (L) 136 - 145 mmol/L    Potassium 4.0 3.5 - 5.1 mmol/L    Chloride 100 97 - 108 mmol/L    CO2 28 21 - 32 mmol/L    Anion gap 7 5 - 15 mmol/L    Glucose 143 (H) 65 - 100 mg/dL    BUN 17 6 - 20 MG/DL    Creatinine 1.03 0.70 - 1.30 MG/DL    BUN/Creatinine ratio 17 12 - 20      GFR est AA >60 >60 ml/min/1.73m2    GFR est non-AA >60 >60 ml/min/1.73m2    Calcium 9.1 8.5 - 10.1 MG/DL    Bilirubin, total 0.4 0.2 - 1.0 MG/DL    ALT (SGPT) 52 12 - 78 U/L    AST (SGOT) 116 (H) 15 - 37 U/L    Alk.  phosphatase 73 45 - 117 U/L    Protein, total 7.3 6.4 - 8.2 g/dL    Albumin 3.3 (L) 3.5 - 5.0 g/dL    Globulin 4.0 2.0 - 4.0 g/dL    A-G Ratio 0.8 (L) 1.1 - 2.2     CBC WITH AUTOMATED DIFF    Collection Time: 09/20/19  5:31 AM   Result Value Ref Range    WBC 11.8 (H) 4.1 - 11.1 K/uL    RBC 3.57 (L) 4.10 - 5.70 M/uL    HGB 10.7 (L) 12.1 - 17.0 g/dL    HCT 33.7 (L) 36.6 - 50.3 %    MCV 94.4 80.0 - 99.0 FL    MCH 30.0 26.0 - 34.0 PG    MCHC 31.8 30.0 - 36.5 g/dL    RDW 13.1 11.5 - 14.5 %    PLATELET 231 764 - 189 K/uL    MPV 9.4 8.9 - 12.9 FL    NRBC 0.0 0  WBC    ABSOLUTE NRBC 0.00 0.00 - 0.01 K/uL    NEUTROPHILS 88 (H) 32 - 75 %    LYMPHOCYTES 5 (L) 12 - 49 %    MONOCYTES 7 5 - 13 %    EOSINOPHILS 0 0 - 7 %    BASOPHILS 0 0 - 1 %    IMMATURE GRANULOCYTES 0 0.0 - 0.5 %    ABS. NEUTROPHILS 10.4 (H) 1.8 - 8.0 K/UL    ABS. LYMPHOCYTES 0.6 (L) 0.8 - 3.5 K/UL    ABS. MONOCYTES 0.8 0.0 - 1.0 K/UL    ABS. EOSINOPHILS 0.0 0.0 - 0.4 K/UL    ABS. BASOPHILS 0.0 0.0 - 0.1 K/UL    ABS. IMM. GRANS. 0.0 0.00 - 0.04 K/UL    DF SMEAR SCANNED      PLATELET COMMENTS Large Platelets      RBC COMMENTS ANISOCYTOSIS  1+        RBC COMMENTS OVALOCYTES  PRESENT        WBC COMMENTS HYPERSEGMENTED POLYS     HEMOGLOBIN A1C WITH EAG    Collection Time: 09/20/19  5:31 AM   Result Value Ref Range    Hemoglobin A1c 5.6 4.2 - 6.3 %    Est. average glucose 114 mg/dL   GLUCOSE, POC    Collection Time: 09/20/19  6:57 AM   Result Value Ref Range    Glucose (POC) 155 (H) 65 - 100 mg/dL    Performed by [address] [place] [address], POC    Collection Time: 09/20/19 12:15 PM   Result Value Ref Range    Glucose (POC) 180 (H) 65 - 100 mg/dL    Performed by Laura Paniagua           Assessment:     Active Problems:    Cerebral edema ([facility] (9/20/2019)      Brain metastases ([facility] (9/20/2019)        Plan:   1. Brain metastases   - cont decadron   - cont keppra   - need to discuss further treatment with oncology. This is a deep lesion. Would it be better served with radiation vs surgery so he can start systemic therapy sooner since he has progression of disease?   - Ok to transfer to [facility] from [address] [place] [address] standpoint  2. Cerebral edema with brain compression   - due to #1   - plans as above  3.  Metastatic lung cancer   - Followed by Dr. Esequiel Silverio oncology to discuss plans for further systemic disease in light of progression of disease on CT scans    Activity: up with assist  DVT ppx: SCDs  Dispo: tbd    Plan d/w  [facility], ICU nurse, wife at bedside.       Alicja Navarro NP

## 2019-09-20 NOTE — PROGRESS NOTES
Farzana Saavedra [address] Work  [phone]    Narrative  Met with patient's wife for some time as patient's wife was outside patient's room and wanted to talk. Patient's wife expressed worry about patient's condition and the uncertainty \"of what's going to happen next\". Provided reflective listening and emotional support. Visited with patient briefly to check in. Plan  Will continue to follow up with patient and his wife Wade Thomas when patient attends clinic appointments with Dr. Perico Roche.         Mikayla White, [facility]   Palliative Medicine,   [phone]

## 2019-09-20 NOTE — PROGRESS NOTES
Hospitalist Progress Note  Lashae Joseph MD  Answering service: [phone] OR [phone] from in house phone        Date of Service:  2019  NAME:  Soumya Shah  :  1960  MRN:  [id number]      Admission Summary:   66-year-old  male with a past medical history of non-small cell lung cancer stage IV who presented to [facility] with left sided weakness. Interval history / Subjective:   CC: left sided weakness. Patient with persistent left sided weakness. No nausea. No vomiting. No diarrhea. He is eating his diet well. Assessment & Plan:       New Large right brain metastasis  Cerebral edema with midline shift  MRI brain with interval progression of metastatic disease with an large metastatic foci in the periatrial region of the right parietal lobe and the posterior right temporal lobe. Interval increased associated vasogenic edema with right to left midline shift of approximately 9 mm  Neurosurgery team is following, plan is probable radiation therapy. Oncology consulted. Continue IV dexamethasone. Neuro-checks. Telemetry monitor. Stage IV lung cancer with metastasis to brain and liver  Oncology and palliative team consulted. Appreciate help. Diabetes mellitus with hypoglycemia  Glucose 56 on admission. No further hypoglycemia. Diabetes is controlled. continue sliding scale insulin. Hold home lantus  Close monitor. Mild protein calorie malnutrition  Nutrition consulted    Hypertension  BP controlled.      Code status: full  DVT prophylaxis: [address] discussed with: Patient/Family  Disposition: SNF/LTC     Hospital Problems  Date Reviewed: 2019          Codes Class Noted POA    Cerebral edema ([facility] ICD-10-CM: G93.6  ICD-9-CM: 348.5  2019 Unknown        Brain metastases ([facility] ICD-10-CM: C79.31  ICD-9-CM: 198.3  2019 Unknown                Review of Systems:   Except as documented all systems reviewed and negative. Vital Signs:    Last 24hrs VS reviewed since prior progress note. Most recent are:  Visit Vitals  /56 (BP 1 Location: Right arm, BP Patient Position: At rest)   Pulse 95   Temp 98.5 °F (36.9 °C)   Resp 8   Ht 5' 10\" (1.778 m)   Wt 70.9 kg (156 lb 4.9 oz)   SpO2 95%   BMI 22.43 kg/m²         Intake/Output Summary (Last 24 hours) at 9/20/2019 1817  Last data filed at 9/20/2019 1740  Gross per 24 hour   Intake 0 ml   Output 2050 ml   Net -2050 ml        Physical Examination:             Constitutional:  No acute pain or distress, cooperative, pleasant    ENT:  Oral mucous moist, oropharynx benign. Resp:  CTA bilaterally. No wheezing/rhonchi/rales. No accessory muscle use   CV:  Regular rate, S1S2 present    GI:  Soft, non distended, non tender. normoactive bowel sounds     Musculoskeletal:  No edema    Neurologic:  AOx3. Moves all extremities R>L. Left facial droop. No dysarthria. Skin:  no rashes       Data Review:    Review and/or order of clinical lab test      Labs:     Recent Labs     09/20/19  0531 09/19/19  0849   WBC 11.8* 4.5   HGB 10.7* 10.2*   HCT 33.7* 32.0*    234     Recent Labs     09/20/19  0531 09/19/19  0849   * 133*   K 4.0 4.1    97   CO2 28 25   BUN 17 23*   CREA 1.03 1.16   * 64*   CA 9.1 9.1     Recent Labs     09/20/19  0531 09/19/19  0849   SGOT 116* 121*   ALT 52 47   AP 73 73   TBILI 0.4 0.7   TP 7.3 7.6   ALB 3.3* 3.5   GLOB 4.0 4.1*     Recent Labs     09/19/19  0851   INR 1.2*   PTP 12.6*      No results for input(s): FE, TIBC, PSAT, FERR in the last 72 hours. No results found for: FOL, RBCF   No results for input(s): PH, PCO2, PO2 in the last 72 hours.   Recent Labs     09/19/19  0849   TROIQ <0.05     Lab Results   Component Value Date/Time    Cholesterol, total 183 03/30/2010 04:00 AM    HDL Cholesterol 29 (L) 03/30/2010 04:00 AM    LDL, calculated 116.6 (H) 03/30/2010 04:00 AM    Triglyceride 187 (H) 03/30/2010 04:00 AM    CHOL/HDL Ratio 6.3 (H) 03/30/2010 04:00 AM     Lab Results   Component Value Date/Time    Glucose (POC) 209 (H) 09/20/2019 04:34 PM    Glucose (POC) 180 (H) 09/20/2019 12:15 PM    Glucose (POC) 155 (H) 09/20/2019 06:57 AM    Glucose (POC) 190 (H) 09/19/2019 11:40 PM    Glucose (POC) 222 (H) 09/19/2019 07:07 PM     Lab Results   Component Value Date/Time    Color YELLOW/STRAW 09/19/2019 10:16 AM    Appearance CLEAR 09/19/2019 10:16 AM    Specific gravity 1.020 09/19/2019 10:16 AM    Specific gravity 1.007 12/31/2018 04:19 PM    pH (UA) 5.5 09/19/2019 10:16 AM    Protein NEGATIVE  09/19/2019 10:16 AM    Glucose NEGATIVE  09/19/2019 10:16 AM    Ketone 15 (A) 09/19/2019 10:16 AM    Bilirubin Negative 08/20/2019 10:54 AM    Urobilinogen 1.0 09/19/2019 10:16 AM    Nitrites NEGATIVE  09/19/2019 10:16 AM    Leukocyte Esterase NEGATIVE  09/19/2019 10:16 AM    Epithelial cells FEW 09/19/2019 10:16 AM    Bacteria NEGATIVE  09/19/2019 10:16 AM    WBC 0-4 09/19/2019 10:16 AM    RBC 0-5 09/19/2019 10:16 AM         Medications Reviewed:     Current Facility-Administered Medications   Medication Dose Route Frequency    polyethylene glycol (MIRALAX) packet 17 g  17 g Oral DAILY    sodium chloride (NS) flush 5-40 mL  5-40 mL IntraVENous Q8H    sodium chloride (NS) flush 5-40 mL  5-40 mL IntraVENous PRN    acetaminophen (TYLENOL) tablet 650 mg  650 mg Oral Q4H PRN    ondansetron (ZOFRAN) injection 4 mg  4 mg IntraVENous Q4H PRN    insulin lispro (HUMALOG) injection   SubCUTAneous AC&HS    glucose chewable tablet 16 g  4 Tab Oral PRN    glucagon (GLUCAGEN) injection 1 mg  1 mg IntraMUSCular PRN    dextrose 10% infusion 0-250 mL  0-250 mL IntraVENous PRN    dexamethasone (DECADRON) 4 mg/mL injection 4 mg  4 mg IntraVENous Q6H    ondansetron (ZOFRAN ODT) tablet 4 mg  4 mg Oral Q8H PRN    megestrol (MEGACE) tablet 40 mg  40 mg Oral BID    pantoprazole (PROTONIX) tablet 40 mg  40 mg Oral BID    lisinopril (PRINIVIL, ZESTRIL) tablet 10 mg  10 mg Oral DAILY    morphine CR (MS CONTIN) tablet 30 mg  30 mg Oral Q8H    oxyCODONE IR (ROXICODONE) tablet 15 mg  15 mg Oral Q6H PRN    gabapentin (NEURONTIN) capsule 100 mg  100 mg Oral TID     ______________________________________________________________________  EXPECTED LENGTH OF STAY: - - -  ACTUAL LENGTH OF STAY:          1                 Boyd Garduno MD

## 2019-09-20 NOTE — PROGRESS NOTES
1930: Bedside and Verbal shift change report given to Wan Jimenes RN (oncoming nurse) by Jasmin Velasquez RN (offgoing nurse). Report included the following information SBAR, Kardex, Intake/Output, MAR, Accordion, Recent Results, Cardiac Rhythm NSR and Alarm Parameters . 2100: Transported to MRI on monitor with RN and transport. A&O x4. LUE and LLE weaker than RUE and RLE.     0000: Transported to CT on monitor with RN and transport. Assessment unchanged. 0730: Bedside and Verbal shift change report given to Francisca Woodruff RN (oncoming nurse) by Wan Jimenes RN (offgoing nurse). Report included the following information SBAR, Kardex, OR Summary, Procedure Summary, Intake/Output, MAR, Accordion, Recent Results, Cardiac Rhythm NSR and Alarm Parameters .

## 2019-09-20 NOTE — PROGRESS NOTES
Patient: Kathia Dodd MRN: [id number]    Age: 61 y.o. YOB: 1960 Room/Bed: 6/   Consent for video monitoring obtained after the below has been explained to the patient/family on 9/20/2019:  1. They are being monitored continuously in an effort to promote their safety. 2. That there may be times when the camera will be discontinued to provide care to me to ensure my dignity, such as during bathing or any activity that risks me being exposed. 3. They have the right to opt out of having this surveillance monitoring at any time. 4. It has been explained to the patient/family and they understand that the hospital does not maintain any recording of this surveillance monitoring.     Raudel Porter

## 2019-09-21 NOTE — PROGRESS NOTES
Bedside shift change report given to [facility], [facility], RN (oncoming nurse) by Génesis Matt and Edin Valero RN (offgoing nurse). Report included the following information SBAR, Kardex and MAR.

## 2019-09-21 NOTE — PROGRESS NOTES
Problem: Self Care Deficits Care Plan (Adult)  Goal: *Acute Goals and Plan of Care (Insert Text)  Description  FUNCTIONAL STATUS PRIOR TO ADMISSION: Patient was independent and active without use of DME.    HOME SUPPORT: The patient lived with spouse and children but did not require assist for ADLS and mobility. Occupational Therapy Goals  Initiated 9/21/2019   1. Patient will perform grooming at sink with supervision within 7 day(s). 2.  Patient will perform lower body dressing with supervision within 7 day(s). 3.  Patient will perform toilet transfers with modified independence within 7 day(s). 4.  Patient will perform all aspects of toileting with supervisionwithin 7 day(s). 5.  Patient will participate in upper extremity therapeutic exercise/activities with independence for 10 minutes within 7 day(s). 6.  Patient will improve their Fugl Mariee score by 5 points in prep for ADLs within 7 days. Outcome: Progressing Towards Goal       OCCUPATIONAL THERAPY EVALUATION  Patient: Leanne Giraldo (36 y.o. male)  Date: 9/21/2019  Primary Diagnosis: Brain metastases ([facility] [address] [C79.31]  Cerebral edema ([facility] [address] [G93.6]        Precautions: fall      ASSESSMENT  Based on the objective data described below, the patient presents with minor confusion, decreased sensation, and decreased balance. Min A to walk a few feet with Rw. Noted problems with motor planning and problem solve. Needed assistance figuring out how to call his wife on his cell phone and is unaware of date. Noted medical condition leading to many worsening symptoms. Current Level of Function Impacting Discharge (ADLs/self-care):CGA needed. Need increased assistance for IADL, problem solving    Functional Outcome Measure: The patient scored 56/66 on the Fugl-Mariee UE Assessment outcome measure which is indicative of minor coordination and strength impairments.  Has decreased sensation on L side      Other factors to consider for discharge: stage IV cancer with brain mets     Patient will benefit from skilled therapy intervention to address the above noted impairments. PLAN :  Recommendations and Planned Interventions: self care training, functional mobility training, therapeutic exercise, balance training, visual/perceptual training, therapeutic activities, cognitive retraining, endurance activities, neuromuscular re-education, patient education, home safety training and family training/education    Frequency/Duration: Patient will be followed by occupational therapy 5 times a week to address goals. Recommendation for discharge: (in order for the patient to meet his/her long term goals)  To be determined: Pending planned course of treatment, patient wishes, and performance on stairs, would recommend 24/7 care. This discharge recommendation:  A follow-up discussion with the attending provider and/or case management is planned    Equipment recommendations for successful discharge (if) home: none       SUBJECTIVE:   Patient stated Mine Momin passed out on the toilet.     OBJECTIVE DATA SUMMARY:   HISTORY:   Past Medical History:   Diagnosis Date    Cancer ([facility] [address]     LUNG RT. SIDE METS TO BRAIN, LIVER    Chronic obstructive pulmonary disease (HCC)     MILD PER WIFE    Diabetes ([facility] [address]     TYPE II    GERD (gastroesophageal reflux disease)     Hypertension      Past Surgical History:   Procedure Laterality Date    HX CERVICAL FUSION      HX [place]ANIOTOMY  01/29/2019    [place] [facility] [place]      COLONOSCOPY    HX HERNIA REPAIR Right 1972    INGUINAL    VASCULAR SURGERY PROCEDURE UNLIST Bilateral     LEGS     Expanded or extensive additional review of patient history:     Home Situation  Home Environment: Private residence  # Steps to Enter: 10  Rails to Enter: Yes  One/Two Story Residence: Other (Comment)(Two story and basement)  Current DME Used/Available at Home: Walker, rolling  Tub or Shower Type: Shower    Hand dominance: Right    EXAMINATION OF PERFORMANCE DEFICITS:  Cognitive/Behavioral Status:  Neurologic State: Alert  Orientation Level: Oriented to person;Oriented to situation;Oriented to place; Disoriented to time  Cognition: Follows commands; Impulsive;Memory loss             Skin: intact    Edema: none    Hearing: Auditory  Auditory Impairment: None    Vision/Perceptual:                           Acuity: Within Defined Limits    Corrective Lenses: Glasses    Range of Motion:    AROM: Within functional limits  PROM: Within functional limits                      Strength:    Strength: Generally decreased, functional                Coordination:  Coordination: Generally decreased, functional  Fine Motor Skills-Upper: Left Impaired;Right Intact    Gross Motor Skills-Upper: Left Intact; Right Intact    Tone & Sensation:    Tone: Normal  Sensation: Impaired(decrreased sensation to R side to light touch)                      Balance:  Sitting: Intact  Standing: Impaired  Standing - Static: Good  Standing - Dynamic : Fair;Constant support    Functional Mobility and Transfers for ADLs:  Bed Mobility:  Supine to Sit: Contact guard assistance  Scooting: Supervision    Transfers:  Sit to Stand: Contact guard assistance  Stand to Sit: Contact guard assistance  Bed to Chair: Minimum assistance    ADL Assessment:  Feeding: Independent    Oral Facial Hygiene/Grooming: Supervision    Bathing: Contact guard assistance    Upper Body Dressing: Setup    Lower Body Dressing: Contact guard assistance    Toileting: Contact guard assistance                ADL Intervention and task modifications:                                        Functional Measure:  Fugl-Mariee Assessment of Motor Recovery after Stroke:   Reflex Activity  Flexors/Biceps/Fingers: Can be elicited  Extensors/Triceps: Can be elicited  Reflex Subtotal: 4    Volitional Movement Within Synergies  Shoulder Retraction: Full  Shoulder Elevation: Full  Shoulder Abduction (90 degrees): Full  Shoulder External Rotation: Full  Elbow Flexion: Full  Forearm Supination: Full  Shoulder Adduction/Internal Rotation: Full  Elbow Extension: Full  Forearm Pronation: Full  Subtotal: 18    Volitional Movement Mixing Synergies  Hand to Lumbar Spine: Full  Shoulder Flexion (0-90 degrees): Full  Pronation-Supination: Full  Subtotal: 6    Volitional Movement With Little or No Synergy  Shoulder Abduction (0-90 degrees): Full  Shoulder Flexion ( degrees): Full  Pronation/Supination: Full  Subtotal : 6    Normal Reflex Activity  Biceps, Triceps, Finger Flexors: Full  Subtotal : 2    Upper Extremity Total   Upper Extremity Total: 36    Wrist  Stability at 15 Degree Dorsiflexion: Full  Repeated Dorsiflexion/ Volar Flexion: Full  Stability at 15 Degree Dorsiflexion: Full  Repeated Dorsiflexion/ Volar Flexion: Full  Circumduction: Full  Wrist Total: 10    Hand  Mass Flexion: Full  Mass Extension: Full  Grasp A: Partial  Grasp B: Partial  Grasp C: Partial  Grasp D: Partial  Grasp E: Partial  Hand Total: 9    Coordination/Speed  Tremor: Slight  Dysmetria: Marked  Time: >5s  Coordination/Speed Total : 1    Total A-D  Total A-D (Motor Function): 56/66     This is a reliable/valid measure of arm function after a neurological event. It has established value to characterize functional status and for measuring spontaneous and therapy-induced recovery; tests proximal and distal motor functions. Fugl-Mariee Assessment - UE scores recorded between five and 30 days post neurologic event can be used to predict UE recovery at six months post neurologic event. Severe = 0-21 points   Moderately Severe = 22-33 points   Moderate = 34-47 points   Mild = 48-66 points  DAVON Hassan, YESSICA Goldberg, & TRICIA Boyd (1992). Measurement of motor recovery after stroke: Outcome assessment and sample size requirements. Stroke, 23, pp. 7553-5413. ------------------------------------------------------------------------------------------------------------------------------------------------------------------  MCID:  Stroke:   Ira Mart et al, 2001; n = 171; mean age 79 (6) years; assessed within 16 (12) days of stroke, Acute Stroke)  FMA Motor Scores from Admission to Discharge   10 point increase in FMA Upper Extremity = 1.5 change in discharge FIM   10 point increase in FMA Lower Extremity = 1.9 change in discharge FIM  MDC:   Stroke:   Poli Gallagher et al, 2008, n = 14, mean age = 59.9 (14.6) years, assessed on average 14 (6.5) months post stroke, Chronic Stroke)   FMA = 5.2 points for the Upper Extremity portion of the assessment     Occupational Therapy Evaluation Charge Determination   History Examination Decision-Making   LOW Complexity : Brief history review  LOW Complexity : 1-3 performance deficits relating to physical, cognitive , or psychosocial skils that result in activity limitations and / or participation restrictions  LOW Complexity : No comorbidities that affect functional and no verbal or physical assistance needed to complete eval tasks       Based on the above components, the patient evaluation is determined to be of the following complexity level: LOW   Pain Rating:  none    Activity Tolerance:   Good  Please refer to the flowsheet for vital signs taken during this treatment. After treatment patient left in no apparent distress:    Sitting in chair, Call bell within reach and Bed / chair alarm activated    COMMUNICATION/EDUCATION:   The patients plan of care was discussed with: Registered Nurse and Rehabilitation Attendant. Patient was educated regarding his deficit(s) of L sided decreased sensation/coordination as this relates to his diagnosis of brain metastasis. He demonstrated Fair understanding as evidenced by verbal feedback. Noted he has problem solving and executive functioning deficits.     Home safety education was provided and the patient/caregiver indicated understanding., Patient/family have participated as able in goal setting and plan of care. and Patient/family agree to work toward stated goals and plan of care. This patients plan of care is appropriate for delegation to [facility].     Thank you for this referral.  Jose Dominguez  Time Calculation: 17 mins

## 2019-09-21 NOTE — PROGRESS NOTES
Hospitalist Progress Note  Skylar Chang MD  Answering service: [phone] OR [phone] from in house phone        Date of Service:  2019  NAME:  Bre Jenkins  :  1960  MRN:  [id number]      Admission Summary:   70-year-old  male with a past medical history of non-small cell lung cancer stage IV who presented to ED with left sided weakness. Interval history / Subjective:   CC: left sided weakness. Patient with persistent left sided weakness. No nausea. No vomiting. No diarrhea. Assessment & Plan:       New Large right brain metastasis  Cerebral edema with midline shift  MRI brain with interval progression of metastatic disease with an large metastatic foci in the periatrial region of the right parietal lobe and the posterior right temporal lobe. Interval increased associated vasogenic edema with right to left midline shift of approximately 9 mm  Neurosurgery team and oncology teams are following. Appreciate help. Continue IV dexamethasone. Neuro-checks. Telemetry monitor. Stage IV lung cancer with metastasis to brain and liver  Oncology and palliative team consulted. Appreciate help. Diabetes mellitus with hypoglycemia  Glucose 56 on admission. No further hypoglycemia. Diabetes is controlled. Fasting glucose is 151. continue sliding scale insulin. Hold home lantus. Close monitor. Mild protein calorie malnutrition  Nutrition consulted    Hypertension  BP controlled.      Code status: full  DVT prophylaxis: [address] discussed with: Patient/Family  Disposition: SNF/LTC     Hospital Problems  Date Reviewed: 2019          Codes Class Noted POA    Cerebral edema ([facility] [address] ICD-10-CM: G93.6  ICD-9-CM: 348.5  2019 Unknown        Brain metastases ([facility] [address] ICD-10-CM: C79.31  ICD-9-CM: 198.3  2019 Unknown                Review of Systems:   Except as documented all systems reviewed and negative. Vital Signs:    Last 24hrs VS reviewed since prior progress note. Most recent are:  Visit Vitals  /62 (BP 1 Location: Left arm, BP Patient Position: At rest)   Pulse 91   Temp 97.9 °F (36.6 °C)   Resp 16   Ht 5' 10\" (1.778 m)   Wt 81.1 kg (178 lb 14.4 oz)   SpO2 100%   BMI 25.67 kg/m²         Intake/Output Summary (Last 24 hours) at 9/21/2019 1825  Last data filed at 9/21/2019 1977  Gross per 24 hour   Intake --   Output 700 ml   Net -700 ml        Physical Examination:             Constitutional:  No acute pain or distress, cooperative, pleasant    ENT:  Oral mucous moist, oropharynx benign. Resp:  CTA bilaterally. No wheezing/rhonchi/rales. No accessory muscle use   CV:  Regular rate, S1S2 present    GI:  Soft, non distended, non tender. normoactive bowel sounds     Musculoskeletal:  No edema    Neurologic:  AOx3. Moves all extremities R>L. Left facial droop. No dysarthria. Skin:  no rashes       Data Review:    Review and/or order of clinical lab test      Labs:     Recent Labs     09/21/19 0451 09/20/19  0531   WBC 11.0 11.8*   HGB 9.9* 10.7*   HCT 31.1* 33.7*    260     Recent Labs     09/21/19  0451 09/20/19  0531 09/19/19  0849    135* 133*   K 4.4 4.0 4.1    100 97   CO2 29 28 25   BUN 22* 17 23*   CREA 0.93 1.03 1.16   * 143* 64*   CA 9.3 9.1 9.1     Recent Labs     09/20/19  0531 09/19/19  0849   SGOT 116* 121*   ALT 52 47   AP 73 73   TBILI 0.4 0.7   TP 7.3 7.6   ALB 3.3* 3.5   GLOB 4.0 4.1*     Recent Labs     09/19/19  0851   INR 1.2*   PTP 12.6*      No results for input(s): FE, TIBC, PSAT, FERR in the last 72 hours. No results found for: FOL, RBCF   No results for input(s): PH, PCO2, PO2 in the last 72 hours.   Recent Labs     09/19/19  0849   TROIQ <0.05     Lab Results   Component Value Date/Time    Cholesterol, total 183 03/30/2010 04:00 AM    HDL Cholesterol 29 (L) 03/30/2010 04:00 AM    LDL, calculated 116.6 (H) 03/30/2010 04:00 AM Triglyceride 187 (H) 03/30/2010 04:00 AM    CHOL/HDL Ratio 6.3 (H) 03/30/2010 04:00 AM     Lab Results   Component Value Date/Time    Glucose (POC) 247 (H) 09/21/2019 04:24 PM    Glucose (POC) 164 (H) 09/21/2019 11:30 AM    Glucose (POC) 151 (H) 09/21/2019 08:07 AM    Glucose (POC) 178 (H) 09/20/2019 10:28 PM    Glucose (POC) 209 (H) 09/20/2019 04:34 PM     Lab Results   Component Value Date/Time    Color YELLOW/STRAW 09/19/2019 10:16 AM    Appearance CLEAR 09/19/2019 10:16 AM    Specific gravity 1.020 09/19/2019 10:16 AM    Specific gravity 1.007 12/31/2018 04:19 PM    pH (UA) 5.5 09/19/2019 10:16 AM    Protein NEGATIVE  09/19/2019 10:16 AM    Glucose NEGATIVE  09/19/2019 10:16 AM    Ketone 15 (A) 09/19/2019 10:16 AM    Bilirubin Negative 08/20/2019 10:54 AM    Urobilinogen 1.0 09/19/2019 10:16 AM    Nitrites NEGATIVE  09/19/2019 10:16 AM    Leukocyte Esterase NEGATIVE  09/19/2019 10:16 AM    Epithelial cells FEW 09/19/2019 10:16 AM    Bacteria NEGATIVE  09/19/2019 10:16 AM    WBC 0-4 09/19/2019 10:16 AM    RBC 0-5 09/19/2019 10:16 AM         Medications Reviewed:     Current Facility-Administered Medications   Medication Dose Route Frequency    polyethylene glycol (MIRALAX) packet 17 g  17 g Oral DAILY    sodium chloride (NS) flush 5-40 mL  5-40 mL IntraVENous Q8H    sodium chloride (NS) flush 5-40 mL  5-40 mL IntraVENous PRN    acetaminophen (TYLENOL) tablet 650 mg  650 mg Oral Q4H PRN    ondansetron (ZOFRAN) injection 4 mg  4 mg IntraVENous Q4H PRN    insulin lispro (HUMALOG) injection   SubCUTAneous AC&HS    glucose chewable tablet 16 g  4 Tab Oral PRN    glucagon (GLUCAGEN) injection 1 mg  1 mg IntraMUSCular PRN    dextrose 10% infusion 0-250 mL  0-250 mL IntraVENous PRN    dexamethasone (DECADRON) 4 mg/mL injection 4 mg  4 mg IntraVENous Q6H    ondansetron (ZOFRAN ODT) tablet 4 mg  4 mg Oral Q8H PRN    megestrol (MEGACE) tablet 40 mg  40 mg Oral BID    pantoprazole (PROTONIX) tablet 40 mg  40 mg Oral BID    lisinopril (PRINIVIL, ZESTRIL) tablet 10 mg  10 mg Oral DAILY    morphine CR (MS CONTIN) tablet 30 mg  30 mg Oral Q8H    oxyCODONE IR (ROXICODONE) tablet 15 mg  15 mg Oral Q6H PRN    gabapentin (NEURONTIN) capsule 100 mg  100 mg Oral TID     ______________________________________________________________________  EXPECTED LENGTH OF STAY: - - -  ACTUAL LENGTH OF STAY:          2                 Yemi Leung MD

## 2019-09-21 NOTE — PROGRESS NOTES
Problem: Pressure Injury - Risk of  Goal: *Prevention of pressure injury  Description  Document Angelito Scale and appropriate interventions in the flowsheet. Outcome: Progressing Towards Goal  Note:   Pressure Injury Interventions:  Sensory Interventions: Assess need for specialty bed, Minimize linen layers    Moisture Interventions: Apply protective barrier, creams and emollients, Absorbent underpads    Activity Interventions: PT/OT evaluation    Mobility Interventions: PT/OT evaluation    Nutrition Interventions: Document food/fluid/supplement intake    Friction and Shear Interventions: HOB 30 degrees or less, Apply protective barrier, creams and emollients                Problem: Falls - Risk of  Goal: *Absence of Falls  Description  Document Shawn Vyas Fall Risk and appropriate interventions in the flowsheet.   Outcome: Progressing Towards Goal  Note:   Fall Risk Interventions:       Mentation Interventions: Bed/chair exit alarm    Medication Interventions: Bed/chair exit alarm, Patient to call before getting OOB    Elimination Interventions: Bed/chair exit alarm, Call light in reach    History of Falls Interventions: Bed/chair exit alarm         Problem: Nutrition Deficit  Goal: *Optimize nutritional status  Outcome: Progressing Towards Goal

## 2019-09-21 NOTE — PROGRESS NOTES
Problem: Mobility Impaired (Adult and Pediatric)  Goal: *Acute Goals and Plan of Care (Insert Text)  Description    FUNCTIONAL STATUS PRIOR TO ADMISSION: Patient was modified independent using a Rolling walker for functional mobility per pt report. HOME SUPPORT PRIOR TO ADMISSION: The patient lived alone with wife (who is home on disability) to provide assistance. Physical Therapy Goals  Initiated 9/21/2019  1. Patient will move from supine to sit and sit to supine  in bed with supervision/set-up within 7 day(s). 2.  Patient will transfer from bed to chair and chair to bed with supervision using the least restrictive device within 7 day(s). 3.  Patient will perform sit to stand with Supervision within 7 day(s). 4.  Patient will ambulate with minimal assistance/contact guard assist for 150 feet with the least restrictive device within 7 day(s). 5.  Patient will ascend/descend 10 stairs with B handrail(s) with minimal assistance/contact guard assist within 7 day(s). 6.  Patient will improve Arriaga Balance score by 7 points within 7 days. Outcome: Progressing Towards Goal   PHYSICAL THERAPY EVALUATION- NEURO POPULATION  Patient: Alissa Duque (78 y.o. male)  Date: 9/21/2019  Primary Diagnosis: Brain metastases ([facility] [address] [C79.31]  Cerebral edema ([address] [G93.6]        Precautions: L inattention/neglect/hemiplegia,  Fall, Bed Alarm      ASSESSMENT  Based on the objective data described below, the patient presents with minor confusion, decreased L sided sensation/attention/visual field/strength/coordination and decreased balance. Pt requires Min A to utilize RW this session for repeated placement of LUE on handle and maneuvering device while maintaining LEs within DME ANA. Noted problems with motor planning/sequencing and problem solving. Pt requires increased cues for managing hand hygiene at sink with soap/towels on L.  Pt often neglects to incorporate LUE in ADLs as well as leaves LUE/LLE hanging off EOB once supine post activity. Noted medical condition leading to many worsening symptoms. Next session: trial RUE DME if walker continues to go poorly; increased gait and activities with L sided attention/needs. Current Level of Function Impacting Discharge (mobility/balance): upwards of moderate assistance physical + verbal cues/safety assistance    Functional Outcome Measure: The patient scored Total: 8/56 on the [facility] Assessment which is indicative of high fall risk. Other factors to consider for discharge: pt with multiple metastases and perhaps plans for surgery vs radiation therapy     Patient will benefit from skilled therapy intervention to address the above noted impairments. PLAN :  Recommendations and Planned Interventions: bed mobility training, transfer training, gait training, therapeutic exercises, patient and family training/education and therapeutic activities      Frequency/Duration: Patient will be followed by physical therapy:  5 times a week to address goals. Recommendation for discharge: To be determined: Pending planned course of treatment, patient wishes, and performance on stairs, would recommend 24/7 care. Pt's ability to return home may be dependent on medical treatment plan and wife's ability to manage pt at home (they have twins 15 yo per pt).     This discharge recommendation:  Has not yet been discussed the attending provider and/or case management    Equipment recommendations for successful discharge (if) home: pt may benefit from cane if returning home (?) TBD          SUBJECTIVE:   Patient demonstrated slight signs of frustration with education on L sided impairments; suspect emotional frailty with increased education-does well with conversation and light topics    OBJECTIVE DATA SUMMARY:   HISTORY:    Past Medical History:   Diagnosis Date    Cancer ([facility] [address]     LUNG RT. SIDE METS TO BRAIN, LIVER    Chronic obstructive pulmonary disease ([facility] [address]     MILD PER WIFE Diabetes ([facility] [address]     TYPE II    GERD (gastroesophageal reflux disease)     Hypertension      Past Surgical History:   Procedure Laterality Date    HX CERVICAL FUSION      HX CRANIOTOMY  01/29/2019    REMOVAL OF BRAIN METS AT [facility]    HX GI      COLONOSCOPY    HX HERNIA REPAIR Right 1972    INGUINAL    VASCULAR SURGERY PROCEDURE UNLIST Bilateral     LEGS       Personal factors and/or comorbidities impacting plan of care: supportive wife at home (though home 2* disability \"mental breakdown\" at work); twins at home in 7th grade    [address]: Private residence  # Steps to Enter: 10  Rails to Enter: Yes  Hand Rails : Bilateral  Wheelchair Ramp: No  One/Two Story Residence: Two story  # of Interior Steps: 10  Interior Rails: Both  Lift Chair Available: No  Living Alone: No  Support Systems: Spouse/Significant Other/Partner, Child(matthias)  Patient Expects to be Discharged to[de-identified] Unknown  Current DME Used/Available at Home: Walker, rolling  Tub or Shower Type: Shower    EXAMINATION/PRESENTATION/DECISION MAKING:   Critical Behavior:  Neurologic State: Alert  Orientation Level: (NT)  Cognition: Impaired decision making, Memory loss, Poor safety awareness  Safety/Judgement: Decreased awareness of environment, Decreased insight into deficits, Fall prevention, Lack of insight into deficits  Hearing:   Auditory  Auditory Impairment: None  Range Of Motion:  AROM: Generally decreased, functional           PROM: Within functional limits           Strength:    Strength: Generally decreased, functional(LUE/LLE weakness)                    Tone & Sensation:   Tone: Normal              Sensation: Impaired(LUE/LLE)               Coordination:  Coordination: Generally decreased, functional(LUE/LLE incoordination)  Vision:   Acuity: Within Defined Limits  Corrective Lenses: Glasses  Functional Mobility:  Bed Mobility:  Supine to Sit: Additional time;Contact guard assistance(cues for sequencing task completion; HOB elevated to L)  Sit to Supine: Contact guard assistance; Additional time(additional cues for LLE placement into bed)  Scooting: Contact guard assistance; Additional time  Transfers:  Sit to Stand: Contact guard assistance(cues for push from EOB/LUE cues for placement)  Stand to Sit: Minimum assistance; Moderate assistance; Additional time(cues for alignment, walker management, safety concerns)        Bed to Chair: Minimum assistance              Balance:   Sitting: Intact  Standing: Impaired; With support  Standing - Static: Good  Standing - Dynamic : Fair  Ambulation/Gait Training:  Distance (ft): 15 Feet (ft)(x2)  Assistive Device: Gait belt;Walker, rolling  Ambulation - Level of Assistance: Minimal assistance; Additional time  Gait Abnormalities: Ataxic;Hemiplegic; Path deviations;Trunk sway increased(multiple cues for maintaining LUE on RW and LEs within DME)  Stance: Left decreased  Speed/Tomasa: Slow;Pace decreased (<100 feet/min)  Step Length: Right shortened;Left shortened   Stairs:     Stairs - Level of Assistance: (NT)      Functional Measure  Arriaga Balance Test: limited BBS 2* pt requiring void in bathroom    Sitting to Standin  Standing Unsupported: 2  Sitting with Back Unsupported: 3  Standing to Sittin  Transfers: 1  Standing Unsupported with Eyes Closed: 0(NT)  Standing Unsupported with Feet Together: 0(NT)  Reach Forward with Outstretched Arm: 0(NT)   Object: 0(NT)  Turn to Look Over Shoulders: 0(NT)  Turn 360 Degrees: 0(NT)  Alternate Foot on Step/Stool: 0(NT)  Standing Unsupported One Foot in Front: 0(NT)  Stand on One Le  Total: 8/56         56=Maximum possible score;   0-20=High fall risk  21-40=Moderate fall risk   41-56=Low fall risk     Pain Rating:  NA    Activity Tolerance:   Good  Please refer to the flowsheet for vital signs taken during this treatment.     After treatment patient left in no apparent distress:   Supine in bed, Call bell within reach, Bed / chair alarm activated and RN present     COMMUNICATION/EDUCATION:   The patients plan of care was discussed with: Registered Nurse. Patient was educated regarding his deficit(s) of (mentioned above) as this relates to his diagnosis of large R brain mets. He demonstrated Fair. Fall prevention education was provided and the patient/caregiver indicated understanding., Patient/family have participated as able in goal setting and plan of care. and Patient/family agree to work toward stated goals and plan of care.     Thank you for this referral.  Brisa Anne   Time Calculation: 27 mins

## 2019-09-22 NOTE — PROGRESS NOTES
Neurosurgery  Awake, alert, oriented. Left arm drift present   Seems quite motivated. CT shows progression of diseasein rest of body. Plan for steroids over weekend.    Then plans for treatment options

## 2019-09-22 NOTE — CONSULTS
[address] Name:  Kaley Key 
MR#:  [id number] :  1960 ACCOUNT #:  [de-identified] DATE OF SERVICE:  2019 REASON FOR CONSULTATION:  Brain metastasis. HISTORY OF PRESENT ILLNESS:  This is a 60-year-old gentleman who was diagnosed with metastatic non-small cell lung cancer in 2019. He apparently underwent a craniotomy at [facility] in 2019. It appears that it was for a right posterior temporoparietal mass. He has been followed by Dr. Tess Santos of Medical Oncology at [facility]. He has had a good response to treatment in regards to his systemic disease. His last MRI of the brain was in 2019. He presented to the hospital with a generalized weakness, more so on the left than the right. It started several days ago. His wife does not know of any particular event or injury that was associated with the onset. She said it had been rapidly progressive, that the morning of admission he went to the bathroom and slid down along the side of his wall because of the weakness. He has not complained of any headache, vision changes. He was brought to [facility] where a head CT revealed a significant amount of edema in the region of his previous surgery and he was transferred to [facility] for further evaluation. PAST MEDICAL HISTORY:  As mentioned in history of present illness, lung cancer with metastatic disease to brain and liver, COPD, diabetes type 2, GERD, hypertension. PAST SURGICAL HISTORY:  Craniotomy in 2019 at [facility], thoracic fusion, hernia repair. FAMILY HISTORY:  Positive for stroke and diabetes. SOCIAL HISTORY:  He previously smoked cigarettes. He quit in . He no longer drinks alcohol, apparently at one point did drink alcohol heavily. No IV drug abuse. He is . HOME MEDICATIONS: 
1.  Neurontin 100 mg p.o. t.i.d. 2.  Motrin 200 mg daily. 3.  Lantus 14 units subcu at bedtime. 4.  Humalog 5 units before meals. 5.  Prinivil 10 mg p.o. daily. 6.  Megace 40 mg p.o. b.i.d. 
7.  MS Contin 30 mg CR one tablet every 8 hours. 8.  Zofran 4 mg q.8 h. p.r.n. 
9.  Oxycodone IR 15 mg q.6 h. p.r.n., oxycodone IR 10 mg 1-1/2 tablets q.6 h. p.r.n. 
10.  Protonix 40 mg p.o. b.i.d. ALLERGIES:  NO KNOWN DRUG ALLERGIES. REVIEW OF SYSTEMS:  Denies any headache, vision changes, hearing difficulty, chest pain, shortness of breath, abdominal pain, urinary dysfunction, numbness. Positive for generalized weakness. No skin eruption. PHYSICAL EXAMINATION: 
VITAL SIGNS:  Afebrile. Vital signs stable. NEUROLOGIC:  This is a well-developed, well-nourished gentleman who is examined lying in the hospital bed. He is alert, he is oriented x3. Normal affect. Fluent speech. Conjugate gaze. He has a left facial droop. He has left-sided weakness, particularly in his upper extremity. Full strength in his right upper extremity. He is able to self raise his left leg off the bed though he does have some weakness in the left compared to the right. IMAGING STUDIES:  CT scan of the head with and without contrast shows a large new right brain metastasis adjacent to the atrium of the right ventricle with significant edema and mass effect. ASSESSMENT AND PLAN:  This is a 15-year-old gentleman who has a new area of enhancement, potentially radiation necrosis, I am not certain of his radiation history, as well as progression of intracranial metastatic disease. He does have a CT of his chest, abdomen and pelvis and it also shows progression of his systemic disease. At this time, we will treat him with steroids to decrease the cerebral edema and we will discuss with his oncologist further treatment plan. Thank you for this consultation. Nabeel Abbott MD 
 
 
[id number] 
D:  09/21/2019 15:14 
T:  09/21/2019 16:05 
JOB #:  [id number]

## 2019-09-22 NOTE — PROGRESS NOTES
Hospitalist Progress Note  Brandon Bob MD  Answering service: [phone] or [phone] from in house phone        Date of Service:  2019  NAME:  Coco Mccormack  :  1960  MRN:  [id number]      Admission Summary:   55-year-old  male with a past medical history of non-small cell lung cancer stage IV who presented to ED with left sided weakness. Interval history / Subjective:   CC: left sided weakness. Patient with no complaints. No nausea. No vomiting. No diarrhea. He is eating hid diet well and is waiting for PT team to go for a walk. Assessment & Plan:       New Large right brain metastasis  Cerebral edema with midline shift  MRI brain with interval progression of metastatic disease with a large metastatic foci in the periatrial region of the right parietal lobe and the posterior right temporal lobe. Interval increased associated vasogenic edema with right to left midline shift of approximately 9 mm  Neurosurgery team and oncology teams are following and deciding on further therapy. Continue IV dexamethasone. Neuro-checks. Telemetry monitor. Stage IV lung cancer with metastasis to brain, liver and lungs  Oncology and palliative team consulted. Appreciate help. CT chest/abd/pelv revealed significant progression of metastatic disease. Diabetes mellitus with hypoglycemia  Glucose 56 on admission. No further hypoglycemia. Diabetes is controlled. Fasting glucose is 158. Continue sliding scale insulin. Hold home lantus. Close monitor. Mild protein calorie malnutrition  Nutrition consulted    Hypertension  BP controlled.      Code status: full  DVT prophylaxis: [address] discussed with: Patient/Family  Disposition: SNF/LTC     Hospital Problems  Date Reviewed: 2019          Codes Class Noted POA    Cerebral edema ([facility] [address] ICD-10-CM: G93.6  ICD-9-CM: 348.5  2019 Unknown        Brain metastases [facility]) ICD-10-CM: C79.31  ICD-9-CM: 198.3  9/20/2019 Unknown                Review of Systems:   Except as documented all systems reviewed and negative. Vital Signs:    Last 24hrs VS reviewed since prior progress note. Most recent are:  Visit Vitals  /70 (BP 1 Location: Right arm, BP Patient Position: At rest)   Pulse 98   Temp 98.3 °F (36.8 °C)   Resp 11   Ht 5' 10\" (1.778 m)   Wt 81.1 kg (178 lb 14.3 oz)   SpO2 98%   BMI 25.67 kg/m²         Intake/Output Summary (Last 24 hours) at 9/22/2019 1432  Last data filed at 9/21/2019 2238  Gross per 24 hour   Intake --   Output 225 ml   Net -225 ml        Physical Examination:             Constitutional:  No acute pain or distress, cooperative, pleasant    ENT:  Oral mucous moist, oropharynx benign. Resp:  CTA bilaterally. No wheezing/rhonchi/rales. No accessory muscle use   CV:  Regular rate, S1S2 present    GI:  Soft, non distended, non tender. normoactive bowel sounds     Musculoskeletal:  No edema    Neurologic:  AOx3. Moves all extremities R>L. Left facial droop. No dysarthria. Skin:  no rashes       Data Review:    Review and/or order of clinical lab test      Labs:     Recent Labs     09/21/19 0451 09/20/19  0531   WBC 11.0 11.8*   HGB 9.9* 10.7*   HCT 31.1* 33.7*    260     Recent Labs     09/21/19 0451 09/20/19  0531    135*   K 4.4 4.0    100   CO2 29 28   BUN 22* 17   CREA 0.93 1.03   * 143*   CA 9.3 9.1     Recent Labs     09/20/19  0531   SGOT 116*   ALT 52   AP 73   TBILI 0.4   TP 7.3   ALB 3.3*   GLOB 4.0     No results for input(s): INR, PTP, APTT in the last 72 hours. No lab exists for component: INREXT, INREXT   No results for input(s): FE, TIBC, PSAT, FERR in the last 72 hours. No results found for: FOL, RBCF   No results for input(s): PH, PCO2, PO2 in the last 72 hours. No results for input(s): CPK, CKNDX, TROIQ in the last 72 hours.     No lab exists for component: CPKMB  Lab Results Component Value Date/Time    Cholesterol, total 183 03/30/2010 04:00 AM    HDL Cholesterol 29 (L) 03/30/2010 04:00 AM    LDL, calculated 116.6 (H) 03/30/2010 04:00 AM    Triglyceride 187 (H) 03/30/2010 04:00 AM    CHOL/HDL Ratio 6.3 (H) 03/30/2010 04:00 AM     Lab Results   Component Value Date/Time    Glucose (POC) 245 (H) 09/22/2019 11:42 AM    Glucose (POC) 158 (H) 09/22/2019 07:13 AM    Glucose (POC) 195 (H) 09/21/2019 10:42 PM    Glucose (POC) 247 (H) 09/21/2019 04:24 PM    Glucose (POC) 164 (H) 09/21/2019 11:30 AM     Lab Results   Component Value Date/Time    Color YELLOW/STRAW 09/19/2019 10:16 AM    Appearance CLEAR 09/19/2019 10:16 AM    Specific gravity 1.020 09/19/2019 10:16 AM    Specific gravity 1.007 12/31/2018 04:19 PM    pH (UA) 5.5 09/19/2019 10:16 AM    Protein NEGATIVE  09/19/2019 10:16 AM    Glucose NEGATIVE  09/19/2019 10:16 AM    Ketone 15 (A) 09/19/2019 10:16 AM    Bilirubin Negative 08/20/2019 10:54 AM    Urobilinogen 1.0 09/19/2019 10:16 AM    Nitrites NEGATIVE  09/19/2019 10:16 AM    Leukocyte Esterase NEGATIVE  09/19/2019 10:16 AM    Epithelial cells FEW 09/19/2019 10:16 AM    Bacteria NEGATIVE  09/19/2019 10:16 AM    WBC 0-4 09/19/2019 10:16 AM    RBC 0-5 09/19/2019 10:16 AM         Medications Reviewed:     Current Facility-Administered Medications   Medication Dose Route Frequency    influenza vaccine 2019-20 (6 mos+)(PF) (FLUARIX/FLULAVAL/FLUZONE QUAD) injection 0.5 mL  0.5 mL IntraMUSCular PRIOR TO DISCHARGE    polyethylene glycol (MIRALAX) packet 17 g  17 g Oral DAILY    sodium chloride (NS) flush 5-40 mL  5-40 mL IntraVENous Q8H    sodium chloride (NS) flush 5-40 mL  5-40 mL IntraVENous PRN    acetaminophen (TYLENOL) tablet 650 mg  650 mg Oral Q4H PRN    ondansetron (ZOFRAN) injection 4 mg  4 mg IntraVENous Q4H PRN    insulin lispro (HUMALOG) injection   SubCUTAneous AC&HS    glucose chewable tablet 16 g  4 Tab Oral PRN    glucagon (GLUCAGEN) injection 1 mg  1 mg IntraMUSCular PRN    dextrose 10% infusion 0-250 mL  0-250 mL IntraVENous PRN    dexamethasone (DECADRON) 4 mg/mL injection 4 mg  4 mg IntraVENous Q6H    ondansetron (ZOFRAN ODT) tablet 4 mg  4 mg Oral Q8H PRN    megestrol (MEGACE) tablet 40 mg  40 mg Oral BID    pantoprazole (PROTONIX) tablet 40 mg  40 mg Oral BID    lisinopril (PRINIVIL, ZESTRIL) tablet 10 mg  10 mg Oral DAILY    morphine CR (MS CONTIN) tablet 30 mg  30 mg Oral Q8H    oxyCODONE IR (ROXICODONE) tablet 15 mg  15 mg Oral Q6H PRN    gabapentin (NEURONTIN) capsule 100 mg  100 mg Oral TID     ______________________________________________________________________  EXPECTED LENGTH OF STAY: - - -  ACTUAL LENGTH OF STAY:          3                 Corinne Durham MD

## 2019-09-22 NOTE — PROGRESS NOTES
Spiritual Care Partner Volunteer visited patient in room 656 on 9.22.19. Documented by: : Rev. Tico Martinez.  Tommie Christine; [facility], to contact [phone] [address] call: [phone]

## 2019-09-22 NOTE — PROGRESS NOTES
RN walked patient with minimal assistance using gait belt hallway. Patient walked one loop around unit and tolerated well.

## 2019-09-23 NOTE — PROGRESS NOTES
LINDY:  1. Hospice consult placed by palliative physician today with request for [facility] hospice and meeting to be held with pt/family tomorrow on Tuesday 10am.  2.  Referral sent to [facility] via [address] [place] [address]. 3.  Hospice liason will update chart to confirm meeting time.   NIKKI spoke with Brisa Poon, [phone] regarding anticipated meeting tomorrow at [place] [address], [facility]

## 2019-09-23 NOTE — DIABETES MGMT
[facility]    [facility] Consult Follow Up Note    Recommendations/ Comments: Pt noted with elevated BG likely due to steroids. Receiving 4 mg dexamethasone q 6 hours at this time. If appropriate, please consider:  If pt eating at least 50% of meals, consider 1 mg amaryl daily  Change lispro correction scale to normal sensitivity at this time. Current hospital DM medication: lispro correction scale - high sensitivity     Chart reviewed on Hilma Hammans. Patient is a 61 y.o. male with known DM on Lantus, 14 units daily and AC lispro 5 units TID at home. A1c:   Lab Results   Component Value Date/Time    Hemoglobin A1c 5.6 09/20/2019 05:31 AM    Hemoglobin A1c 5.7 (H) 08/20/2019 10:54 AM       Recent Glucose Results:   Lab Results   Component Value Date/Time     (H) 09/23/2019 01:21 AM    GLUCPOC 180 (H) 09/23/2019 07:52 AM    GLUCPOC 176 (H) 09/22/2019 09:02 PM    GLUCPOC 231 (H) 09/22/2019 04:24 PM        Lab Results   Component Value Date/Time    Creatinine 1.01 09/23/2019 01:21 AM     Estimated Creatinine Clearance: 81.3 mL/min (based on SCr of 1.01 mg/dL). Active Orders   Diet    DIET DIABETIC WITH OPTIONS Consistent Carb 2400kcal; Mechanical Soft        PO intake: No data found. Will continue to follow as needed.     Thank you  Alex Purcell RD, Diabetes Clinician         Time spent: 4 minutes

## 2019-09-23 NOTE — HOSPICE
Hospice Liaison Nurse:  Spoke with Dr. Ashwin Contreras concerning this patient and plan of care. Dr. Dayana Nguyen said she put in a hospice referal today, and would like hospice to see this patient after Oncology visited with patient and family tomorrow am. Oncology has a scheduled bedside meeting tomorrow at 10:00am. Per Palliative team request, Hospice Liaison Nurse to speak with Oncology, and meet with patient/family after if this is still appropriate for family. Per Dr. Ashwin Contreras note today:      Tigist White MD   Physician   Palliative Medicine   Progress Notes   Signed   Date of Service:  09/23/19 1449           PLAN:   1. Discussed pt care w/ primary oncologist, Dr Augusta Garcia. Scans show progressive disease. Pt w/ complex social situation and poor functional status. Recommendation is for hospice care. Do note that oncology team here discussing further chemo recs, but family has strong trust in primary teams at [address] 2. Pt would not tolerate chemo further well. Uncertain if pt would able to get further radiation to brain or if this would even help given decline in function. 3. This is the first time I meet w/ wife [place], but she and pt open to conversation w/ me since they follow closely w/ my colleague Dr Jeanine Lopez. 4. Talk about progressive disease and the recommendation for hospice care. While this is the first time someone talks about hospice to pt and wife, she is not surprised. During conversation pt will answer questions and interject sometimes, but most of the time is watching TV and I talk mostly to wife. She has seen the changes in patient at home and says she knew life was very limited/   5. Discuss hospice philosophy and how they would support in the home. They have their 15 yo twin grandchildren (who they adopted at 4 months) in the home and [place] worrying about caring for them too.    6. Bring up code/resuscitation status and the medical recommendation to allow natural death, DNR- they are to think about this today. 9. Dilcia to tell family about meeting Essence wooten, at [facility]. Dr Deyvi Andrade and Stephani Vanessa to be present to support and guide. [facility] and [facility] Oncology team to discuss. 8. Communicated plan of care with: Palliative IDT, [address] Team incl Dr Priscila Stokes, Dr Deyvi Andrade, Dr Katie France       Please Call Hospice with questions, or to update on patient status, or educational needs of family at bedside. Thank you!     Sobia Armstrong, [facility], [address] [facility]

## 2019-09-23 NOTE — PROGRESS NOTES
Hospitalist Progress Note          Martine Buenrostro MD  Please call  and page for questions. Call physician on-call through the  7pm-7am    Daily Progress Note: 9/23/2019    Primary care Kasandra Chang MD    Date of admission: 9/19/2019  2:07 PM    Admission summery and hospital course:  26-year-old  male with a past medical history of non-small cell lung cancer stage IV who presented to ED with left sided weakness. Subjective:   Patient said he is feeling better today. Patient would like to go home today. Assessment/Plan:   Stage IV lung cancer with metastasis to brain, liver and lungs  Oncology and palliative team input appreciated. .  CT chest/abd/pelv revealed significant progression of metastatic disease. New Large right brain metastasis  Cerebral edema with midline shift  MRI brain with interval progression of metastatic disease with a large metastatic foci in the periatrial region of the right parietal lobe and the posterior right temporal lobe. Interval increased associated vasogenic edema, right to left midline shift of approximately 9 mm. Neurosurgery team and oncology teams are following and deciding on further therapy. Continue IV dexamethasone. Neuro-checks. Telemetry monitor.       Diabetes mellitus with hypoglycemia  Glucose 56 on admission. No further hypoglycemia. Diabetes is controlled. Fasting glucose is 158. Continue sliding scale insulin. Hold home lantus, he may not need Lantus at discharge. Close monitor.     Mild protein calorie malnutrition  Nutrition consulted, continue supplement.      Hypertension  BP controlled with Lisinopril.         See orders for other plans. VTE prophylaxis: SCD  Code status: Full  Discussed plan of care with Patient/Family and Nurse. Pre-admission lived at home. Discharge planning: pending. DC fter final decision made from NS and oncology, probably today.         Review of Systems:     Review of Systems:  Symptom  Y/N  Comments   Symptom  Y/N  Comments    Fever/Chills   n   Chest Pain  n    Poor Appetite  n    Edema  n     Cough  n   Abdominal Pain  n     Sputum  n   Joint Pain  n    SOB/ROCHE  n   Pruritis/Rash  n    Nausea/vomit  n   Tolerating PT/OT  n    Diarrhea  n   Tolerating Diet      Constipation     Other      Could not obtain due to:         Objective:   Physical Exam:     Visit Vitals  /82   Pulse 100   Temp 98.5 °F (36.9 °C)   Resp 18   Ht 5' 10\" (1.778 m)   Wt 81.1 kg (178 lb 14.3 oz)   SpO2 96%   BMI 25.67 kg/m²    O2 Flow Rate (L/min): 2 l/min O2 Device: Room air    Temp (24hrs), Av.4 °F (36.9 °C), Min:97.9 °F (36.6 °C), Max:98.9 °F (37.2 °C)    No intake/output data recorded.  1901 -  0700  In: -   Out: 3869 [Urine:1125]      General:  Alert, cooperative, no distress, appears stated age. Lungs:   Clear to auscultation bilaterally. Chest wall:  No tenderness or deformity. Heart:  Regular rate and rhythm, S1, S2 normal, no murmur. Abdomen:   Soft, non-tender. Bowel sounds normal.    Extremities: Extremities normal, atraumatic, no cyanosis or edema. Pulses: 2+ and symmetric all extremities. Skin: Skin color, texture, turgor normal. No rashes or lesions   Neurologic: Patient has clear voice. He communicates well. Patient said has been walking in the hallway without any problems. Data Review:       Recent Days:  Recent Labs     19  0121 19  0451   WBC 12.6* 11.0   HGB 11.2* 9.9*   HCT 35.0* 31.1*    261     Recent Labs     19  0121 19  0451   * 136   K 4.4 4.4    102   CO2 26 29   * 161*   BUN 26* 22*   CREA 1.01 0.93   CA 9.5 9.3     No results for input(s): PH, PCO2, PO2, HCO3, FIO2 in the last 72 hours.     24 Hour Results:  Recent Results (from the past 24 hour(s))   GLUCOSE, POC    Collection Time: 19 11:42 AM   Result Value Ref Range    Glucose (POC) 245 (H) 65 - 100 mg/dL Performed by Efren Monroy (CON)    GLUCOSE, POC    Collection Time: 09/22/19  4:24 PM   Result Value Ref Range    Glucose (POC) 231 (H) 65 - 100 mg/dL    Performed by Raudel Gleason    GLUCOSE, POC    Collection Time: 09/22/19  9:02 PM   Result Value Ref Range    Glucose (POC) 176 (H) 65 - 100 mg/dL    Performed by Tanisha Emmanuel    CBC WITH AUTOMATED DIFF    Collection Time: 09/23/19  1:21 AM   Result Value Ref Range    WBC 12.6 (H) 4.1 - 11.1 K/uL    RBC 3.77 (L) 4.10 - 5.70 M/uL    HGB 11.2 (L) 12.1 - 17.0 g/dL    HCT 35.0 (L) 36.6 - 50.3 %    MCV 92.8 80.0 - 99.0 FL    MCH 29.7 26.0 - 34.0 PG    MCHC 32.0 30.0 - 36.5 g/dL    RDW 13.5 11.5 - 14.5 %    PLATELET 309 228 - 396 K/uL    MPV 8.9 8.9 - 12.9 FL    NRBC 0.0 0  WBC    ABSOLUTE NRBC 0.00 0.00 - 0.01 K/uL    NEUTROPHILS 81 (H) 32 - 75 %    LYMPHOCYTES 10 (L) 12 - 49 %    MONOCYTES 9 5 - 13 %    EOSINOPHILS 0 0 - 7 %    BASOPHILS 0 0 - 1 %    IMMATURE GRANULOCYTES 0 0.0 - 0.5 %    ABS. NEUTROPHILS 10.2 (H) 1.8 - 8.0 K/UL    ABS. LYMPHOCYTES 1.2 0.8 - 3.5 K/UL    ABS. MONOCYTES 1.1 (H) 0.0 - 1.0 K/UL    ABS. EOSINOPHILS 0.0 0.0 - 0.4 K/UL    ABS. BASOPHILS 0.0 0.0 - 0.1 K/UL    ABS. IMM.  GRANS. 0.1 (H) 0.00 - 0.04 K/UL    DF AUTOMATED     METABOLIC PANEL, BASIC    Collection Time: 09/23/19  1:21 AM   Result Value Ref Range    Sodium 134 (L) 136 - 145 mmol/L    Potassium 4.4 3.5 - 5.1 mmol/L    Chloride 101 97 - 108 mmol/L    CO2 26 21 - 32 mmol/L    Anion gap 7 5 - 15 mmol/L    Glucose 191 (H) 65 - 100 mg/dL    BUN 26 (H) 6 - 20 MG/DL    Creatinine 1.01 0.70 - 1.30 MG/DL    BUN/Creatinine ratio 26 (H) 12 - 20      GFR est AA >60 >60 ml/min/1.73m2    GFR est non-AA >60 >60 ml/min/1.73m2    Calcium 9.5 8.5 - 10.1 MG/DL   GLUCOSE, POC    Collection Time: 09/23/19  7:52 AM   Result Value Ref Range    Glucose (POC) 180 (H) 65 - 100 mg/dL    Performed by Keyona Martin        Problem List:  Problem List as of 9/23/2019 Date Reviewed: 9/16/2019 Codes Class Noted - Resolved    Cerebral edema ([facility] ICD-10-CM: G93.6  ICD-9-CM: 348.5  9/20/2019 - Present        Brain metastases ([facility] ICD-10-CM: C79.31  ICD-9-CM: 198.3  9/20/2019 - Present        Non-small cell lung cancer ([facility] ICD-10-CM: C34.90  ICD-9-CM: 162.9  8/8/2019 - Present        Lung cancer metastatic to brain [facility]) ICD-10-CM: C34.90, C79.31  ICD-9-CM: 162.9, 198.3  2/13/2019 - Present        Lung cancer ([facility] ICD-10-CM: C34.90  ICD-9-CM: 162.9  2/11/2019 - Present              Medications reviewed  Current Facility-Administered Medications   Medication Dose Route Frequency    influenza vaccine 2019-20 (6 mos+)(PF) (FLUARIX/FLULAVAL/FLUZONE QUAD) injection 0.5 mL  0.5 mL IntraMUSCular PRIOR TO DISCHARGE    polyethylene glycol (MIRALAX) packet 17 g  17 g Oral DAILY    sodium chloride (NS) flush 5-40 mL  5-40 mL IntraVENous Q8H    sodium chloride (NS) flush 5-40 mL  5-40 mL IntraVENous PRN    acetaminophen (TYLENOL) tablet 650 mg  650 mg Oral Q4H PRN    ondansetron (ZOFRAN) injection 4 mg  4 mg IntraVENous Q4H PRN    insulin lispro (HUMALOG) injection   SubCUTAneous AC&HS    glucose chewable tablet 16 g  4 Tab Oral PRN    glucagon (GLUCAGEN) injection 1 mg  1 mg IntraMUSCular PRN    dextrose 10% infusion 0-250 mL  0-250 mL IntraVENous PRN    dexamethasone (DECADRON) 4 mg/mL injection 4 mg  4 mg IntraVENous Q6H    ondansetron (ZOFRAN ODT) tablet 4 mg  4 mg Oral Q8H PRN    megestrol (MEGACE) tablet 40 mg  40 mg Oral BID    pantoprazole (PROTONIX) tablet 40 mg  40 mg Oral BID    lisinopril (PRINIVIL, ZESTRIL) tablet 10 mg  10 mg Oral DAILY    morphine CR (MS CONTIN) tablet 30 mg  30 mg Oral Q8H    oxyCODONE IR (ROXICODONE) tablet 15 mg  15 mg Oral Q6H PRN    gabapentin (NEURONTIN) capsule 100 mg  100 mg Oral TID       Care Plan discussed with: Patient/Family, Nurse and     Total time spent with patient: 40 minutes.     Beverly Brown MD

## 2019-09-23 NOTE — PROGRESS NOTES
Physical Therapy:    PT attempted to see patient for routine therapy session this am.  Patient and family currently in meeting with Palliative medicine. Will attempt to see later today as able and appropriate. 14:05 update: Attempted once again to see patient this afternoon, consulting with MD at this time. Will f/u later, likely tomorrow. Note new recommendations to transition to hospice.      Katarina Jeter, MS, PT

## 2019-09-23 NOTE — PROGRESS NOTES
Palliative Medicine Consult  Jerel: [phone] ([phone])    Patient Name: Tim Garcia  YOB: 1960    Date of Initial Consult: 9/20/19  Reason for Consult: Care decisions   Requesting Provider: Abhinav Dill   Primary Care Physician: Margie Sofia MD     SUMMARY:   Tim Garcia is a 61 y.o. with a past history of stage 4 lung cancer dx 1/2019 with brain and liver mets s/p brain tumor removal at [facility] [place], SB[place] who was admitted on 9/19/2019 from home w/ L sided weakness- MRI brain showing progressive brain mets, large R sided lesion. Also w/ worsening disease on CT A/P. Followed by Dr Mee Rodriguez and Dr Jody Lyles- seen in our clinic 9/16/19. Nsgy and Onc following. Current medical issues leading to Palliative Medicine involvement include: care decisions, support. On MSContin 30mg every 8h, Oxycodone 15mg every 6h prn pain, Gabapentin 100mg tid. Social: Worked in construction, lives w/ wife Ethan Milan who has stage 2 breast cancer. Twin grandchildren live with them. Complex social situation due to high level of stress, grief over loss of function and inability to work. Concern expressed by wife during recent clinic visit that pt is verbally abusive to family. PALLIATIVE DIAGNOSES:   1. R chest wall pain - neoplasm related  2. Neuropathic pain   3. L sided weakness w/ R sided brain mets  4. Fatigue  5. Confusion   6. Constipation from opioids   7. Grief over loss of function   8. Memory deficits        PLAN:   1. Discussed pt care w/ primary oncologist, Dr Mee Rodriguez. Scans show progressive disease. Pt w/ complex social situation and poor functional status. Recommendation is for hospice care. Do note that oncology team here discussing further chemo recs, but family has strong trust in primary teams at [address] 2. Pt would not tolerate chemo further well. Uncertain if pt would able to get further radiation to brain or if this would even help given decline in function.    3. This is the first time I meet w/ wife Gabby Bee, but she and pt open to conversation w/ me since they follow closely w/ my colleague Dr Benoit Fall. 4. Talk about progressive disease and the recommendation for hospice care. While this is the first time someone talks about hospice to pt and wife, she is not surprised. During conversation pt will answer questions and interject sometimes, but most of the time is watching TV and I talk mostly to wife. She has seen the changes in patient at home and says she knew life was very limited/   5. Discuss hospice philosophy and how they would support in the home. They have their 15 yo twin grandchildren (who they adopted at 4 months) in the home and Gabby Bee worrying about caring for them too. 6. Bring up code/resuscitation status and the medical recommendation to allow natural death, DNR- they are to think about this today. 9. Dilcia to tell family about meeting Essence wooten, at [facility]. Dr Benoit Fall and Federico Duque to be present to support and guide. [facility] and [facility] Oncology team to discuss.    8. Communicated plan of care with: Palliative IDT, [address] Team incl Dr Dimitris Munoz, Dr Benoit Fall, Dr Karla Alcantar / TREATMENT PREFERENCES:     GOALS OF CARE:  Patient/Health Care Proxy Stated Goals: Prolong life    TREATMENT PREFERENCES:   Code Status: Full Code    Advance Care Planning:  [x] The [facility] Interdisciplinary Team has updated the ACP Navigator with [address] and Patient Capacity      Primary Decision MakerAndriris Jameser Spouse - [phone]    Secondary Decision Maker: Sharon Gee - Father - [phone]  Advance Care Planning 9/22/2019   Patient's [address] is: -   Confirm Advance Directive Yes, on file   Does the patient have other document types -       Medical Interventions: Full interventions       Other:    As far as possible, the palliative care team has discussed with patient / health care proxy about goals of care / treatment preferences for patient. HISTORY:       HPI/SUBJECTIVE:    The patient is:   [x] Verbal and participatory  [] Non-participatory due to: Pt awake, alert. More conversational today. No pain right now, continues on home regimen. Clinical Pain Assessment (nonverbal scale for severity on nonverbal patients):   Clinical Pain Assessment  Severity: 0          Duration: for how long has pt been experiencing pain (e.g., 2 days, 1 month, years)  Frequency: how often pain is an issue (e.g., several times per day, once every few days, constant)     FUNCTIONAL ASSESSMENT:     Palliative Performance Scale (PPS):  PPS: 40       PSYCHOSOCIAL/SPIRITUAL SCREENING:     Palliative IDT has assessed this patient for cultural preferences / practices and a referral made as appropriate to needs (Cultural Services, Patient Advocacy, Ethics, etc.)    Any spiritual / Sabianist concerns:  [] Yes /  [x] No    Caregiver Burnout:  [] Yes /  [x] No /  [] No Caregiver Present      Anticipatory grief assessment:   [x] Normal  / [] Maladaptive       ESAS Anxiety: Anxiety: 3    ESAS Depression: Depression: 0        REVIEW OF SYSTEMS:     Positive and pertinent negative findings in ROS are noted above in HPI. The following systems were [x] reviewed / [] unable to be reviewed as noted in HPI  Other findings are noted below. Systems: constitutional, ears/nose/mouth/throat, respiratory, gastrointestinal, genitourinary, musculoskeletal, integumentary, neurologic, psychiatric, endocrine. Positive findings noted below. Modified ESAS Completed by: provider   Fatigue: 5 Drowsiness: 2   Depression: 0 Pain: 0   Anxiety: 3 Nausea: 0   Anorexia: 3 Dyspnea: 1                    PHYSICAL EXAM:     From RN flowsheet:  Wt Readings from Last 3 Encounters:   09/22/19 178 lb 14.3 oz (81.1 kg)   09/19/19 159 lb (72.1 kg)   09/16/19 155 lb 3.2 oz (70.4 kg)     Blood pressure 139/78, pulse (!) 103, temperature 98.6 °F (37 °C), resp.  rate 16, height 5' 10\" (1.778 m), weight 178 lb 14.3 oz (81.1 kg), SpO2 96 %. Pain Scale 1: Numeric (0 - 10)  Pain Intensity 1: 0  Pain Onset 1: chronic  Pain Location 1: Shoulder  Pain Orientation 1: Right  Pain Description 1: Aching  Pain Intervention(s) 1: Medication (see MAR)      Constitutional: awakens easily, oriented, but disengaged   Eyes: pupils equal  ENMT: no nasal discharge, moist mucous membranes  Cardiovascular: regular rhythm  Respiratory: breathing not labored  Gastrointestinal: soft , BS present   Musculoskeletal: no deformity  Skin: warm, dry  Neurologic: moving all extremities  Psychiatric: flat          HISTORY:     Active Problems:    Cerebral edema (HCC) (9/20/2019)      Brain metastases ([facility] (9/20/2019)      Past Medical History:   Diagnosis Date    Cancer ([facility]     LUNG RT. SIDE METS TO BRAIN, LIVER    Chronic obstructive pulmonary disease (HCC)     MILD PER WIFE    Diabetes ([facility]     TYPE II    GERD (gastroesophageal reflux disease)     Hypertension       Past Surgical History:   Procedure Laterality Date    HX CERVICAL FUSION      HX CRANIOTOMY  01/29/2019    REMOVAL OF BRAIN METS AT [facility] [place]    HX GI      COLONOSCOPY    HX HERNIA REPAIR Right 1972    INGUINAL    VASCULAR SURGERY PROCEDURE UNLIST Bilateral     LEGS      Family History   Problem Relation Age of Onset    Stroke Father     Diabetes Father     Other Mother 58        SUDDEN DEATH    Cancer Maternal Grandfather     Anesth Problems Neg Hx       History reviewed, no pertinent family history.   Social History     Tobacco Use    Smoking status: Former Smoker     Packs/day: 1.50     Years: 25.00     Pack years: 37.50     Last attempt to quit: 1/21/2009     Years since quitting: 10.6    Smokeless tobacco: Never Used   Substance Use Topics    Alcohol use: No     Frequency: Never     Allergies   Allergen Reactions    Other Medication Nausea and Vomiting     Pt states he is allergic to diuretics but unsure of name         Current Facility-Administered Medications Medication Dose Route Frequency    influenza vaccine 2019-20 (6 mos+)(PF) (FLUARIX/FLULAVAL/FLUZONE QUAD) injection 0.5 mL  0.5 mL IntraMUSCular PRIOR TO DISCHARGE    polyethylene glycol (MIRALAX) packet 17 g  17 g Oral DAILY    sodium chloride (NS) flush 5-40 mL  5-40 mL IntraVENous Q8H    sodium chloride (NS) flush 5-40 mL  5-40 mL IntraVENous PRN    acetaminophen (TYLENOL) tablet 650 mg  650 mg Oral Q4H PRN    ondansetron (ZOFRAN) injection 4 mg  4 mg IntraVENous Q4H PRN    insulin lispro (HUMALOG) injection   SubCUTAneous AC&HS    glucose chewable tablet 16 g  4 Tab Oral PRN    glucagon (GLUCAGEN) injection 1 mg  1 mg IntraMUSCular PRN    dextrose 10% infusion 0-250 mL  0-250 mL IntraVENous PRN    dexamethasone (DECADRON) 4 mg/mL injection 4 mg  4 mg IntraVENous Q6H    ondansetron (ZOFRAN ODT) tablet 4 mg  4 mg Oral Q8H PRN    megestrol (MEGACE) tablet 40 mg  40 mg Oral BID    pantoprazole (PROTONIX) tablet 40 mg  40 mg Oral BID    lisinopril (PRINIVIL, ZESTRIL) tablet 10 mg  10 mg Oral DAILY    morphine CR (MS CONTIN) tablet 30 mg  30 mg Oral Q8H    oxyCODONE IR (ROXICODONE) tablet 15 mg  15 mg Oral Q6H PRN    gabapentin (NEURONTIN) capsule 100 mg  100 mg Oral TID          LAB AND IMAGING FINDINGS:     Lab Results   Component Value Date/Time    WBC 12.6 (H) 09/23/2019 01:21 AM    HGB 11.2 (L) 09/23/2019 01:21 AM    PLATELET 709 17/00/3128 01:21 AM     Lab Results   Component Value Date/Time    Sodium 134 (L) 09/23/2019 01:21 AM    Potassium 4.4 09/23/2019 01:21 AM    Chloride 101 09/23/2019 01:21 AM    CO2 26 09/23/2019 01:21 AM    BUN 26 (H) 09/23/2019 01:21 AM    Creatinine 1.01 09/23/2019 01:21 AM    Calcium 9.5 09/23/2019 01:21 AM    Magnesium 2.2 12/31/2018 01:13 PM    Phosphorus 2.6 03/31/2010 06:43 AM      Lab Results   Component Value Date/Time    AST (SGOT) 116 (H) 09/20/2019 05:31 AM    Alk.  phosphatase 73 09/20/2019 05:31 AM    Protein, total 7.3 09/20/2019 05:31 AM    Albumin 3.3 (L) 09/20/2019 05:31 AM    Globulin 4.0 09/20/2019 05:31 AM     Lab Results   Component Value Date/Time    INR 1.2 (H) 09/19/2019 08:51 AM    Prothrombin time 12.6 (H) 09/19/2019 08:51 AM    aPTT 144.2 (H) 05/28/2010 04:43 PM      No results found for: IRON, FE, TIBC, IBCT, PSAT, FERR   No results found for: PH, PCO2, PO2  No components found for: GLPOC   No results found for: CPK, CKMB             Total time: 65 min  Counseling / coordination time, spent as noted above: 40 min   > 50% counseling / coordination?: yes    Prolonged service was provided for  [x]30 min   []75 min in face to face time in the presence of the patient, spent as noted above. Time Start:  1110am  Time End: 1150am  Note: this can only be billed with  (initial) or 21 [phone] (follow up). If multiple start / stop times, list each separately. \

## 2019-09-23 NOTE — CONSULTS
[address]  at [address] [facility], [address] [place] [address], [facility] [place] [address]      Patient: Donato Aviles MRN: [id number]  SSN: [id number]    YOB: 1960  Age: 61 y.o. Sex: male        Diagnosis:     1. T3 N3 M1a (Stage IV) NSCLC of the lung, likely adenocarcinoma    NGS no actionable mutation   PD-L1 90%    Treatment:     1. Right occipital craniotomy at [address] [place] [address] on 1/29/2019.   2. SBRT to the brain lesions   3. Palliative chemotherapy   Carbo/Alimta/Keytruda - s/p 4 cycles   Alimta/Pembrolizumab -     HPI:      Donato Aviles is a 61 y.o. male seen today for hospital consult for metastatic lung cancer to brain on chemo with Dr Aleksandra Vega at [facility]. Pt admitted with left weakness and new worse brain mets. Brain MRI:   IMPRESSION:     Interval progression of metastatic disease with an large metastatic foci in the  periatrial region of the right parietal lobe and the posterior right temporal  lobe. Interval increased associated vasogenic edema with right to left midline  shift of approximately 9 mm.     Status post resection of previously demonstrated right occipital parietal large  metastasis. Postprocedural changes related to a previous occipital craniotomy  No other new intracranial metastatic foci. Pt being seen by neurosurgery. Also had CTs C/A/P which show progressive cancer. Pt is in bed today. States he wants to go home. Has plans for outpt chemo at [facility]. Denies pain. Sees palliative care. On steroids. Last note from Dr Aleksandra Vega 8/19:  diagnosis of metastatic lung carcinoma. He has a over 30 pack years of cigarette smoking. He works in the construction business. MRI brain revealed 5 focus of metastatic disease and he underwent a right occipital craniotomy at [address] [place] [address] on 1/29/2019. He completed SBRT to the brain lesions and tumor bed at [address] [place] [address].  He is following with Palliative Medicine and pain is controlled with medication. He is receiving palliative chemotherapy and tolerating it well. Recent scans show good response to treatment. Review of Systems:  Per HPI otherwise negative. Weakness +    Constitutional: fatigue  Eyes: negative  Ears, Nose, Mouth, Throat, and Face: negative  Respiratory: negative   Cardiovascular: negative  Gastrointestinal: diarrhea  Genitourinary:negative  Integument/Breast: negative  Hematologic/Lymphatic: negative  Musculoskeletal: right upper chest pain  Neurological: negative      History reviewed. No pertinent history of prior cancer  History reviewed. No pertinent surgical history. History reviewed. No pertinent family history of cancer    Social History     Tobacco Use    Smoking status: Former Smoker     Packs/day: 1.50     Years: 25.00     Pack years: 37.50     Last attempt to quit: 1/21/2009     Years since quitting: 10.6    Smokeless tobacco: Never Used   Substance Use Topics    Alcohol use: No     Frequency: Never      Prior to Admission medications    Medication Sig Start Date End Date Taking? Authorizing Provider   morphine CR (MS CONTIN) 30 mg CR tablet Take 1 Tab by mouth every eight (8) hours for 30 days. Max Daily Amount: 90 mg. 9/30/19 10/30/19 Yes Stan Bautista MD   oxyCODONE IR (OXY-IR) 15 mg immediate release tablet Take 1 Tab by mouth every six (6) hours as needed for Pain for up to 15 days. Max Daily Amount: 60 mg. 10/1/19 10/16/19 Yes Stan Bautista MD   gabapentin (NEURONTIN) 100 mg capsule Take 1 Cap by mouth three (3) times daily. Max Daily Amount: 300 mg. 9/16/19  Yes Stan Bautista MD   insulin lispro (HUMALOG U-100 INSULIN) 100 unit/mL injection INJECT 5 UNITS SUBCUTANEOUSLY BEFORE MEALS-INCREASE HUMALOG 1 UNIT EVERY OTHER DAY UNTIL 2 HR PP  OR LESS.  Use Humalog kwik pen after done with vial. 8/23/19  Yes Dee Abernathy MD   insulin glargine (LANTUS) 100 unit/mL injection 14 Units by SubCUTAneous route nightly. Use toujeo pen when out of lantus vial. 8/19/19  Yes Brock Ramos MD   lisinopril (PRINIVIL, ZESTRIL) 10 mg tablet Take 1 Tab by mouth daily. 8/8/19  Yes Brock Ramos MD   ibuprofen (MOTRIN) 200 mg tablet Take  by mouth. Yes Provider, Historical   pantoprazole (PROTONIX) 40 mg tablet Take 1 Tab by mouth two (2) times a day. Indications: gastroesophageal reflux disease 4/30/19  Yes Jemal Butler MD   megestrol (MEGACE) 40 mg tablet Take 1 Tab by mouth two (2) times a day. 4/16/19  Yes Jemal Butler MD   lidocaine-prilocaine (EMLA) topical cream Apply  to affected area as needed for Pain. 2/13/19  Yes Edda Sanches MD   ondansetron (ZOFRAN ODT) 4 mg disintegrating tablet Take 1 Tab by mouth every eight (8) hours as needed for Nausea. 2/13/19  Yes Edda Sanches MD   oxyCODONE IR (ROXICODONE) 10 mg tab immediate release tablet Take 1.5 Tabs by mouth every six (6) hours as needed for Pain for up to 15 days. Max Daily Amount: 60 mg. 9/16/19 10/1/19  Jemal Butler MD          Allergies   Allergen Reactions    Other Medication Nausea and Vomiting     Pt states he is allergic to diuretics but unsure of name              Objective:     Visit Vitals  /78   Pulse (!) 103   Temp 98.6 °F (37 °C)   Resp 16   Ht 5' 10\" (1.778 m)   Wt 178 lb 14.3 oz (81.1 kg)   SpO2 96%   BMI 25.67 kg/m²         Physical Exam:    GENERAL: alert, cooperative, no distress  EYE: conjunctivae/corneas clear. NECK: supple  LUNG: decreased bs b/l   HEART: regular rate and rhythm  ABDOMEN: soft, non-tender.   EXTREMITIES:  extremities normal  SKIN: warm, dry  NEUROLOGIC: in bed moving, states can walk, gait not observed    CT Results (most recent):  Results from Hospital Encounter encounter on 09/19/19   CT CHEST W CONT    Narrative CLINICAL HISTORY: Lung cancer follow-up    INDICATION:  Lung cancer follow-up, right-sided lung cancer with metastases  COMPARISON:  7/31/2019    TECHNIQUE:   Thin axial images were obtained through the chest, abdomen and pelvis. Coronal  and sagittal reconstructions were generated. Oral contrast was administered. CT dose reduction was achieved through use of a standardized protocol tailored  for this examination and automatic exposure control for dose modulation. Adaptive statistical iterative reconstruction (ASIR) was utilized. FINDINGS:     SUPRACLAVICULAR REGION: Major cervical vasculature within normal limits. No  supraclavicular adenopathy. MEDIASTINUM: Mediastinal adenopathy is not significant change compared to the  prior examination, 3-25 33 x 18 mm. Subcarinal lymph node is not significantly  changed. PLEURA/LUNGS[de-identified]   Right upper lobe mass lesion at 3-19 19 x 37 mm in size unchanged. IV tiny  satellite nodule 3-23 new compared to the prior exam. There is no pleural  effusion. Trace pericardial fluid. SOFT TISSUE/ AXILLA: No axillary adenopathy. No chest wall mass. LIVER/GALLBLADDER:     Widespread intrahepatic metastatic disease is progressed compared to the  previous exam.    3-58 left hepatic lobe 19 x 19 mm in size previously 11 x 11 mm. 3-59 right  hepatic lobe adjacent to the gallbladder 37 x 30 mm in size previously 26 x 20  mm in size. There are additional new and/or enlarged hepatic metastatic foci. There is no intrahepatic duct dilatation. The CBD is not dilated. SPLEEN/PANCREAS: No mass. . There is no pancreatic mass. There is no pancreatic  duct dilatation. There is no evidence of splenomegaly. ADRENALS/KIDNEYS: Left renal hypodensity is most likely a cyst. Left renal  cortical atrophy. . There is no adrenal mass. There is no hydroureter or  hydronephrosis. There is no perinephric mass. No ureteral or bladder calculus. STOMACH, COLON AND SMALL BOWEL: Fecal stasis. There is no obstruction or free  intraperitoneal air. There is no evidence of incarceration or obstruction. No  mesenteric adenopathy. No retroperitoneal adenopathy.   APPENDIX: Unremarkable. PERITONEUM/RETROPERITONEUM: There is no abdominal aortic aneurysm. No  significant lymphadenopathy. URINARY BLADDER: No mass or calculus. PELVIS: There is no pelvic adenopathy. There is no pelvic sidewall mass. There  is no inguinal adenopathy. BONES: No fracture or dislocation. . No lytic or blastic lesions. No evidence of  fracture or dislocation. Impression IMPRESSION:  Imaging findings are consistent with interval progression of disease. There is extensive progression of metastatic disease in the hepatic parenchyma  with numerous new and/or enlarged hepatic mass lesions as described above. Possible new small satellite nodule in the right upper lobe of the lung. Mediastinal and primary lung mass/adenopathy not significantly changed. Intracranial metastatic disease progression as described in brain MRI report. MRI Results (most recent):  Results from [place] encounter on 09/19/19   MRI BRAIN W WO CONT    Narrative EXAM:  MRI BRAIN W WO CONT  Clinical: Evaluate for intracranial metastases  INDICATION:    brain mets    COMPARISON:  7/3/2019. CONTRAST: 20 ml Dotarem. TECHNIQUE:    Multiplanar multisequence acquisition without and with contrast of the brain. FINDINGS:    Enlarged metastasis in the right parietal/temporal lobe  New associated extensive vasogenic edema. Right periatrial lesion adjacent to the right lateral ventricle   10-11 17 x 16 mm previously 5 x 6 mm. 10-15 right posterior temporal lesion 25 x 29 mm previously 11 x 5 mm. Interval right to left midline shift with extensive associated vasogenic edema  in the right temporal right parietal and right frontal lobes. Right-to-left  midline shift with approximately 9 mm. Effacement of the right lateral  ventricle. Effacement of the right cerebral peduncle. Status post resection of the large metastasis at the right parieto-occipital  region.  Resection cavity   Slightly diminished in size with no significant residual nodular enhancement. No  local recurrence demonstrated. There are no other new intracranial metastatic foci demonstrated. Hemosiderin  deposition at the   operative site and tiny focus of chronic hemorrhage in the left inferior frontal  lobe region in the location of the prior metastasis, unchanged  Previous right occipital craniotomy. No destructive lesions. No Chiari or syrinx. Cavernous sinuses are symmetric. Impression IMPRESSION:     Interval progression of metastatic disease with an large metastatic foci in the  periatrial region of the right parietal lobe and the posterior right temporal  lobe. Interval increased associated vasogenic edema with right to left midline  shift of approximately 9 mm. Status post resection of previously demonstrated right occipital parietal large  metastasis. Postprocedural changes related to a previous occipital craniotomy    No other new intracranial metastatic foci. Lab Results   Component Value Date/Time    WBC 12.6 (H) 09/23/2019 01:21 AM    HGB 11.2 (L) 09/23/2019 01:21 AM    HCT 35.0 (L) 09/23/2019 01:21 AM    PLATELET 033 54/54/3638 01:21 AM    MCV 92.8 09/23/2019 01:21 AM       Lab Results   Component Value Date/Time    Sodium 134 (L) 09/23/2019 01:21 AM    Potassium 4.4 09/23/2019 01:21 AM    Chloride 101 09/23/2019 01:21 AM    CO2 26 09/23/2019 01:21 AM    Anion gap 7 09/23/2019 01:21 AM    Glucose 191 (H) 09/23/2019 01:21 AM    BUN 26 (H) 09/23/2019 01:21 AM    Creatinine 1.01 09/23/2019 01:21 AM    BUN/Creatinine ratio 26 (H) 09/23/2019 01:21 AM    GFR est AA >60 09/23/2019 01:21 AM    GFR est non-AA >60 09/23/2019 01:21 AM    Calcium 9.5 09/23/2019 01:21 AM    Bilirubin, total 0.4 09/20/2019 05:31 AM    AST (SGOT) 116 (H) 09/20/2019 05:31 AM    Alk.  phosphatase 73 09/20/2019 05:31 AM    Protein, total 7.3 09/20/2019 05:31 AM    Albumin 3.3 (L) 09/20/2019 05:31 AM    Globulin 4.0 09/20/2019 05:31 AM    A-G Ratio 0.8 (L) 09/20/2019 05:31 AM    ALT (SGPT) 52 09/20/2019 05:31 AM           Assessment/ PLAN:     1. T3 N3 M1a (Stage IV) NSCLC of the lung, likely adenoca   NGS - no targetable mutation  PD-L1 90%  Unresectable disease   ECOG PS 1  Intent of Treatment - palliative  Prognosis poor  Hx of right occipital craniotomy at [facility] on 1/29/2019  Completed SBRT to brain lesions and tumor bed by Dr. Chloe Kwong at [address] [place] [address] on 2/27/2019    Receiving palliative chemotherapy at [facility]  With Dr Claudia Mendiola. Carbo/Alimta/Pembrolizumab - s/p 4 cycles   Alimta/Pembrolizumab     CT initally shrinkage of disease in the lungs and mediastinum     Pt admitted now for weakness and new brain mets. Being seen by neurosurgery and will d/w them. Has a larger mass on MRI but not sure pt will tolerate a second neurosurgery well. Needs Rad/onc consult and likely radiation/ possible WBXRT. Will consult rad/onc. Reviewed progressive disease on scans with pt today. Pt will need new chemo/ systemic therapy. Sees Dr Marisela Graham and will f/u after d/c. Pt states he wants to go home. Guess he can do XRT as outpt. Will d/w hospitalist.    2. Weakness with Progressive brain mets on MRI. On steroids. Per neurosurgery and rad/onc. 3. Chemotherapy related anemia. Stable. 4.  Psychosocial.  No family in room at my visit. Pt sees palliative care.      We will follow  Call if questions  D/w neurosurgery and hospitalist    Signed by: Rhys Kruse DO                     September 23, 2019

## 2019-09-23 NOTE — PROGRESS NOTES
Problem: Pressure Injury - Risk of  Goal: *Prevention of pressure injury  Description  Document Angelito Scale and appropriate interventions in the flowsheet. Outcome: Progressing Towards Goal  Note:   Pressure Injury Interventions:  Sensory Interventions: Float heels, Keep linens dry and wrinkle-free, Maintain/enhance activity level, Monitor skin under medical devices    Moisture Interventions: Maintain skin hydration (lotion/cream), Limit adult briefs, Minimize layers, Moisture barrier    Activity Interventions: Increase time out of bed, PT/OT evaluation, Pressure redistribution bed/mattress(bed type)    Mobility Interventions: HOB 30 degrees or less, PT/OT evaluation, Pressure redistribution bed/mattress (bed type)    Nutrition Interventions: Document food/fluid/supplement intake    Friction and Shear Interventions: Lift sheet, Minimize layers, HOB 30 degrees or less                Problem: Patient Education: Go to Patient Education Activity  Goal: Patient/Family Education  Outcome: Progressing Towards Goal     Problem: Falls - Risk of  Goal: *Absence of Falls  Description  Document Franck Fall Risk and appropriate interventions in the flowsheet.   Outcome: Progressing Towards Goal  Note:   Fall Risk Interventions:  Mobility Interventions: Patient to call before getting OOB    Mentation Interventions: Increase mobility, More frequent rounding, Reorient patient    Medication Interventions: Patient to call before getting OOB    Elimination Interventions: Elevated toilet seat, Toilet paper/wipes in reach, Toileting schedule/hourly rounds    History of Falls Interventions: Bed/chair exit alarm, Room close to nurse's station         Problem: Patient Education: Go to Patient Education Activity  Goal: Patient/Family Education  Outcome: Progressing Towards Goal

## 2019-09-23 NOTE — PROGRESS NOTES
Bedside and Verbal shift change report given to Dorene Diaz RN (oncoming nurse) by Sterling Loza RN (offgoing nurse).  Report included the following information SBAR, Kardex, Intake/Output, MAR, Recent Results, Med Rec Status and Cardiac Rhythm SR.

## 2019-09-23 NOTE — PROGRESS NOTES
NUTRITION       Malnutrition Screening Tool (MST) triggered RD referral based on results obtained during nursing admission assessment completed on 9/22/19. The patient's chart was reviewed and nutrition assessment was completed 9/20/19. Wt Readings from Last 5 Encounters:   09/22/19 81.1 kg (178 lb 14.3 oz)   09/19/19 72.1 kg (159 lb)   09/16/19 70.4 kg (155 lb 3.2 oz)   09/03/19 72.5 kg (159 lb 14.4 oz)   08/12/19 73.4 kg (161 lb 12.8 oz)     Provided patient with coupons for [employer] Nutritional Supplements (only coupons available from RD services at this time). Intake is not documented on flow sheets however patient reports to be eating well & likes foods served. Plan to see patient for nutrition care needs as indicated. Thank you.     Maite Wilson, RD, MS, CDE

## 2019-09-23 NOTE — PROGRESS NOTES
Occupational Therapy:    Attempted to see patient for OT session, however patient and family currently in meeting with palliative medicine. Will follow up as able and appropriate.     Thank you  Gadiel Russo, RASHMI, OTR/L

## 2019-09-23 NOTE — PROGRESS NOTES
Problem: Pressure Injury - Risk of  Goal: *Prevention of pressure injury  Description  Document Angelito Scale and appropriate interventions in the flowsheet. Outcome: Progressing Towards Goal  Note:   Pressure Injury Interventions:  Sensory Interventions: Float heels, Keep linens dry and wrinkle-free, Maintain/enhance activity level, Minimize linen layers, Monitor skin under medical devices, Pad between skin to skin, Assess changes in LOC    Moisture Interventions: Limit adult briefs, Maintain skin hydration (lotion/cream), Minimize layers, Moisture barrier, Offer toileting Q_hr    Activity Interventions: Increase time out of bed, Pressure redistribution bed/mattress(bed type), PT/OT evaluation    Mobility Interventions: HOB 30 degrees or less, Pressure redistribution bed/mattress (bed type), PT/OT evaluation    Nutrition Interventions: Document food/fluid/supplement intake, Discuss nutritional consult with provider    Friction and Shear Interventions: Lift sheet, Lift team/patient mobility team, Minimize layers                Problem: Falls - Risk of  Goal: *Absence of Falls  Description  Document Tim Burton Fall Risk and appropriate interventions in the flowsheet.   Outcome: Progressing Towards Goal  Note:   Fall Risk Interventions:  Mobility Interventions: Communicate number of staff needed for ambulation/transfer, OT consult for ADLs, Patient to call before getting OOB, PT Consult for mobility concerns, PT Consult for assist device competence, Bed/chair exit alarm    Mentation Interventions: Adequate sleep, hydration, pain control, Bed/chair exit alarm, Increase mobility, More frequent rounding, Reorient patient, Room close to nurse's station    Medication Interventions: Bed/chair exit alarm, Evaluate medications/consider consulting pharmacy, Patient to call before getting OOB, Teach patient to arise slowly    Elimination Interventions: Bed/chair exit alarm, Call light in reach, Patient to call for help with toileting needs, Stay With Me (per policy), Toilet paper/wipes in reach, Toileting schedule/hourly rounds    History of Falls Interventions: Bed/chair exit alarm, Evaluate medications/consider consulting pharmacy, Investigate reason for fall, Room close to nurse's station, Utilize gait belt for transfer/ambulation         Problem: Nutrition Deficit  Goal: *Optimize nutritional status  Outcome: Progressing Towards Goal

## 2019-09-23 NOTE — PROGRESS NOTES
Full note to follow. Casey Soria is a 60 y/o with diagnosis of stage IV NSCLC of the lung, [id number], PD-L1 90%. He had brain metastases and liver disease at the time of diagnosis. He is s/p R occipital [facility]otomy at [facility] on 1/29/19. He subsequently underwent stereotactic radiotherapy to the postoperative cavity to 2500cGy/5fx and single fraction SRS to 6548-8376 cGy to the only additional visible 5 lesions seen at that time, treatments ending 2/27/19. This was under the care of Dr. Angela Hess. He has since been on palliative chemotherapy with Dr. Josselin Dean at [facility]. He has received Carbo/Alimta/Keytruda x 4c and most recently Alimta/Pembrolizumab. He was due for his next round today. Unfortunately he presented with left sided weakness. Brain MRI was performed and the read said interval progression of metastatic disease within the right parietal lobe and posterior right temporal lobe with worsening vasogenic edema and 9mm midline shift. He was started on high dose steroids and has had some improvement. His case has been discussed with Neurosurgery who does not feel surgery is his best option [facility] teams that follow him chronically as an outpatient. Per Dr. Josselin Dean he is not a candidate for any further chemotherapy; and hospice talks are being initiated with the family. I do not have his previous treatment records in full detail, however he has not received prior whole brain radiotherapy to my knowledge. He has received SRS to 6 separate sites, and it is unclear to me if these are the areas seen on current MRI in which case this could represent radiation necrosis. If they are new areas of disease, then his likely best treatment option is whole brain radiotherapy given his declining performance status. His other very reasonable alternative is maintain on steroids alone.      The other factor at play is that per the patients wife and the primary team he did not tolerate his SRS very well with significant fatigue and decline in performance status at that time. I discussed both whole brain RT and steroids alone with the patient and wife. I am not inclined to push for whole brain RT but certainly would need to obtain records before I would move forward if they decided to pursue it. I spoke with Dr. Karla Resendiz who will discuss goals of care with the patient and his family tomorrow at a family meeting. I explained my conversation with the family to her and will follow up after this meeting if further discussion about whole brain RT is appropriate.

## 2019-09-23 NOTE — PROGRESS NOTES
Problem: Pressure Injury - Risk of  Goal: *Prevention of pressure injury  Description  Document Angelito Scale and appropriate interventions in the flowsheet. Outcome: Progressing Towards Goal  Note:   Pressure Injury Interventions:  Sensory Interventions: Float heels, Keep linens dry and wrinkle-free, Maintain/enhance activity level, Monitor skin under medical devices    Moisture Interventions: Maintain skin hydration (lotion/cream), Limit adult briefs, Minimize layers, Moisture barrier    Activity Interventions: Increase time out of bed, PT/OT evaluation, Pressure redistribution bed/mattress(bed type)    Mobility Interventions: HOB 30 degrees or less, PT/OT evaluation, Pressure redistribution bed/mattress (bed type)    Nutrition Interventions: Document food/fluid/supplement intake    Friction and Shear Interventions: Lift sheet, Minimize layers, HOB 30 degrees or less                Problem: Patient Education: Go to Patient Education Activity  Goal: Patient/Family Education  Outcome: Progressing Towards Goal     Problem: Falls - Risk of  Goal: *Absence of Falls  Description  Document Franck Fall Risk and appropriate interventions in the flowsheet.   Outcome: Progressing Towards Goal  Note:   Fall Risk Interventions:  Mobility Interventions: Patient to call before getting OOB    Mentation Interventions: Increase mobility, More frequent rounding, Reorient patient    Medication Interventions: Patient to call before getting OOB    Elimination Interventions: Elevated toilet seat, Toilet paper/wipes in reach, Toileting schedule/hourly rounds    History of Falls Interventions: Bed/chair exit alarm, Room close to nurse's station         Problem: Patient Education: Go to Patient Education Activity  Goal: Patient/Family Education  Outcome: Progressing Towards Goal     Problem: Discharge Planning  Goal: *Discharge to safe environment  Outcome: Progressing Towards Goal  Goal: *Knowledge of medication management  Outcome: Progressing Towards Goal  Goal: *Knowledge of discharge instructions  Outcome: Progressing Towards Goal     Problem: Patient Education: Go to Patient Education Activity  Goal: Patient/Family Education  Outcome: Progressing Towards Goal     Problem: Nutrition Deficit  Goal: *Optimize nutritional status  Outcome: Progressing Towards Goal     Problem: Patient Education: Go to Patient Education Activity  Goal: Patient/Family Education  Outcome: Progressing Towards Goal     Problem: Patient Education: Go to Patient Education Activity  Goal: Patient/Family Education  Outcome: Progressing Towards Goal     Problem: Patient Education: Go to Patient Education Activity  Goal: Patient/Family Education  Outcome: Progressing Towards Goal

## 2019-09-24 NOTE — PROGRESS NOTES
LINDY      1. Pt discharging home tomorrow 9/25  2. [facility] accepted. Hospice admission RN scheduled for 3 pm tomorrow. DME to be delivered to home prior to discharge. 3. [facility] confirmed 1P transportation time. 4. Per request, CM will complete [employer] form. CM to follow.       Gigi Bryant, MSW,CRM

## 2019-09-24 NOTE — PROGRESS NOTES
Occupational Therapy    Chart reviewed. Patient and family had meeting with palliative medicine and hospice this morning and have opted to stop all cancer treatments and go home with hospice. Hospice note indicates plan to obtain needed DME. Planned d/c to home hospice is tomorrow. Will complete OT order.       Adrienne Romero, OTR/L

## 2019-09-24 NOTE — PROGRESS NOTES
Problem: Pressure Injury - Risk of  Goal: *Prevention of pressure injury  Description  Document Angelito Scale and appropriate interventions in the flowsheet. Outcome: Progressing Towards Goal  Note:   Pressure Injury Interventions:  Sensory Interventions: Assess changes in LOC, Check visual cues for pain, Discuss PT/OT consult with provider, Keep linens dry and wrinkle-free    Moisture Interventions: Absorbent underpads, Assess need for specialty bed, Check for incontinence Q2 hours and as needed, Limit adult briefs    Activity Interventions: Increase time out of bed, Pressure redistribution bed/mattress(bed type), PT/OT evaluation    Mobility Interventions: HOB 30 degrees or less, PT/OT evaluation    Nutrition Interventions: Offer support with meals,snacks and hydration    Friction and Shear Interventions: HOB 30 degrees or less, Lift sheet, Minimize layers                Problem: Patient Education: Go to Patient Education Activity  Goal: Patient/Family Education  Outcome: Progressing Towards Goal     Problem: Falls - Risk of  Goal: *Absence of Falls  Description  Document Franck Fall Risk and appropriate interventions in the flowsheet.   Outcome: Progressing Towards Goal  Note:   Fall Risk Interventions:  Mobility Interventions: Bed/chair exit alarm, Communicate number of staff needed for ambulation/transfer, Patient to call before getting OOB, PT Consult for mobility concerns, Utilize walker, cane, or other assistive device    Mentation Interventions: Adequate sleep, hydration, pain control, Bed/chair exit alarm, Eyeglasses and hearing aids, Evaluate medications/consider consulting pharmacy, Increase mobility, Reorient patient, Room close to nurse's station, Update white board    Medication Interventions: Bed/chair exit alarm, Patient to call before getting OOB, Teach patient to arise slowly    Elimination Interventions: Bed/chair exit alarm, Call light in reach, Patient to call for help with toileting needs, Stay With Me (per policy), Urinal in reach    History of Falls Interventions: Bed/chair exit alarm, Door open when patient unattended, Investigate reason for fall, Room close to nurse's station, Assess for delayed presentation/identification of injury for 48 hrs (comment for end date), Vital signs minimum Q4HRs X 24 hrs (comment for end date)         Problem: Patient Education: Go to Patient Education Activity  Goal: Patient/Family Education  Outcome: Progressing Towards Goal     Problem: Discharge Planning  Goal: *Discharge to safe environment  Outcome: Progressing Towards Goal  Goal: *Knowledge of medication management  Outcome: Progressing Towards Goal  Goal: *Knowledge of discharge instructions  Outcome: Progressing Towards Goal     Problem: Patient Education: Go to Patient Education Activity  Goal: Patient/Family Education  Outcome: Progressing Towards Goal     Problem: Nutrition Deficit  Goal: *Optimize nutritional status  Outcome: Progressing Towards Goal     Problem: Patient Education: Go to Patient Education Activity  Goal: Patient/Family Education  Outcome: Progressing Towards Goal     Problem: Patient Education: Go to Patient Education Activity  Goal: Patient/Family Education  Outcome: Progressing Towards Goal     Problem: Patient Education: Go to Patient Education Activity  Goal: Patient/Family Education  Outcome: Progressing Towards Goal

## 2019-09-24 NOTE — PROGRESS NOTES
Hospitalist Progress Note          Bal Justice MD  Please call  and page for questions. Call physician on-call through the  7pm-7am    Daily Progress Note: 9/24/2019    Primary care Dianelys Bruce MD    Date of admission: 9/19/2019  2:07 PM    Admission summery and hospital course:  63-year-old  male with a past medical history of non-small cell lung cancer stage IV who presented to ED with left sided weakness. Subjective:   Patient denied any acute issue today. Assessment/Plan:   New Large right brain metastasis  Cerebral edema with midline shift  MRI brain with interval progression of metastatic disease with a large metastatic foci in the periatrial region of the right parietal lobe and the posterior right temporal lobe. Interval increased associated vasogenic edema, right to left midline shift of approximately 9 mm. Neurosurgery team and oncology teams are following. Continue dexamethasone as per NS. I discussed with NS regarding this. Neuro-checks. Telemetry monitor. Stage IV lung cancer with metastasis to brain, liver and lungs  Oncology and palliative team input appreciated. .  CT chest/abd/pelv revealed significant progression of metastatic disease.        Diabetes mellitus with hypoglycemia  Glucose 56 on admission. No further hypoglycemia. Diabetes is uncontrolled now with hyperglycemia. Continue sliding scale insulin. Will resume his Lantus to his half dose tonight.      Mild protein calorie malnutrition  Nutrition consulted, continue supplement.      Hypertension  BP is reasonable with Lisinopril.          See orders for other plans. VTE prophylaxis: SCD  Code status: Full  Discussed plan of care with Patient/Family and Nurse. Pre-admission lived at home. Discharge planning: Possible to discharge to home tomorrow with home hospice.         Review of Systems:     Review of Systems:  Symptom  Y/N  Comments Symptom  Y/N  Comments    Fever/Chills  n    Chest Pain  n    Poor Appetite  n    Edema  n     Cough  n   Abdominal Pain  n     Sputum  n   Joint Pain      SOB/ROCHE  n   Pruritis/Rash      Nausea/vomit  n   Tolerating PT/OT      Diarrhea     Tolerating Diet      Constipation     Other      Could not obtain due to:         Objective:   Physical Exam:     Visit Vitals  /80 (BP 1 Location: Right arm, BP Patient Position: At rest)   Pulse 95   Temp 98.5 °F (36.9 °C)   Resp 16   Ht 5' 10\" (1.778 m)   Wt 72.3 kg (159 lb 4.8 oz)   SpO2 99%   BMI 22.86 kg/m²    O2 Flow Rate (L/min): 2 l/min O2 Device: Room air    Temp (24hrs), Av.3 °F (36.8 °C), Min:98 °F (36.7 °C), Max:98.5 °F (36.9 °C)    No intake/output data recorded.  1901 -  0700  In: -   Out: 4500 [Urine:1275]    General:  Alert, cooperative, no distress, appears stated age. Lungs:   Clear to auscultation bilaterally. Heart:  Regular rate and rhythm, S1, S2 normal, no murmur. Abdomen:   Soft, non-tender. Bowel sounds normal.    Extremities: Extremities normal, atraumatic, no cyanosis or edema. Pulses: 2+ and symmetric all extremities. Skin: Skin color, texture, turgor normal. No rashes or lesions   Neurologic: Patient has clear voice. He communicates well. Patient said has been walking in the hallway without any problems.           Data Review:       Recent Days:  Recent Labs     19  0121   WBC 12.6*   HGB 11.2*   HCT 35.0*        Recent Labs     19  0121   *   K 4.4      CO2 26   *   BUN 26*   CREA 1.01   CA 9.5     No results for input(s): PH, PCO2, PO2, HCO3, FIO2 in the last 72 hours.     24 Hour Results:  Recent Results (from the past 24 hour(s))   GLUCOSE, POC    Collection Time: 19  9:31 PM   Result Value Ref Range    Glucose (POC) 225 (H) 65 - 100 mg/dL    Performed by [address] [place] [address], POC    Collection Time: 19  7:38 AM   Result Value Ref Range    Glucose (POC) 198 (H) 65 - 100 mg/dL    Performed by Sol Lubin, POC    Collection Time: 09/24/19 11:29 AM   Result Value Ref Range    Glucose (POC) 237 (H) 65 - 100 mg/dL    Performed by Roxie Mejia    GLUCOSE, POC    Collection Time: 09/24/19  4:40 PM   Result Value Ref Range    Glucose (POC) 345 (H) 65 - 100 mg/dL    Performed by Roxie Mejia        Problem List:  Problem List as of 9/24/2019 Date Reviewed: 9/16/2019          Codes Class Noted - Resolved    Cerebral edema ([facility] ICD-10-CM: G93.6  ICD-9-CM: 348.5  9/20/2019 - Present        Brain metastases ([facility] ICD-10-CM: C79.31  ICD-9-CM: 198.3  9/20/2019 - Present        Non-small cell lung cancer ([facility] ICD-10-CM: C34.90  ICD-9-CM: 162.9  8/8/2019 - Present        Lung cancer metastatic to brain [facility]) ICD-10-CM: C34.90, C79.31  ICD-9-CM: 162.9, 198.3  2/13/2019 - Present        Lung cancer ([facility] ICD-10-CM: C34.90  ICD-9-CM: 162.9  2/11/2019 - Present              Medications reviewed  Current Facility-Administered Medications   Medication Dose Route Frequency    oxyCODONE IR (ROXICODONE) tablet 20 mg  20 mg Oral Q4H PRN    dexAMETHasone (DECADRON) tablet 4 mg  4 mg Oral TID    lactulose (CHRONULAC) 10 gram/15 mL solution 15 mL  10 g Oral DAILY PRN    influenza vaccine 2019-20 (6 mos+)(PF) (FLUARIX/FLULAVAL/FLUZONE QUAD) injection 0.5 mL  0.5 mL IntraMUSCular PRIOR TO DISCHARGE    polyethylene glycol (MIRALAX) packet 17 g  17 g Oral DAILY    sodium chloride (NS) flush 5-40 mL  5-40 mL IntraVENous Q8H    sodium chloride (NS) flush 5-40 mL  5-40 mL IntraVENous PRN    acetaminophen (TYLENOL) tablet 650 mg  650 mg Oral Q4H PRN    ondansetron (ZOFRAN) injection 4 mg  4 mg IntraVENous Q4H PRN    insulin lispro (HUMALOG) injection   SubCUTAneous AC&HS    glucose chewable tablet 16 g  4 Tab Oral PRN    glucagon (GLUCAGEN) injection 1 mg  1 mg IntraMUSCular PRN    dextrose 10% infusion 0-250 mL  0-250 mL IntraVENous PRN    ondansetron (ZOFRAN ODT) tablet 4 mg 4 mg Oral Q8H PRN    megestrol (MEGACE) tablet 40 mg  40 mg Oral BID    pantoprazole (PROTONIX) tablet 40 mg  40 mg Oral BID    lisinopril (PRINIVIL, ZESTRIL) tablet 10 mg  10 mg Oral DAILY    morphine CR (MS CONTIN) tablet 30 mg  30 mg Oral Q8H    gabapentin (NEURONTIN) capsule 100 mg  100 mg Oral TID       Care Plan discussed with: Patient/Family, Nurse and Consultant Neurosurgery and palliative care. Total time spent with patient: 50 minutes.     Jose Angel Venegas MD

## 2019-09-24 NOTE — HOSPICE
[facility]  [phone]    Patient Name: Dave Piper  YOB: 1960  Age: 61 y.o. [facility] RN Note:  Hospice consult noted, Chart reviewed, Plan of care reviewed with patients nurse & Care manager. In to meet with patient, wife, son and son's SO. Palliative MD and SW present at start of discussion. Patient and family report that he is ready to stop all cancer treatments and go home with hospice care as soon as possible. Wife, aJsbir Gage [phone] is tearful and expresses concerns about discussing patient's impending death with their 15 year grandchildren - male and female twins- that the patient and his wife have raised since infancy. Hospice  bereavement coordinator notified. Family is interested in getting assistance with Medicaid aids in the home as soon as possible. Patient has Medicaid and [employer]AI will be completed today by hospital CM. Hospice admission is scheduled for 3 pm tomorrow. DME needs identified as hospital bed, OVBT, BSC, wheelchair, rollator, oxygen [place] 2L PRN. CM updated with above information and will arrange transportation for 1 pm.     Thank you for the opportunity to be of service to this patient.

## 2019-09-24 NOTE — PROGRESS NOTES
Bedside shift change report given to Lolita Lau RN (oncoming nurse) by [address], RN (offgoing nurse). Report included the following information SBAR, Kardex, Intake/Output, MAR, Accordion, Recent Results, Med Rec Status, Cardiac Rhythm NSR and Alarm Parameters .

## 2019-09-24 NOTE — PROGRESS NOTES
Bedside and Verbal shift change report given to Todd Payan RN (oncoming nurse) by Reece Thornton RN (offgoing nurse).  Report included the following information SBAR, Kardex, Intake/Output, MAR, Recent Results, Med Rec Status and Cardiac Rhythm SR.
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
## 2019-09-24 NOTE — DIABETES MGMT
[facility]    [facility] Consult Follow Up Note    Recommendations/ Comments: Pt noted with elevated BG likely due to steroids. Receiving 4 mg dexamethasone q 6 hours at this time.  mg/dL - 198 mg/dL past two days and post prandial BG's > 200 mg/dL. If appropriate, please consider:  Add Lantus 10 units daily while on steroids  Change lispro correction scale to normal sensitivity     Dr Barry Maruice regarding above    Current hospital DM medication: lispro correction scale - high sensitivity     Chart reviewed on [facility]. Patient is a 61 y.o. male with known DM on Lantus, 14 units daily and AC lispro 5 units TID at home. A1c:   Lab Results   Component Value Date/Time    Hemoglobin A1c 5.6 09/20/2019 05:31 AM    Hemoglobin A1c 5.7 (H) 08/20/2019 10:54 AM       Recent Glucose Results:   Lab Results   Component Value Date/Time    GLUCPOC 198 (H) 09/24/2019 07:38 AM    GLUCPOC 225 (H) 09/23/2019 09:31 PM    GLUCPOC 256 (H) 09/23/2019 04:26 PM        Lab Results   Component Value Date/Time    Creatinine 1.01 09/23/2019 01:21 AM     Estimated Creatinine Clearance: 80.5 mL/min (based on SCr of 1.01 mg/dL). Active Orders   Diet    DIET DIABETIC WITH OPTIONS Consistent Carb 2400kcal; Mechanical Soft        PO intake: No data found. Will continue to follow as needed.     Thank you  Emma Del Rosario RN, CDE      Time spent: 4 minutes

## 2019-09-24 NOTE — PROGRESS NOTES
CM offered screening for Medicaid Long-Term Services & Supports. Family Consented to screening. [employer] Screening submitted for processing.

## 2019-09-24 NOTE — PROGRESS NOTES
Physical Therapy:    Chart reviewed and RN consulted prior to planned treatment session. Patient and family had meeting with palliative medicine and hospice this morning and have opted to stop all cancer treatments and go home with hospice. Planned d/c to home hospice is tomorrow. Hospice note indicates plan to obtain needed DME. Will complete PT orders at this time.     Lenin Forrester MS, PT

## 2019-09-24 NOTE — PROGRESS NOTES
Bedside shift change report given to Timi Banks RN (oncoming nurse) by Kimi Curtis RN (offgoing nurse). Report included the following information SBAR, Kardex, ED Summary, Procedure Summary, Intake/Output, MAR, Recent Results, Med Rec Status, Cardiac Rhythm NSR and Alarm Parameters .

## 2019-09-24 NOTE — TELEPHONE ENCOUNTER
Kaycee Frye is calling regarding order for MRI of brain. She would like to discuss with nurse. Advised nurse would call her back.

## 2019-09-24 NOTE — HOSPICE
[address] [facility] Help to Those in Need  [phone]    Patient Name: Suzanna Rodriguez  YOB: 1960  Age: 61 y.o. [facility] LCSW Note:      This LCSW and Fitz Toledo met with pt and his wife Collins Del Rosario to discuss home admission to hospice on 9/25/2019. Pt was lethargic, but pleasant. Pt appeared realistic and accepting. Wife was emotional  and tearful. Wife is overhwhelmed with emotions she is feeling  re pts terminal cancer. Wife has stage II breast cancer as well, but feels more at peace with her prognosis. LCSW provided emotional support and reassurance for wife. LCSW and wife discussed home supports though hospice, team members and their roles, as well as [facility] for respite needs due to caregiver exhaustion/breakdown or for symptom management needs of pt. LCSW and wife discussed feeling of anxiety. Wife reports she has a dx of bipolar d/o with gio and depression. Wife reports she is seeing psychiatrist and has been compliant with her medications. LCSW provided support for wife in coping with anxiety. LCSW provided education on how to do relaxation breathing for wife to reduce anxiety. LCSW encouraged wife to contact her MD to discuss anxiety and possible medications. LCSW and wife discussed her concerns re their 15 y/o twin grandchildren she and pt have adopted Wife reports they have been caring for twins since they were 1 months old. Wife reports concern re twins reaction to pts terminal prognosis, as children do not know pt is terminal. LCSW provided education and support in talking in children. LCSW shared information on how to talk to children about death and dying. Hospice RN has reached out to Bereavement services for pre-bereavement for wife and twins. LCSW and wife discussed Medicaid aides in the home for pt. Per chart review CM will complete [facility].  Pt and wife will need assistance in obtaining Medicaid personal care attendants through a Care Agency. Wife reports not only feeling overwhelmed, but also not being very organized and experiences some \"forgetfulness\" at times. LCSW provided reassurance of SW support with transition to home and with needed resources. LCSW will follow-up with pt / wife and CM in the am.          Thank you for the opportunity to be of service to Mr. Franchesca Sanchez and his wife.     Cecile WIGGINSW, [address] [place] [address] Vstugo [phone]

## 2019-09-25 NOTE — DISCHARGE INSTRUCTIONS
Discharge Instructions       PATIENT ID: Alissa Duque  MRN: [id number]   YOB: 1960    DATE OF ADMISSION: 9/19/2019  2:07 PM    DATE OF DISCHARGE: 9/25/2019    PRIMARY CARE PROVIDER: Samantha Diaz MD     ATTENDING PHYSICIAN: Ivanna Parham MD  DISCHARGING PROVIDER: Ghazal Trent MD    To contact this individual call [phone] and ask the  to page. If unavailable ask to be transferred the Adult Hospitalist Department. DISCHARGE DIAGNOSES   New Large right brain metastasis  Cerebral edema with midline shift  Stage IV lung cancer with metastasis to brain, liver and lungs  Diabetes mellitus with hypoglycemia  Diabetes is uncontrolled now with hyperglycemia. Mild protein calorie malnutrition  Hypertension    CONSULTATIONS: IP CONSULT TO ONCOLOGY  IP CONSULT TO PALLIATIVE CARE - PROVIDER  IP CONSULT TO RADIATION ONCOLOGY    PROCEDURES/SURGERIES: * No surgery found *    PENDING TEST RESULTS:   At the time of discharge the following test results are still pending: none     FOLLOW UP APPOINTMENTS:   Follow-up Information     Follow up With Specialties Details Why Contact Info    Samantha Diaz MD Internal Medicine As needed   [address] [place] [address]  [phone]             ADDITIONAL CARE RECOMMENDATIONS: Per hospice care    DIET: Diabetic Diet      ACTIVITY: Activity as tolerated    WOUND CARE: None     EQUIPMENT needed: None       DISCHARGE MEDICATIONS:   See Medication Reconciliation Form    · It is important that you take the medication exactly as they are prescribed. · Keep your medication in the bottles provided by the pharmacist and keep a list of the medication names, dosages, and times to be taken in your wallet. · Do not take other medications without consulting your doctor. NOTIFY YOUR PHYSICIAN FOR ANY OF THE FOLLOWING:   Fever over 101 degrees for 24 hours.    Chest pain, shortness of breath, fever, chills, nausea, vomiting, diarrhea, change in mentation, falling, weakness, bleeding. Severe pain or pain not relieved by medications. Or, any other signs or symptoms that you may have questions about.       DISPOSITION:    Home With:   OT  PT  HH  RN       SNF/Inpatient Rehab/LTAC    Independent/assisted living   x Hospice    Other:     CDMP Checked:   Yes x     PROBLEM LIST Updated:  Yes x       Signed:   Amanda Sellers MD  9/25/2019  8:13 AM

## 2019-09-25 NOTE — DISCHARGE SUMMARY
Discharge Summary       PATIENT ID: Soumya Shah  MRN: [id number]   YOB: 1960    DATE OF ADMISSION: 9/19/2019  2:07 PM    DATE OF DISCHARGE: 9/25/2019  PRIMARY CARE PROVIDER: MD [place] MD  DISCHARGING PROVIDER: Jame Velazquez MD    To contact this individual call [phone] and ask the  to page. If unavailable ask to be transferred the Adult Hospitalist Department. CONSULTATIONS: IP CONSULT TO ONCOLOGY  IP CONSULT TO PALLIATIVE CARE - PROVIDER  IP CONSULT TO RADIATION ONCOLOGY    PROCEDURES/SURGERIES: None     ADMITTING DIAGNOSES & HOSPITAL COURSE:     66-year-old  male with a past medical history of non-small cell lung cancer stage IV who presented to ED with left sided weakness. New Large right brain metastasis  Cerebral edema with midline shift  MRI brain with interval progression of metastatic disease with a large metastatic foci in the periatrial region of the right parietal lobe and the posterior right temporal lobe. Interval increased associated vasogenic edema, right to left midline shift of approximately 9 mm. Neurosurgery team and oncology teams are following. Continue dexamethasone as per NS. I discussed with NS regarding this. Neuro-checks. Telemetry monitor. DISCHARGE DIAGNOSES / PLAN:      New Large right brain metastasis  Cerebral edema with midline shift  MRI brain with interval progression of metastatic disease with a large metastatic foci in the periatrial region of the right parietal lobe and the posterior right temporal lobe. Interval increased associated vasogenic edema, right to left midline shift of approximately 9 mm. Neurosurgery team and oncology teams are following. Continue dexamethasone as per NS. I discussed with NS regarding this. Neuro-checks. Telemetry monitor.       Stage IV lung cancer with metastasis to brain, liver and lungs  Oncology and palliative team input appreciated. .  CT chest/abd/pelv revealed significant progression of metastatic disease. Diabetes mellitus with hypoglycemia  Glucose 56 on admission. No further hypoglycemia. Diabetes is uncontrolled now with hyperglycemia. Continue sliding scale insulin. Will resume his Lantus to his half dose tonight. Mild protein calorie malnutrition  Nutrition consulted, continue supplement. Hypertension  BP is reasonable with Lisinopril       ADDITIONAL CARE RECOMMENDATIONS:     PENDING TEST RESULTS:   At the time of discharge the following test results are still pending: None     FOLLOW UP APPOINTMENTS:    Follow-up Information     Follow up With Specialties Details Why Contact Info    Mando Dowell MD Internal Medicine   [address]  [phone]               DIET: Cardiac /Diabetic diet       ACTIVITY:as tolerated   WOUND CARE:daily wound care   EQUIPMENT needed: none     DISCHARGE MEDICATIONS:  Current Discharge Medication List      START taking these medications    Details   polyethylene glycol (MIRALAX) 17 gram packet Take 1 Packet by mouth daily for 30 days. Qty: 30 Packet, Refills: 0      dexAMETHasone (DECADRON) 4 mg tablet Take 4 mg by mouth three (3) times daily. Qty: 90 Tab, Refills: 0         CONTINUE these medications which have CHANGED    Details   insulin glargine (LANTUS) 100 unit/mL injection 7 Units by SubCUTAneous route nightly. Use toujeo pen when out of lantus vial.  Qty: 3 Vial, Refills: 3    Associated Diagnoses: Controlled type 2 diabetes mellitus with diabetic nephropathy, unspecified whether long term insulin use ([facility]         CONTINUE these medications which have NOT CHANGED    Details   morphine CR (MS CONTIN) 30 mg CR tablet Take 1 Tab by mouth every eight (8) hours for 30 days.  Max Daily Amount: 90 mg.  Qty: 90 Tab, Refills: 0    Associated Diagnoses: Malignant neoplasm of upper lobe of lung, unspecified laterality ([facility] gabapentin (NEURONTIN) 100 mg capsule Take 1 Cap by mouth three (3) times daily. Max Daily Amount: 300 mg. Qty: 90 Cap, Refills: 0    Associated Diagnoses: Brachial neuralgia      insulin lispro (HUMALOG U-100 INSULIN) 100 unit/mL injection INJECT 5 UNITS SUBCUTANEOUSLY BEFORE MEALS-INCREASE HUMALOG 1 UNIT EVERY OTHER DAY UNTIL 2 HR PP  OR LESS. Use Humalog kwik pen after done with vial.  Qty: 3 Vial, Refills: 1    Associated Diagnoses: Controlled type 2 diabetes mellitus with diabetic nephropathy, unspecified whether long term insulin use (HCC)      lisinopril (PRINIVIL, ZESTRIL) 10 mg tablet Take 1 Tab by mouth daily. Qty: 30 Tab, Refills: 5    Associated Diagnoses: Hypertension goal BP (blood pressure) < 130/80      ibuprofen (MOTRIN) 200 mg tablet Take  by mouth.      pantoprazole (PROTONIX) 40 mg tablet Take 1 Tab by mouth two (2) times a day. Indications: gastroesophageal reflux disease  Qty: 60 Tab, Refills: 6      megestrol (MEGACE) 40 mg tablet Take 1 Tab by mouth two (2) times a day. Qty: 60 Tab, Refills: 2    Associated Diagnoses: Anorexia      lidocaine-prilocaine (EMLA) topical cream Apply  to affected area as needed for Pain. Qty: 30 g, Refills: 0    Associated Diagnoses: Malignant neoplasm of upper lobe of right lung (HCC)      ondansetron (ZOFRAN ODT) 4 mg disintegrating tablet Take 1 Tab by mouth every eight (8) hours as needed for Nausea. Qty: 40 Tab, Refills: 1    Associated Diagnoses: Malignant neoplasm of upper lobe of right lung ([address] Chemotherapy-induced nausea      oxyCODONE IR (ROXICODONE) 20 mg immediate release tablet Take 1 Tab by mouth every four (4) hours as needed for Pain for up to 30 days. Max Daily Amount: 120 mg.  Qty: 180 Tab, Refills: 0    Associated Diagnoses: Lung cancer metastatic to brain ([address]               NOTIFY YOUR PHYSICIAN FOR ANY OF THE FOLLOWING:   Fever over 101 degrees for 24 hours.    Chest pain, shortness of breath, fever, chills, nausea, vomiting, diarrhea, change in mentation, falling, weakness, bleeding. Severe pain or pain not relieved by medications. Or, any other signs or symptoms that you may have questions about.     DISPOSITION:    Home With:   OT  PT  HH  RN       Long term SNF/Inpatient Rehab    Independent/assisted living    Hospice    Other:       PATIENT CONDITION AT DISCHARGE:     Functional status    Poor     Deconditioned     Independent      Cognition     Lucid     Forgetful     Dementia      Catheters/lines (plus indication)    Bland     PICC     PEG     None      Code status     Full code     DNR      PHYSICAL EXAMINATION AT DISCHARGE:   Refer to Progress Note      CHRONIC MEDICAL DIAGNOSES:  Problem List as of 9/25/2019 Date Reviewed: 9/16/2019          Codes Class Noted - Resolved    Cerebral edema ([facility] ICD-10-CM: G93.6  ICD-9-CM: 348.5  9/20/2019 - Present        Brain metastases ([facility] ICD-10-CM: C79.31  ICD-9-CM: 198.3  9/20/2019 - Present        Non-small cell lung cancer ([facility] ICD-10-CM: C34.90  ICD-9-CM: 162.9  8/8/2019 - Present        Lung cancer metastatic to brain [facility]) ICD-10-CM: C34.90, C79.31  ICD-9-CM: 162.9, 198.3  2/13/2019 - Present        Lung cancer ([facility] ICD-10-CM: C34.90  ICD-9-CM: 162.9  2/11/2019 - Present              Greater than 40  minutes were spent with the patient on counseling and coordination of care    Signed:   Ruben Walsh MD  9/25/2019  9:38 AM

## 2019-09-25 NOTE — CONSULTS
RADIATION ONCOLOGY CONSULTATION  9/23/2019  Patient: Michael Shahid   YOB: 1960  Patient ID: [id number]   Referring MD: Ben Last  Radiation Oncologist:   Patient Diagnosis: C34.90 - Malignant neoplasm of unspecified part of unspecified bronchus or lung, Diagnosed 9/23/2019 (Active)   C79.31 - Secondary malignant neoplasm of brain, Diagnosed 9/23/2019 (Active)  AJCC Staging has been reviewed. I am seeing this patient at the request of Ben Last. Chief Complaint: Left sided weakness    History of Present Illness: Esa Palomares is a 60 y/o with diagnosis of stage IV NSCLC of the lung, [id number], PD-L1 90%. He had brain metastases and liver disease at the time of diagnosis. He is s/p R occipital craniotomy at [address] [place] [address] on 1/29/19. He subsequently underwent stereotactic radiotherapy to the postoperative cavity to 2500cGy/5fx and single fraction SRS to 7634-9775 cGy to the only additional visible 5 lesions seen at that time, treatments ending 2/27/19. This was under the care of Dr. Primitivo Aguilar. Per chart review he had unusual symptoms following radiation which included significant lethargy and flu like symptoms. He has since been on palliative chemotherapy with Dr. Rubio Riggs at [id number] [place] [address]. He has received Carbo/Alimta/Keytruda x 4c and most recently Alimta/Pembrolizumab. He was due for his next round today. Unfortunately he presented with left sided weakness. Brain MRI was performed and the read said interval progression of metastatic disease within the right parietal lobe and posterior right temporal lobe with worsening vasogenic edema and 9mm midline shift. He was started on high dose steroids and has had some improvement. He also underwent CT C/A/P which showed progressive disease in the thorax and liver. Currently he has residual left sided weakness with some improvement on high dose steroids. He denies significant headaches though occasionally has some.  Per his wife he has moments of confusion as well. He has become much less active and has lost weight recently. Medical History: - Cancer (T3 N3 M1a (Stage IV) NSCLS of the lung, likely adenocarcinoma 01/2019  brain and liver) , - cOPD, - diabetes, - gastroesophageal reflux, - hypertension. Surgical History: Craniotomy (2/2019 @[facility]), hernia repair and sBRT (to the brain lesions). Allergies: No Known Allergies    Current Medications: Ibuprofen 1 Tablet (of 200 mg) Capsule Oral q 6 hours PRN   Lantus 14 Units (of 100 Units/mL) Subcutaneous at bedtime   Lisinopril 1 Tablet (of 10 mg) Tablet Oral daily   Megestrol Acetate 1 Tablet (of 40 mg/mL) Suspension Oral b. i. d.   MS Contin 1 Tablet (of 30 mg) Tablet, controlled release Oral q 8 hours   Neurontin 1 Tablet (of 100 mg) Capsule Oral t. i. d.   oxyCODONE HCl ER 1 Tablet (of 15 mg) Tablet ER 12 HR Abuse-Deterrent Oral q 6 hours   Protonix 1 Tablet (of 40 mg) Tablet, enteric coated Oral daily   Zofran ODT 1 Tablet (of 4 mg) Tablet Dispersable Oral q 8 hours PRN  PQRS Medication Reconciliation: Medications documented and reviewed    Family History: No Remarkable Family History    Social History: Last screened on 9/23/2019 - Yes - but has quit for 10 years. Smoked 1.5 packs/day for 25 years (37.5 pack years). Last screened on 9/23/2019 - Never drank. Patient indicated use of the following products: snuff. Patient indicated access to the following support systems: Lives with spouse, significant other, family, or friends. Patient indicated the following nutritional habits: Regular meals. Patient indicated participation in the following forms of activity: Daily activities. Review of Systems:   Constitutional Complains of fatigue, lethargy, malaise and change in weight. Allergic/Immunologic Denies allergies and adverse reactions. Head Denies alopecia. Eyes Complains of blurred vision, double vision and photophobia. ENMT Complains of altered taste. Denies dysphagia and esophagitis. Neck Denies neck pain and decreased range of motion. Integumentary Denies blistering and rash. Cardiovascular Denies chest pain and palpitations. Respiratory Denies cough and dyspnea. Gastrointestinal Denies diarrhea, nausea and vomiting. Musculoskeletal Complains of muscle weakness. Neurologic Complains of disorientation, dizziness, headaches and motor weakness. Psychiatric Complains of mood swings. Performance Status: 50% - Requires frequent medical help and considerable assistance. (Karnofsky)  Physical Exam:   Constitutional Laying comfortably in his hospital bed. NAD. Head normocephalic , atraumatic   Eyes EOMI. Pupils equal and round. ENMT Moist oral mucosa. Fair dentition. No oral lesions. Neck Supple, no palpable lymphadenopathy   Integumentary No evidence of bruising and rash. Cardiovascular Well perfused throughout. No evidence of cyanosis or pallor. Respiratory Breathing unlabored on room air. No accessory muscle use. Abdomen Nontender and no distention. Extremities No upper or lower extremity edema bilaterally. Musculoskeletal No evidence of joint abnormalities and compromised muscle tone. Neurologic Ongoing left sided upper ext and lower ext weakness, 3-4/5. No sensory deficits. AAOx4 hwoever orientation did change during conversation. No obvious cranial nerve deficits. Psychiatric Presents with altered affect and lack of comprehension. VITALS: Performed on 2019 2:24 PM  BMI - 25.541 kg/m2 (HIGH) -   Height - 70 in -   Weight - 178 lbs -   Pulse - 103 /min (HIGH) -   Respiration - 16 /min -   Systolic - 177 mm(hg) -   Diastolic - 78 mm(hg) -   Fall Risk - 3 -  Vitals are within normal limits    Time and Counselin minutes were spent with the patient and family members, acquiring the vital information vital to the case. The patient was examined to verify findings as related in the HPI, as well as other areas as needed.  Time was allowed for all questions about the proposed course of care to be answered. Greater than 50% time was spent face to face with the patient in counseling and coordination of care. Impression:   Eduar Jorge is a 60 y/o with diagnosis of stage IV NSCLC of the lung, [id number], PD-L1 90%. He is s/p craniotomy followed by stereotactic radiosurgery and subsequently on palliative chemotherapy, now with presumably worsening intracranial disease. His case has been discussed with Neurosurgery who does not feel surgery is his best option [facility] teams that follow him chronically as an outpatient. Per Dr. Augusta Garcia he is not a candidate for any further chemotherapy; and hospice talks are being initiated with the family. I do not have his previous treatment records in full detail, however he has not received prior whole brain radiotherapy to my knowledge. He has received SRS to 6 separate sites, and it is unclear to me if these are the areas seen on current MRI in which case this could represent radiation necrosis. If they are new areas of disease, then a treatment option is whole brain radiotherapy. However, given his declining performance status another very reasonable alternative is maintain on steroids alone. The other factor at play is that per the patients wife and the primary team he did not tolerate his SRS very well with significant fatigue and decline in performance status at that time. I discussed both whole brain RT and steroids alone with the patient and wife. I am not inclined to push for whole brain RT even though it is an option but certainly would need to obtain records before I would move forward if they decided to pursue it. I spoke with Dr. Jeanine Lopez who will discuss goals of care with the patient and his family tomorrow at a family meeting.  I explained my conversation with the family to her and will follow up after this meeting if further discussion about whole brain RT is appropriate.     Plan: -Discussed whole brain RT versus steroids alone  -WIll await results of goals of care discussion with Palliative Care Team    UPDATE: Patient and family have decided to proceed to hospice without pursuing whole brain radiation

## 2019-09-25 NOTE — PROGRESS NOTES
Problem: Pressure Injury - Risk of  Goal: *Prevention of pressure injury  Description  Document Angelito Scale and appropriate interventions in the flowsheet. Outcome: Progressing Towards Goal  Note:   Pressure Injury Interventions:  Sensory Interventions: Assess changes in LOC, Check visual cues for pain, Discuss PT/OT consult with provider, Keep linens dry and wrinkle-free    Moisture Interventions: Absorbent underpads, Apply protective barrier, creams and emollients, Check for incontinence Q2 hours and as needed, Limit adult briefs    Activity Interventions: PT/OT evaluation, Increase time out of bed    Mobility Interventions: Turn and reposition approx. every two hours(pillow and wedges), PT/OT evaluation, HOB 30 degrees or less    Nutrition Interventions: Offer support with meals,snacks and hydration    Friction and Shear Interventions: HOB 30 degrees or less, Lift sheet                Problem: Falls - Risk of  Goal: *Absence of Falls  Description  Document Franck Fall Risk and appropriate interventions in the flowsheet.   Outcome: Progressing Towards Goal  Note:   Fall Risk Interventions:  Mobility Interventions: Communicate number of staff needed for ambulation/transfer, Patient to call before getting OOB, Strengthening exercises (ROM-active/passive)    Mentation Interventions: Increase mobility, More frequent rounding, Update white board    Medication Interventions: Evaluate medications/consider consulting pharmacy, Patient to call before getting OOB, Teach patient to arise slowly    Elimination Interventions: Bed/chair exit alarm, Call light in reach, Stay With Me (per policy), Urinal in reach, Toilet paper/wipes in reach    History of Falls Interventions: Bed/chair exit alarm, Door open when patient unattended, Investigate reason for fall, Assess for delayed presentation/identification of injury for 48 hrs (comment for end date), Vital signs minimum Q4HRs X 24 hrs (comment for end date)         Problem: Patient Education: Go to Patient Education Activity  Goal: Patient/Family Education  Outcome: Progressing Towards Goal     Problem: Discharge Planning  Goal: *Discharge to safe environment  Outcome: Progressing Towards Goal  Goal: *Knowledge of medication management  Outcome: Progressing Towards Goal  Goal: *Knowledge of discharge instructions  Outcome: Progressing Towards Goal     Problem: Patient Education: Go to Patient Education Activity  Goal: Patient/Family Education  Outcome: Progressing Towards Goal     Problem: Nutrition Deficit  Goal: *Optimize nutritional status  Outcome: Progressing Towards Goal     Problem: Patient Education: Go to Patient Education Activity  Goal: Patient/Family Education  Outcome: Progressing Towards Goal     Problem: Patient Education: Go to Patient Education Activity  Goal: Patient/Family Education  Outcome: Progressing Towards Goal     Problem: Patient Education: Go to Patient Education Activity  Goal: Patient/Family Education  Outcome: Progressing Towards Goal

## 2019-09-25 NOTE — PROGRESS NOTES
Problem: Pressure Injury - Risk of  Goal: *Prevention of pressure injury  Description  Document Angelito Scale and appropriate interventions in the flowsheet. Outcome: Progressing Towards Goal  Note:   Pressure Injury Interventions:  Sensory Interventions: Assess changes in LOC, Check visual cues for pain, Discuss PT/OT consult with provider, Keep linens dry and wrinkle-free    Moisture Interventions: Absorbent underpads, Apply protective barrier, creams and emollients, Check for incontinence Q2 hours and as needed, Limit adult briefs    Activity Interventions: PT/OT evaluation, Increase time out of bed    Mobility Interventions: Turn and reposition approx. every two hours(pillow and wedges), PT/OT evaluation, HOB 30 degrees or less    Nutrition Interventions: Offer support with meals,snacks and hydration    Friction and Shear Interventions: HOB 30 degrees or less, Lift sheet                Problem: Falls - Risk of  Goal: *Absence of Falls  Description  Document Franck Fall Risk and appropriate interventions in the flowsheet.   Outcome: Progressing Towards Goal  Note:   Fall Risk Interventions:  Mobility Interventions: Communicate number of staff needed for ambulation/transfer, Patient to call before getting OOB, Strengthening exercises (ROM-active/passive)    Mentation Interventions: Increase mobility, More frequent rounding, Update white board    Medication Interventions: Evaluate medications/consider consulting pharmacy, Patient to call before getting OOB, Teach patient to arise slowly    Elimination Interventions: Bed/chair exit alarm, Call light in reach, Stay With Me (per policy), Urinal in reach, Toilet paper/wipes in reach    History of Falls Interventions: Bed/chair exit alarm, Door open when patient unattended, Investigate reason for fall, Assess for delayed presentation/identification of injury for 48 hrs (comment for end date), Vital signs minimum Q4HRs X 24 hrs (comment for end date)         Problem: Discharge Planning  Goal: *Discharge to safe environment  Outcome: Progressing Towards Goal  Goal: *Knowledge of medication management  Outcome: Progressing Towards Goal  Goal: *Knowledge of discharge instructions  Outcome: Progressing Towards Goal     Problem: Nutrition Deficit  Goal: *Optimize nutritional status  Outcome: Progressing Towards Goal

## 2019-09-25 NOTE — HOSPICE
[facility]  [phone]    Patient Name: León Goodwin  YOB: 1960  Age: 61 y.o. [employer] LCSW Note:  Hospice consult noted. Chart reviewed. Plan of care discussed with patients n care manager. This LCSW spoke with pts CM [place] citizen [place] re pts [facility] and and need for personal care attendants with LTC Medicaid. CM reports [facility] will be processed [place]. LCSW called Arron Flores. DSS and spoke with Dulce Hobson ( [phone] ext# [phone], fax: [phone]) pts /Benefits  to discuss need for LTC Medicaid personal care attendants for pt. Ms. Jeff Estrada sent this LCSW and pts home Swer Cory Argue an updated list of personal care agencies in [facility].     In to meet with pt to discuss dc home with [employer]. No needs reported, pt looking forward to going home. LCSW called pts wife Shani Morrow to update her on transition to LTC Medicaid. LCSW will send Bill Esquivel DSS and Medicaid  information to his home Swer. Thank you for the opportunity to be of service to Mr. Genaro Alexandra and his family.     Cecile Woodson [facility], [address] [place] [address] [place]  [phone]

## 2019-09-25 NOTE — PROGRESS NOTES
Pharmacist Discharge Medication Reconciliation    Discharging Provider: Dr. Manda Lynn PMH:   Past Medical History:   Diagnosis Date    Cancer ([facility]     LUNG RT. SIDE METS TO BRAIN, LIVER    Chronic obstructive pulmonary disease ([facility]     MILD PER WIFE    Diabetes ([facility]     TYPE II    GERD (gastroesophageal reflux disease)     Hypertension      Chief Complaint for this Admission: No chief complaint on file. Allergies: Other medication    Discharge Medications:   Current Discharge Medication List        START taking these medications    Details   polyethylene glycol (MIRALAX) 17 gram packet Take 1 Packet by mouth daily for 30 days. Qty: 30 Packet, Refills: 0      dexAMETHasone (DECADRON) 4 mg tablet Take 4 mg by mouth three (3) times daily. Qty: 90 Tab, Refills: 0           CONTINUE these medications which have CHANGED    Details   insulin glargine (LANTUS) 100 unit/mL injection 7 Units by SubCUTAneous route nightly. Use toujeo pen when out of lantus vial.  Qty: 3 Vial, Refills: 3    Associated Diagnoses: Controlled type 2 diabetes mellitus with diabetic nephropathy, unspecified whether long term insulin use ([facility]           CONTINUE these medications which have NOT CHANGED    Details   morphine CR (MS CONTIN) 30 mg CR tablet Take 1 Tab by mouth every eight (8) hours for 30 days. Max Daily Amount: 90 mg.  Qty: 90 Tab, Refills: 0    Associated Diagnoses: Malignant neoplasm of upper lobe of lung, unspecified laterality (HCC)      gabapentin (NEURONTIN) 100 mg capsule Take 1 Cap by mouth three (3) times daily. Max Daily Amount: 300 mg. Qty: 90 Cap, Refills: 0    Associated Diagnoses: Brachial neuralgia      insulin lispro (HUMALOG U-100 INSULIN) 100 unit/mL injection INJECT 5 UNITS SUBCUTANEOUSLY BEFORE MEALS-INCREASE HUMALOG 1 UNIT EVERY OTHER DAY UNTIL 2 HR PP  OR LESS.  Use Humalog kwik pen after done with vial.  Qty: 3 Vial, Refills: 1    Associated Diagnoses: Controlled type 2 diabetes mellitus with diabetic nephropathy, unspecified whether long term insulin use (HCC)      lisinopril (PRINIVIL, ZESTRIL) 10 mg tablet Take 1 Tab by mouth daily. Qty: 30 Tab, Refills: 5    Associated Diagnoses: Hypertension goal BP (blood pressure) < 130/80      ibuprofen (MOTRIN) 200 mg tablet Take  by mouth.      pantoprazole (PROTONIX) 40 mg tablet Take 1 Tab by mouth two (2) times a day. Indications: gastroesophageal reflux disease  Qty: 60 Tab, Refills: 6      megestrol (MEGACE) 40 mg tablet Take 1 Tab by mouth two (2) times a day. Qty: 60 Tab, Refills: 2    Associated Diagnoses: Anorexia      lidocaine-prilocaine (EMLA) topical cream Apply  to affected area as needed for Pain. Qty: 30 g, Refills: 0    Associated Diagnoses: Malignant neoplasm of upper lobe of right lung (HCC)      ondansetron (ZOFRAN ODT) 4 mg disintegrating tablet Take 1 Tab by mouth every eight (8) hours as needed for Nausea. Qty: 40 Tab, Refills: 1    Associated Diagnoses: Malignant neoplasm of upper lobe of right lung ([address] Chemotherapy-induced nausea      oxyCODONE IR (ROXICODONE) 20 mg immediate release tablet Take 1 Tab by mouth every four (4) hours as needed for Pain for up to 30 days. Max Daily Amount: 120 mg.  Qty: 180 Tab, Refills: 0    Associated Diagnoses: Lung cancer metastatic to brain [facility])             The patient's chart, MAR and AVS were reviewed by Kanchan Rodriguez.

## 2019-09-25 NOTE — PROGRESS NOTES
Bedside shift change report given to Jose Howard RN (oncoming nurse) by Nasim Hernandes RN (offgoing nurse). Report included the following information SBAR, Kardex, Procedure Summary, MAR, Recent Results, Med Rec Status, Cardiac Rhythm NSR, Alarm Parameters  and Quality Measures.

## 2019-09-25 NOTE — PROGRESS NOTES
Hospitalist Progress Note  Rohini Machuca MD  Answering service: [phone] OR [phone] from in house phone        Date of Service:  2019  NAME:  Fransisco Bloom  :  1960  MRN:  [id number]      Admission Summary:     63-year-old  male with a past medical history of non-small cell lung cancer stage IV who presented to ED with left sided weakness. Interval history / Subjective:   Patient was seen and examined this morning   - No acute problem reported      Assessment & Plan:     New Large right brain metastasis  Cerebral edema with midline shift  MRI brain with interval progression of metastatic disease with a large metastatic foci in the periatrial region of the right parietal lobe and the posterior right temporal lobe. Interval increased associated vasogenic edema, right to left midline shift of approximately 9 mm. Neurosurgery team and oncology teams are following. Continue dexamethasone as per NS. I discussed with NS regarding this. Neuro-checks. Telemetry monitor. Stage IV lung cancer with metastasis to brain, liver and lungs  Oncology and palliative team input appreciated. .  CT chest/abd/pelv revealed significant progression of metastatic disease. Diabetes mellitus with hypoglycemia  Glucose 56 on admission. No further hypoglycemia. Diabetes is uncontrolled now with hyperglycemia. Continue sliding scale insulin. Will resume his Lantus to his half dose tonight. Mild protein calorie malnutrition  Nutrition consulted, continue supplement. Hypertension  BP is reasonable with Lisinopril. See orders for other plans. VTE prophylaxis: SCD  Code status: Full  Discussed plan of care with Patient/Family and Nurse. Pre-admission lived at home.    Discharge planning: Possible to discharge with home hospice today      Hospital Problems  Date Reviewed: 2019          Codes Class Noted POA    Cerebral edema (HCC) ICD-10-CM: G93.6  ICD-9-CM: 348.5  9/20/2019 Unknown        Brain metastases ([facility] ICD-10-CM: C79.31  ICD-9-CM: 198.3  9/20/2019 Unknown                Review of Systems:     Symptom  Y/N  Comments    Symptom  Y/N  Comments    Fever/Chills  n      Chest Pain  n      Poor Appetite  n      Edema  n       Cough  n     Abdominal Pain  n       Sputum  n     Joint Pain         SOB/ROCHE  n     Pruritis/Rash         Nausea/vomit  n     Tolerating PT/OT         Diarrhea        Tolerating Diet         Constipation        Other         Could not obtain due to:                  Vital Signs:    Last 24hrs VS reviewed since prior progress note. Most recent are:  Visit Vitals  /47   Pulse 94   Temp 98.1 °F (36.7 °C)   Resp 13   Ht 5' 10\" (1.778 m)   Wt 75.1 kg (165 lb 8 oz)   SpO2 98%   BMI 23.75 kg/m²         Intake/Output Summary (Last 24 hours) at 9/25/2019 2089  Last data filed at 9/25/2019 0000  Gross per 24 hour   Intake --   Output 950 ml   Net -950 ml        Physical Examination:             Constitutional:  No acute distress, cooperative, pleasant    ENT:  Oral mucous moist, oropharynx benign. Neck supple,    Resp:  CTA bilaterally. No wheezing/rhonchi/rales. No accessory muscle use   CV:  Regular rhythm, normal rate, no murmurs, gallops, rubs    GI:  Soft, non distended, non tender. normoactive bowel sounds, no hepatosplenomegaly     Musculoskeletal:  No edema, warm, 2+ pulses throughout    Neurologic:  Moves all extremities. AAOx3, CN II-XII reviewed            Data Review:    Review and/or order of clinical lab test      Labs:     Recent Labs     09/23/19 0121   WBC 12.6*   HGB 11.2*   HCT 35.0*        Recent Labs     09/23/19 0121   *   K 4.4      CO2 26   BUN 26*   CREA 1.01   *   CA 9.5     No results for input(s): SGOT, GPT, ALT, AP, TBIL, TBILI, TP, ALB, GLOB, GGT, AML, LPSE in the last 72 hours.     No lab exists for component: AMYP, HLPSE  No results for input(s): INR, PTP, APTT, INREXT in the last 72 hours. No results for input(s): FE, TIBC, PSAT, FERR in the last 72 hours. No results found for: FOL, RBCF   No results for input(s): PH, PCO2, PO2 in the last 72 hours. No results for input(s): CPK, CKNDX, TROIQ in the last 72 hours.     No lab exists for component: CPKMB  Lab Results   Component Value Date/Time    Cholesterol, total 183 03/30/2010 04:00 AM    HDL Cholesterol 29 (L) 03/30/2010 04:00 AM    LDL, calculated 116.6 (H) 03/30/2010 04:00 AM    Triglyceride 187 (H) 03/30/2010 04:00 AM    CHOL/HDL Ratio 6.3 (H) 03/30/2010 04:00 AM     Lab Results   Component Value Date/Time    Glucose (POC) 245 (H) 09/25/2019 07:28 AM    Glucose (POC) 369 (H) 09/24/2019 09:41 PM    Glucose (POC) 345 (H) 09/24/2019 04:40 PM    Glucose (POC) 237 (H) 09/24/2019 11:29 AM    Glucose (POC) 198 (H) 09/24/2019 07:38 AM     Lab Results   Component Value Date/Time    Color YELLOW/STRAW 09/19/2019 10:16 AM    Appearance CLEAR 09/19/2019 10:16 AM    Specific gravity 1.020 09/19/2019 10:16 AM    Specific gravity 1.007 12/31/2018 04:19 PM    pH (UA) 5.5 09/19/2019 10:16 AM    Protein NEGATIVE  09/19/2019 10:16 AM    Glucose NEGATIVE  09/19/2019 10:16 AM    Ketone 15 (A) 09/19/2019 10:16 AM    Bilirubin Negative 08/20/2019 10:54 AM    Urobilinogen 1.0 09/19/2019 10:16 AM    Nitrites NEGATIVE  09/19/2019 10:16 AM    Leukocyte Esterase NEGATIVE  09/19/2019 10:16 AM    Epithelial cells FEW 09/19/2019 10:16 AM    Bacteria NEGATIVE  09/19/2019 10:16 AM    WBC 0-4 09/19/2019 10:16 AM    RBC 0-5 09/19/2019 10:16 AM         Medications Reviewed:     Current Facility-Administered Medications   Medication Dose Route Frequency    oxyCODONE IR (ROXICODONE) tablet 20 mg  20 mg Oral Q4H PRN    dexAMETHasone (DECADRON) tablet 4 mg  4 mg Oral TID    insulin glargine (LANTUS) injection 7 Units  7 Units SubCUTAneous QHS    lactulose (CHRONULAC) 10 gram/15 mL solution 15 mL  10 g Oral DAILY PRN    influenza vaccine 2019-20 (6 mos+)(PF) (FLUARIX/FLULAVAL/FLUZONE QUAD) injection 0.5 mL  0.5 mL IntraMUSCular PRIOR TO DISCHARGE    polyethylene glycol (MIRALAX) packet 17 g  17 g Oral DAILY    sodium chloride (NS) flush 5-40 mL  5-40 mL IntraVENous Q8H    sodium chloride (NS) flush 5-40 mL  5-40 mL IntraVENous PRN    acetaminophen (TYLENOL) tablet 650 mg  650 mg Oral Q4H PRN    ondansetron (ZOFRAN) injection 4 mg  4 mg IntraVENous Q4H PRN    insulin lispro (HUMALOG) injection   SubCUTAneous AC&HS    glucose chewable tablet 16 g  4 Tab Oral PRN    glucagon (GLUCAGEN) injection 1 mg  1 mg IntraMUSCular PRN    dextrose 10% infusion 0-250 mL  0-250 mL IntraVENous PRN    ondansetron (ZOFRAN ODT) tablet 4 mg  4 mg Oral Q8H PRN    megestrol (MEGACE) tablet 40 mg  40 mg Oral BID    pantoprazole (PROTONIX) tablet 40 mg  40 mg Oral BID    lisinopril (PRINIVIL, ZESTRIL) tablet 10 mg  10 mg Oral DAILY    morphine CR (MS CONTIN) tablet 30 mg  30 mg Oral Q8H    gabapentin (NEURONTIN) capsule 100 mg  100 mg Oral TID     ______________________________________________________________________  EXPECTED LENGTH OF STAY: 3d 19h  ACTUAL LENGTH OF STAY:          6                 Sol Vargas MD

## 2019-09-25 NOTE — DIABETES MGMT
[facility]    [facility] Consult Follow Up Note    Recommendations/ Comments: Pt noted with elevated BG likely due to steroids. Receiving 4 mg dexamethasone q 6 hours at this time.  mg/dL - 198 mg/dL past two days and post prandial BG's > 200 mg/dL. If desiring tighter glycemic contorl, please consider:  Increasing Lantus to 14 units while pt is on steroids  Change lispro correction scale to normal sensitivity         Current hospital DM medication: lispro correction scale - high sensitivity and Lantus 7 units   Chart reviewed on Rita Padron. Patient is a 61 y.o. male with known DM on Lantus, 14 units daily and AC lispro 5 units TID at home. A1c:   Lab Results   Component Value Date/Time    Hemoglobin A1c 5.6 09/20/2019 05:31 AM    Hemoglobin A1c 5.7 (H) 08/20/2019 10:54 AM       Recent Glucose Results:   Lab Results   Component Value Date/Time    GLUCPOC 245 (H) 09/25/2019 07:28 AM    GLUCPOC 369 (H) 09/24/2019 09:41 PM    GLUCPOC 345 (H) 09/24/2019 04:40 PM        Lab Results   Component Value Date/Time    Creatinine 1.01 09/23/2019 01:21 AM     Estimated Creatinine Clearance: 81.3 mL/min (by C-G formula based on SCr of 1.01 mg/dL). Active Orders   Diet    DIET DIABETIC WITH OPTIONS Consistent Carb 2400kcal; Mechanical Soft        PO intake: No data found. Will continue to follow as needed.     Thank you  Sony Nichols RD, CDE        Time spent: 4 minutes

## 2019-09-26 NOTE — TELEPHONE ENCOUNTER
CM spoke with [facility] intake [phone] and advised that [facility] was successfully processed for home personal care aide. Faxed processed [facility] to [phone] and confirmation received.     CUCA Navarro

## 2019-11-16 PROBLEM — Z51.5 HOSPICE CARE: Status: ACTIVE | Noted: 2019-01-01

## 2019-11-17 NOTE — PROGRESS NOTES
Spiritual Care Assessment/Progress Note  [facility]      NAME: Vito Gannon      MRN: [id number]  AGE: 61 y.o. SEX: male  Adventism Affiliation: Cheondoism   Language: English     11/17/2019     Total Time (in minutes): 54     Spiritual Assessment begun in [facility] through conversation with:         []Patient        [x] Family    [] Friend(s)        Reason for Consult: Family care     Spiritual beliefs: (Please include comment if needed)     [] Identifies with a juan tradition:         [] Supported by a juan community:            [] Claims no spiritual orientation:           [] Seeking spiritual identity:                [x] Adheres to an individual form of spirituality:           [] Not able to assess:                           Identified resources for coping:      [x] Prayer                               [] Music                  [] Guided Imagery     [x] Family/friends                 [] Pet visits     [] Devotional reading                         [] Unknown     [] Other:                                        Interventions offered during this visit: (See comments for more details)          Family/Friend(s):  Affirmation of juan, Affirmation of emotions/emotional suffering, Catharsis/review of pertinent events in supportive environment, End of life issues discussed, Coping skills reviewed/reinforced, Iconic (affirming the presence of God/Higher Power), Normalization of emotional/spiritual concerns, Adventism beliefs/image of God discussed, Prayer (assurance of)     Plan of Care:     [x] Support spiritual and/or cultural needs    [] Support AMD and/or advance care planning process      [x] Support grieving process   [] Coordinate Rites and/or Rituals    [] Coordination with community clergy   [] No spiritual needs identified at this time   [] Detailed Plan of Care below (See Comments)  [] Make referral to Music Therapy  [] Make referral to Pet Therapy     [] Make referral to Addiction services  [] Make referral to [facility]  [] Make referral to Spiritual Care Partner  [] No future visits requested        [x] Follow up visits as needed     Comments:   Responded to request from Robinson Otero RN to be present for family meeting with patient's family, Andres Souza, and Hospice physician. Family included wife, father, brother, and sons. Provided ministry of presence and empathic listening as wife, primarily, processed feelings of being overwhelmed by medical concerns, fears surrounding uncertainty, the realization patient may be transitioning. She was tearful; experiencing anticipatory grief. Wife, Jamey Richard, and patient are raising their 15year old granddaughters and concerns about them were raised as well. Family appears supportive of Dilcia, especially patient's father and Julmissy Nicks son. Family asked to be kept in prayer. Offered assurance of prayer as well as availability of ongoing pastoral support as requested/needed.        FritzDAVID Jay, [facility], Staff [address]    [address] Paging Service  [phone])

## 2019-11-17 NOTE — PROGRESS NOTES
Oncology End of Shift Note      Bedside shift change report given to Cathye Peabody, RN (incoming nurse) by David Smalls (outgoing nurse) on Billy Perfect. Report included the following information SBAR, Kardex, Intake/Output, MAR, Accordion and Recent Results. Shift Summary: Sugars better this shift than overnight; patient is alert and speaking, also standing on the side of the bed; Bland inserted for comfort and draining well; denies pain; L BM; family at the bedside most of the day       Issues for Physician to Address:       Patient on Cardiac Monitoring?    [] Yes  [x] No    Rhythm:          Shift Events    David Smalls

## 2019-11-17 NOTE — PROGRESS NOTES
Problem: Falls - Risk of  Goal: *Absence of Falls  Description  Document Domonique Saucedo Fall Risk and appropriate interventions in the flowsheet. Note:   Fall Risk Interventions:  Mobility Interventions: Bed/chair exit alarm    Mentation Interventions: Adequate sleep, hydration, pain control, Door open when patient unattended    Medication Interventions: Bed/chair exit alarm, Patient to call before getting OOB    Elimination Interventions: Bed/chair exit alarm, Call light in reach              Problem: Comfort Deficit  Goal: Reduce/control pain  Description  Patient will report that pain has been reduced or controlled through verbal and nonverbal means and that measures to promote comfort are effective. Note:   Pt had been on scheduled ms contin at home. Frequent assessments with prn morphine/IV available. Problem: Anticipatory Grief  Goal: Explore reactions to and verbalize acceptance of impending loss  Description  Patient/family/caregiver will explore reactions to and verbalize acceptance of impending loss. Note:   Educated family on EOL processes with written material.  Family verbalized understanding. Problem: Anxiety/Agitation  Goal: Verbalize and demonstrate ability to manage anxiety  Description  The patient/family/caregiver will verbalize and demonstrate ability to manage the patient's anxiety throughout hospice care. Note:   PRN lorazepam available for s/s of agitation/restlessness.

## 2019-11-17 NOTE — H&P
[facility]   Good Help to Those in Need  [phone]    Patient Name: Derek Acosta  YOB: 1960    Date of Provider Hospice Visit: 11/17/19    Level of Care:   [x] General Inpatient (GIP)    [] Routine   [] Respite    Current Location of Care:  [] [facility] [] [facility] [x] [address] [] [facility] [] Hospice House [facility], patient referred from:  [] [facility] [] [facility] [] [address] [] [facility] [] Home [] Other:     Date of Original Hospice Admission: 9/25/19  Hospice Medical Director at time of admission: [place] Diagnosis:  Metastatic non small cell lung cancer  Diagnoses RELATED to the terminal prognosis: Brain and liver metastases  Other Diagnoses: DM type 2, hypertension     HOSPICE SUMMARY   Do not cut and paste chart information other than imaging findings    Derek Acosta is a 61y.o. year old who was admitted to [facility]. Patient is a 62 yo with stage 4 lung cancer dx 1/2019 with brain and liver mets s/p brain tumor removal at [facility] who was found to have a large R sided temporal and parietal lobes this past September after presenting with L sided weakness. CT abd/pelvis also showed progression of disease in the liver and lung. He was no longer deemed a candidate for systemic treatment and was discharged to home in the care of his wife Yelitza Pichardo with the support of Hospice. Over the past couple of weeks family has noted Cayla Clark to be increasingly confused and acting erratically. He exhibited periods of listlessness. His blood sugars were running high last week and he has had increasing difficulty injecting his insulin. Yesterday he was unable to take in anything to eat or drink nor take any medicines. He was found markedly confused by our Hospice RN. Decision was made to admit to GIP level of care to manage dehydration and hyperglycemia.       The patient's principle diagnosis has resulted in delirium, lethargy, labile blood sugars and dehydration  Refer to LCD     Functionally, the patient's Karnofsky and/or Palliative Performance Scale has declined over a period of months and is estimated at 20%. The patient is dependent on the following ADLs: All    Objective information that support this patients limited prognosis includes:  Profound lethargy, terminal delirium     EXAM:  MRI BRAIN W WO CONT  9/19/19  Clinical: Evaluate for intracranial metastases  INDICATION:    brain mets     FINDINGS:     Enlarged metastasis in the right parietal/temporal lobe  New associated extensive vasogenic edema. Right periatrial lesion adjacent to the right lateral ventricle   10-11 17 x 16 mm previously 5 x 6 mm.     10-15 right posterior temporal lesion 25 x 29 mm previously 11 x 5 mm.      Interval right to left midline shift with extensive associated vasogenic edema  in the right temporal right parietal and right frontal lobes. Right-to-left  midline shift with approximately 9 mm. Effacement of the right lateral  ventricle. Effacement of the right cerebral peduncle.     Status post resection of the large metastasis at the right parieto-occipital  region. Resection cavity   Slightly diminished in size with no significant residual nodular enhancement. No  local recurrence demonstrated. There are no other new intracranial metastatic foci demonstrated. Hemosiderin  deposition at the   operative site and tiny focus of chronic hemorrhage in the left inferior frontal  lobe region in the location of the prior metastasis, unchanged  Previous right occipital craniotomy. No destructive lesions. No Chiari or syrinx. Cavernous sinuses are symmetric. CT Abd / Pelvis:  9/20/19  CLINICAL HISTORY: Lung cancer follow-up     INDICATION:  Lung cancer follow-up, right-sided lung cancer with metastases  COMPARISON:  7/31/2019     TECHNIQUE:   Thin axial images were obtained through the chest, abdomen and pelvis. Coronal  and sagittal reconstructions were generated. Oral contrast was administered.    CT dose reduction was achieved through use of a standardized protocol tailored  for this examination and automatic exposure control for dose modulation. Adaptive statistical iterative reconstruction (ASIR) was utilized.     FINDINGS:      SUPRACLAVICULAR REGION: Major cervical vasculature within normal limits. No  supraclavicular adenopathy. MEDIASTINUM: Mediastinal adenopathy is not significant change compared to the  prior examination, 3-25 33 x 18 mm. Subcarinal lymph node is not significantly  changed.        PLEURA/LUNGS[de-identified]   Right upper lobe mass lesion at 3-19 19 x 37 mm in size unchanged. IV tiny  satellite nodule 3-23 new compared to the prior exam. There is no pleural  effusion. Trace pericardial fluid. SOFT TISSUE/ AXILLA: No axillary adenopathy. No chest wall mass. LIVER/GALLBLADDER:      Widespread intrahepatic metastatic disease is progressed compared to the  previous exam.     3-58 left hepatic lobe 19 x 19 mm in size previously 11 x 11 mm. 3-59 right  hepatic lobe adjacent to the gallbladder 37 x 30 mm in size previously 26 x 20  mm in size. There are additional new and/or enlarged hepatic metastatic foci. There is no intrahepatic duct dilatation. The CBD is not dilated. SPLEEN/PANCREAS: No mass. . There is no pancreatic mass. There is no pancreatic  duct dilatation. There is no evidence of splenomegaly. ADRENALS/KIDNEYS: Left renal hypodensity is most likely a cyst. Left renal  cortical atrophy. . There is no adrenal mass. There is no hydroureter or  hydronephrosis. There is no perinephric mass. No ureteral or bladder calculus. STOMACH, COLON AND SMALL BOWEL: Fecal stasis. There is no obstruction or free  intraperitoneal air. There is no evidence of incarceration or obstruction. No  mesenteric adenopathy. No retroperitoneal adenopathy. APPENDIX: Unremarkable. PERITONEUM/RETROPERITONEUM: There is no abdominal aortic aneurysm. No  significant lymphadenopathy.   URINARY BLADDER: No mass or calculus. PELVIS: There is no pelvic adenopathy. There is no pelvic sidewall mass. There  is no inguinal adenopathy. BONES: No fracture or dislocation. . No lytic or blastic lesions. No evidence of  fracture or dislocation.     IMPRESSION  IMPRESSION:  Imaging findings are consistent with interval progression of disease.     There is extensive progression of metastatic disease in the hepatic parenchyma  with numerous new and/or enlarged hepatic mass lesions as described above.     Possible new small satellite nodule in the right upper lobe of the lung. Mediastinal and primary lung mass/adenopathy not significantly changed. The patient/family chose comfort measures with the support of Hospice. HOSPICE DIAGNOSES   Active Symptoms:  1. Lethargy  2. Delirium  3. Cancer related pain  4. Hyperglycemia     PLAN   1. Pt was admitted last evening for assessment and management of hyperglycemia, dehydration and resultant delirium. A metabolic panel on admission revealed a glucose of 738, AG of 8, Na of 132, Cr of 1.59 (up from 1.01) c/w HHS. He received NS IV hydration at 250 cc/hr overnight along with humalog sliding scale q4h. He continues to require GIP level of care due to need for frequent nursing and provider assessments, medications via IV route. 2. The following conditions require ongoing management:  3. HHS- resolved. Glucose now in normal range. Hold IVF for now. Continue q4h glucose checks with prn humalog. Lower Lantus to 10 units qhs (he received 14 untis last night). 4. Brain mets- Order at home was for Decadron 6 mg BID. Has been on 12 mg daily since Sept hospitalization. Recommend to taper start taper; trying to balance hyperglycemia w/ benefit. I suspect pt is transitioning and tumor has evolved in the brain; benefit from steroid likely much less than it was prior.   Reduce dose to 6 mg daily and give through IV while unable to take PO.  5. Delirium- due to brain mets and hyperglycemia. Improved this morning; he is calm and resting. Haldol available prn through IV. 6. Cancer related pain- on MS Contin 60 mg q8h and oxycodone 20 mg q1h prn at home. Unclear last time he took his oral meds, it seems to have been several days. He received no opioid overnight and has no signs/sx of withdrawal or c/o pain or discomfort this a.m. Opt to hold MS Contin as I fear we may not have oral route for much longer. Ordered Morphine 6 mg IV q1h prn for mod-severe pain. 7.  and SW to support family needs  8. Met with multiple family members in the family room today including wife Alec Garcia, sons Fredrick Fairbanks and Guero, Brother Billy, Pt's Father, along with Hospice RN Efren Benavides and on call hospital . Stephanie Valdivia shared her experiences with her  over the past week, namely discussing his increase in confusion. We talked about likelihood that his brain tumor has progressed leading to his decline. Shared that the hyperglycemic state has been reversible and this has allowed him to be more comfortable today. I suspect he is transitioning and now has closer to days to short weeks to live, but ongoing assessment over the next 24 hrs will provide us more information about his ability to take in anything orally. He may rally; but he may not. Stephanie Valdivia is tearful and has many questions about where to care for him, unsure if she will be able to manage his medicines the way they need to be. Her son provided a great deal of support and reassurance. We reassured her that we will formulate a good plan once he is stabilized to ensure he is well cared for at the end of life. Alec Jose thinks Андрей Gela would want to be at home. Gerald Hernandez will arrange for SW support in the morning. 9. Disposition: SW to meet with wife to talk about returning home vs placement with Hospice once symptoms controlled.     Prognosis estimated based on 11/17/19 clinical assessment is:   [] Hours to Days    [x] Days to Weeks    [] Other:    Communicated plan of care with: Hospice Case Manager; Hospice IDT; Care Team     GOALS OF CARE     Patient/Medical POA stated Goal of Care: Comfort with the support of Hospice    [x] I have reviewed and/or updated ACP information in the Advance Care Planning Navigator. This information is available in the [address] link in the patient's chart header. Primary Decision [facility] Agent):   Primary Decision Maker: Suzan Gregorio - [phone]    Secondary Decision Maker: Eyad Stephenson - Father - [phone]    Resuscitation Status: DNR  If DNR is there a Durable DNR on file? : [x] Yes [] No (If no, complete Durable DNR)    HISTORY     History obtained from: chart review, RNs, family    CHIEF COMPLAINT: fatigue and confusion  The patient is:   [x] Verbal - minimally interactive  [] Nonverbal  [] Unresponsive    HPI/SUBJECTIVE:  Mr. Carly Marina was admitted to [address] [facility] yesterday after experiencing several days of worsening confusion and agitation, erratic sleep pattern and listlessness. He was not eating or drinking or injecting his insulin. Overnight he received IV hydration, insulin and close monitoring here at [address] [facility] and this am he appears comfortable but remains lethargic.   He denies pain or discomfort but expresses thirst.         REVIEW OF SYSTEMS     The following systems were: [x] reviewed  [] unable to be reviewed    Positive ROS include:  Constitutional: fatigue, weakness, in pain, short of breath  Ears/nose/mouth/throat: increased airway secretions  Respiratory:shortness of breath, wheezing  Gastrointestinal:poor appetite, nausea, vomiting, abdominal pain, constipation, diarrhea  Musculoskeletal:pain, deformities, swelling legs  Neurologic:confusion, hallucinations, weakness  Psychiatric:anxiety, feeling depressed, poor sleep  Endocrine: increased thirst      Adult Non-Verbal Pain Assessment Score: 0    Face  [x] 0   No particular expression or smile  [] 1   Occasional grimace, tearing, frowning, wrinkled forehead  [] 2   Frequent grimace, tearing, frowning, wrinkled forehead    Activity (movement)  [x] 0   Lying quietly, normal position  [] 1   Seeking attention through movement or slow, cautious movement  [] 2   Restless, excessive activity and/or withdrawal reflexes    Guarding  [x] 0   Lying quietly, no positioning of hands over areas of body  [] 1   Splinting areas of the body, tense  [] 2   Rigid, stiff    Physiology (vital signs)  [x] 0   Stable vital signs  [] 1   Change in any of the following: SBP > 20mm Hg; HR > 20/minute  [] 2   Change in any of the following: SBP > 30mm Hg; HR > 25/minute    Respiratory  [x] 0   Baseline RR/SpO2, compliant with ventilator  [] 1   RR > 10 above baseline, or 5% drop SpO2, mild asynchrony with ventilator  [] 2   RR > 20 above baseline, or 10% drop SpO2, asynchrony with ventilator     FUNCTIONAL ASSESSMENT     Palliative Performance Scale (PPS):  10-20       PSYCHOSOCIAL/SPIRITUAL ASSESSMENT     Active Problems:    Hospice care (11/16/2019)      Past Medical History:   Diagnosis Date    Cancer ([facility] [address]     LUNG RT. SIDE METS TO BRAIN, LIVER    Chronic obstructive pulmonary disease (HCC)     MILD PER WIFE    Diabetes (HCC)     TYPE II    GERD (gastroesophageal reflux disease)     Hypertension       Past Surgical History:   Procedure Laterality Date    HX CERVICAL FUSION      HX CRANIOTOMY  01/29/2019    REMOVAL OF BRAIN METS AT [facility]    HX GI      COLONOSCOPY    HX HERNIA REPAIR Right 1972    INGUINAL    VASCULAR SURGERY PROCEDURE UNLIST Bilateral     LEGS      Social History     Tobacco Use    Smoking status: Former Smoker     Packs/day: 1.50     Years: 25.00     Pack years: 37.50     Last attempt to quit: 1/21/2009     Years since quitting: 10.8    Smokeless tobacco: Never Used   Substance Use Topics    Alcohol use: No     Frequency: Never     Family History   Problem Relation Age of Onset    Stroke Father     Diabetes Father    Caffie Andrez Other Mother 58        SUDDEN DEATH    Cancer Maternal Grandfather     Anesth Problems Neg Hx       Allergies   Allergen Reactions    Other Medication Nausea and Vomiting     Pt states he is allergic to diuretics but unsure of name         Current Facility-Administered Medications   Medication Dose Route Frequency    dexAMETHasone (DECADRON) tablet 6 mg  6 mg Oral BID    gabapentin (NEURONTIN) capsule 100 mg  100 mg Oral TID    insulin lispro (HUMALOG) injection   SubCUTAneous Q4H    glucose chewable tablet 16 g  4 Tab Oral PRN    dextrose (D50W) injection syrg 12.5-25 g  12.5-25 g IntraVENous PRN    glucagon (GLUCAGEN) injection 1 mg  1 mg IntraMUSCular PRN    morphine CR (MS CONTIN) tablet 60 mg  60 mg Oral Q8H    ondansetron (ZOFRAN ODT) tablet 4 mg  4 mg Oral Q4H PRN    oxyCODONE IR (ROXICODONE) tablet 20 mg  20 mg Oral Q1H PRN    haloperidol lactate (HALDOL) injection 2 mg  2 mg IntraVENous Q4H PRN    0.9% sodium chloride infusion  250 mL/hr IntraVENous CONTINUOUS    senna-docusate (PERICOLACE) 8.6-50 mg per tablet 1 Tab  1 Tab Oral DAILY    pantoprazole (PROTONIX) tablet 40 mg  40 mg Oral BID    sodium chloride (NS) flush 5-40 mL  5-40 mL IntraVENous Q8H    sodium chloride (NS) flush 5-40 mL  5-40 mL IntraVENous PRN    insulin glargine (LANTUS) injection 14 Units  14 Units SubCUTAneous QHS        PHYSICAL EXAM     Wt Readings from Last 3 Encounters:   09/25/19 75.1 kg (165 lb 8 oz)   09/25/19 72.3 kg (159 lb 6.3 oz)   09/19/19 72.1 kg (159 lb)       Visit Vitals  /62 (BP 1 Location: Left arm, BP Patient Position: At rest)   Pulse (!) 119   Temp 97.8 °F (36.6 °C)   Resp 14   SpO2 97%       Supplemental O2  [] Yes  [x] NO  Last bowel movement: unknown    Currently this patient has:  [] Peripheral IV [] PICC  [x] PORT [] ICD    [x] Bland Catheter [] NG Tube   [] PEG Tube    [] Rectal Tube [] Drain  [] Other:     Constitutional:  Ill appearing  male lying comfortably in bed  Eyes: sclerae anicteric  ENMT: op dry  Cardiovascular: RRR  Respiratory:  Good effort, clear bs anteriorly  Gastrointestinal:  Soft, non tender, nd, bs active  Musculoskeletal: no rigidity  Skin: warm, dry  Neurologic: lethargic, arouses to voice, follows simple commands  Psychiatric:  Calm, no agitation      Pertinent Lab and or Imaging Tests:  Lab Results   Component Value Date/Time    Sodium 132 (L) 11/16/2019 09:56 PM    Potassium 5.4 (H) 11/16/2019 09:56 PM    Chloride 96 (L) 11/16/2019 09:56 PM    CO2 28 11/16/2019 09:56 PM    Anion gap 8 11/16/2019 09:56 PM    Glucose 738 (HH) 11/16/2019 09:56 PM    BUN 54 (H) 11/16/2019 09:56 PM    Creatinine 1.59 (H) 11/16/2019 09:56 PM    BUN/Creatinine ratio 34 (H) 11/16/2019 09:56 PM    GFR est AA 54 (L) 11/16/2019 09:56 PM    GFR est non-AA 45 (L) 11/16/2019 09:56 PM    Calcium 10.0 11/16/2019 09:56 PM     Lab Results   Component Value Date/Time    Protein, total 7.6 11/16/2019 09:56 PM    Albumin 3.0 (L) 11/16/2019 09:56 PM           Total time: 75m  Counseling / coordination time: 60m  > 50% counseling / coordination?: y    Time start:  10:15  Time end:  11:45

## 2019-11-17 NOTE — HOSPICE
[facility]  [phone]    [facility] Daily Nursing Note   Patient Name: Ragini Anne  YOB: 1960  Age: 61 y.o. Date of Visit: 11/17/19  Facility of Care: [facility]  Patient Room: [facility]/01     Hospice Attending: Justin Correa MD  Hospice Diagnosis: Hospice care [Z51.5]    Level of Care: GIP    Current GIP Symptoms    1. Pain  2. Delirium  3. Hyperglycemia      ASSESSMENT & PLAN     1. Pain: pt denied pain at beginning of shift but has been on scheduled mc contin q8hrs at home. It is unclear if pt has been taking this medication. Morphine 6mg q1hr prn available  2. Delirium: r/t brain mets and uncontrolled BG. IV decadron started with BG monitoring/management. 3.  Hyperglycemia: unmanaged BG at home resulting in confusion, delirium and lethargy. Monitoring BG q4hrs with insulin, dextrose, glucagon and humalog available for administration. 4. Frequent monitoring with skilled nursing staff with MD/NP on-call 24/7      Spiritual Interventions: Hospital and Hospice Chaplain available 24/7    Psych/ Social/ Emotional Interventions: Wife Amber Bo, sons Guero and Felecia Nguyen, brother Carol Martinez, and father Alia King present. Amber Bo is concerned about begin able to care for pt if he were to come home. This nurse, Dr. Eloisa Palomares and Lizz Gray (pastoral care) were present for long discussion which was lead by Dr. Eloisa Palomares. Will contact MSW to contact family to set-up meeting with family to discuss returning home vs placement in facility. Care Coordination Needs: communication with [facility] nurse/staff to help with management of care, contact MSW to communicate pt/family needs for returning home vs placement in facility, and communicating with IDG.     Care plan and New Orders discussed / approved with Marilyn Winters MD.    Description History and Chart Review   Narrative History of last 24 hours that demonstrates care cannot be provided in another setting:  Pt requiring frequent assessments by skilled medical staff with administration of IV medications that cannot be performed outside the inpatient setting. What has been done to control the patient's symptoms in the last 24 hours? Monitoring of BG with administration of prn insulin, dextrose and glucagon. Start of IV decardon and prn morphine. Does the patient currently require IV medications? Yes  Does the patient currently require scheduled medications? Yes  Does the patient currently require a PCA? No    List number of doses of PRN medications in last 24 hours:  Medication 1:  dextrose  Number of doses:  1    Medication 2: glucagon  Number of doses:  1    Medication 3: humalog  Number of doses:  2    Supporting documentation for GIP need for pain control:  [x] Frequent evaluation by a doctor, nurse practitioner, nurse   [x] Frequent medication adjustment    [x] IVs that cannot be administered at home   [] Aggressive pain management   [x] Complicated technical delivery of medications              Supporting documentation for GIP need for symptom control:  [x]  Sudden decline necessitating intensive nursing intervention  []  Uncontrolled / intractable nausea or vomiting   []  Pathological fractures  []  Advanced open wounds requiring frequent skilled care  [] Unmanageable respiratory distress  [x] New or worsening delirium   [] Delirium with behavior issues: Is 24 hour caregiver present due to safety concerns with agitation? (yes/no)  [] Imminent death - with skilled nursing needs documented above    DISCHARGE PLANNING   1. Discharge Plan: Wife and family are concerned about pt returning home due to concerns that wife does not feel capable of caring for pt. MSW to contact family to help with placement. 2. Patient/Family teaching: educated on EOL processes and provided written material (Gone From My Sight)  3.  Response to patient/family teaching: Family verbalized understanding    ASSESSMENT    KARNOFSKY: 20    Prognosis estimated based on 11/17/19 clinical assessment is:   [] Few to Many Hours  [] Hours to Days   [] Few to Many Days   [x] Days to Weeks   [] Few to Many Weeks   [] Weeks to Months   [] Few to Many Months    Quality Measure: Patient self-reports:  [x] Yes    [] No    ESAS:   Time of Assessment: 1000  Pain (1-10):0  Fatigue (1-10): 8  Shortness of breath (1-10):0  Nausea (1-10): 0  Appetite (1-10): 0  Anxiety: (1-10):   Depression: (1-10):   Well-being: (1-10):   Constipation: _ Yes  _ No  LAST BM: unknown    CLINICAL INFORMATION     Patient Vitals for the past 12 hrs:   Temp Pulse Resp BP SpO2   11/17/19 0725 97.8 °F (36.6 °C) (!) 119 14 102/62 97 %       Currently this patient has:  [] Supplemental O2   [] IV    [] PICC      [x] PORT   [] NG Tube    [] PEG Tube   [] Ostomy     [x] Bland draining _yellow__ urine  [] Other:     SIGNS/PHYSICAL FINDINGS     Skin (including wound):  [] Warm, dry, supple, intact and color normal for race  [x] Warm   [] Dry   [] Cool     [] Clammy       [] Diaphoretic    Turgor   [] Normal   [x] Decreased  Color:   [] Pink   [] Pale   [] Cyanotic   [] Erythema   [] Jaundice   [x] Normal for Race  []  Wounds:    Neuro:  [x] Lethargy  [] Restlessness / agitation  [] Confusion / delirium  [] Hallucinations  [] Responds to maximal stimulation  [] Unresponsive  [] Seizures     Cardiac:  [] Dyspnea on Exertion  [] JVD  [] Murmur  [] Palpitations  [] Hypotension  [] Hypertension  [x] Tachycardia  [] Bradycardia  [] Irregular HR  [] Pulses Decreased  [] Pulses Absent  [] Edema:         [] Mottling:          Respiratory:  Breath sounds:    [] Diminished   [] Wheeze   [x] Rhonchi   [] Rales   [x] Even and unlabored  [] Labored:            [] Cough   [] Non Productive   [] Productive    [] Description:           [] Deep suctioned   [] O2 at ___ LPM  [] High flow oxygen greater than 10 LPM  [] Bi-Pap    GI  [x] Abdomen (soft)   [] Ascites  [] Nausea  [] Vomiting  [] Incontinent of bowels  [x] Bowel sounds (active)  [] Diarrhea  [] Constipation (see above including last bowel movement)  [] Checked for impaction  [] Last BM unknown    Nutrition  Diet:__diabetic__  Appetite:   [] Good   [] Fair   [x] Poor   [] Tube Feeding       [] Voiding  [] Incontinent   [x] Bland    Musculoskeletal  [] Balance/[place] Unsteady   [x] Weak   Strength:    [] Normal    [] Limited    [x] Decreasing   Activities:    [x] Up as tolerated   [] Bedridden    [] Specify:    SAFETY  [x] 24 hr. Caregiver   [x] Side rails ? [x] Hospital bed   [x] Reviewed Falls & Safety       ALLERGIES AND MEDICATIONS     Allergies:    Allergies   Allergen Reactions    Other Medication Nausea and Vomiting     Pt states he is allergic to diuretics but unsure of name          Current Facility-Administered Medications   Medication Dose Route Frequency    insulin glargine (LANTUS) injection 10 Units  10 Units SubCUTAneous QHS    bisacodyl (DULCOLAX) suppository 10 mg  10 mg Rectal DAILY PRN    [START ON 11/18/2019] pantoprazole (PROTONIX) tablet 40 mg  40 mg Oral ACB    [START ON 11/18/2019] dexamethasone (PF) (DECADRON) injection 6 mg  6 mg IntraVENous DAILY    morphine injection 6 mg  6 mg IntraVENous Q1H PRN    dexAMETHasone (DECADRON) tablet 6 mg  6 mg Oral BID    insulin lispro (HUMALOG) injection   SubCUTAneous Q4H    glucose chewable tablet 16 g  4 Tab Oral PRN    dextrose (D50W) injection syrg 12.5-25 g  12.5-25 g IntraVENous PRN    glucagon (GLUCAGEN) injection 1 mg  1 mg IntraMUSCular PRN    ondansetron (ZOFRAN ODT) tablet 4 mg  4 mg Oral Q4H PRN    oxyCODONE IR (ROXICODONE) tablet 20 mg  20 mg Oral Q1H PRN    haloperidol lactate (HALDOL) injection 2 mg  2 mg IntraVENous Q4H PRN    0.9% sodium chloride infusion  250 mL/hr IntraVENous CONTINUOUS    senna-docusate (PERICOLACE) 8.6-50 mg per tablet 1 Tab  1 Tab Oral DAILY    sodium chloride (NS) flush 5-40 mL  5-40 mL IntraVENous Q8H    sodium chloride (NS) flush 5-40 mL  5-40 mL IntraVENous PRN          Visit Time In: 1000  Visit Time Out: 0544

## 2019-11-17 NOTE — PROGRESS NOTES
Oncology End of Shift Note      Bedside shift change report given to Teddy Monterroso RN (incoming nurse) by Maite Burnett (outgoing nurse) on [facility]. Report included the following information SBAR, Kardex, Intake/Output and MAR. Shift Summary: direct admit last night from home, DKA (BS was reading as HI on glucometer), got a hold of hospice for insulin orders, BS went from 738 down to 431 with fluids and insulin, became les      Issues for Physician to Address:       Patient on Cardiac Monitoring?     [] Yes  [x] No    Rhythm:          Shift Events        Maite Burnett

## 2019-11-18 NOTE — PROGRESS NOTES
Oncology End of Shift Note      Bedside shift change report given to Brittney Jeong RN (incoming nurse) by Paul Mora (outgoing nurse) on [facility]. Report included the following information SBAR, Kardex, Intake/Output and MAR. Shift Summary: increased confusion and some agitation during the night, pulled 2 PIVS and his accessed port, he pulled the folley bag from the [address], avysis placed for his safety, wife informed, haldol given throughout the night, BS doing better, only had to give 2 units at beginning of shift, has been picking at arms and around mouth      Issues for Physician to Address:  Increased confusion and agitation      Patient on Cardiac Monitoring?     [] Yes  [x] No    Rhythm:          Shift Events        Paul Mora

## 2019-11-18 NOTE — INTERDISCIPLINARY ROUNDS
Oncology Interdisciplinary rounds were held today to discuss patient plan of care and outcomes.  The following members were present: Nursing, Physician, Case Management, Pharmacy, and PT/OT    Actual Length of Stay: 2         Expected Length of Stay: - - -                       Plan            Discharge    Hospice patient  Telesitter for safety  Edwina WILLIAM

## 2019-11-18 NOTE — PROGRESS NOTES
Problem: Falls - Risk of  Goal: *Absence of Falls  Description  Document Susanna Franco Fall Risk and appropriate interventions in the flowsheet.   Outcome: Progressing Towards Goal  Note:   Fall Risk Interventions:  Mobility Interventions: Bed/chair exit alarm    Mentation Interventions: Adequate sleep, hydration, pain control    Medication Interventions: Bed/chair exit alarm    Elimination Interventions: Bed/chair exit alarm

## 2019-11-18 NOTE — PROGRESS NOTES
Writing nurse informed by tech that pt IV was on floor. Nurse into assess. IV was on floor. No bleeding from IV site. New IV inserted. #20 left fore arm. Pt pulling at cadet some. Sat lock was no longer on leg. New stat lock placed for cadet. Primary Nurse Sylvain Perdue made aware. 0345: Primary nurse Sylvain Perdue, informed witting nurse that Pt pulled needle out of port and pulled Cadet out. Upon assessing pt needle had been pulled out of port and bag had been pulled off of cadet. PIV still intact. Writing nurse placed a new urine drain bag to cadet, deflated balloon, advanced catheter, and then re-inflated balloon. PIV was covered with and arm sleeve. Writing nurse instructed primary nurse to complete form for an [employer] telesitter and to place camera in room. Primary nurse marlo filled out form and is to call the family to update. Bed alarm turned on and all side rails are up on bed.      0405: Avasy camera placed at bedside and plugged in.

## 2019-11-18 NOTE — HOSPICE
[employer] RN note:  Pt is confused and getting out of bed, able to be redirected. Hospice volunteer arranged to sit with pt from 5-8 for safety and redirection. Pt does not appear agitated so far today but reports from the night state that pt pulled out IV and PORT access.  Loney Schaumann discussing with family discharge plan. Family will need support and education managing blood sugars and insulin. [employer]  [facility]  [phone]     GIP Daily Nursing Note   Patient Name: Sally Pascal  YOB: 1960  Age: 61 y.o.     Date of Visit: 11/18/19  Facility of Care: [facility]  Patient Room: 1111/01     Hospice Attending: Rodolfo Forrester MD  Hospice Diagnosis: Hospice care [Z51.5]     Level of Care: GIP     Current GIP Symptoms    1. Pain  2. Delirium  3. Hyperglycemia        ASSESSMENT & PLAN      1.  Pain: Pt c/o L knee pain when pressed. Pt without ER oxycodone which was discontinued on transfer to [facility]. Will need to monitory LOC due to medication adjustment. 2.  Delirium: r/t brain mets and uncontrolled BG. IV decadron started with BG monitoring/management. 3.  Hyperglycemia: unmanaged BG at home resulting in confusion, delirium and lethargy. Monitoring BG q4hrs with insulin, dextrose, glucagon and humalog available for administration. 4. Frequent monitoring with skilled nursing staff with MD/NP on-call 24/7        Spiritual Interventions: Hospital and Hospice Chaplain available 24/7     Psych/ Social/ Emotional Interventions: Wife Abhishek Adame, sons Guero and Cl Pereyra, at bedside. Extensive meeting with  Loney Schaumann to plan for discharge.   Son Gloria Bustos father is being compassionately extubated tomorrow in [place] for which Abhishek Adame needs to be present for support.   Care Coordination Needs: communication with [facility] nurse/staff to help with management of care, contact MSW to communicate pt/family needs for returning home vs placement in facility, and communicating with IDG.    Care plan and New Orders discussed / approved with Tino Toth MD.     Description History and Chart Review   Narrative History of last 24 hours that demonstrates care cannot be provided in another setting:  Pt requiring frequent assessments by skilled medical staff with administration of IV medications that cannot be performed outside the inpatient setting.     What has been done to control the patient's symptoms in the last 24 hours? Monitoring of BG with administration of prn insulin, dextrose and glucagon. Start po decadron and prn morphine.     Does the patient currently require IV medications? Yes -- pulled out IV access  Does the patient currently require scheduled medications? Yes  Does the patient currently require a PCA?  No     List number of doses of PRN medications in last 24 hours:  Medication 1:  haldol  Number of doses:  3     Medication 2:   Number of doses:  1     Medication 3: humalog  Number of doses:  5     Supporting documentation for GIP need for pain control:  [x] Frequent evaluation by a doctor, nurse practitioner, nurse   [x] Frequent medication adjustment    [x] IVs that cannot be administered at home   [] Aggressive pain management   [x] Complicated technical delivery of medications                                                                                                                                   Supporting documentation for GIP need for symptom control:  [x]  Sudden decline necessitating intensive nursing intervention  []  Uncontrolled / intractable nausea or vomiting   []  Pathological fractures  []  Advanced open wounds requiring frequent skilled care  [] Unmanageable respiratory distress  [x] New or worsening delirium   [] Delirium with behavior issues: Is 24 hour caregiver present due to safety concerns with agitation? (yes/no)  [] Imminent death - with skilled nursing needs documented above     DISCHARGE PLANNING   1. Discharge Plan: Extensive meeting with home . Plan is for facility placement or home with medicaid aids. In the mean time, pt will go to the [facility] while arrangements and placement are coordinated. 2. Patient/Family teaching: educated on EOL processes and provided written material (Gone From My Sight)  3.  Response to patient/family teaching: Family verbalized understanding     ASSESSMENT    KARNOFSKY: 20     Prognosis estimated based on 11/17/19 clinical assessment is:   [] Few to Many Hours  [] Hours to Days   [] Few to Many Days   [x] Days to Weeks   [] Few to Many Weeks   [] Weeks to Months   [] Few to Many Months     Quality Measure:       Patient self-reports:  [x] Yes       [] No     ESAS:   Time of Assessment: 1000  Pain (1-10):0  Fatigue (1-10): 8  Shortness of breath (1-10):0  Nausea (1-10): 0  Appetite (1-10): 0  Anxiety: (1-10):   Depression: (1-10):   Well-being: (1-10):   Constipation: _ Yes  _ No  LAST BM: unknown     CLINICAL INFORMATION      Patient Vitals for the past 12 hrs:    Temp Pulse Resp BP SpO2   11/17/19 0725 97.8 °F (36.6 °C) (!) 119 14 102/62 97 %         Currently this patient has:  [] Supplemental O2         [] IV                      [] PICC                 [x] PORT   [] NG Tube          [] PEG Tube        [] Ostomy     [x] Bland draining _yellow__ urine  [] Other:      SIGNS/PHYSICAL FINDINGS      Skin (including wound):  [] Warm, dry, supple, intact and color normal for race  [x] Warm   [] Dry   [] Cool     [] Clammy       [] Diaphoretic    Turgor              [] Normal              [x] Decreased  Color:              [] Pink              [] Pale              [] Cyanotic              [] Erythema              [] Jaundice              [x] Normal for Race  []  Wounds:     Neuro:  [x] Lethargy  [] Restlessness / agitation  [] Confusion / delirium  [] Hallucinations  [] Responds to maximal stimulation  [] Unresponsive  [] Seizures      Cardiac:  [] Dyspnea on Exertion  [] JVD  [] Murmur  [] Palpitations  [] Hypotension  [] Hypertension  [x] Tachycardia  [] Bradycardia  [] Irregular HR  [] Pulses Decreased  [] Pulses Absent  [] Edema:         [] Mottling:           Respiratory:  Breath sounds:               [] Diminished              [] Wheeze              [x] Rhonchi              [] Rales   [x] Even and unlabored  [] Labored:            [] Cough              [] Non Productive              [] Productive                          [] Description:           [] Deep suctioned   [] O2 at ___ LPM  [] High flow oxygen greater than 10 LPM  [] Bi-Pap     GI  [x] Abdomen (soft)   [] Ascites  [] Nausea  [] Vomiting  [] Incontinent of bowels  [x] Bowel sounds (active)  [] Diarrhea  [] Constipation (see above including last bowel movement)  [] Checked for impaction  [] Last BM unknown     Nutrition  Diet:__diabetic__  Appetite:   [] Good   [] Fair   [x] Poor   [] Tube Feeding        [] Voiding  [] Incontinent   [x] Bland     Musculoskeletal  [] Balance/[place] Unsteady   [x] Weak   Strength:               [] Normal               [] Limited               [x] Decreasing   Activities:               [x] Up as tolerated              [] Bedridden               [] Specify:     SAFETY  [x] 24 hr. Caregiver   [x] Side rails ? [x] Hospital bed   [x] Reviewed Falls & Safety         ALLERGIES AND MEDICATIONS      Allergies:          Allergies   Allergen Reactions    Other Medication Nausea and Vomiting       Pt states he is allergic to diuretics but unsure of name            Current Facility-Administered Medications   Medication Dose Route Frequency    haloperidol (HALDOL) 2 mg/mL oral solution 2 mg  2 mg Oral Q4H PRN    insulin glargine (LANTUS) injection 10 Units  10 Units SubCUTAneous QHS    bisacodyl (DULCOLAX) suppository 10 mg  10 mg Rectal DAILY PRN    pantoprazole (PROTONIX) tablet 40 mg  40 mg Oral ACB    dexAMETHasone (DECADRON) tablet 6 mg  6 mg Oral BID    insulin lispro (HUMALOG) injection SubCUTAneous Q4H    glucose chewable tablet 16 g  4 Tab Oral PRN    glucagon (GLUCAGEN) injection 1 mg  1 mg IntraMUSCular PRN    ondansetron (ZOFRAN ODT) tablet 4 mg  4 mg Oral Q4H PRN    oxyCODONE IR (ROXICODONE) tablet 20 mg  20 mg Oral Q1H PRN    senna-docusate (PERICOLACE) 8.6-50 mg per tablet 1 Tab  1 Tab Oral DAILY          Visit Time In: multiple visits  Visit Time Out:

## 2019-11-19 NOTE — PROGRESS NOTES
Bedside shift change report given to Berto Mercado (oncoming nurse) by Cora Wood (offgoing nurse). Report included the following information SBAR, Kardex, Intake/Output and MAR. Assessed pt and reviewed chart. Agree with assessment charted by previous nurse. Avasy camera in room and bed alarm on. Pt rest at this time. Will continue to monitor and assess pt needs. 0730: Bedside shift change report given to Alycia (oncoming nurse) by Berto Mercado (offgoing nurse). Report included the following information SBAR, Kardex, Intake/Output and MAR.

## 2019-11-19 NOTE — HOSPICE
Met with patient, wife and hospice social worker. Patient today with increased confusion and intermittent agitation. Per wife, patient has demonstrated erratic, impulsive like behaviors in the home over the past few weeks including waking up in the middle of the night to remove door knobs and paint. Wife reports that he is less interactive today and asked her to leave him alone. Wife reports that there is a family member hospitalized in the [place] [address] area who is being removed from life support today and she plans to be there to support her son. Patient will be transferred to the [facility] this afternoon at 4:45 pm via [facility] for continued treatment of delirium, pain and uncontrolled BG. Plan to transfer patient to home in the next 24-48 hours or when symptoms are controlled. Wife expresses concern over increased level of care when patient comes home, specifically needing to turn and change patient once he is bedridden. Hospice social worker continues to work on getting Medicaid aides in the home.

## 2019-11-19 NOTE — PROGRESS NOTES
Patient discharged to hospice house via [facility] transport. Discharge paperwork given to [facility] transporter to give to RN at hospice house. Report given to [facility] via [facility] RN phone. Patient did not have any IV access. Patient is ready for discharge.

## 2019-11-19 NOTE — HOSPICE
Met with patient, wife and hospice social worker. Patient today with increased confusion and intermittent agitation. Per wife, patient has demonstrated erratic, impulsive like behaviors in the home over the past few weeks including waking up in the middle of the night to remove door knobs and paint. Wife reports that he is less interactive today and asked her to leave him alone. Wife reports that there is a family member hospitalized in the [place] [address] area who is being removed from life support today and she plans to be there to support her son. Patient will be transferred to the [facility] for continued treatment of delirium, pain and uncontrolled BG. Plan to transfer patient to home in the next 24-48 hours or when symptoms are controlled. Wife expresses concern over increased level of care when patient comes home, specifically needing to turn and change patient once he is bedridden. Hospice social worker continues to work on getting Medicaid aides in the home.

## 2019-11-19 NOTE — PROGRESS NOTES
Oncology End of Shift Note      Bedside shift change report given to Mary Toussaint (incoming nurse) by Jose Peterson RN (outgoing nurse) on [facility]. Report included the following information SBAR and Kardex. Shift Summary:   Pt would not re direct, needed sitter midday. Issues for Physician to Address:       Patient on Cardiac Monitoring?     [] Yes  [x] No    Rhythm:              Jose Peterson RN

## 2019-11-20 NOTE — PROGRESS NOTES
1700-Spoke with Elias Panda regarding preadmission orders for patient coming from [facility]. Report received that patient had been receiving q4h blood sugar checks at the hospital but that his blood sugars today have all been under the sliding scale and patient has not received any insulin today. He has not taken anything by mouth today. I informed NP Joan Likens that patient is scheduled for Lantus and Lispro, neither of which are available here at [facility] in the pyxis. I informed her that we have only Humalin R stocked. Iquestioned if these things should be continued. 1730-Jo NEAL called Dr Mel Thorne regarding the above and returned call. She instructed for RNs to get in touch with patient's family and question if blood sugar monitoring and treatment are still important to them now that the patient is declining. If it is, then RN to enter blood sugar checks for 4x/day--before meals and qhs. Also instructed to enter the sliding scale used for the Humalog, but replace the insulin with the Humalin R which is stocked in [facility] pyxis. Other medications she instructed to continue include: IV decadron, haldol, morphine, zofran, toradol, robinol, scopalamine, APAP.    1900-Patient arrived by ambulance; assisted into bed.   Report given to Jennifer Bustos

## 2019-11-20 NOTE — PROGRESS NOTES
19:15 Patient arrived to [facility] via [facility] transport from [address], alert with confusion, cadet cath draining clear leonardo urine. DNR with patient, appears comfortable.

## 2019-11-20 NOTE — PROGRESS NOTES
[address] [facility] Help to Those in Need  [phone]    Patient Name: Melyssa Huffman  YOB: 1960    Date of Provider Hospice Visit: 11/20/19    Level of Care:   [x] General Inpatient (GIP)    [] Routine   [] Respite    Current Location of Care:  [] [facility] [] [facility] [] [facility] [] [facility] [x] Hospice House [facility]    IF [facility], patient referred from:  [] [facility] [] [facility] [x] [facility] [] [facility] [] Home [] Other:     Date of Original Hospice Admission:  9/25/19Hospice Medical Director at time of admission: Dr Dayron Suazo Diagnosis: Malignant neoplasm of lung, unspecified laterality, unspecified part of lung (HCCSecondary malignant neoplasm of liver    Diagnoses RELATED to the terminal prognosis:   Chronic obstructive pulmonary disease, unspecified COPD type (HCC)Secondary malignant neoplasm of brain   Other Diagnoses: Uncontrolled type 2 diabetes mellitus with hyperglycemia       HOSPICE SUMMARY       Melyssa Huffman is a 61y.o. year old who was admitted to [facility]. The patient's principle diagnosis has resulted in high blood sugars (hyperglycemeia) weakness, debility, fatigue, shortness of breath, pain, anxiety, agitation, confusion, anorexia, delirium, unstable blood glucose levels the highest level of 738 on 11/16/19       Functionally, the patient's Karnofsky and/or Palliative Performance Scale has declined over a period of  Days and is estimated at 30-40%. The patient is dependent on the following ADLs: pt is completely dependent for some ADLs. The patient/family chose comfort measures with the support of Hospice. HOSPICE DIAGNOSES   Active Symptoms:  1.  Pain: Pt c/o L knee pain when pressed. Pt without ER oxycodone which was discontinued on transfer to [facility]. Will need to monitory LOC due to medication adjustment. 2.  Delirium: r/t brain mets and uncontrolled BG.  IV decadron started with BG monitoring/management.   3.  Hyperglycemia: unmanaged BG at home resulting in confusion, delirium and lethargy.  Monitoring BG q4hrs with insulin, dextrose, glucagon and humalog available for administration.           PLAN   1. Admit to GIP  level of care for overwhelming symptoms, transitions of care, high risk for decline, this care cannot be provided in the home, the need for IV medications, frequent assessments are required by the medical team.  2. Medications include the following:  3. Continue comfort care  4. Decadron 6 mg day  5. Morphine 6 mg IV q 1 hr as needed, has required 5 doses in 24 hrs  6. Haldol 2 mg Q 4 hrs as needed for agitation   7. Blood sugar checks at least 2 x day  8. Pt is hungry and taking in small bites and sips  9.  and SW to support family needs  8. Disposition: to routine level of care once symptoms resolve    Prognosis estimated based on 11/20/19 clinical assessment is:   [] Hours to Days    [x] Days to Weeks    [] Other:    Communicated plan of care with: Hospice Case Manager; Hospice IDT; Care Team     GOALS OF CARE     Patient/Medical POA stated Goal of Care: comfort care    [x] I have reviewed and/or updated ACP information in the Advance Care Planning Navigator. This information is available in the [address] link in the patient's chart header.     Primary Decision [facility] (Health Care Agent):   Primary Decision Maker (Active): Jessica Mercedesbie - Spouse - [phone]    Secondary Decision Maker: Vick Phuc - Father - [phone]    Resuscitation Status: DNR  If DNR is there a Durable DNR on file? : [] Yes [] No (If no, complete Durable DNR)    HISTORY     History obtained from: chart, SN, CM, family, patient    CHIEF COMPLAINT:    The patient is:   [x] Verbal  [] Nonverbal  [] Unresponsive    HPI/SUBJECTIVE: 61 yea old male with hx of Malignant neoplasm of lung, unspecified laterality, unspecified part of lung (HCCSecondary malignant neoplasm of liver     Chronic obstructive pulmonary disease, unspecified COPD type (HCC)Secondary malignant neoplasm of brain  Uncontrolled DM with episodes of hyperglycemic and ketoacidosis        REVIEW OF SYSTEMS     The following systems were: [x] reviewed  [] unable to be reviewed    Positive ROS include:  Constitutional: fatigue, weakness, in pain, short of breath  Ears/nose/mouth/throat: increased airway secretions  Respiratory:shortness of breath, wheezing  Gastrointestinal:poor appetite, nausea, vomiting, abdominal pain, constipation, diarrhea  Musculoskeletal:pain, deformities, swelling legs  Neurologic:confusion, hallucinations, weakness  Psychiatric:anxiety, feeling depressed, poor sleep  Endocrine:     Adult Non-Verbal Pain Assessment Score: na    Face  [] 0   No particular expression or smile  [] 1   Occasional grimace, tearing, frowning, wrinkled forehead  [] 2   Frequent grimace, tearing, frowning, wrinkled forehead    Activity (movement)  [] 0   Lying quietly, normal position  [] 1   Seeking attention through movement or slow, cautious movement  [] 2   Restless, excessive activity and/or withdrawal reflexes    Guarding  [] 0   Lying quietly, no positioning of hands over areas of body  [] 1   Splinting areas of the body, tense  [] 2   Rigid, stiff    Physiology (vital signs)  [] 0   Stable vital signs  [] 1   Change in any of the following: SBP > 20mm Hg; HR > 20/minute  [] 2   Change in any of the following: SBP > 30mm Hg; HR > 25/minute    Respiratory  [] 0   Baseline RR/SpO2, compliant with ventilator  [] 1   RR > 10 above baseline, or 5% drop SpO2, mild asynchrony with ventilator  [] 2   RR > 20 above baseline, or 10% drop SpO2, asynchrony with ventilator     FUNCTIONAL ASSESSMENT     Palliative Performance Scale (PPS): 40%       PSYCHOSOCIAL/SPIRITUAL ASSESSMENT     Active Problems:    * No active hospital problems.  *    Past Medical History:   Diagnosis Date    Cancer ([facility]     LUNG RT. SIDE METS TO BRAIN, LIVER    Chronic obstructive pulmonary disease (HCC)     MILD PER WIFE    Diabetes ([facility] TYPE II    GERD (gastroesophageal reflux disease)     Hypertension       Past Surgical History:   Procedure Laterality Date    HX CERVICAL FUSION      HX CRANIOTOMY  01/29/2019    REMOVAL OF BRAIN METS AT [facility]    HX GI      COLONOSCOPY    HX HERNIA REPAIR Right 1972    INGUINAL    VASCULAR SURGERY PROCEDURE UNLIST Bilateral     LEGS      Social History     Tobacco Use    Smoking status: Former Smoker     Packs/day: 1.50     Years: 25.00     Pack years: 37.50     Last attempt to quit: 1/21/2009     Years since quitting: 10.8    Smokeless tobacco: Never Used   Substance Use Topics    Alcohol use: No     Frequency: Never     Family History   Problem Relation Age of Onset    Stroke Father     Diabetes Father     Other Mother 58        SUDDEN DEATH    Cancer Maternal Grandfather     Anesth Problems Neg Hx       Allergies   Allergen Reactions    Other Medication Nausea and Vomiting     Pt states he is allergic to diuretics but unsure of name         Current Facility-Administered Medications   Medication Dose Route Frequency    bisacodyl (DULCOLAX) suppository 10 mg  10 mg Rectal DAILY PRN    dexamethasone (DECADRON) 4 mg/mL injection 6 mg  6 mg IntraVENous DAILY    haloperidol lactate (HALDOL) injection 2 mg  2 mg IntraVENous Q4H PRN    morphine 10 mg/ml injection 6 mg  6 mg IntraVENous Q1H PRN    ondansetron (ZOFRAN) injection 4 mg  4 mg IntraVENous Q4H PRN    scopolamine (TRANSDERM-SCOP) 1 mg over 3 days 1 Patch  1 Patch TransDERmal Q72H PRN    glycopyrrolate (ROBINUL) injection 0.2 mg  0.2 mg IntraVENous Q6H PRN    ketorolac (TORADOL) injection 30 mg  30 mg IntraVENous Q6H PRN    acetaminophen (TYLENOL) suppository 650 mg  650 mg Rectal Q4H PRN        PHYSICAL EXAM     Wt Readings from Last 3 Encounters:   09/25/19 75.1 kg (165 lb 8 oz)   09/25/19 72.3 kg (159 lb 6.3 oz)   09/19/19 72.1 kg (159 lb)       Visit Vitals  /86   Pulse 72   Temp 95.5 °F (35.3 °C)   Resp 18   SpO2 99% Supplemental O2  [] Yes  [] NO  Last bowel movement: PTA    Currently this patient has:  [x] Peripheral IV [] PICC  [] PORT [] ICD    [] Bland Catheter [] NG Tube   [] PEG Tube    [] Rectal Tube [] Drain  [] Other:     Constitutional: awake and alert sitting in chair  Eyes: pupils equal, anicteric  ENMT: no nasal discharge, moist mucous membranes  Cardiovascular: regular rhythm, distal pulses intact  Respiratory: breathing not labored, symmetric  Gastrointestinal: soft non-tender, +bowel sounds  Musculoskeletal: no deformity, no tenderness to palpation  Skin: warm, dry  Neurologic:pt is/ not able to follow commands, pt is/ not moving all extremities  Psychiatric: full affect         Pertinent Lab and or Imaging Tests:  Lab Results   Component Value Date/Time    Sodium 136 11/18/2019 04:42 PM    Potassium 3.7 11/18/2019 04:42 PM    Chloride 102 11/18/2019 04:42 PM    CO2 28 11/18/2019 04:42 PM    Anion gap 6 11/18/2019 04:42 PM    Glucose 143 (H) 11/18/2019 04:42 PM    BUN 33 (H) 11/18/2019 04:42 PM    Creatinine 1.00 11/18/2019 04:42 PM    BUN/Creatinine ratio 33 (H) 11/18/2019 04:42 PM    GFR est AA >60 11/18/2019 04:42 PM    GFR est non-AA >60 11/18/2019 04:42 PM    Calcium 9.7 11/18/2019 04:42 PM     Lab Results   Component Value Date/Time    Protein, total 7.4 11/18/2019 04:42 PM    Albumin 2.8 (L) 11/18/2019 04:42 PM              Reese Cronin NP

## 2019-11-20 NOTE — PROGRESS NOTES
Problem: Falls - Risk of  Goal: *Absence of Falls  Description  Document [place] Fall Risk and appropriate interventions in the flowsheet.   Outcome: Progressing Towards Goal  Note:   Fall Risk Interventions:  Mobility Interventions: Bed/chair exit alarm    Mentation Interventions: Adequate sleep, hydration, pain control, Bed/chair exit alarm, Door open when patient unattended, More frequent rounding, Reorient patient, Room close to nurse's station    Medication Interventions: Bed/chair exit alarm    Elimination Interventions: Bed/chair exit alarm, Call light in reach

## 2019-11-20 NOTE — PROGRESS NOTES
NAME OF PATIENT:  Christa Reed    LEVEL OF CARE:  GIP    REASON FOR GIP:   Pain, despite numerous changes in medications, Terminal agitation, despite changes to medications, Medication adjustment that must be monitored 24/7 and Stabilizing treatment that cannot take place at home    *PATIENT REMAINS ELIGIBLE FOR [facility] LEVEL OF CARE AS EVIDENCED BY: (MUST BE ADDRESSED OF PATIENT GIP)      REASON FOR RESPITE:  NA    O2 SAFETY:  NA    FALL INTERVENTIONS PROVIDED:   Implemented/recommended use of non-skid footwear, Implemented/recommended use of fall risk identification flag to all team members, Implemented/recommended assistive devices and encouraged their use, Implemented/recommended resources for alarm system (personal alarm, bed alarm, call bell, etc.) , Implemented/recommended environmental changes (remove hazards, lower bed, improve lighting, etc.) and Implemented/recommended increased supervision/assistance    INTERDISPLINARY COMMUNICATION/COLLABORATION:  Physician, MSW, Wesley and RN, CNA    NEW MEDICATION INITIATION DOCUMENTATION:  NA    Reason medication is being initiated:  CALI    MD / Provider name consulted re: change in status / initiation of new medication:  NA    New Symptom(s):  NA    New Order(s):  NA    Name of the person notified of the changes:  NA    Name of person being taught: NA    Instructions given:  NA    Side Effects taught:  NA    Response to teaching:  NA      COMFORTABLE DYING MEASURE:  Is Patient/family satisfied with symptom level?  yes    DISCHARGE PLAN:  Pending. 19:15 Shift report received from [facility] RN  19:30 Patient found by staff member lying across bed with knees on floor wrapped around bed linen, assisted back to bed , inc of large soft stool, cadet cath remains patent. Patient stated \" I was trying to go to the bathroom\" Complete bed bath and linen change provided. Bed alarm applied , safety instructions given to patient, call bell with in reach.  Will cont to monitor. 20:30 Called wife for update and questions related to treatment of Blood glucose, wife agrees with treatment of checking blood glucose before meals and HS, states patient has not been eating for the last couple of days. BG-126 at HS.  21:30 Patient restless and agitated, 22 gauge IV inserted in lt lower arm by Vianey Nava RN, medicated with prn Haldol 2mg/IV.  22:30 Patient resting quietly, no signs of restlessness and delirium noted, medication effective. 23:30 Patient restless, repositioned in bed, facial grimacing and moaning noted, medicated with prn Morphine 6mg/iv. Will cont to monitor. 00:30 Patient resting quietly, no signs of pain or agitation noted, medications effective. 01:30 Patient resting quietly, no signs of pain or discomfort. 02:30 Patient turned and repositioned, no facial grimacing or moaning noted. appears comfortable. 03:30 Patient asleep appears to be comfortable. Will cont to monitor. 04:30 Patient resting no signs of pain or restlessness noted, medications effective. 05:30 Patient turned and repositioned for comfort, no signs of pain or discomfort noted. 06:30 Patient asleep, no signs or symptoms of pain or agitation noted,appears comfortable. 07:00 Shift report given to oncoming Nurse.

## 2019-11-20 NOTE — PROGRESS NOTES
Call [phone] within reach. Bed alarm on,  Wheels locked. Bed in low position. Pt turned and repositioned with pillows for pressure areas.

## 2019-11-20 NOTE — PROGRESS NOTES
3919- Patient picking at air. 22 Gauge IV inserted at left arm. PRN IV haldol administered. Pt aware of results and understands directions.

## 2019-11-20 NOTE — PROGRESS NOTES
0700  Report received  0715  Pt asleep. No facial grimacing or moaning. 0815  Pt sitting up on the side of the bed,  Pt agitated, pulled out his IV. L CPAC accessed. Lungs clear but diminished. Pt is on RA. + bowel sounds. Last reported BM was 11-19  Bland is draining tea colored urine. 0830  Pt medicated with Haldol and Morphine. Pt reported he was having pain but could not state where the pain was. 0900  Pt asleep. 1010  Pt asleep. 1100   Family at the bedside. Pt sleeping in the recliner. Pt arouses at times and then wakes up. Pt is confused. No co pain. 1200  No co pain or dyspnea. [address] [place]  NP Luke Purchase in to visit. Pt states he is hungry. Pt given a pudding. Pt was able to feed self. Tray ordered. 1400  Pt back in the bed with 2 person assist.  Pt immediately fell asleep. 1510 pt continues to sleep. No facial grimacing or moaning. [facility] in to visit. Pt did not recognize him at first but  After some conversation he remembered her. 1605  Pt trying to get out of bed. Pt repositioned. Pt co pain. Pt medicated with haldol and Morphine. 1700 Pt asleep. 1810  Pt continues to sleep. No restlessness noted. No facial grimacing. 1900  Report received.         NAME OF PATIENT:  Sally Mcpherson    LEVEL OF CARE:  GIP    REASON FOR GIP:   Pain, despite numerous changes in medications, Terminal agitation, despite changes to medications, Medication adjustment that must be monitored 24/7 and Stabilizing treatment that cannot take place at home    *PATIENT REMAINS ELIGIBLE FOR [facility] LEVEL OF CARE AS EVIDENCED BY: (MUST BE ADDRESSED OF PATIENT GIP)  Pt receiving IV meds for comfort      REASON FOR RESPITE:  n/a    O2 SAFETY:  Ra    FALL INTERVENTIONS PROVIDED:   Implemented/recommended assistive devices and encouraged their use, Implemented/recommended resources for alarm system (personal alarm, bed alarm, call bell, etc.)  and Implemented/recommended environmental changes (remove hazards, lower bed, improve lighting, etc.)    INTERDISPLINARY COMMUNICATION/COLLABORATION:  Physician, MSW, Wesley and RN, CNA    NEW MEDICATION INITIATION DOCUMENTATION:  Medications reviewed by NP     Reason medication is being initiated:  n/a    MD / Provider name consulted re: change in status / initiation of new medication:  n/a    New Symptom(s):  n/a    New Order(s):  n/a    Name of the person notified of the changes:  n/a    Name of person being taught:  n/a    Instructions given:  n/a    Side Effects taught:  N/a    Response to teaching:  n/a      COMFORTABLE DYING MEASURE:  Is Patient/family satisfied with symptom level?  yes    DISCHARGE PLAN:  Pt will remain at the [facility] until his symptom are controlled ant then he will return home and continue to be followed by Home Hospice.

## 2019-11-21 NOTE — PROGRESS NOTES
NAME OF PATIENT:  Toni Stone    LEVEL OF CARE:  GIP    REASON FOR GIP:   Pain, despite numerous changes in medications, Nausea and vomiting, despite changes to medications, Terminal agitation, despite changes to medications, Medication adjustment that must be monitored 24/7 and Stabilizing treatment that cannot take place at home    *PATIENT REMAINS ELIGIBLE FOR [facility] LEVEL OF CARE AS EVIDENCED BY: (MUST BE ADDRESSED OF PATIENT GIP)      REASON FOR RESPITE:  NA    O2 SAFETY:  NA    FALL INTERVENTIONS PROVIDED:   Implemented/recommended use of non-skid footwear, Implemented/recommended use of fall risk identification flag to all team members, Implemented/recommended assistive devices and encouraged their use, Implemented/recommended resources for alarm system (personal alarm, bed alarm, call bell, etc.) , Implemented/recommended environmental changes (remove hazards, lower bed, improve lighting, etc.) and Implemented/recommended increased supervision/assistance    INTERDISPLINARY COMMUNICATION/COLLABORATION:  Physician, MSW, Wesley and RN, CNA    NEW MEDICATION INITIATION DOCUMENTATION:  NA    Reason medication is being initiated:  NA    MD / Provider name consulted re: change in status / initiation of new medication:  NA    New Symptom(s):  NA    New Order(s):  NA    Name of the person notified of the changes:  NA    Name of person being taught:  NA    Instructions given:  NA    Side Effects taught:  NA    Response to teaching:  NA      COMFORTABLE DYING MEASURE:  Is Patient/family satisfied with symptom level?  yes    DISCHARGE PLAN:  Pending    19:15 Shift report received from [facility] RN  19:45 Patient alert to self, restless and anxious, pulling at gown and cadet cath, states\"My stomach aches\" medicated with Morphine and Haldol, turned and repositioned. Will cont to monitor  20:30 Patient resting quietly, no signs of pain or anxiety noted, wife in to visit, update and emotional support provided.   21:30 Patient resting quietly, no facial grimacing or signs of discomfort noted, medication managing symptoms, wife remains at bedside. Will cont to monitor. 22:30 Patient turned and repositioned for comfort, mouth care provided,no signs of pain or anxiety noted. 23:00 Patient resting quietly, no signs of pain or distress noted, wife left for the night. 00:00 Patient resting quietly, no signs or symptoms of  pain or agitation noted, respiration shallow and unlabored. 01:15 Patient lying in bed eyes closed, no signs of pain or discomfort noted, appears to comfortable. 02:30 Patient resting no facial grimacing or signs of discomfort noted. Will cont to monitor. 03:30 Patient awake requesting ice cream and orange juice, snack provided, denies pain no signs of agitation noted. 04:30 Patient remains awake, restless, moving around in bed, facial grimacing noted,medicated with prn Morphine and Haldol administered. Will cont to monitor. 05:30 Patient resting quietly, no signs of pain or agitation noted, resting quietly. 06:30 Patient lying in bed eyes closed, no facial grimacing or discomfort noted, appears comfortable. Wife called for update. 07:00 Shift report given to oncoming Nurse.

## 2019-11-21 NOTE — PROGRESS NOTES
[facility] Hospice Monitoring   November 21, 2019  Attending: Dr. Lindajean Rubinstein  Pharmacist monitoring provided for this 61y.o. year old male at [facility]. Principle Hospice Diagnosis:  Metastatic non small cell lung cancer  Diagnoses RELATED to the terminal prognosis: Brain and liver metastases  Other Diagnoses: DM type 2, hypertension    Level of care: General Inpatient (GIP)  Length of stay:   Day 2    Active Problem List:  1. Restlessness/Agitation: intermittent  2. Pain; neoplasm related  3. Poor appetite and intake  4. Hyperglycemia; recurrent and ongoing with recent episode of HONK  5.  Decline in function    Medication Plan:  Add Finger stick check for blood glucose daily ACHS and use SSI coverage for now  In preparation for going home switch IV medications to oral forms  Change haldol and zofran to oral forms  Add morphine 10mg oral scheduled every 6 hours and additional every hour as needed for pain, dyspnea      Current Facility-Administered Medications:     insulin regular (NOVOLIN R, HUMULIN R) injection, , SubCUTAneous, AC&HS    haloperidol (HALDOL) 2 mg/mL oral solution 2 mg, 2 mg, Oral, Q6H PRN    ondansetron (ZOFRAN ODT) tablet 8 mg, 8 mg, Oral, Q8H PRN    morphine (ROXANOL) concentrated oral syringe 10 mg, 10 mg, Oral, Q1H PRN    morphine (ROXANOL) concentrated oral syringe 10 mg, 10 mg, Oral, Q6H    bisacodyl (DULCOLAX) suppository 10 mg, 10 mg, Rectal, DAILY PRN    dexamethasone (DECADRON) 4 mg/mL injection 6 mg, 6 mg, IntraVENous, DAILY    scopolamine (TRANSDERM-SCOP) 1 mg over 3 days 1 Patch, 1 Patch, TransDERmal, Q72H PRN    glycopyrrolate (ROBINUL) injection 0.2 mg, 0.2 mg, IntraVENous, Q6H PRN    ketorolac (TORADOL) injection 30 mg, 30 mg, IntraVENous, Q6H PRN    acetaminophen (TYLENOL) suppository 650 mg, 650 mg, Rectal, Q4H PRN    Treatment Goal Check List     Admitting dianosis treated appropriately : YES    Nausea/Vomiting controlled: YES    Pain Controlled: YES    Agitation/Anxiety/ Delirium controlled: YES    Depression controlled:     Secretions controlled : YES    Constipation/Bowel routine controlled: YES    SOB/ Dyspnea controlled: Insomnia:       Pharmacist Medication Review    There were no patient \"home supplied\" medications for the pharmacist to review. Current orders are supplied from [facility] inventory. The current medication orders have been reviewed and are appropriate.       Signed: Danita Krueger PharmD   Phone: [phone]

## 2019-11-21 NOTE — PROGRESS NOTES
[address] [place] [address]   [facility]  [phone]    Patient Name: Chayito David  YOB: 1960    Date of Provider Hospice Visit: 11/21/19    Level of Care:   [x] General Inpatient (GIP)    [] Routine   [] Respite    Current Location of Care:  [] [facility] [] [facility] [] [facility] [] [facility] [x] Hospice House THE [facility], patient referred from:  [] [facility] [] [facility] [x] [facility] [] [facility] [] Home [] Other:     Principle Hospice Diagnosis:  Metastatic non small cell lung cancer  Diagnoses RELATED to the terminal prognosis: Brain and liver metastases  Other Diagnoses: DM type 2, hypertension     HOSPICE SUMMARY   Do not cut and paste chart information other than imaging findings    Chayito David is a 61y.o. year old who was admitted to [address] [place] [address]. 62 yo man admitted to hospice with advanced malignant neoplasm of lung that has progressed and metastasized to liver and brain. Pt has underlying advanced COPD and comorbid conditions of HTN, DM and is malnourished. Pt is s/p brain tumor removal at [facility] followed by XRT to brain was admitted recently with L sided weakness- MRI brain showing progressive brain mets, large R sided lesion. The patient's principle diagnosis of metastatic lung CA has resulted in pain, intermittent agitation and debility.  Functionally, the patient's Palliative Performance Scale has declined over a period of weeks and is estimated at 30. Objective information that support this patients limited prognosis includes:  CT Chest:  IMPRESSION:  Imaging findings are consistent with interval progression of disease.     There is extensive progression of metastatic disease in the hepatic parenchyma  with numerous new and/or enlarged hepatic mass lesions as described above.     Possible new small satellite nodule in the right upper lobe of the lung.   Mediastinal and primary lung mass/adenopathy not significantly changed.     Intracranial metastatic disease progression as described in brain MRI report.        The patient is not likely to endanger self or others.        I confirm that I composed the narrative based on my review of the patient's medical record or examination of the patient.        HOSPICE DIAGNOSES   Active Symptoms:  1. Restlessness/Agitation: intermittent  2. Pain; neoplasm related  3. Poor appetite and intake  4. Hyperglycemia; recurrent and ongoing with recent episode of HONK  5. Decline in function     PLAN   1. Continue GIP LOC for now as pt still requiring close monitoring and adjustment of medications  2. Add Finger stick check for blood glucose daily ACHS and use SSI coverage for now  3. In preparation for going home switch IV medications to oral forms  4. Change haldol and zofran to oral forms  5. Add morphine 10mg oral scheduled every 6 hours and additional every hour as needed for pain, dyspnea    6.  and SW to support family needs  7. Disposition: home with hospice once symptoms stable. SW to arrange on support services at home for wife/children    8. Hospice Plan of care was reviewed in detail and agree with current plan of care    Prognosis estimated based on 11/21/19 clinical assessment is:   [] Hours to Days    [x] Days to Weeks    [] Other:    Communicated plan of care with: Hospice Case Manager; Hospice IDT; Care Team     GOALS OF CARE     Patient/Medical POA stated Goal of Care:comfort    [x] I have reviewed and/or updated ACP information in the Advance Care Planning Navigator. This information is available in the [address] link in the patient's chart header.     Primary Decision [facility] ([address]):   Primary Decision Maker (Active): Soco Mercedese - Spouse - [phone]    Secondary Decision Maker: Linda Mcdowell - Father - [phone]    Resuscitation Status: DNR  If DNR is there a Durable DNR on file? : [x] Yes [] No (If no, complete Durable DNR)    HISTORY     History obtained from: pt, staff at [address] [place] [address],[address]: I am OK  The patient is:   [x] Verbal  [] Nonverbal  [] Unresponsive    HPI/SUBJECTIVE:  Pt is lethargic but arousable, and communicated well, answers questions appropriately, has started to eat little bites and sips. Denies pain, dyspnea  FS this am 391mg% post small amount of breakfast, got 6 units of regular insulin today  Got 2 doses of haldol IV overnight, 3 doses of morphine prn overnight         REVIEW OF SYSTEMS     The following systems were: [x] reviewed  [] unable to be reviewed    Positive ROS include:  Constitutional: fatigue, weakness, in pain, short of breath  Ears/nose/mouth/throat: increased airway secretions  Respiratory:shortness of breath, wheezing  Gastrointestinal:poor appetite, nausea, vomiting, abdominal pain, constipation, diarrhea  Musculoskeletal:pain, deformities, swelling legs  Neurologic:confusion, hallucinations, weakness  Psychiatric:anxiety, feeling depressed, poor sleep  Endocrine:        FUNCTIONAL ASSESSMENT     Palliative Performance Scale (PPS): 40%       PSYCHOSOCIAL/SPIRITUAL ASSESSMENT     Active Problems:    * No active hospital problems.  *    Past Medical History:   Diagnosis Date    Cancer ([facility] [address]     LUNG RT. SIDE METS TO BRAIN, LIVER    Chronic obstructive pulmonary disease (HCC)     MILD PER WIFE    Diabetes (HCC)     TYPE II    GERD (gastroesophageal reflux disease)     Hypertension       Past Surgical History:   Procedure Laterality Date    HX CERVICAL FUSION      HX CRANIOTOMY  01/29/2019    REMOVAL OF BRAIN METS AT [facility]    HX GI      COLONOSCOPY    HX HERNIA REPAIR Right 1972    INGUINAL    VASCULAR SURGERY PROCEDURE UNLIST Bilateral     LEGS      Social History     Tobacco Use    Smoking status: Former Smoker     Packs/day: 1.50     Years: 25.00     Pack years: 37.50     Last attempt to quit: 1/21/2009     Years since quitting: 10.8    Smokeless tobacco: Never Used   Substance Use Topics    Alcohol use: No     Frequency: Never     Family History   Problem Relation Age of Onset    Stroke Father     Diabetes Father     Other Mother 58        SUDDEN DEATH    Cancer Maternal Grandfather     Anesth Problems Neg Hx       Allergies   Allergen Reactions    Other Medication Nausea and Vomiting     Pt states he is allergic to diuretics but unsure of name         Current Facility-Administered Medications   Medication Dose Route Frequency    insulin regular (NOVOLIN R, HUMULIN R) injection   SubCUTAneous AC&HS    haloperidol (HALDOL) 2 mg/mL oral solution 2 mg  2 mg Oral Q6H PRN    ondansetron (ZOFRAN ODT) tablet 8 mg  8 mg Oral Q8H PRN    morphine (ROXANOL) 100 mg/5 mL (20 mg/mL) concentrated solution 10 mg  10 mg Oral Q1H PRN    morphine (ROXANOL) 100 mg/5 mL (20 mg/mL) concentrated solution 10 mg  10 mg Oral Q6H    bisacodyl (DULCOLAX) suppository 10 mg  10 mg Rectal DAILY PRN    dexamethasone (DECADRON) 4 mg/mL injection 6 mg  6 mg IntraVENous DAILY    scopolamine (TRANSDERM-SCOP) 1 mg over 3 days 1 Patch  1 Patch TransDERmal Q72H PRN    glycopyrrolate (ROBINUL) injection 0.2 mg  0.2 mg IntraVENous Q6H PRN    ketorolac (TORADOL) injection 30 mg  30 mg IntraVENous Q6H PRN    acetaminophen (TYLENOL) suppository 650 mg  650 mg Rectal Q4H PRN        PHYSICAL EXAM     Wt Readings from Last 3 Encounters:   09/25/19 75.1 kg (165 lb 8 oz)   09/25/19 72.3 kg (159 lb 6.3 oz)   09/19/19 72.1 kg (159 lb)       Visit Vitals  /80   Pulse (!) 103   Temp 96.3 °F (35.7 °C)   Resp 16   SpO2 97%       Supplemental O2  [] Yes  [x] NO  Last bowel movement:     Currently this patient has:  [x] Peripheral IV [] PICC  [] PORT [] ICD    [x] Bland Catheter [] NG Tube   [] PEG Tube    [] Rectal Tube [] Drain  [] Other:     Constitutional:lethargic, but arousable, no apparent distress  Eyes: pupils equal, anicteric, pallor  ENMT: no nasal discharge, moist mucous membranes  Cardiovascular: regular rhythm, distal pulses intact  Respiratory: breathing not labored, symmetric  Gastrointestinal: soft non-tender, +bowel sounds  Musculoskeletal: no deformity, no tenderness to palpation  Skin: warm, dry  Neurologic:lethargic, arousable  Psychiatric: calm affect       Pertinent Lab and or Imaging Tests:  Lab Results   Component Value Date/Time    Sodium 136 11/18/2019 04:42 PM    Potassium 3.7 11/18/2019 04:42 PM    Chloride 102 11/18/2019 04:42 PM    CO2 28 11/18/2019 04:42 PM    Anion gap 6 11/18/2019 04:42 PM    Glucose 143 (H) 11/18/2019 04:42 PM    BUN 33 (H) 11/18/2019 04:42 PM    Creatinine 1.00 11/18/2019 04:42 PM    BUN/Creatinine ratio 33 (H) 11/18/2019 04:42 PM    GFR est AA >60 11/18/2019 04:42 PM    GFR est non-AA >60 11/18/2019 04:42 PM    Calcium 9.7 11/18/2019 04:42 PM     Lab Results   Component Value Date/Time    Protein, total 7.4 11/18/2019 04:42 PM    Albumin 2.8 (L) 11/18/2019 04:42 PM           Total time: 35mts  Counseling / coordination time: 25mts  > 50% counseling / coordination?:

## 2019-11-21 NOTE — PROGRESS NOTES
Problem: Pressure Injury - Risk of  Goal: *Prevention of pressure injury  Description  Document Angelito Scale and appropriate interventions in the flowsheet. Outcome: Progressing Towards Goal  Note: Pressure Injury Interventions:  Sensory Interventions: Assess changes in LOC, Check visual cues for pain, Float heels, Keep linens dry and wrinkle-free, Maintain/enhance activity level, Minimize linen layers, Pressure redistribution bed/mattress (bed type), Turn and reposition approx.  every two hours (pillows and wedges if needed)    Moisture Interventions: Absorbent underpads, Apply protective barrier, creams and emollients, Limit adult briefs    Activity Interventions: Pressure redistribution bed/mattress(bed type)    Mobility Interventions: Float heels, HOB 30 degrees or less, Pressure redistribution bed/mattress (bed type)    Nutrition Interventions: Document food/fluid/supplement intake    Friction and Shear Interventions: Apply protective barrier, creams and emollients, HOB 30 degrees or less

## 2019-11-21 NOTE — HOSPICE
LCSW participated in bedside rounds with medical team. Patient resting but easily roused and able to participate in care. He is eating some and PO food and fluids encouraged. He was able to feed himself this morning and drink from a straw during rounds. Discharge planning continues and LCSW awaiting word from home team regarding plans as patient's symptoms become better managed - per previous notes, family desires Medicaid aides in home to assist with caregiving.     Pavel Paul, CUCA, [facility]   [phone]

## 2019-11-21 NOTE — PROGRESS NOTES
NAME OF PATIENT:  Alek Mahajan    LEVEL OF CARE:  GIP    REASON FOR GIP:   Medication adjustment that must be monitored 24/7 and Stabilizing treatment that cannot take place at home    *PATIENT REMAINS ELIGIBLE FOR GIP LEVEL OF CARE AS EVIDENCED BY: Need for frequent PRN and scheduled medications for pain and agitation     REASON FOR RESPITE:  N/A     O2 SAFETY:  N/A    FALL INTERVENTIONS PROVIDED:   Implemented/recommended use of non-skid footwear, Implemented/recommended resources for alarm system (personal alarm, bed alarm, call bell, etc.)  and Implemented/recommended increased supervision/assistance    INTERDISPLINARY COMMUNICATION/COLLABORATION:  Physician, MSW, [place] and RN, CNA    NEW MEDICATION INITIATION DOCUMENTATION:  Consulted AT MD to report change in pt status    Reason medication is being initiated:  Increased blood sugar    MD / Provider name consulted re: change in status / initiation of new medication:  Dr. Alisha Ndiaye Symptom(s):  Increased blood sugar     New Order(s): regular insulin sliding scale ACHS    Name of the person notified of the changes:  wife    Name of person being taught:  wife    Instructions given:  yes    Side Effects taught:  yes    Response to teaching:  Receptive       COMFORTABLE DYING MEASURE:  Is Patient/family satisfied with symptom level? Yes    DISCHARGE PLAN:  Pending     0700 Report received from [facility]. 0740 Patient resting in bed quietly, respirations are even and unlabored, eyes are closed, neutral facial expression noted. 1420 Patient given scheduled dexamethasone, see MAR.  0920 Patient resting in bed quietly, respirations are even and unlabored, eyes are closed, neutral facial expression noted. 1020 Patient awake in bed smiling quietly, patient states he wants something to eat and drink. Patient given cup of apple juice and pudding cup. Patient's blood sugar checked, it was 391. Will continue to monitor.    1112 Patient noted to be grimacing and moaning, gave PRN morphine, will continue to monitor. 1135 Patient resting in bed quietly, respirations are even and unlabored, eyes are closed, neutral facial expression noted. 1203 Patient drank two cups of orange juice and ate one cup of ice cream.  1231 Patient given scheduled insulin, see MAR. Insulin verified with OLEKSANDR Hartman. 1308 Patient given scheduled medication, see MAR.   1355 Patient sitting up in bed drinking water and being fed ice cream by wife. No complaints at this time. 1435 Patient becoming agitated, slamming hands on side rails. Patient given PRN haldol. Will continue to monitor. 1520 Patient resting in bed quietly, respirations are even and unlabored. [phone] Patient repositioned in bed with help of OLEKSANDR Hartman. Patient states he feels better right now, patient denies need for pain medication. 1635 Patient's blood sugar checked, result came back [address] on both machines. Looked up result, it stated value exceeds machine reading ability. Notified Lali Machado RN and called Dr. Lambert Cagle, awaiting response. Patient is asymptomatic.   80 Spoke with Dana Drake NP. She states to give 10 units of insulin and re check at bedtime. She stated to try to encourage patient to drink less juice and eat less sugary foods. She stated to re check blood glucose at bedtime. 1714 Patient given 10 units of insulin, see MAR. Verified with OLEKSANDR Hartman. 1750 Patient resting in bed quietly, respirations are even and unlabored, eyes are closed, neutral faciale expression noted. 1811 Patient's cadet emptied, see flow sheet. 1835 Patient resting in bed quietly, respirations are even and unlabored, neutral facial expression noted.

## 2019-11-22 NOTE — HOSPICE
LCSW met with patient and wife at bedside today along with medical team during bedside rounds. Patient appearing more alert today than yesterday, but had difficulty with aspiration this morning, some agitation and difficulty breathing as well. He complained of \"phlegm\" in lower chest and plan is to administer medication to assist with breaking it up and allowing him to cough it up. Team then spoke with wife outside of room. Wife shared that she does not want patient to die at home as her 17yo sons are in the home and was reassured that it does not appear that patient is imminently dying. She does ont want to place patient in LTC facility at this time as patient remains alert and has stated he wants to go home, however she may consider it depending on his decline. Ideally, she will need assistance in the home and home LOUISE has been working toward [employer] aides in home. She also stated that she does not know how to administer patient's insulin which RN will be able to teach. Plan will be for patient to remain GIP through the weekend and then reassess to see if symptoms are fully managed on Monday. Wife, Radha Madrid also shared that her older son is in [place] and that his father passed earlier this week. LCSW had spoken with home LOUISE and with bereavement counselor who will be reaching out to son and [facility] office for assistance.     CUCA Regalado, [facility]   [phone]

## 2019-11-22 NOTE — PROGRESS NOTES
Problem: Falls - Risk of  Goal: *Absence of Falls  Description  Document Minh Polo Fall Risk and appropriate interventions in the flowsheet.   Outcome: Progressing Towards Goal  Note: Fall Risk Interventions:  Mobility Interventions: Bed/chair exit alarm    Mentation Interventions: Adequate sleep, hydration, pain control, Bed/chair exit alarm, More frequent rounding, Reorient patient, Room close to nurse's station, Update white board    Medication Interventions: Bed/chair exit alarm    Elimination Interventions: Bed/chair exit alarm, Call light in reach

## 2019-11-22 NOTE — PROGRESS NOTES
1900 Report received from [place], [facility]. Pt is GIP level of care for symptoms of delerium, pain, agitation, uncontrolled Blood Glucose. Dx Lung Cancer with mets to Brain and Spine. 1930 Pt is awake,alert. banging on bedside table and shaking side rails. . Able to respond to simple questions and follow simple commands. Restless in bed, moving legs. Request water. Drank without difficulty. Scheduled Morphine and PRN Haldol given for symptoms. 2030 Continues to talk out. Speech is garbled. Request more water. 2145 Blood glucose check. Results 267. Given 2 U ss coverage per order. 2200 Wife called to check on her . 2300 Resting quietly, lying in bed awake. 0000 Calling out for water and shaking rails. Pleasantly confused. 0130 Scheduled Morphine and PRN Haldol given for management of symptoms. 0230 Appears to be sleeping. 0330 Continue to monitor. Appears to be sleeping  0415 Appears to be sleeping.  0500 Pt repositioned in bed. Cranberry juice given. Pt talking more clearly at this time. 0600 Pt awake, but resting quietly. Given more water to drink. 0630 Phone call from pts wife to check on him. Request MD call her when visit made this am.  0700 Report to oncoming nurse. NAME OF PATIENT:  Farideh Yanes    LEVEL OF CARE:  [facility]    REASON FOR GIP:   Pain, despite numerous changes in medications, Terminal agitation, despite changes to medications, Medication adjustment that must be monitored 24/7 and Stabilizing treatment that cannot take place at home    *PATIENT REMAINS ELIGIBLE FOR [facility] LEVEL OF CARE AS EVIDENCED BY: Frequents assessment and medication adjustments required to mange symptoms.        REASON FOR RESPITE:  na    O2 SAFETY:  na    FALL INTERVENTIONS PROVIDED:   Implemented/recommended use of fall risk identification flag to all team members, Implemented/recommended resources for alarm system (personal alarm, bed alarm, call bell, etc.) , Implemented/recommended environmental changes (remove hazards, lower bed, improve lighting, etc.) and Implemented/recommended increased supervision/assistance    INTERDISPLINARY COMMUNICATION/COLLABORATION:  Physician, MSW, [place] and RN, CNA    NEW MEDICATION INITIATION DOCUMENTATION:  Documentation completed in Clinical Note in [facility]    Reason medication is being initiated:  na    MD / Provider name consulted re: change in status / initiation of new medication:  na    New Symptom(s):  na    New Order(s):  na    Name of the person notified of the changes:  na    Name of person being taught:  na    Instructions given:  na    Side Effects taught:  na    Response to teaching:        COMFORTABLE DYING MEASURE:  Is Patient/family satisfied with symptom level? Yes    DISCHARGE PLAN:  Remain GIP until symptoms are managed and pt can be cared for in the home.

## 2019-11-22 NOTE — DISCHARGE SUMMARY
Discharge Summary    [facility]  Good Help to Those in Need  [phone]      Date of Admission: 11/16/2019  Date of Discharge: 11/19/2019    Karen Calderón is a 61y.o. year old who was admitted to [facility] at [facility] with a Hospice diagnosis of  Metastatic non small cell lung cancer      Pt was admitted for GIP level care. Per HPI  Active Symptoms:  1. Lethargy  2. Delirium  3. Cancer related pain  4. Hyperglycemia      PLAN   1. Pt was admitted last evening for assessment and management of hyperglycemia, dehydration and resultant delirium. A metabolic panel on admission revealed a glucose of 738, AG of 8, Na of 132, Cr of 1.59 (up from 1.01) c/w HHS. He received NS IV hydration at 250 cc/hr overnight along with humalog sliding scale q4h. He continues to require GIP level of care due to need for frequent nursing and provider assessments, medications via IV route. 2. The following conditions require ongoing management:  3. HHS- resolved. Glucose now in normal range. Hold IVF for now. Continue q4h glucose checks with prn humalog. Lower Lantus to 10 units qhs (he received 14 untis last night). 4. Brain mets- Order at home was for Decadron 6 mg BID. Has been on 12 mg daily since Sept hospitalization. Recommend to taper start taper; trying to balance hyperglycemia w/ benefit. I suspect pt is transitioning and tumor has evolved in the brain; benefit from steroid likely much less than it was prior. Reduce dose to 6 mg daily and give through IV while unable to take PO.  5. Delirium- due to brain mets and hyperglycemia. Improved this morning; he is calm and resting. Haldol available prn through IV. 6. Cancer related pain- on MS Contin 60 mg q8h and oxycodone 20 mg q1h prn at home. Unclear last time he took his oral meds, it seems to have been several days. He received no opioid overnight and has no signs/sx of withdrawal or c/o pain or discomfort this a.m.   Opt to hold MS Contin as I fear we may not have oral route for much longer. Ordered Morphine 6 mg IV q1h prn for mod-severe pain.      7.  and SW to support family needs  8. Met with multiple family members in the family room today including wife Shekhar Lynch, sons Benito Stallings and Guero, Brother Billy, Pt's Father, along with Hospice RN Cecilia Young and on call hospital . Dennis Marroquin shared her experiences with her  over the past week, namely discussing his increase in confusion. We talked about likelihood that his brain tumor has progressed leading to his decline. Shared that the hyperglycemic state has been reversible and this has allowed him to be more comfortable today. I suspect he is transitioning and now has closer to days to short weeks to live, but ongoing assessment over the next 24 hrs will provide us more information about his ability to take in anything orally. He may rally; but he may not. Dennis Marroquin is tearful and has many questions about where to care for him, unsure if she will be able to manage his medicines the way they need to be. Her son provided a great deal of support and reassurance. We reassured her that we will formulate a good plan once he is stabilized to ensure he is well cared for at the end of life. Shekhar Lynch thinks Marisela Ta would want to be at home. Francheska Ramos will arrange for SW support in the morning.     9. Disposition: SW to meet with wife to talk about returning home vs placement with Hospice once symptoms controlled.         The patient was discharged to [facility] for ongoing Hospice care under [facility] level for additional pain control and arrangement of caregiver support at home

## 2019-11-22 NOTE — PROGRESS NOTES
0730- report received from [address] [facility] [address] 0740 pt in bed resting. resp even and unlabored. Pt denies pain at this time. Bed low, side rails up x3, call bell in reach. Port-a-cath intact but not accessed, found port and needle in bed sheets. Skin on sacrum noted to be minimally reddened. Pt turned to his right side. Blood Gluc 163, no coverage needed per order  0830 pt in bed resting, resp even and unlabored. Bed low, side rails up x3, call bell in reach. 0915 pt in bed resting, resp even and labored at times. Bed low, side rails up x3, call bell in reach. 0930 pt ate less than 5% of breakfast.  Pt noted to cough after some thin liquids and with yogart. Pt remained elevated during feeding. 1010 pt port a cath accessed left chest.  Pt tolerated well. 1030 pt turned and repositioned with pillows. Pt resp 24 with noted increased secretions. Pt noted to have griace with touch. Pt given robinol and MS related to increased secretions, SOB, and pain. Bed low, side rails up x3, call bell in reach. 1050 pt in bed resting, resp even and unlabored. Pt denies pain. 1100 called pt wife to give an update, also let her know that pt requested a quilt or blanket from home. 1110 pt in bed resting. resp 20, even and unlabored. Bed low, side rials up x3, call bell in reach. 1130 Blood Gluc 398, covered with 6 units per SS order. Pt tolerated well. Pt in bed resting, resp 18, even and unlabored at this time. Pt gets very SOB with any activity. Noted some coughing and secretions. Bed low, side rails up x3, call bell in reach. 1220 pt in bed, resp 20, pt noted to be coughing. Wife in room to visit. Pt repositioned with pillows. Pt given some juice. Bed low, side rails up x3, call bell in reach. 1320 pt in bed resp 20, even and unlabored. Pt ate bites and sips only for lunch, Noted coughing with eating and drinking. Re-educated re sitting up for eating and drinking. Wife remains at bedside.   1340 rounds completed with Dr. Ana Sims, and Luisana. Wife asked questions re his prognosis and recent changes. Wife verbalized understanding. 1400 pt increasingly agitated with periods of SOB, resp 22. given scheduled morphine and PRN haldol. 1440 pt in bed, wife at bedside. resp even and unlabored at 20 at this time. Bed low, side rails up x3, call bell in reach. 1530 pt in bed, wife at bedside. Call bell in reach, side rails up x3. Pt turned again, once pt is repositioned with pillows, he pulls the pillow out and lays back on his back. Pt also frequently raises the head of bed and the foot of the bed causing pressure on his sacral area. 1620 pt in bed, resp even and unlabored. Noted coughing. Pt denies pain at this time. Bed low, side rails up x3, call bell in reach. 1648 blood sugar 239, no coverage needed per order. Pt given bed bath by CNA. Pt tolerated well. Periodic agitation. Bed low, side rails up x3, call bell in reach. 1710 pt tolerated jet neb well. Bed low, side rails up x3, call bell in reach. 1800 pt in bed resting, lights dimmed. Bed low, side rails up x3, call bell in reach. 1845 pt in bed, resp even and unlabored. Bed low, side rails up x3, call bell in reach. 1900 pt sacral area is reddened and starting to breakdown. mepilex placed.       NAME OF PATIENT:  Amber Overall    LEVEL OF CARE:  GIP    REASON FOR GIP:   Unmanageable respiratory distress, Terminal agitation, despite changes to medications, Medication adjustment that must be monitored 24/7 and Stabilizing treatment that cannot take place at home    *PATIENT REMAINS ELIGIBLE FOR GIP LEVEL OF CARE AS EVIDENCED BY: (MUST BE ADDRESSED OF PATIENT GIP)      REASON FOR RESPITE:  n/a    O2 SAFETY:  Concentrator positioning (6\" from furniture/drapes)    FALL INTERVENTIONS PROVIDED:   Implemented/recommended use of non-skid footwear, Implemented/recommended environmental changes (remove hazards, lower bed, improve lighting, etc.) and Implemented/recommended increased supervision/assistance    INTERDISPLINARY COMMUNICATION/COLLABORATION:  Physician, MSW, [place] and RN, CNA    NEW MEDICATION INITIATION DOCUMENTATION:  Documentation completed in Clinical Note in [address] [facility]    Reason medication is being initiated:  Secretions, agitation    MD / Provider name consulted re: change in status / initiation of new medication:  Odell    New Symptom(s):  Secretions, agitation    New Order(s):  See orders    Name of the person notified of the changes:  Pt and wife    Name of person being taught:  Pt and wife    Instructions given:  verbally    Side Effects taught:  verbally    Response to teaching:  Verbalizes understanding      COMFORTABLE DYING MEASURE:  Is Patient/family satisfied with symptom level?  yes    DISCHARGE PLAN:  pending

## 2019-11-22 NOTE — PROGRESS NOTES
[address] [place] [address]   [facility] Help to Those in Need  [phone]    Patient Name: Farideh Yanes  YOB: 1960    Date of Provider Hospice Visit: 11/22/19    Level of Care:   [x] General Inpatient (GIP)    [] Routine   [] Respite    Current Location of Care:  [] [facility] [] [facility] [] [facility] [] [facility] [x] Hospice House [facility], patient referred from:  [] [facility] [] [facility] [x] [facility] [] [facility] [] Home [] Other:     Principle Hospice Diagnosis:  Metastatic non small cell lung cancer  Diagnoses RELATED to the terminal prognosis: Brain and liver metastases  Other Diagnoses: DM type 2, hypertension     HOSPICE SUMMARY   Do not cut and paste chart information other than imaging findings    Farideh Yanes is a 61y.o. year old who was admitted to [address] [place] [address]. 62 yo man admitted to hospice with advanced malignant neoplasm of lung that has progressed and metastasized to liver and brain. Pt has underlying advanced COPD and comorbid conditions of HTN, DM and is malnourished. Pt is s/p brain tumor removal at [facility] followed by XRT to brain was admitted recently with L sided weakness- MRI brain showing progressive brain mets, large R sided lesion. The patient's principle diagnosis of metastatic lung CA has resulted in pain, intermittent agitation and debility.  Functionally, the patient's Palliative Performance Scale has declined over a period of weeks and is estimated at 30. Objective information that support this patients limited prognosis includes:  CT Chest:  IMPRESSION:  Imaging findings are consistent with interval progression of disease.     There is extensive progression of metastatic disease in the hepatic parenchyma  with numerous new and/or enlarged hepatic mass lesions as described above.     Possible new small satellite nodule in the right upper lobe of the lung.   Mediastinal and primary lung mass/adenopathy not significantly changed.     Intracranial metastatic disease progression as described in brain MRI report.        The patient is not likely to endanger self or others.        I confirm that I composed the narrative based on my review of the patient's medical record or examination of the patient.        HOSPICE DIAGNOSES   Active Symptoms:  1. Restlessness/Agitation: intermittent  2. Pain; neoplasm related  3. Poor appetite and intake  4. Hyperglycemia; recurrent and ongoing with recent episode of HONK  5. Decline in function  6. Cough/phlegm       PLAN   1. Continue GIP LOC for now as pt still requiring close monitoring and adjustment of medications  2. Continue Finger stick check for blood glucose daily ACHS and use SSI coverage for now; wife Donal needs to be taught monitoring and managing blood sugars  3. Add mucinex 600mg twice daily and robitussin cough syrup every 4 hours as needed for cough  4. Add duonebs every 4 hours as needed to help with breathing and cough  5. Continue other comfort medications as needed  6. Continue morphine 10mg oral scheduled every 6 hours and additional every hour as needed for pain, dyspnea  7. Encourage upright position for feeding, in alert state, no straws, drink liquids directly from cup.     8.  and SW to support family needs  9. Disposition: home with hospice once symptoms stable. SW to arrange on support services at home for wife/children    10. Hospice Plan of care was reviewed in detail and agree with current plan of care    Prognosis estimated based on 11/22/19 clinical assessment is:   [] Hours to Days    [x] Days to Weeks    [] Other:    Communicated plan of care with: Hospice Case Manager; Hospice IDT; Care Team     GOALS OF CARE     Patient/Medical POA stated Goal of Care:comfort    [x] I have reviewed and/or updated ACP information in the Advance Care Planning Navigator. This information is available in the [address] link in the patient's chart header.     Primary Decision Maker ([address]):   Primary Decision Maker (Active): Dilcia Mercedes - Spouse - [phone]    Secondary Decision Maker: Maricruz Fournier - Father - [phone]    Resuscitation Status: DNR  If DNR is there a Durable DNR on file? : [x] Yes [] No (If no, complete Durable DNR)    HISTORY     History obtained from: pt, staff at [address] [place] [address],[address]: I am OK  The patient is:   [x] Verbal  [] Nonverbal  [] Unresponsive    HPI/SUBJECTIVE:  Pt is awake and talking, appears pale and tired, has cough and phlegm and wants to try to clear his throat. This am he had bad episode of coughing with choking and aspirated on his breakfast but doing better now. Pt got 1 prn morphine and 1 prn robinul  Blood sugars were 163 this am and 398 around lunch time and got 6 units of insulin     REVIEW OF SYSTEMS     The following systems were: [x] reviewed  [] unable to be reviewed    Positive ROS include:  Constitutional: fatigue, weakness, in pain, short of breath  Ears/nose/mouth/throat: increased airway secretions  Respiratory:shortness of breath, wheezing  Gastrointestinal:poor appetite, nausea, vomiting, abdominal pain, constipation, diarrhea  Musculoskeletal:pain, deformities, swelling legs  Neurologic:confusion, hallucinations, weakness  Psychiatric:anxiety, feeling depressed, poor sleep  Endocrine:        FUNCTIONAL ASSESSMENT     Palliative Performance Scale (PPS): 40%       PSYCHOSOCIAL/SPIRITUAL ASSESSMENT     Active Problems:    * No active hospital problems.  *    Past Medical History:   Diagnosis Date    Cancer ([facility] [address]     LUNG RT. SIDE METS TO BRAIN, LIVER    Chronic obstructive pulmonary disease (HCC)     MILD PER WIFE    Diabetes (HCC)     TYPE II    GERD (gastroesophageal reflux disease)     Hypertension       Past Surgical History:   Procedure Laterality Date    HX CERVICAL FUSION      HX CRANIOTOMY  01/29/2019    REMOVAL OF BRAIN METS AT [facility]    HX GI      COLONOSCOPY    HX HERNIA REPAIR Right 1972    INGUINAL    VASCULAR SURGERY PROCEDURE UNLIST Bilateral     LEGS Social History     Tobacco Use    Smoking status: Former Smoker     Packs/day: 1.50     Years: 25.00     Pack years: 37.50     Last attempt to quit: 1/21/2009     Years since quitting: 10.8    Smokeless tobacco: Never Used   Substance Use Topics    Alcohol use: No     Frequency: Never     Family History   Problem Relation Age of Onset    Stroke Father     Diabetes Father     Other Mother 58        SUDDEN DEATH    Cancer Maternal Grandfather     Anesth Problems Neg Hx       Allergies   Allergen Reactions    Other Medication Nausea and Vomiting     Pt states he is allergic to diuretics but unsure of name         Current Facility-Administered Medications   Medication Dose Route Frequency    insulin regular (NOVOLIN R, HUMULIN R) injection   SubCUTAneous AC&HS    haloperidol (HALDOL) 2 mg/mL oral solution 2 mg  2 mg Oral Q6H PRN    ondansetron (ZOFRAN ODT) tablet 8 mg  8 mg Oral Q8H PRN    morphine (ROXANOL) concentrated oral syringe 10 mg  10 mg Oral Q1H PRN    morphine (ROXANOL) concentrated oral syringe 10 mg  10 mg Oral Q6H    bisacodyl (DULCOLAX) suppository 10 mg  10 mg Rectal DAILY PRN    dexamethasone (DECADRON) 4 mg/mL injection 6 mg  6 mg IntraVENous DAILY    scopolamine (TRANSDERM-SCOP) 1 mg over 3 days 1 Patch  1 Patch TransDERmal Q72H PRN    glycopyrrolate (ROBINUL) injection 0.2 mg  0.2 mg IntraVENous Q6H PRN    ketorolac (TORADOL) injection 30 mg  30 mg IntraVENous Q6H PRN    acetaminophen (TYLENOL) suppository 650 mg  650 mg Rectal Q4H PRN        PHYSICAL EXAM     Wt Readings from Last 3 Encounters:   09/25/19 75.1 kg (165 lb 8 oz)   09/25/19 72.3 kg (159 lb 6.3 oz)   09/19/19 72.1 kg (159 lb)       Visit Vitals  /80   Pulse 60   Temp 96.2 °F (35.7 °C)   Resp 15   SpO2 98%       Supplemental O2  [] Yes  [x] NO  Last bowel movement:     Currently this patient has:  [x] Peripheral IV [] PICC  [] PORT [] ICD    [x] Bland Catheter [] NG Tube   [] PEG Tube    [] Rectal Tube [] Drain  [] Other:     Constitutional:lethargic, but arousable, no apparent distress  Eyes: pupils equal, anicteric, pallor  ENMT: increased cough with airway secretions  Cardiovascular: regular rhythm, distal pulses intact  Respiratory: breathing slightly labored, symmetric, cough with chest secretions  Gastrointestinal: soft non-tender, +bowel sounds  Musculoskeletal: no deformity, no tenderness to palpation  Skin: warm, dry  Neurologic:lethargic, arousable  Psychiatric: calm affect       Pertinent Lab and or Imaging Tests:  Lab Results   Component Value Date/Time    Sodium 136 11/18/2019 04:42 PM    Potassium 3.7 11/18/2019 04:42 PM    Chloride 102 11/18/2019 04:42 PM    CO2 28 11/18/2019 04:42 PM    Anion gap 6 11/18/2019 04:42 PM    Glucose 143 (H) 11/18/2019 04:42 PM    BUN 33 (H) 11/18/2019 04:42 PM    Creatinine 1.00 11/18/2019 04:42 PM    BUN/Creatinine ratio 33 (H) 11/18/2019 04:42 PM    GFR est AA >60 11/18/2019 04:42 PM    GFR est non-AA >60 11/18/2019 04:42 PM    Calcium 9.7 11/18/2019 04:42 PM     Lab Results   Component Value Date/Time    Protein, total 7.4 11/18/2019 04:42 PM    Albumin 2.8 (L) 11/18/2019 04:42 PM           Total time: 35mts  Counseling / coordination time: 25mts  > 50% counseling / coordination?:

## 2019-11-23 NOTE — PROGRESS NOTES
0900 Start of shift report from [address] Pt lying quiet in bed. Pt stated 7 pain out 10 scale pain. Schedule meds given along with prn Haldol. Periods of delirium with frequent periods of pulling on clothing. Shift assessment done. Lungs sounds are even and unlabored. Respiration, 20; No signs of respiratory distress. Stage 2 pressure sore on sacrum with mepilex placed. No signs of edema noted. Port a cath  out and non intact upon entering room; nurse reattached port and discarded it. 2100 Pt lying in bed awake, trying to take clothes and sheets off. Nurse assisted pt with putting clothing back on and linen back on bed.    2200 Pt awake lying in bed pulling on clothing and throwing them on floor. Blood sugar checked (439). 8 units of insulin given with witness Main Knutson) at bedside. Pt wife Za Ennis) called on telephone at [phone] and spoke with nurse Dianne Castillo). Wife asked about the care and well-being of pt. Nurse stated the above pt documentation to pt's wife. Wife stated she will call back at 0600 on 11/23/19.    2300 Pt awake in bed stated he's hungry. Frequently pulling off clothing and throwing blankets on the floor. Isaac Long CNA made pt a tuna sandwich with chocolate pudding on the side. 0000 Pt asleep in bed, bed is in the lowest position. Call bell in reach. 0100 Pt is in bed asleep, awaken by nurse Dianne Castillo) for due schedule medications. Nurse assisted pt with tuna sandwich and chocolate pudding. No signs of aspiration and well toleration of solid foods. Bed is in lowest position, call bell in reach. 0200 Pt asleep in bed. Resting quietly. Bed alarm on, bed in lowest position. Call bell in reach. 0300 Pt asleep in bed.    0400 Pt asleep in bed. Resting quietly. 0500 Pt awaken by nurse to give scheduled Albuterol treatment. Upon nurse leaving room, pt is back asleep. Bed in lowest position. Call bell in reach. Bed alarm is on.    0630 Pt awake in bed.  Upon entering room pt had bed elevated to the highest level, stating he wanted to get up. Pt stated pain 10/10. Scheduled medication given (morphine). Linen changed on bed and mepilex changed on buttocks with applied ointment. Pt reposition on (r) side and pulled up in bed for comfort. Bed in lowest position, with bed alarm on and call bell in reach upon leaving room. 0710 Off going report giving to Aditya banerjee RN                NAME OF PATIENT:  Erick Henriquez    LEVEL OF CARE:  GIP    REASON FOR GIP:   Pain, despite numerous changes in medications, Unmanageable respiratory distress and Medication adjustment that must be monitored 24/7    *PATIENT REMAINS ELIGIBLE FOR GIP LEVEL OF CARE AS EVIDENCED BY: (MUST BE ADDRESSED OF PATIENT GIP)      REASON FOR RESPITE:  Caregiver breakdown    O2 SAFETY:  NA    FALL INTERVENTIONS PROVIDED:   Implemented/recommended use of fall risk identification flag to all team members, Implemented/recommended assistive devices and encouraged their use, Implemented/recommended resources for alarm system (personal alarm, bed alarm, call bell, etc.)  and Implemented/recommended environmental changes (remove hazards, lower bed, improve lighting, etc.)    INTERDISPLINARY COMMUNICATION/COLLABORATION:  Physician, MSW, [place] and RN, CNA    NEW MEDICATION INITIATION DOCUMENTATION:  NA    Reason medication is being initiated:  NA    MD / Provider name consulted re: change in status / initiation of new medication:  NA    New Symptom(s):  NA    New Order(s):  NA    Name of the person notified of the changes:  NA    Name of person being taught:  NA    Instructions given:  NA    Side Effects taught:  NA    Response to teaching:  NA      COMFORTABLE DYING MEASURE:  Is Patient/family satisfied with symptom level?   NA    DISCHARGE PLAN:  Pending

## 2019-11-23 NOTE — PROGRESS NOTES
[address] [place] [address]   [facility] in [facility]  [phone]    Patient Name: Wanda Castano  YOB: 1960    Date of Provider Hospice Visit: 11/23/19    Level of Care:   [x] General Inpatient (GIP)    [] Routine   [] Respite    Current Location of Care:  [] [facility] [] [facility] [] [facility] [] [facility] [x] Hospice House [facility], patient referred from:  [] [facility] [] [facility] [x] [facility] [] [facility] [] Home [] Other:     Principle Hospice Diagnosis:  Metastatic non small cell lung cancer  Diagnoses RELATED to the terminal prognosis: Brain and liver metastases  Other Diagnoses: DM type 2, hypertension     HOSPICE SUMMARY   Do not cut and paste chart information other than imaging findings    Wanda Castano is a 61y.o. year old who was admitted to [address] [place] [address]. 60 yo man admitted to hospice with advanced malignant neoplasm of lung that has progressed and metastasized to liver and brain. Pt has underlying advanced COPD and comorbid conditions of HTN, DM and is malnourished. Pt is s/p brain tumor removal at [facility] followed by XRT to brain was admitted recently with L sided weakness- MRI brain showing progressive brain mets, large R sided lesion. The patient's principle diagnosis of metastatic lung CA has resulted in pain, intermittent agitation and debility.  Functionally, the patient's Palliative Performance Scale has declined over a period of weeks and is estimated at 30. Objective information that support this patients limited prognosis includes:  CT Chest:  IMPRESSION:  Imaging findings are consistent with interval progression of disease.     There is extensive progression of metastatic disease in the hepatic parenchyma  with numerous new and/or enlarged hepatic mass lesions as described above.     Possible new small satellite nodule in the right upper lobe of the lung.   Mediastinal and primary lung mass/adenopathy not significantly changed.     Intracranial metastatic disease progression as described in brain MRI report.        The patient is not likely to endanger self or others.        I confirm that I composed the narrative based on my review of the patient's medical record or examination of the patient.        HOSPICE DIAGNOSES   Active Symptoms:  1. Restlessness/Agitation: intermittent  2. Pain; neoplasm related  3. Poor appetite and intake  4. Hyperglycemia; recurrent and ongoing with recent episode of HONK  5. Decline in function  6. Cough/phlegm       PLAN   1. Continue GIP LOC for now as pt still requiring close monitoring and adjustment of medications  2. d/c Mucinex; it has not arrived at this time and pt has no symptoms today. 3.  and SW to support family needs  4. Disposition: home with hospice once symptoms stable. SW to arrange on support services at home for wife/children    5. Hospice Plan of care was reviewed in detail and agree with current plan of care    Prognosis estimated based on 11/23/19 clinical assessment is:   [] Hours to Days    [x] Days to Weeks    [] Other:    Communicated plan of care with: Hospice Case Manager; Hospice IDT; Care Team     GOALS OF CARE     Patient/Medical POA stated Goal of Care:comfort    [x] I have reviewed and/or updated ACP information in the Advance Care Planning Navigator. This information is available in the [address] [place] [address] Drive link in the patient's chart header.     Primary Decision Maker (Health Care Agent):   Primary Decision Maker (Active): Jessica Mercedesbie - Spouse - [phone]    Secondary Decision Maker: Jenny Wills - Father - [phone]    Resuscitation Status: DNR  If DNR is there a Durable DNR on file? : [x] Yes [] No (If no, complete Durable DNR)    HISTORY     History obtained from: pt, staff at [address] [place] [address],[address]: I am OK  The patient is:   [x] Verbal  [] Nonverbal  [] Unresponsive    HPI/SUBJECTIVE:  Asleep     REVIEW OF SYSTEMS     The following systems were: [x] reviewed  [] unable to be reviewed    Positive ROS include:  Constitutional: fatigue, weakness, in pain, short of breath  Ears/nose/mouth/throat: increased airway secretions  Respiratory:shortness of breath, wheezing  Gastrointestinal:poor appetite, nausea, vomiting, abdominal pain, constipation, diarrhea  Musculoskeletal:pain, deformities, swelling legs  Neurologic:confusion, hallucinations, weakness  Psychiatric:anxiety, feeling depressed, poor sleep  Endocrine:        FUNCTIONAL ASSESSMENT     Palliative Performance Scale (PPS): 40%       PSYCHOSOCIAL/SPIRITUAL ASSESSMENT     Active Problems:    * No active hospital problems.  *    Past Medical History:   Diagnosis Date    Cancer ([facility] [address]     LUNG RT. SIDE METS TO BRAIN, LIVER    Chronic obstructive pulmonary disease (HCC)     MILD PER WIFE    Diabetes (HCC)     TYPE II    GERD (gastroesophageal reflux disease)     Hypertension       Past Surgical History:   Procedure Laterality Date    HX CERVICAL FUSION      HX CRANIOTOMY  01/29/2019    REMOVAL OF BRAIN METS AT [facility]    HX GI      COLONOSCOPY    HX HERNIA REPAIR Right 1972    INGUINAL    VASCULAR SURGERY PROCEDURE UNLIST Bilateral     LEGS      Social History     Tobacco Use    Smoking status: Former Smoker     Packs/day: 1.50     Years: 25.00     Pack years: 37.50     Last attempt to quit: 1/21/2009     Years since quitting: 10.8    Smokeless tobacco: Never Used   Substance Use Topics    Alcohol use: No     Frequency: Never     Family History   Problem Relation Age of Onset    Stroke Father     Diabetes Father     Other Mother 58        SUDDEN DEATH    Cancer Maternal Grandfather     Anesth Problems Neg Hx       Allergies   Allergen Reactions    Other Medication Nausea and Vomiting     Pt states he is allergic to diuretics but unsure of name         Current Facility-Administered Medications   Medication Dose Route Frequency    guaiFENesin ER (MUCINEX) tablet 600 mg (Patient Supplied)  600 mg Oral Q12H    guaiFENesin (ROBITUSSIN) 100 mg/5 mL oral liquid 100 mg  100 mg Oral Q4H PRN    albuterol-ipratropium (DUO-NEB) 2.5 MG-0.5 MG/3 ML  3 mL Nebulization Q4H RT    insulin regular (NOVOLIN R, HUMULIN R) injection   SubCUTAneous AC&HS    haloperidol (HALDOL) 2 mg/mL oral solution 2 mg  2 mg Oral Q6H PRN    ondansetron (ZOFRAN ODT) tablet 8 mg  8 mg Oral Q8H PRN    morphine (ROXANOL) concentrated oral syringe 10 mg  10 mg Oral Q1H PRN    morphine (ROXANOL) concentrated oral syringe 10 mg  10 mg Oral Q6H    bisacodyl (DULCOLAX) suppository 10 mg  10 mg Rectal DAILY PRN    dexamethasone (DECADRON) 4 mg/mL injection 6 mg  6 mg IntraVENous DAILY    scopolamine (TRANSDERM-SCOP) 1 mg over 3 days 1 Patch  1 Patch TransDERmal Q72H PRN    glycopyrrolate (ROBINUL) injection 0.2 mg  0.2 mg IntraVENous Q6H PRN    ketorolac (TORADOL) injection 30 mg  30 mg IntraVENous Q6H PRN    acetaminophen (TYLENOL) suppository 650 mg  650 mg Rectal Q4H PRN        PHYSICAL EXAM     Wt Readings from Last 3 Encounters:   09/25/19 75.1 kg (165 lb 8 oz)   09/25/19 72.3 kg (159 lb 6.3 oz)   09/19/19 72.1 kg (159 lb)       Visit Vitals  BP 96/64 (BP 1 Location: Left arm, BP Patient Position: Supine;During activity)   Pulse (!) 104   Temp 97.4 °F (36.3 °C)   Resp 18   SpO2 97%       Supplemental O2  [] Yes  [x] NO  Last bowel movement:     Currently this patient has:  [x] Peripheral IV [] PICC  [] PORT [] ICD    [x] Bland Catheter [] NG Tube   [] PEG Tube    [] Rectal Tube [] Drain  [] Other:     Constitutional: asleep  (did not attempt to wake pt)  Eyes: pupils equal, anicteric, pallor  ENMT: dry  Cardiovascular: tachy, regular rhythm, distal pulses intact  Respiratory: CTAB, symmetric  Gastrointestinal: soft non-tender, +bowel sounds  Musculoskeletal: no deformity, no tenderness to palpation  Skin: warm, dry  Neurologic:   Psychiatric:        Pertinent Lab and or Imaging Tests:  Lab Results   Component Value Date/Time    Sodium 136 11/18/2019 04:42 PM    Potassium 3.7 11/18/2019 04:42 PM    Chloride 102 11/18/2019 04:42 PM    CO2 28 11/18/2019 04:42 PM    Anion gap 6 11/18/2019 04:42 PM    Glucose 143 (H) 11/18/2019 04:42 PM    BUN 33 (H) 11/18/2019 04:42 PM    Creatinine 1.00 11/18/2019 04:42 PM    BUN/Creatinine ratio 33 (H) 11/18/2019 04:42 PM    GFR est AA >60 11/18/2019 04:42 PM    GFR est non-AA >60 11/18/2019 04:42 PM    Calcium 9.7 11/18/2019 04:42 PM     Lab Results   Component Value Date/Time    Protein, total 7.4 11/18/2019 04:42 PM    Albumin 2.8 (L) 11/18/2019 04:42 PM           Total time:    Counseling / coordination time:    > 50% counseling / coordination?:

## 2019-11-23 NOTE — PROGRESS NOTES
0700  Report received. 0720  Pt lying in bed, asleep. Pt aroused to verbal stimuli. No co pain or dyspnea at this time. Lungs diminished but clear. Pt is on Ra. + bowel sounds. Last BM was 11-19. Cadet is draining yellow urine. No edema noted. Pt has a mepilex border on his sacrum. Pt back to sleep at the end of assessment. 0830  Pt asleep. No facial grimacing or moaning. 0930  Pt asleep. 1020  Pt pulling at his cadet and throwing his leg over the railing. 1034  Pt medicated with Haldol and Morphine  1100  Pt resting. No restlessness or pain noted. 1200  Pt asleep.      1233  Pt medicated with 2 units of insulin, scheduled Morphine,   1300  Pt only tolerated 20% of his lunch. No complaints at this time. 1410  Pt asleep. No facial grimacing. 1500  Pt repositioned in bed, No distress noted. 1615  Pt continues to sleep. No facial grimacing or moaning. 65  Pt's wife in to visit   Rn updated her on Pt's status. Ms Jada Carrasco was usy making arrangements. FH is going to be [employer]. Pt continues to rest  Comfortably. 1730  Pt agitated, pulling on his gown. Trying to lick his hands wife. Throwing legs over the rail. Pt medicated with Morphine and Haldol. Pt's wife is telling Mr Jada Carrasco not to eat fast, not to take big bites, that she can not understand him etc.  Pt continues to get agitated. 1810 pt resting in bed. No complaints. 1850  Pt asleep. 1900  Report given. NAME OF PATIENT:  Thuan Leigh    LEVEL OF CARE:  GIP    REASON FOR GIP:   Pain, despite numerous changes in medications, Terminal agitation, despite changes to medications, Medication adjustment that must be monitored 24/7 and Stabilizing treatment that cannot take place at home    *PATIENT REMAINS ELIGIBLE FOR GIP LEVEL OF CARE AS EVIDENCED BY: (MUST BE ADDRESSED OF PATIENT GIP)  Pt continues to receive IV Decadron.       REASON FOR RESPITE:  n/a    O2 SAFETY: Ra    FALL INTERVENTIONS PROVIDED:   Implemented/recommended resources for alarm system (personal alarm, bed alarm, call bell, etc.)  and Implemented/recommended environmental changes (remove hazards, lower bed, improve lighting, etc.)    INTERDISPLINARY COMMUNICATION/COLLABORATION:  Physician, MSW, [place] and RN, CNA    NEW MEDICATION INITIATION DOCUMENTATION:  No new medications initiated. Reason medication is being initiated:  n/a    MD / Provider name consulted re: change in status / initiation of new medication:  n/a    New Symptom(s):  n/a    New Order(s):  n/a    Name of the person notified of the changes:  n/a    Name of person being taught:  n/a    Instructions given:  N/a    Side Effects taught:  n/a    Response to teaching:  n/a      COMFORTABLE DYING MEASURE:  Is Patient/family satisfied with symptom level?  yes    DISCHARGE PLAN:  Pt will remain at the [facility] until his symptoms are managed.

## 2019-11-23 NOTE — PROGRESS NOTES
Problem: Falls - Risk of  Goal: *Absence of Falls  Description  Document Donata Carrel Fall Risk and appropriate interventions in the flowsheet. Outcome: Not Progressing Towards Goal  Note: Fall Risk Interventions:  Mobility Interventions: Communicate number of staff needed for ambulation/transfer    Mentation Interventions: More frequent rounding, Reorient patient, Room close to nurse's station    Medication Interventions: Bed/chair exit alarm, Patient to call before getting OOB    Elimination Interventions: Bed/chair exit alarm, Call light in reach              Problem: Patient Education: Go to Patient Education Activity  Goal: Patient/Family Education  Outcome: Not Progressing Towards Goal     Problem: Pressure Injury - Risk of  Goal: *Prevention of pressure injury  Description  Document Angelito Scale and appropriate interventions in the flowsheet. Outcome: Not Progressing Towards Goal  Note: Pressure Injury Interventions:  Sensory Interventions: Assess changes in LOC, Assess need for specialty bed, Avoid rigorous massage over bony prominences, Check visual cues for pain, Float heels, Keep linens dry and wrinkle-free, Minimize linen layers, Pad between skin to skin    Moisture Interventions: Absorbent underpads, Apply protective barrier, creams and emollients, Minimize layers    Activity Interventions: Assess need for specialty bed, Pressure redistribution bed/mattress(bed type)    Mobility Interventions: Assess need for specialty bed, Float heels, HOB 30 degrees or less, Pressure redistribution bed/mattress (bed type), Turn and reposition approx.  every two hours(pillow and wedges)    Nutrition Interventions: Document food/fluid/supplement intake, Offer support with meals,snacks and hydration    Friction and Shear Interventions: Apply protective barrier, creams and emollients, HOB 30 degrees or less, Lift sheet, Minimize layers                Problem: Patient Education: Go to Patient Education Activity  Goal: Patient/Family Education  Outcome: Not Progressing Towards Goal

## 2019-11-23 NOTE — PROGRESS NOTES
Problem: Pressure Injury - Risk of  Goal: *Prevention of pressure injury  Description  Document Angelito Scale and appropriate interventions in the flowsheet. 11/22/2019 2156 by Michael Massey LPN  Outcome: Progressing Towards Goal  Note: Pressure Injury Interventions:  Sensory Interventions: Assess changes in LOC, Assess need for specialty bed, Avoid rigorous massage over bony prominences, Check visual cues for pain, Float heels, Keep linens dry and wrinkle-free, Minimize linen layers, Pad between skin to skin    Moisture Interventions: Absorbent underpads, Apply protective barrier, creams and emollients, Minimize layers    Activity Interventions: Assess need for specialty bed, Pressure redistribution bed/mattress(bed type)    Mobility Interventions: Assess need for specialty bed, Float heels, HOB 30 degrees or less, Pressure redistribution bed/mattress (bed type), Turn and reposition approx.  every two hours(pillow and wedges)    Nutrition Interventions: Document food/fluid/supplement intake, Offer support with meals,snacks and hydration    Friction and Shear Interventions: Apply protective barrier, creams and emollients, HOB 30 degrees or less, Lift sheet, Minimize layers             11/22/2019 2152 by Michael Massey LPN  Outcome: Not Progressing Towards Goal  Note: Pressure Injury Interventions:  Sensory Interventions: Assess changes in LOC, Assess need for specialty bed, Avoid rigorous massage over bony prominences, Check visual cues for pain, Float heels, Keep linens dry and wrinkle-free, Minimize linen layers, Pad between skin to skin    Moisture Interventions: Absorbent underpads, Apply protective barrier, creams and emollients, Minimize layers    Activity Interventions: Assess need for specialty bed, Pressure redistribution bed/mattress(bed type)    Mobility Interventions: Assess need for specialty bed, Float heels, HOB 30 degrees or less, Pressure redistribution bed/mattress (bed type), Turn and reposition approx.  every two hours(pillow and wedges)    Nutrition Interventions: Document food/fluid/supplement intake, Offer support with meals,snacks and hydration    Friction and Shear Interventions: Apply protective barrier, creams and emollients, HOB 30 degrees or less, Lift sheet, Minimize layers

## 2019-11-24 NOTE — PROGRESS NOTES
0700 report received. 0720  Pt lying in bed,asleep. Pt arouses to verbal stimuli. No facial grimacing or moaning at this time. No co pain. Lungs diminished. No cough noted. Pt is on RA. + bowel sounds. Last reported BM was 11-20. Bland is draining yellow urine. No edema. Dressing on sacrum is dry and intact. Pt has a L chest PAC. Dressing is clear and intact. 0830     4 units of SS insulin given. 0930  Pr tolerated 75% of his breakfast.  No complaints. Pt continues to have some confusion. Pt trying  to call his wife, pushed the nurses call bell. 1030  Pt asleep. 1125  Pt pulling on the side rails. Pt trying to get his cath secure off of his leg. Pt redirected. Breathing treatment given. . Pt medicated with 6 units of insulin  1148  Pt agitated and anxious. Pt's wife at the bedside. Pt requesting meds. Pt states his wife makes him anxious and nervous. Pt medicated with haldol and Morphine  1230  Pt only tolerated several bites of his lunch. No complaints at this time. 1  Pt yelling at his wife. Ms seymour stated she could not understand him. Pt requesting orange juice. Orange juice provided. 1430  Pt agitated with his wife. RN encouraged Ms Al Monroy to go to her appointment this afternoon and we would look after Mr Al Monroy. Ms Al Monroy stated she was going to leave in a little while. pt rolled his eyes. 1500  Phone call to VAL Mora to change meds. Haldol scheduled every 4 hours   Ms Al Monroy left the [facility]. 1520 Pt medicated with Haldol, Breathing treatment given. 1600  Pt asleep. No distress noted. [phone] Pt's sister in to visit. Pt easily agitated with too many visitors. [id number]  Pt agitated with his sister. Pt unable to get his thoughts out. Pt wanting to know where his other bedside table was. Rn removed his tray and pt thanked me for finding it. 1729  Pt medicated with Lorazepam and Haldol. BS  HIGH - unable to read. Darlene Ramires Phone call to NP Loyal Opitz. Pt continues to eat and drink whatever he wants. Pt medicated with 10 units of insulin. 1800  Pt lying in bed, watching TV. No complaints    1815  Pt sitting up on the side of the bed, agitated. Pt wanting to get out of the [facility].    1822  Pt medicated with Morphine, Lorazepam and scheduled Haldol     1845  Pt asleep. 1900  Report given. NAME OF PATIENT:  Derek Acosta    LEVEL OF CARE:  GIP    REASON FOR GIP:   Pain, despite numerous changes in medications and Terminal agitation, despite changes to medications    *PATIENT REMAINS ELIGIBLE FOR [facility] LEVEL OF CARE AS EVIDENCED BY: (MUST BE ADDRESSED OF PATIENT GIP)  Pt receiving IV Decadron      REASON FOR RESPITE:  n/a    O2 SAFETY:  RA    FALL INTERVENTIONS PROVIDED:   Implemented/recommended resources for alarm system (personal alarm, bed alarm, call bell, etc.)  and Implemented/recommended environmental changes (remove hazards, lower bed, improve lighting, etc.)    INTERDISPLINARY COMMUNICATION/COLLABORATION:  Physician, MSW, [place] and RN, CNA    NEW MEDICATION INITIATION DOCUMENTATION:  No new medications initiated. Reason medication is being initiated:  n/a    MD / Provider name consulted re: change in status / initiation of new medication:  n/a    New Symptom(s):  n/a    New Order(s):  n/a    Name of the person notified of the changes:  n/a    Name of person being taught:  n/a    Instructions given:  n/a    Side Effects taught:  n/a    Response to teaching:  n/a      COMFORTABLE DYING MEASURE:  Is Patient/family satisfied with symptom level?  yes    DISCHARGE PLAN:  Pt will remain at the [facility] until his symptom are managed and then he will return home and continue to be followed by Home Hospice.

## 2019-11-24 NOTE — PROGRESS NOTES
Problem: Falls - Risk of  Goal: *Absence of Falls  Description  Document Tash Bourgeois Fall Risk and appropriate interventions in the flowsheet.   Outcome: Progressing Towards Goal  Note: Fall Risk Interventions:  Mobility Interventions: Bed/chair exit alarm    Mentation Interventions: Bed/chair exit alarm    Medication Interventions: Bed/chair exit alarm    Elimination Interventions: Bed/chair exit alarm

## 2019-11-24 NOTE — PROGRESS NOTES
1900 Report received from [place], [address] Pt awake in bed. Wife(Dilcia) at bedside. Lungs even and unlabored. Port a Cath dry and intact in upper left subclavicular area. Red area on sacrum; stage 2 pressure sore. Mepilex applied to sacrum. Scheduled Duo Neb given. 2100 Wife at bedside. Pt appears to be agitated, fussing with wife. Scheduled morphine given. Pt request for something to eat. Nurse(Tasia) assist pt with feedings. Pt took bites and sips of dinner (broccoli, chicken and noodles) (45% eaten). No signs of aspiration. Bed in the lowest position, call bell in reach. Bed alarm is on.  2200 Pt asleep. Sleeping quietly. 2230 Pt awake during B/S check. B/S (439); 8 units of insulin administered with another nurse(Mallika) as a witness at bedside. Pt asleep when nurse left out of room. Bed at lowest position, call bell in reach; bed alarm on.  2300 Pt asleep in bed. 0000 Pt asleep in bed; resting quietly. 0100 Pt awake upon entering room. Scheduled medications given. Pt was giving a snack(cup of peaches) upon request and orange juice. Pt awake upon nurse(Tasia) leaving room. Bed in low position and bed alarm on.  0200 Pt asleep in bed snoring. 0300 Pt asleep in bed.  0400 Pt in bed, awaken by nurse Dung Garces). Scheduled med (Duo neb) given. Pt had a couple of sips of cold water and went back to sleep. Bed in lowest position; bed alarm on.  0500 Pt asleep in bed. Lower extremity feels cold to touch. Socks applied to pt feet along with blankets. 0600 Pt asleep in bed.  0630 Pt awake in bed. Pt verbalized a 9/10 pain rate. Scheduled (Morphine) given. Bed alarm is on. Call bell in reach. 0700 Change of shift report given.       NAME OF PATIENT:  Horace Peck    LEVEL OF CARE:  GIP    REASON FOR GIP:   Medication adjustment that must be monitored 24/7    *PATIENT REMAINS ELIGIBLE FOR GIP LEVEL OF CARE AS EVIDENCED BY: (MUST BE ADDRESSED OF PATIENT GIP)      REASON FOR RESPITE:  Caregiver breakdown    O2 SAFETY:  NA    FALL INTERVENTIONS PROVIDED:   Implemented/recommended use of fall risk identification flag to all team members, Implemented/recommended assistive devices and encouraged their use, Implemented/recommended resources for alarm system (personal alarm, bed alarm, call bell, etc.) , Implemented/recommended environmental changes (remove hazards, lower bed, improve lighting, etc.) and Implemented/recommended increased supervision/assistance    INTERDISPLINARY COMMUNICATION/COLLABORATION:  Physician, MSW, [place] and RN, CNA    NEW MEDICATION INITIATION DOCUMENTATION:  NA    Reason medication is being initiated:  NA    MD / Provider name consulted re: change in status / initiation of new medication:  NA    New Symptom(s):  NA    New Order(s):  NA    Name of the person notified of the changes:  NA    Name of person being taught:  NA    Instructions given:  NA    Side Effects taught:  NA    Response to teaching:  NA      COMFORTABLE DYING MEASURE:  Is Patient/family satisfied with symptom level?   NA    DISCHARGE PLAN:  Pending

## 2019-11-25 NOTE — PROGRESS NOTES
[address] [place] [address]   [facility]  [phone]    Patient Name: Derek Acosta  YOB: 1960    Date of Provider Hospice Visit: 11/25/19    Level of Care:   [x] General Inpatient (GIP)    [] Routine   [] Respite    Current Location of Care:  [] [facility] [] [facility] [] [facility] [] [facility] [x] Hospice House [facility], patient referred from:  [] [facility] [] [facility] [x] [facility] [] [facility] [] Home [] Other:     Principle Hospice Diagnosis:  Metastatic non small cell lung cancer  Diagnoses RELATED to the terminal prognosis: Brain and liver metastases  Other Diagnoses: DM type 2, hypertension     HOSPICE SUMMARY        Derek Acosta is a 61y.o. year old who was admitted to [address] [place] [address]. 62 yo man admitted to hospice with advanced malignant neoplasm of lung that has progressed and metastasized to liver and brain. Pt has underlying advanced COPD and comorbid conditions of HTN, DM and is malnourished. Pt is s/p brain tumor [place] [facility] [place] by XRT to brain was admitted recently with L sided weakness- MRI brain showing progressive brain mets, large R sided lesion. The patient's principle diagnosis of metastatic lung CA has resulted in pain, intermittent agitation and debility.  Functionally, the patient's Palliative Performance Scale has declined over a period of weeks and is estimated at 30. Objective information that support this patients limited prognosis includes:  CT Chest:  IMPRESSION:  Imaging findings are consistent with interval progression of disease.     There is extensive progression of metastatic disease in the hepatic parenchyma  with numerous new and/or enlarged hepatic mass lesions as described above.     Possible new small satellite nodule in the right upper lobe of the lung.   Mediastinal and primary lung mass/adenopathy not significantly changed.     Intracranial metastatic disease progression as described in brain MRI report.        The patient is not likely to endanger self or others.        I confirm that I composed the narrative based on my review of the patient's medical record or examination of the patient.        HOSPICE DIAGNOSES   Active Symptoms:  1. Restlessness/Agitation: intermittent  2. Pain; neoplasm related  3. Poor appetite and intake  4. Hyperglycemia; recurrent and ongoing with recent episode of HONK  5. Decline in function  6. Cough/phlegm       PLAN   1. Continue GIP LOC for now as pt still requiring close monitoring and adjustment of medications including pain medications, BS are unstable  2. d/c Mucinex; it has not arrived at this time and pt has no symptoms today. 3.  and SW to support family needs  4. Disposition: home with hospice once symptoms stable. SW to arrange on support services at home for wife/children  5. Nebulizer's scheduled   6. Haldol 2 mg every 4 hours   7. Continue insulin coverage   8. Roxanol 10 mg po Q 4 hrs  9. Continue decadron 6 mg injections  10. Zofran 8 mg Q 8 hrs    11. Scop patch     12. Hospice Plan of care was reviewed in detail and agree with current plan of care    Prognosis estimated based on 11/25/19 clinical assessment is:   [] Hours to Days    [x] Days to Weeks    [] Other:    Communicated plan of care with: Hospice Case Manager; Hospice IDT; Care Team     GOALS OF CARE     Patient/Medical POA stated Goal of Care:comfort    [x] I have reviewed and/or updated ACP information in the Advance Care Planning Navigator. This information is available in the [address] [place] [address] Drive link in the patient's chart header.     Primary Decision [facility] ([address]):   Primary Decision Maker (Active): Dilcia Mercedes - Spouse - [phone]    Secondary Decision Maker: Cruz Sarah - Father - [phone]    Resuscitation Status: DNR  If DNR is there a Durable DNR on file? : [x] Yes [] No (If no, complete Durable DNR)    HISTORY     History obtained from: pt, staff at [address] [place] [address],[address]: I am OK  The patient is:   [x] Verbal  [] Nonverbal  [] Unresponsive    HPI/SUBJECTIVE:  Asleep     REVIEW OF SYSTEMS     The following systems were: [x] reviewed  [] unable to be reviewed    Positive ROS include:  Constitutional: fatigue, weakness, in pain, short of breath  Ears/nose/mouth/throat: increased airway secretions  Respiratory:shortness of breath, wheezing  Gastrointestinal:poor appetite, nausea, vomiting, abdominal pain, constipation, diarrhea  Musculoskeletal:pain, deformities, swelling legs  Neurologic:confusion, hallucinations, weakness  Psychiatric:anxiety, feeling depressed, poor sleep  Endocrine:        FUNCTIONAL ASSESSMENT     Palliative Performance Scale (PPS): 40%       PSYCHOSOCIAL/SPIRITUAL ASSESSMENT     Active Problems:    * No active hospital problems.  *    Past Medical History:   Diagnosis Date    Cancer ([facility] [address]     LUNG RT. SIDE METS TO BRAIN, LIVER    Chronic obstructive pulmonary disease (HCC)     MILD PER WIFE    Diabetes (HCC)     TYPE II    GERD (gastroesophageal reflux disease)     Hypertension       Past Surgical History:   Procedure Laterality Date    HX CERVICAL FUSION      HX CRANIOTOMY  01/29/2019    REMOVAL OF BRAIN METS AT [facility]    HX GI      COLONOSCOPY    HX HERNIA REPAIR Right 1972    INGUINAL    VASCULAR SURGERY PROCEDURE UNLIST Bilateral     LEGS      Social History     Tobacco Use    Smoking status: Former Smoker     Packs/day: 1.50     Years: 25.00     Pack years: 37.50     Last attempt to quit: 1/21/2009     Years since quitting: 10.8    Smokeless tobacco: Never Used   Substance Use Topics    Alcohol use: No     Frequency: Never     Family History   Problem Relation Age of Onset    Stroke Father     Diabetes Father     Other Mother 58        SUDDEN DEATH    Cancer Maternal Grandfather     Anesth Problems Neg Hx       Allergies   Allergen Reactions    Other Medication Nausea and Vomiting     Pt states he is allergic to diuretics but unsure of name         Current Facility-Administered Medications   Medication Dose Route Frequency    LORazepam (INTENSOL) 2 mg/mL oral concentrate 1 mg  1 mg SubLINGual Q15MIN PRN    haloperidol (HALDOL) 2 mg/mL oral solution 2 mg  2 mg Oral Q4H    morphine (ROXANOL) concentrated oral syringe 10 mg  10 mg SubLINGual Q4H    guaiFENesin (ROBITUSSIN) 100 mg/5 mL oral liquid 100 mg  100 mg Oral Q4H PRN    albuterol-ipratropium (DUO-NEB) 2.5 MG-0.5 MG/3 ML  3 mL Nebulization Q4H RT    insulin regular (NOVOLIN R, HUMULIN R) injection   SubCUTAneous AC&HS    haloperidol (HALDOL) 2 mg/mL oral solution 2 mg  2 mg Oral Q6H PRN    ondansetron (ZOFRAN ODT) tablet 8 mg  8 mg Oral Q8H PRN    morphine (ROXANOL) concentrated oral syringe 10 mg  10 mg Oral Q1H PRN    bisacodyl (DULCOLAX) suppository 10 mg  10 mg Rectal DAILY PRN    dexamethasone (DECADRON) 4 mg/mL injection 6 mg  6 mg IntraVENous DAILY    scopolamine (TRANSDERM-SCOP) 1 mg over 3 days 1 Patch  1 Patch TransDERmal Q72H PRN    glycopyrrolate (ROBINUL) injection 0.2 mg  0.2 mg IntraVENous Q6H PRN    acetaminophen (TYLENOL) suppository 650 mg  650 mg Rectal Q4H PRN        PHYSICAL EXAM     Wt Readings from Last 3 Encounters:   09/25/19 75.1 kg (165 lb 8 oz)   09/25/19 72.3 kg (159 lb 6.3 oz)   09/19/19 72.1 kg (159 lb)       Visit Vitals  /82 (BP 1 Location: Left arm, BP Patient Position: At rest)   Pulse 86   Temp 97.2 °F (36.2 °C)   Resp 17   SpO2 97%       Supplemental O2  [] Yes  [x] NO  Last bowel movement:     Currently this patient has:  [x] Peripheral IV [] PICC  [] PORT [] ICD    [x] Bland Catheter [] NG Tube   [] PEG Tube    [] Rectal Tube [] Drain  [] Other:     Constitutional: asleep  (did not attempt to wake pt)  Eyes: pupils equal, anicteric, pallor  ENMT: dry  Cardiovascular: tachy, regular rhythm, distal pulses intact  Respiratory: some airway secretions in the upper airways, symmetric  Gastrointestinal: soft non-tender, +bowel sounds  Musculoskeletal: no deformity, no tenderness to palpation  Skin: warm, dry  Neurologic:  calm  Psychiatric: NA       Pertinent Lab and or Imaging Tests:  Lab Results   Component Value Date/Time    Sodium 136 11/18/2019 04:42 PM    Potassium 3.7 11/18/2019 04:42 PM    Chloride 102 11/18/2019 04:42 PM    CO2 28 11/18/2019 04:42 PM    Anion gap 6 11/18/2019 04:42 PM    Glucose 143 (H) 11/18/2019 04:42 PM    BUN 33 (H) 11/18/2019 04:42 PM    Creatinine 1.00 11/18/2019 04:42 PM    BUN/Creatinine ratio 33 (H) 11/18/2019 04:42 PM    GFR est AA >60 11/18/2019 04:42 PM    GFR est non-AA >60 11/18/2019 04:42 PM    Calcium 9.7 11/18/2019 04:42 PM     Lab Results   Component Value Date/Time    Protein, total 7.4 11/18/2019 04:42 PM    Albumin 2.8 (L) 11/18/2019 04:42 PM         Juan Silva NP

## 2019-11-25 NOTE — HOSPICE
LCSW participated in medical rounds at bedside of patient along with NP, RN and patient's wife and father, Deshawn Leon. Patient will remain GIP through today. Wife expressed that she does not want him to return home due to difficulty with caregiving and having 2 children at home - she does not want patient to pass at home. Per patient's face sheet, he is approved at 100% for [facility] and therefore cover letter can be submitted for cost of routine bed should patient transition to routine LOC. LTC was not discussed today as patient will remain GIP at this time, however depending on his status tomorrow, revisiting LTC facilities may need to be discussed with patient's wife.     CUCA Puckett, [facility]   [phone]

## 2019-11-25 NOTE — PROGRESS NOTES
Problem: Pressure Injury - Risk of  Goal: *Prevention of pressure injury  Description  Document Angelito Scale and appropriate interventions in the flowsheet. Outcome: Progressing Towards Goal  Note: Pressure Injury Interventions:  Sensory Interventions: Assess changes in LOC, Chair cushion, Check visual cues for pain, Keep linens dry and wrinkle-free, Maintain/enhance activity level, Minimize linen layers, Pressure redistribution bed/mattress (bed type), Turn and reposition approx. every two hours (pillows and wedges if needed)    Moisture Interventions: Absorbent underpads, Apply protective barrier, creams and emollients, Limit adult briefs    Activity Interventions: Increase time out of bed, Pressure redistribution bed/mattress(bed type)    Mobility Interventions: Float heels, HOB 30 degrees or less, Pressure redistribution bed/mattress (bed type)    Nutrition Interventions: Offer support with meals,snacks and hydration    Friction and Shear Interventions: Apply protective barrier, creams and emollients, HOB 30 degrees or less                Problem: Falls - Risk of  Goal: *Absence of Falls  Description  Document Franck Fall Risk and appropriate interventions in the flowsheet.   Outcome: Progressing Towards Goal  Note: Fall Risk Interventions:  Mobility Interventions: Bed/chair exit alarm, Patient to call before getting OOB    Mentation Interventions: Adequate sleep, hydration, pain control, Bed/chair exit alarm, Door open when patient unattended, Familiar objects from home, More frequent rounding, Reorient patient, Room close to nurse's station, Update white board    Medication Interventions: Bed/chair exit alarm, Patient to call before getting OOB    Elimination Interventions: Bed/chair exit alarm, Call light in reach, Patient to call for help with toileting needs

## 2019-11-25 NOTE — PROGRESS NOTES
NAME OF PATIENT:  Kylee Gonzalez    LEVEL OF CARE:  GIP    REASON FOR GIP:   Medication adjustment that must be monitored 24/7 and Stabilizing treatment that cannot take place at home    *PATIENT REMAINS ELIGIBLE FOR GIP LEVEL OF CARE AS EVIDENCED BY: Need for PRN and scheduled medications for agitation and pain. Blood sugars are unmanageable at home       REASON FOR RESPITE:  N/A    O2 SAFETY:  N/A    FALL INTERVENTIONS PROVIDED:   Implemented/recommended use of non-skid footwear, Implemented/recommended resources for alarm system (personal alarm, bed alarm, call bell, etc.) , Implemented/recommended environmental changes (remove hazards, lower bed, improve lighting, etc.) and Implemented/recommended increased supervision/assistance    INTERDISPLINARY COMMUNICATION/COLLABORATION:  Physician, MSW, [place] and RN, CNA    NEW MEDICATION INITIATION DOCUMENTATION:  N/A    Reason medication is being initiated:  N/A    MD / Provider name consulted re: change in status / initiation of new medication:  N/A    New Symptom(s):  N/A    New Order(s):  N/A    Name of the person notified of the changes:  N/A    Name of person being taught:  N/A    Instructions given:  N/A    Side Effects taught:  N/A    Response to teaching:  N/A      COMFORTABLE DYING MEASURE:  Is Patient/family satisfied with symptom level? Yes    DISCHARGE PLAN:  pending    0700 Report received from [facility]. 0715 Patient resting in bed quietly, respirations are even and unlabored, eyes are closed, patient appears to be sleeping. Assessment performed, see flow sheet. 0850 Patient's blood sugar checked, it was 264. Will cover appropriately. 7699 Patient given scheduled breathing treatment, patient took mask off retirement thorough treatment stating he did not want to use it anymore. Patient given scheduled insulin, 2 units verified with OLEKSANDR Arias Seen. Patient given half a cup of orange juice and half a cup of water to drink.    0313 Patient resting in bed quietly, respirations are even and unlabored, eyes are closed, neutral facial expression noted. Patient given scheduled dexamethasone, see MAR.   0915 Patient fed breakfast, patient ate half of muffin and half of eggs. Patient did not want potatoes. Patient drank half a cup of orange juice and water. Patient became agitated after eating breakfast and was grimacing in pain. Patient given PRN lorazepam and scheduled morphine, will continue to monitor. 5037 Patient no longer grimacing or agitated at this time. Patient resting in bed quietly, respirations are even and unlabored, eyes are closed, neutral facial expression noted. Will continue to monitor. 1004 Patient continues to rest in bed quietly, respirations are even and unlabored, eyes are closed, neutral facial expression noted. 200 Patient's wife and father at the bedside. Given update on patient's morning. Patient is resting in bed quietly, respirations are even and unlabored at this time. 1110 Patient's blood sugar checked, it was 309. Will cover appropriately. 1146 Patient given scheduled insulin and breathing treatment. Patient not awake enough to take PO haldol at this time. Patient's sacral dressing changed. Patient turned and repositioned with MITZI Parisi. Patient is resting in bed quietly, respirations are even and unlabored, eyes are closed, neutral facial expression noted. 1225 Patient resting in bed quietly, respirations are even and unlabored, eyes are closed, neutral facial expression noted. Family is at the bedside. 1320 Patient resting in bed quietly, respirations are even and unlabored, eyes are closed, neutral facial expression noted. 1417 Patient became agitated and began grimacing. Patient given scheduled morphine and haldol, see MAR. Patient turned and repositioned in bed with help of CNA [facility]. 1455 Patient's port dressing noted to have completely come off. Patient's cramer needle taken out and re accessed.  Clean dressing placed. Patient tolerated procedure well.   1504 Patient triggered bed alarm attempting to get out of bed. Patient given PRN lorazepam, see MAR. Will continue to monitor. [phone] Patient is resting in bed quietly, respirations are even and unlabored, eyes are closed, neutral facial expression noted. 1622 Patient's blood sugar checked, it was 287. Will cover appropriately. 1654 Patient given scheduled insulin, see MAR. Verified with RN Isaura Matthews. Patient noted to be moaning and grimacing, gave PRN morphine, see MAR.  1725 Patient resting in bed quietly, respirations are even and unlabored, eyes are closed, neutral facial expression noted. 1816 Patient given scheduled morphine and lorazepam, see MAR. Patient noted to have secretions, scopolamine patch applied behind left ear. Will continue to monitor. 1850 Patient resting in bed quietly, respirations are even and unlabored, eyes are closed, neutral facial expression noted.

## 2019-11-26 NOTE — PROGRESS NOTES
11/26/19 0515 Venous Access Device portt 11/25/19 Upper chest (subclavicular area), left Placement Date/Time: 11/25/19 1454   Device Name/Type: portt  Number of Attempts: 1  Present on Admission/Arrival: Yes  Type: Single  Location: Upper chest (subclavicular area), left Central Line Being Utilized No  
Site Assessment Clean, dry, & intact Dressing Status Clean, dry, & intact; Intact Dressing Type Transparent Alcohol Cap Used Yes

## 2019-11-26 NOTE — PROGRESS NOTES
0700  Report received. 0720  Pt lying in bed, asleep. Pt without a gown. Covers in disarray. Pt opened eyes to verbal stimuli. No co pain. No anxiety noted. Lungs diminished. No cough noted.  + bowel sounds. Last reported BM was 11-20. Bland is draining yellow urine. No edema noted. Pt has a dressing on his sacrum. Dressing is dry and intact. Pt has a L Chest PAC. Dressing is clear and intact. 0830  Pt resting. No facial grimacing or moaning at this time. 0900  Pt medicated with scheduled meds. Pt opened his eyes but was nonverbal and fell back to sleep. 1010  Pt continues to sleep. No facial grimacing. 1100  Dr Radames Saba rounding. Medication changes made. PO meds changed to IV. Pt resting. 1215  Pt medicated with scheduled IV meds. Wife and son at the bedside. 1230  Pt asleep. No facial grimacing or moaning. Dr Radames Saba in to visit. Disease process discussed. Pt's son requesting a visit out side of the room. Family met with Dr Radames Saba, Umm Pathak and RN to discuss EOL care. FAmily requesting a Counselor to visit at the time of death due to pt's young children.  (age 15  Twins)  Son Tearful and requesting a counselor. PN SW in to visit and support. 1330  Pt resting. 1410  Pt continues to have shallow breathing. No distress noted. 1500  Pt asleep. 1610 Pt moaning,  Pt medicated with scheduled meds. 1630  Pt resting. No distress noted. 1730 Pt having shallow breathing with periods of apnea. No distress. Ms Brett Gaitan and Pt's son at the bedside. 1830  Pt unresponsive. No facial grimacing or moaning. No distress noted. Pt having apnea lasting 40 secs. Family at the bedside. 1900  Report given.       NAME OF PATIENT:  Jose Alberto Souza    LEVEL OF CARE:  GIP    REASON FOR GIP:   Pain, despite numerous changes in medications, Terminal agitation, despite changes to medications, Medication adjustment that must be monitored 24/7 and Stabilizing treatment that cannot take place at home    *PATIENT REMAINS ELIGIBLE FOR GIP LEVEL OF CARE AS EVIDENCED BY: (MUST BE ADDRESSED OF PATIENT GIP)  Pt was receiving PRN SL medications. Medications changed to IV. Pt less responsive. REASON FOR RESPITE:  N/a    O2 SAFETY:  RA    FALL INTERVENTIONS PROVIDED:   Implemented/recommended use of fall risk identification flag to all team members, Implemented/recommended assistive devices and encouraged their use, Implemented/recommended resources for alarm system (personal alarm, bed alarm, call bell, etc.)  and Implemented/recommended environmental changes (remove hazards, lower bed, improve lighting, etc.)    INTERDISPLINARY COMMUNICATION/COLLABORATION:  Physician, MSW, [place] and RN, CNA    NEW MEDICATION INITIATION DOCUMENTATION:  No new medications initiated,      Reason medication is being initiated:  n/a    MD / Provider name consulted re: change in status / initiation of new medication:  n/a    New Symptom(s):  n/a    New Order(s):  n/a    Name of the person notified of the changes:  n/a    Name of person being taught:  n/a    Instructions given:  n/a    Side Effects taught:  n/a    Response to teaching:  n/a      COMFORTABLE DYING MEASURE:  Is Patient/family satisfied with symptom level?  yes    DISCHARGE PLAN:  Pt will remain at the [facility] until his symptoms are well managed and then he will return home and continue to be followed by Home Hospice.

## 2019-11-26 NOTE — PROGRESS NOTES
[address] [place] [address]   [facility]  [phone]    Patient Name: Horace Peck  YOB: 1960    Date of Provider Hospice Visit: 11/26/19    Level of Care:   [x] General Inpatient (GIP)    [] Routine   [] Respite    Current Location of Care:  [] [facility] [] [facility] [] [address] [] [address] [x] Hospice House THE [facility], patient referred from:  [] [facility] [] [facility] [x] [address] [] [address] [] Home [] Other:     Principle Hospice Diagnosis:  Metastatic non small cell lung cancer  Diagnoses RELATED to the terminal prognosis: Brain and liver metastases  Other Diagnoses: DM type 2, hypertension     HOSPICE SUMMARY        Horace Peck is a 61y.o. year old who was admitted to [address] [place] [address]. 60 yo man admitted to hospice with advanced malignant neoplasm of lung that has progressed and metastasized to liver and brain. Pt has underlying advanced COPD and comorbid conditions of HTN, DM and is malnourished. Pt is s/p brain tumor removal at [facility] followed by XRT to brain was admitted recently with L sided weakness- MRI brain showing progressive brain mets, large R sided lesion. The patient's principle diagnosis of metastatic lung CA has resulted in pain, intermittent agitation and debility.  Functionally, the patient's Palliative Performance Scale has declined over a period of weeks and is estimated at 30. Objective information that support this patients limited prognosis includes:  CT Chest:  IMPRESSION:  Imaging findings are consistent with interval progression of disease.     There is extensive progression of metastatic disease in the hepatic parenchyma  with numerous new and/or enlarged hepatic mass lesions as described above.     Possible new small satellite nodule in the right upper lobe of the lung.   Mediastinal and primary lung mass/adenopathy not significantly changed.     Intracranial metastatic disease progression as described in brain MRI report.        The patient is not likely to endanger self or others.        I confirm that I composed the narrative based on my review of the patient's medical record or examination of the patient.        HOSPICE DIAGNOSES   Active Symptoms:  1. Restlessness/Agitation: intermittent  2. Pain; neoplasm related  3. Inability to eat  4. Reduced responsiveness  5. Decline in function  6. Cough/phlegm/shortness of breath       PLAN   1. Continue GIP LOC as pt still requiring close monitoring and adjustment of medications  2. D/c finger stick glucose checks and insulin administration. Pt is unresponsive, unable to eat/drink, blood glucose have been stable in <200 range, likely to drop further. Discussed with wife and she is agreeable to stop checks and focus only on symptoms management  3. Continue all other comfort medications  4. Change nebulizers to every 4 hours as needed  5.  and SW to support family needs: pt has two adopted 12 year grandchildren as his own children and also an older son 21year old Mindy Sarah who is grieving heavily. Spoke to wife and Mindy Sarah today in length at rounds, answered questions related to symptoms management and prognosis (timeframe), offered reassurance and support. Jyothi Negro also requested and she is meeting with Mindy Sarah. Oli Dudley also alerted. Wife would want on call Counsellor to be present to support children and her when pt dies. 6. Disposition: home with hospice once symptoms stable: very unlikely. 7. Hospice Plan of care was reviewed in detail and agree with current plan of care    Prognosis estimated based on 11/26/19 clinical assessment is:   [x] Hours to Days    [] Days to Weeks    [] Other:    Communicated plan of care with: Hospice Case Manager; Hospice IDT; Care Team     GOALS OF CARE     Patient/Medical POA stated Goal of Care:comfort    [x] I have reviewed and/or updated ACP information in the Advance Care Planning Navigator.  This information is available in the [address] link in the patient's chart header. Primary Decision Maker ([address]):   Primary Decision Maker (Active): Dilcia Mercedes - Spouse - [phone]    Secondary Decision Maker: Melisa Morales - Father - [phone]    Resuscitation Status: DNR  If DNR is there a Durable DNR on file? : [x] Yes [] No (If no, complete Durable DNR)    HISTORY     History obtained from: wife, staff    CHIEF COMPLAINT:   The patient is:   [] Verbal  [] Nonverbal  [x] Unresponsive    HPI/SUBJECTIVE:  Lethargic, unresponsive     REVIEW OF SYSTEMS     The following systems were: [] reviewed  [x] unable to be reviewed    Adult Non-Verbal Pain Assessment    Face  [x] 0   No particular expression or smile  [] 1   Occasional grimace, tearing, frowning, wrinkled forehead  [] 2   Frequent grimace, tearing, frowning, wrinkled forehead    Activity (movement)  [x] 0   Lying quietly, normal position  [] 1   Seeking attention through movement or slow, cautious movement  [] 2   Restless, excessive activity and/or withdrawal reflexes    Guarding  [x] 0   Lying quietly, no positioning of hands over areas of body  [] 1   Splinting areas of the body, tense  [] 2   Rigid, stiff    Physiology (vital signs)  [] 0   Stable vital signs  [] 1   Change in any of the following: SBP > 20mm Hg; HR > 20/minute  [x] 2   Change in any of the following: SBP > 30mm Hg; HR > 25/minute    Respiratory  [] 0   Baseline RR/SpO2, compliant with ventilator  [] 1   RR > 10 above baseline, or 5% drop SpO2, mild asynchrony with ventilator  [x] 2   RR > 20 above baseline, or 10% drop SpO2, asynchrony with ventilator    Total Non-Verbal Pain Score: 4       FUNCTIONAL ASSESSMENT     Palliative Performance Scale (PPS): 20%       PSYCHOSOCIAL/SPIRITUAL ASSESSMENT     Active Problems:    * No active hospital problems.  *    Past Medical History:   Diagnosis Date    Cancer ([facility] [address]     LUNG RT. SIDE METS TO BRAIN, LIVER    Chronic obstructive pulmonary disease (HCC)     MILD PER WIFE    Diabetes (HCC)     TYPE II    GERD (gastroesophageal reflux disease)     Hypertension       Past Surgical History:   Procedure Laterality Date    HX CERVICAL FUSION      HX CRANIOTOMY  01/29/2019    REMOVAL OF BRAIN METS AT [facility]    HX GI      COLONOSCOPY    HX HERNIA REPAIR Right 1972    INGUINAL    VASCULAR SURGERY PROCEDURE UNLIST Bilateral     LEGS      Social History     Tobacco Use    Smoking status: Former Smoker     Packs/day: 1.50     Years: 25.00     Pack years: 37.50     Last attempt to quit: 1/21/2009     Years since quitting: 10.8    Smokeless tobacco: Never Used   Substance Use Topics    Alcohol use: No     Frequency: Never     Family History   Problem Relation Age of Onset    Stroke Father     Diabetes Father     Other Mother 58        SUDDEN DEATH    Cancer Maternal Grandfather     Anesth Problems Neg Hx       Allergies   Allergen Reactions    Other Medication Nausea and Vomiting     Pt states he is allergic to diuretics but unsure of name         Current Facility-Administered Medications   Medication Dose Route Frequency    haloperidol lactate (HALDOL) injection 2 mg  2 mg IntraVENous Q4H    morphine injection 3 mg  3 mg IntraVENous Q4H    morphine injection 2 mg  2 mg IntraVENous Q15MIN PRN    LORazepam (ATIVAN) injection 1 mg  1 mg IntraVENous Q15MIN PRN    haloperidol (HALDOL) 2 mg/mL oral solution 2 mg  2 mg Oral Q6H PRN    albuterol-ipratropium (DUO-NEB) 2.5 MG-0.5 MG/3 ML  3 mL Nebulization Q4H RT    haloperidol (HALDOL) 2 mg/mL oral solution 2 mg  2 mg Oral Q6H PRN    ondansetron (ZOFRAN ODT) tablet 8 mg  8 mg Oral Q8H PRN    morphine (ROXANOL) concentrated oral syringe 10 mg  10 mg Oral Q1H PRN    bisacodyl (DULCOLAX) suppository 10 mg  10 mg Rectal DAILY PRN    dexamethasone (DECADRON) 4 mg/mL injection 6 mg  6 mg IntraVENous DAILY    scopolamine (TRANSDERM-SCOP) 1 mg over 3 days 1 Patch  1 Patch TransDERmal Q72H PRN    glycopyrrolate (ROBINUL) injection 0.2 mg  0.2 mg IntraVENous Q6H PRN    acetaminophen (TYLENOL) suppository 650 mg  650 mg Rectal Q4H PRN        PHYSICAL EXAM     Wt Readings from Last 3 Encounters:   09/25/19 75.1 kg (165 lb 8 oz)   09/25/19 72.3 kg (159 lb 6.3 oz)   09/19/19 72.1 kg (159 lb)       Visit Vitals  BP 90/60   Pulse (!) 104   Temp 96.7 °F (35.9 °C)   Resp 17   SpO2 (!) 88%       Supplemental O2  [] Yes  [x] NO  Last bowel movement:     Currently this patient has:  [x] Peripheral IV [] PICC  [] PORT [] ICD    [x] Bland Catheter [] NG Tube   [] PEG Tube    [] Rectal Tube [] Drain  [] Other:     Constitutional:lethargic, unresponsive  Eyes: closed  ENMT: dry  Cardiovascular: tachy, regular rhythm, distal pulses intact  Respiratory:slightly labored breathing.   Gastrointestinal: soft +bowel sounds  Musculoskeletal: no deformity, no tenderness to palpation  Skin: warm, dry  Neurologic:  unresponsive        Pertinent Lab and or Imaging Tests:  Lab Results   Component Value Date/Time    Sodium 136 11/18/2019 04:42 PM    Potassium 3.7 11/18/2019 04:42 PM    Chloride 102 11/18/2019 04:42 PM    CO2 28 11/18/2019 04:42 PM    Anion gap 6 11/18/2019 04:42 PM    Glucose 143 (H) 11/18/2019 04:42 PM    BUN 33 (H) 11/18/2019 04:42 PM    Creatinine 1.00 11/18/2019 04:42 PM    BUN/Creatinine ratio 33 (H) 11/18/2019 04:42 PM    GFR est AA >60 11/18/2019 04:42 PM    GFR est non-AA >60 11/18/2019 04:42 PM    Calcium 9.7 11/18/2019 04:42 PM     Lab Results   Component Value Date/Time    Protein, total 7.4 11/18/2019 04:42 PM    Albumin 2.8 (L) 11/18/2019 04:42 PM         Ron Portillo MD     Time spent: 35mts

## 2019-11-26 NOTE — PROGRESS NOTES
Problem: Pressure Injury - Risk of  Goal: *Prevention of pressure injury  Description  Document Angelito Scale and appropriate interventions in the flowsheet. Outcome: Progressing Towards Goal  Note: Pressure Injury Interventions:  Sensory Interventions: Assess changes in LOC, Check visual cues for pain, Float heels, Keep linens dry and wrinkle-free    Moisture Interventions: Absorbent underpads, Apply protective barrier, creams and emollients, Maintain skin hydration (lotion/cream)    Activity Interventions: Pressure redistribution bed/mattress(bed type)    Mobility Interventions: Float heels, HOB 30 degrees or less, Pressure redistribution bed/mattress (bed type)    Nutrition Interventions: Document food/fluid/supplement intake, Offer support with meals,snacks and hydration    Friction and Shear Interventions: Apply protective barrier, creams and emollients, HOB 30 degrees or less                Problem: Falls - Risk of  Goal: *Absence of Falls  Description  Document Franck Fall Risk and appropriate interventions in the flowsheet.   Outcome: Progressing Towards Goal  Note: Fall Risk Interventions:  Mobility Interventions: Bed/chair exit alarm    Mentation Interventions: Adequate sleep, hydration, pain control, Bed/chair exit alarm, Door open when patient unattended, Room close to nurse's station    Medication Interventions: Bed/chair exit alarm    Elimination Interventions: Bed/chair exit alarm, Call light in reach

## 2019-11-26 NOTE — PROGRESS NOTES
NAME OF PATIENT:  Vito Gannon    LEVEL OF CARE: GIP      REASON FOR GIP:   Pain, despite numerous changes in medications, Terminal agitation, despite changes to medications and Stabilizing treatment that cannot take place at home    *PATIENT REMAINS ELIGIBLE FOR GIP LEVEL OF CARE AS EVIDENCED BY: (MUST BE ADDRESSED OF PATIENT GIP)    O2 SAFETY: N/A    FALL INTERVENTIONS PROVIDED:   Implemented/recommended use of non-skid footwear, Implemented/recommended use of fall risk identification flag to all team members, Implemented/recommended assistive devices and encouraged their use, Implemented/recommended resources for alarm system (personal alarm, bed alarm, call bell, etc.) , Implemented/recommended environmental changes (remove hazards, lower bed, improve lighting, etc.) and Implemented/recommended increased supervision/assistance    INTERDISPLINARY COMMUNICATION/COLLABORATION:  Physician, MSW, [place] and RN, CNA    NEW MEDICATION INITIATION DOCUMENTATION:  N/A    Reason medication is being initiated:  N/A    MD / Provider name consulted re: change in status / initiation of new medication:  N/A    New Symptom(s):  N/A    New Order(s):  N/A    Name of the person notified of the changes:  N/A    Name of person being taught:  N./A    Instructions given:  N/A    Side Effects taught:  N/A    Response to teaching:  N/A      COMFORTABLE DYING MEASURE:  Is Patient/family satisfied with symptom level?  yes    DISCHARGE PLAN:  PENDING    1900 Received change of shift report from OLEKSANDR Byrnes.  1133 Shift assessment completed with Chavez Ledezma RN. Patient in bed resting with eyes closed. Pt is extremely lethargic, difficulty to arouse, but does respond to verbal stimuli. Pt appears comfortable. Possible gurgling noted. Lung sounds were diminished throughout, pt on room air. 2030 Patient resting in bed with eyes close. Pt appears comfortable. Patient was given nebulizer treatment as scheduled.    2130 Patient resting comfortably, no signs of pain or distress. 2230 Patient resting. Blood sugar checked and was 250. Oral Haldol and Morphine and 2 units Insulin given. Pt became restless after medications were given and was given oral Ativan in addition. 2330 Patient sleeping . Patient appears comfortable at this time. 0015 Patient sleeping in bed, no pain or discomfort noted. Nebulizer treatment given. 0130 Patient resting in bed , eyes closed. 2516 Patient restless in bed , pulling at blankets and gown. Given oral Lorazepam and Morphine. 0230 Patient is sleeping , respirations are even and unlabored. 0430 Patient resting quietly in bed with eyes closed. Scheduled nebulizer treatment administered, as well as scheduled oral Haldol. 0500 Patient bathed and repositioned. 0600 Patient resting comfortably with eyes closed. 0630 Patient given oral Haldol and Morphine. 0700 Change of shift report given to oncoming RN.

## 2019-11-27 NOTE — PROGRESS NOTES
0700  Report received. 0715  Pt restless, moving about in bed  0720  Pt medicated with Lorazepam and Morphine. 0730   Pt lying in bed, unresponsive. No facial grimacing or moaning. No dyspnea noted. Lungs clear but diminished. No cough noted. Pt is on RA. + bowel sounds. Last reported BM was 11-20  No intake for days. Bland is draining scant yellow urine. No edema noted. Pt has Left Chest PAC. Dressing is clear and intact. Pt turned and repositioned. Dressing on sacrum is intact. 0745  Pt resting comfortably. No facial grimacing or moaning. No agitation noted. 0840  Pt medicated with scheduled  IV  meds. 0845  Pt resting. 0945  Pt continues to be unresponsive. No facial grimacing, No moaning. No restlessness with IV medications. 1030  Pt lying in bed,  No distress noted. 1130  Respirations shallow, not labored. No facial grimacing. 1220  Pt medicated with scheduled meds. Pt turned and repositioned. No distress noted. Pt having periods of apnea. 1330  Pt snoring. No distress. 1430  Pt repositioned. Respirations shallow, not labored. NO distress. 0  Pt continues to have shallow breathing with periods of apnea. No facial grimacing. 1615  Pt medicated with Morphine and Lorazepam.  Pt moving his head on his pillow. Brows furrowed. 1645  Pt resting. Respirations shallow. 36  Pt's wife leaving for a little bit. Pt resting. Rn stated she would call if there was any change. 1810  Pt medicated with scheduled Iv Haldol. Respirations remain shallow. No needs at this time. 1900 Report given.       NAME OF PATIENT:  Ragini Anne    LEVEL OF CARE:  GIP    REASON FOR GIP:    Pain, despite numerous changes in medications, Terminal agitation, despite changes to medications, Medication adjustment that must be monitored 24/7 and Stabilizing treatment that cannot take place at home    *PATIENT REMAINS ELIGIBLE FOR GIP LEVEL OF CARE AS EVIDENCED BY: (MUST BE ADDRESSED OF PATIENT GIP)  IV Morphine increase for comfort. Pt continues to receive scheduled and PRN IV medications for comfort. REASON FOR RESPITE:  n/a    O2 SAFETY:  RA      FALL INTERVENTIONS PROVIDED:   Implemented/recommended resources for alarm system (personal alarm, bed alarm, call bell, etc.)  and Implemented/recommended environmental changes (remove hazards, lower bed, improve lighting, etc.)    INTERDISPLINARY COMMUNICATION/COLLABORATION:  Physician, MSW, [place] and RN, CNA    NEW MEDICATION INITIATION DOCUMENTATION:No new medications initiated. Reason medication is being initiated:  n/a    MD / Provider name consulted re: change in status / initiation of new medication:  n/a    New Symptom(s):  n/a    New Order(s):  n/a    Name of the person notified of the changes:  n/a    Name of person being taught:  n/a    Instructions given:  n/a    Side Effects taught:  n/a    Response to teaching:  n/a      COMFORTABLE DYING MEASURE:  Is Patient/family satisfied with symptom level?  yes    DISCHARGE PLAN:  Pt will remain at the [facility] for EOL care.

## 2019-11-27 NOTE — HOSPICE
LCSW and medical team met with patient and wife at bedside during medical rounds. Patient has had a rapid decline and is now experiencing apnea and minimally responsive. Patient's sons came to visit last night and one reportedly had an anxiety attack that prompted a 911 call. Wife shared that ER did not provide son with any medication and LCSW suggested he speak with his PCP about this as he does not have a psychiatrist.    Wife plans to stay at bedside until patient passes and would like for team to notify her when they believe he has only hours left so she can notify family such as patient's father to come to bedside. She did not say that patient's children would be back to visit.     CUCA Anthony, [facility]   [phone]

## 2019-11-27 NOTE — PROGRESS NOTES
NAME OF PATIENT:  Derek Dave    LEVEL OF CARE:  GIP     REASON FOR GIP:   Pain, despite numerous changes in medications, Medication adjustment that must be monitored 24/7 and Stabilizing treatment that cannot take place at home    *PATIENT REMAINS ELIGIBLE FOR GIP LEVEL OF CARE AS EVIDENCED BY: (MUST BE ADDRESSED OF PATIENT GIP)      REASON FOR RESPITE:  n/a    O2 SAFETY:  n/a    FALL INTERVENTIONS PROVIDED:   Implemented/recommended use of non-skid footwear, Implemented/recommended use of fall risk identification flag to all team members, Implemented/recommended assistive devices and encouraged their use, Implemented/recommended resources for alarm system (personal alarm, bed alarm, call bell, etc.) , Implemented/recommended environmental changes (remove hazards, lower bed, improve lighting, etc.) and Implemented/recommended increased supervision/assistance    INTERDISPLINARY COMMUNICATION/COLLABORATION:  Physician, MSW, [place] and RN, CNA    NEW MEDICATION INITIATION DOCUMENTATION:  n/a    Reason medication is being initiated:  n/a    MD / Provider name consulted re: change in status / initiation of new medication:  n/a    New Symptom(s):  n/a    New Order(s):  n/a    Name of the person notified of the changes:  n/a    Name of person being taught:  n/a    Instructions given:  n/a    Side Effects taught:  n/a    Response to teaching:  n/a      COMFORTABLE DYING MEASURE:  Is Patient/family satisfied with symptom level?  yes    DISCHARGE PLAN:  EOL at [address] [facility] briefly responsive to verbal stimuli. Multiple family members present, two sons, daughter in law, wife and grandson. Family is anxious and obviously saddened by change in LOC. Patient intermittently reaching out and grabbing his blanket. He was respositioned with feet elevated for comfort. 1955 Scheduled Morphine and Haldol administered. Patient appeared comfortable after receiving. 20:42 Most of patient's family left after son Collette Harris fainted a few times due to the thought of loosing his father. Wife remains for the night and anxious but accepting. Patient resting. No indication of pain/discomfort. 21:22 Patient resting. Wife given bedding to stay the night. 22:34 Patient in bed resting. Warm to touch. Wife at bedside. No distress noted. 23:30 Patient is a little restless and pulling at things. Shceduled Morphine, Haldol and prn Lorazepam administered for comfort. Immediate response to intervention noted. 00:16 Patient resting quietly. No grimacing, s/s of discomfort observed. 01:20 Patient status remains the same. He is resting and appears comfortable. Breathing is unlabored. 02:08 Patient resting quietly. 03:17 Patient appears comfortable in bed resting. Mouth breathing noted. No distress observed. 04:18 Patient a little more restless. Scheduled Morphine, Haldol and prn Lorazepam administered. Patient repositioned to left side with multiple pillows. Skin is cool to touch and dry. Non-productive wet cough noted. 225cc dark yellow urine emptied from Bland bag. Wife at bedside. 06:03 Patient resting. He's readjusted himself back to supine position. No distress noted.

## 2019-11-27 NOTE — PROGRESS NOTES
[address] [place] [address]   [facility]  [phone]    Patient Name: Horace Peck  YOB: 1960    Date of Provider Hospice Visit: 11/27/19    Level of Care:   [x] General Inpatient (GIP)    [] Routine   [] Respite    Current Location of Care:  [] [facility] [] [facility] [] [address] [] [address] [x] Hospice House THE [facility], patient referred from:  [] [facility] [] [facility] [x] [address] [] [address] [] Home [] Other:     Principle Hospice Diagnosis:  Metastatic non small cell lung cancer  Diagnoses RELATED to the terminal prognosis: Brain and liver metastases  Other Diagnoses: DM type 2, hypertension     HOSPICE SUMMARY        Horace Peck is a 61y.o. year old who was admitted to [address] [place] [address]. 60 yo man admitted to hospice with advanced malignant neoplasm of lung that has progressed and metastasized to liver and brain. Pt has underlying advanced COPD and comorbid conditions of HTN, DM and is malnourished. Pt is s/p brain tumor removal at [facility] followed by XRT to brain was admitted recently with L sided weakness- MRI brain showing progressive brain mets, large R sided lesion. The patient's principle diagnosis of metastatic lung CA has resulted in pain, intermittent agitation and debility.  Functionally, the patient's Palliative Performance Scale has declined over a period of weeks and is estimated at 30. Objective information that support this patients limited prognosis includes:  CT Chest:  IMPRESSION:  Imaging findings are consistent with interval progression of disease.     There is extensive progression of metastatic disease in the hepatic parenchyma  with numerous new and/or enlarged hepatic mass lesions as described above.     Possible new small satellite nodule in the right upper lobe of the lung.   Mediastinal and primary lung mass/adenopathy not significantly changed.     Intracranial metastatic disease progression as described in brain MRI report.        The patient is not likely to endanger self or others.        I confirm that I composed the narrative based on my review of the patient's medical record or examination of the patient.        HOSPICE DIAGNOSES   Active Symptoms:  -Restlessness/Agitation: intermittent, 3 prns ativan overnight  -Pain: neoplasm related, 1 prn ativan this morning  -Unresponsiveness  -Cough/phlegm/shortness of breath     PLAN   1. Continue GIP LOC as pt still requiring close monitoring and adjustment of medications  2. Increase morphine to 4mg IV q4hrs d/t worsening discomfort/agitation overnight. Will monitor prn use for need to schedule ativan. 3. Haldol 2mg q6hrs   4. D/c IV steroids  5. No longer checking BGs as pt is unresponsive  6. Will d/c nebulizer txs   7. Continue remaining comfort meds and prn suppository  8.  and SW to support family needs: pt has two adopted 12 year grandchildren as his own children and also an older son 21year old Shady Turner who is grieving heavily. Spoke to wife and Shady Turner today in length at rounds, answered questions related to symptoms management and prognosis (timeframe), offered reassurance and support. Augustus Pérez also requested and she is meeting with Shady Turner. [facility] also alerted. Wife would want on call Counsellor to be present to support children and her when pt dies. 9. Disposition: ideally home with hospice once symptoms stable though this is very unlikely as pt is actively dying with progressing sxs  10. Hospice Plan of care was reviewed in detail and agree with current plan of care    Prognosis estimated based on 11/27/19 clinical assessment is:   [x] Hours to Days    [] Days to Weeks    [] Other:    Communicated plan of care with: Hospice Case Manager; Hospice IDT; Care Team     GOALS OF CARE     Patient/Medical POA stated Goal of Care:comfort    [x] I have reviewed and/or updated ACP information in the Advance Care Planning Navigator.  This information is available in the [address] link in the patient's chart header. Primary Decision [facility] (Health Care Agent):   Primary Decision Maker (Active): Dilcia Mercedes - Spouse - [phone]    Secondary Decision Maker: Mone Salazar - Father - [phone]    Resuscitation Status: DNR  If DNR is there a Durable DNR on file? : [x] Yes [] No (If no, complete Durable DNR)    HISTORY     History obtained from: wife, staff     CHIEF COMPLAINT:   The patient is:   [] Verbal  [] Nonverbal  [x] Unresponsive    HPI/SUBJECTIVE:  Lethargic, unresponsive. Wife remained at bedside overnight and says there were a few times that he exhibited grimacing and moaning. She also observed episodes of respiratory pauses.  3 prns ativan and 1 prn morphine overnight     REVIEW OF SYSTEMS     The following systems were: [] reviewed  [x] unable to be reviewed    Adult Non-Verbal Pain Assessment    Face  [x] 0   No particular expression or smile  [] 1   Occasional grimace, tearing, frowning, wrinkled forehead  [] 2   Frequent grimace, tearing, frowning, wrinkled forehead    Activity (movement)  [x] 0   Lying quietly, normal position  [] 1   Seeking attention through movement or slow, cautious movement  [] 2   Restless, excessive activity and/or withdrawal reflexes    Guarding  [x] 0   Lying quietly, no positioning of hands over areas of body  [] 1   Splinting areas of the body, tense  [] 2   Rigid, stiff    Physiology (vital signs)  [] 0   Stable vital signs  [] 1   Change in any of the following: SBP > 20mm Hg; HR > 20/minute  [] 2   Change in any of the following: SBP > 30mm Hg; HR > 25/minute    Respiratory  [] 0   Baseline RR/SpO2, compliant with ventilator  [] 1   RR > 10 above baseline, or 5% drop SpO2, mild asynchrony with ventilator  [] 2   RR > 20 above baseline, or 10% drop SpO2, asynchrony with ventilator    Total Non-Verbal Pain Score:        FUNCTIONAL ASSESSMENT     Palliative Performance Scale (PPS): 10%       PSYCHOSOCIAL/SPIRITUAL ASSESSMENT     Active Problems:    Lung cancer (HCC) (2/11/2019)      Past Medical History:   Diagnosis Date    Cancer ([facility] [address]     LUNG RT. SIDE METS TO BRAIN, LIVER    Chronic obstructive pulmonary disease (HCC)     MILD PER WIFE    Diabetes (HCC)     TYPE II    GERD (gastroesophageal reflux disease)     Hypertension       Past Surgical History:   Procedure Laterality Date    HX CERVICAL FUSION      HX CRANIOTOMY  01/29/2019    REMOVAL OF BRAIN METS AT [facility]    HX GI      COLONOSCOPY    HX HERNIA REPAIR Right 1972    INGUINAL    VASCULAR SURGERY PROCEDURE UNLIST Bilateral     LEGS      Social History     Tobacco Use    Smoking status: Former Smoker     Packs/day: 1.50     Years: 25.00     Pack years: 37.50     Last attempt to quit: 1/21/2009     Years since quitting: 10.8    Smokeless tobacco: Never Used   Substance Use Topics    Alcohol use: No     Frequency: Never     Family History   Problem Relation Age of Onset    Stroke Father     Diabetes Father     Other Mother 58        SUDDEN DEATH    Cancer Maternal Grandfather     Anesth Problems Neg Hx       Allergies   Allergen Reactions    Other Medication Nausea and Vomiting     Pt states he is allergic to diuretics but unsure of name         Current Facility-Administered Medications   Medication Dose Route Frequency    morphine injection 4 mg  4 mg IntraVENous Q4H    haloperidol lactate (HALDOL) injection 2 mg  2 mg IntraVENous Q6H    morphine injection 2 mg  2 mg IntraVENous Q15MIN PRN    LORazepam (ATIVAN) injection 1 mg  1 mg IntraVENous Q15MIN PRN    haloperidol (HALDOL) 2 mg/mL oral solution 2 mg  2 mg Oral Q6H PRN    haloperidol (HALDOL) 2 mg/mL oral solution 2 mg  2 mg Oral Q6H PRN    bisacodyl (DULCOLAX) suppository 10 mg  10 mg Rectal DAILY PRN    scopolamine (TRANSDERM-SCOP) 1 mg over 3 days 1 Patch  1 Patch TransDERmal Q72H PRN    glycopyrrolate (ROBINUL) injection 0.2 mg  0.2 mg IntraVENous Q6H PRN    acetaminophen (TYLENOL) suppository 650 mg  650 mg Rectal Q4H PRN PHYSICAL EXAM     Wt Readings from Last 3 Encounters:   09/25/19 75.1 kg (165 lb 8 oz)   09/25/19 72.3 kg (159 lb 6.3 oz)   09/19/19 72.1 kg (159 lb)       Visit Vitals  /68   Pulse (!) 110   Temp (!) 94.6 °F (34.8 °C)   Resp 20   SpO2 (!) 84%     Supplemental O2  [] Yes  [x] NO  Last bowel movement:     Currently this patient has:  [x] Peripheral IV [] PICC  [] PORT [] ICD    [x] Bland Catheter [] NG Tube   [] PEG Tube    [] Rectal Tube [] Drain  [] Other:     Constitutional: unresponsive, frail  Eyes: closed, no drainage, slight eyelash reflex  ENMT: dry, no discharge or secretions  Cardiovascular: tachy, regular rhythm, distal pulses intact  Respiratory: slightly labored breathing, no pauses observed  Gastrointestinal: soft  +bowel sounds  Musculoskeletal: no deformity, no tenderness to palpation  Skin: warm, dry  Neurologic: unresponsive    Pertinent Lab and or Imaging Tests:  Lab Results   Component Value Date/Time    Sodium 136 11/18/2019 04:42 PM    Potassium 3.7 11/18/2019 04:42 PM    Chloride 102 11/18/2019 04:42 PM    CO2 28 11/18/2019 04:42 PM    Anion gap 6 11/18/2019 04:42 PM    Glucose 143 (H) 11/18/2019 04:42 PM    BUN 33 (H) 11/18/2019 04:42 PM    Creatinine 1.00 11/18/2019 04:42 PM    BUN/Creatinine ratio 33 (H) 11/18/2019 04:42 PM    GFR est AA >60 11/18/2019 04:42 PM    GFR est non-AA >60 11/18/2019 04:42 PM    Calcium 9.7 11/18/2019 04:42 PM     Lab Results   Component Value Date/Time    Protein, total 7.4 11/18/2019 04:42 PM    Albumin 2.8 (L) 11/18/2019 04:42 PM         José Martinez MD

## 2019-11-28 NOTE — PROGRESS NOTES
1900 Report received from [place], [facility]. Pt is GIP level of care for management of pain and anxiety. Dx Lung Cancer with Brain and Liver Mets. 1930 Pt is resting quietly in bed, unresponsive to stimulus. Wife is at the bedside. She will spend the night. Respirations deep, he is mouth breathing. 2000 Scheduled medications given via [place] Juan Luis. 2100 Pts son and his family at the bedside. Pt continues to rest quietly. Scheduled medication effective to manage symptoms. 2200 Appears to be resting quietly. 2250 Respirations 36 and tachypneic. PRN Lorazepam and Morphine given to manage symptoms. [address], respirations 32.  2345 Awakened wife to notify her that breathing pattern has changed. He now has agonal breathing with increased secretions. Wife notifying family members of changes. 80 Pt is transitioning. Wife at his side tearful. She states no one else is coming in.  0001 Pt without apical heartbeat or respirations for 1 full minute. Pt pronounced at this time. Wife is tearful but grieving appropriately. Wife Amber Bo has called one of her sons, he is not coming back. He has said his good byes. The son who is having a very hard time will be notified by her sister she states. The sister will call him, his girlfriend is with him in the home. She states she will tell the twins when she gets home. She states she will not need the counselor here. She states her best friend will be with her and the children in the home when she tells them. 0130 Son, Pato Wolfe called to state that he has been notified and he is doing ok. Sounds appropriate. States he would like to come but he knows its best that he not come. 0200 2 rings and a necklace given to Amber Bo, pts wife.         NAME OF PATIENT:  Ragini Anne    LEVEL OF CARE:  GIP    REASON FOR GIP:   Pain, despite numerous changes in medications, Unmanageable respiratory distress, Medication adjustment that must be monitored 24/7 and Stabilizing treatment that cannot take place at home    *PATIENT REMAINS ELIGIBLE FOR [facility] LEVEL OF CARE AS EVIDENCED BY: Frequent assessment of the pt and scheduled IV medications required to manage symptoms. REASON FOR RESPITE:  na    O2 SAFETY:  na    FALL INTERVENTIONS PROVIDED:   Implemented/recommended use of fall risk identification flag to all team members, Implemented/recommended resources for alarm system (personal alarm, bed alarm, call bell, etc.) , Implemented/recommended environmental changes (remove hazards, lower bed, improve lighting, etc.) and Implemented/recommended increased supervision/assistance    INTERDISPLINARY COMMUNICATION/COLLABORATION:  Physician, MSW, [place] and RN, CNA    NEW MEDICATION INITIATION DOCUMENTATION:  Documentation completed in Clinical Note in [address] [place] [address]  No indication for medication changes at this time. Reason medication is being initiated:  na    MD / Provider name consulted re: change in status / initiation of new medication: na    New Symptom(s):  na    New Order(s): na  Name of the person notified of the changes:  na    Name of person being taught:  na  Instructions given:  na    Side Effects taught:  na    Response to teaching:  na      COMFORTABLE DYING MEASURE:  Is Patient/family satisfied with symptom level? Yes    DISCHARGE PLAN:  Remain GIP level of care until symptoms are managed.

## 2019-11-28 NOTE — PROGRESS NOTES
Problem: Falls - Risk of  Goal: *Absence of Falls  Description  Document Esa Carrero Fall Risk and appropriate interventions in the flowsheet.   Outcome: Progressing Towards Goal  Note: Fall Risk Interventions:  Mobility Interventions: Bed/chair exit alarm    Mentation Interventions: Door open when patient unattended, Bed/chair exit alarm    Medication Interventions: Bed/chair exit alarm    Elimination Interventions: Bed/chair exit alarm, Call light in reach

## 2019-11-29 ENCOUNTER — HOME CARE VISIT (OUTPATIENT)
Dept: HOSPICE | Facility: HOSPICE | Age: 59
End: 2019-11-29
Payer: MEDICAID

## 2019-12-04 ENCOUNTER — HOME CARE VISIT (OUTPATIENT)
Dept: HOSPICE | Facility: HOSPICE | Age: 59
End: 2019-12-04
Payer: MEDICAID

## 2019-12-06 ENCOUNTER — HOME CARE VISIT (OUTPATIENT)
Dept: HOSPICE | Facility: HOSPICE | Age: 59
End: 2019-12-06
Payer: MEDICAID

## 2019-12-10 ENCOUNTER — HOME CARE VISIT (OUTPATIENT)
Dept: HOSPICE | Facility: HOSPICE | Age: 59
End: 2019-12-10
Payer: MEDICAID

## 2025-06-28 NOTE — CONSULTS
Palliative Medicine Consult  Jerel: [phone] ([id number])    Patient Name: Raffaele Nava  YOB: 1960    Date of Initial Consult: 9/20/19  Reason for Consult: Care decisions   Requesting Provider: Maryanna Pallas   Primary Care Physician: Kota Cadena MD     SUMMARY:   Raffaele Nava is a 61 y.o. with a past history of stage 4 lung cancer dx 1/2019 with brain and liver mets s/p brain tumor removal at [facility], SBRT who was admitted on 9/19/2019 from home w/ L sided weakness- MRI brain showing progressive brain mets, large R sided lesion. Also w/ worsening disease on CT A/P. Followed by Dr Sandra Henry and Dr Randi Khan- seen in our clinic 9/16/19. Nsgy and Onc following. Current medical issues leading to Palliative Medicine involvement include: care decisions, support. On MSContin 30mg every 8h, Oxycodone 15mg every 6h prn pain, Gabapentin 100mg tid. Social: Worked in construction, lives w/ wife Kim Camejo who has stage 2 breast cancer. Twin grandchildren live with them. Complex social situation due to high level of stress, grief over loss of function and inability to work. Concern expressed by wife during recent clinic visit that pt is verbally abusive to family. PALLIATIVE DIAGNOSES:   1. R chest wall pain - neoplasm related  2. Neuropathic pain   3. L sided weakness w/ R sided brain mets  4. Fatigue  5. Confusion   6. Constipation from opioids   7. Grief over loss of function   8. Memory deficits        PLAN:   1. Family meeting  at bedside with patient, wife Olena Carrel son Mark Jolly and Dilcia's son Jessica Bloom joined us on the phone, Celso's girlfriend and palliative  Solomon Perez. -Assessed patient and family's understanding of his condition, Summarized his care since initial diagnosis of metastatic small cell lung cancer. Discussed recent CT results in detail, explained progression of disease in the liver and brain in simple terms and what this means for Ever.  Dr. Sandra Henry, his primary Severe sepsis, present on admission,  secondary to pneumonia, suspected aspiration pneumonia, patient also with history of MRSA colonization, will continue broad-spectrum antibiotics cefepime, vancomycin, Flagyl today is day 4 of antibiotic therapy  Follow up cultures  Patient developed increased respiratory rate, increased work of breathing, on Saturday 12/15/2018 CT of the chest shows left pleural effusion, essentially left lung is ryley out, patient intubated 12/15/2018    Patient developed hypotension on 12/16/2018, required Levophed overnight, that has been weaned off with resolution of hypertension  Chest x-ray today shows worsening pleural effusions, IR to perform bedside ultrasound and attempt thoracentesis      Tube feeds started 12/15/2018 oncologist is not offering any further systemic therapy given his aggressive progression of disease on standard therapy. I spoke with radiation oncologist regarding whole brain radiation for his current brain tumor. Erick Hernandez did very poorly with focal radiation after craniectomy earlier in the year when he was initially diagnosed. It took several months to even recover from radiation. We talked about the risks and benefits of whole brain radiation in detail. Erick Hernandez is clear about wanting comfort, good pain management peace of mind during the last few months of his life and does not think he wants to undergo whole brain radiation.    - Discussed hospice in detail. Patient family agreed to hospice at home. .     2. Psychosocial-Rian and Dilcia have  a very difficult relationship. Our  Meryl Abarca has been working with Dereck Cheyenne help cope. 3. Hospice-discussed with [address] [place] [address] hospice liaison and [place]. Alerted  hospice about patient's  Young grandchildren. 4. DNR discussed in detail MD DNR signed. 5. Pain management-patient reports suboptimal pain control, increase his oxycodone 15 mg every 4 hours as needed. 6. Plan for home with hospice when arrangements are made. 7. Change IV dexamethasone to p.o   In anticipation of discharge.   8. Communicated plan of care with[facility] IDT[facility] Dr. Cornejo Model / TREATMENT PREFERENCES:     GOALS OF CARE:  Patient/Health Care Proxy Stated Goals: Prolong life    TREATMENT PREFERENCES:   Code Status: DNR    Advance Care Planning:  [x] [facility] Interdisciplinary Team has updated the ACP Navigator with Health Care Decision Maker and Patient Capacity      Primary Decision MakerSari Setters - [phone]    Secondary Decision Maker: Cedricnorma Arvizu - Father - [phone]  Advance Care Planning 9/22/2019   Patient's [place] is: -   Confirm Advance Directive Yes, on file   Does the patient have other document types -       Medical Interventions: Full interventions       Other:    As far as possible, the palliative care team has discussed with patient / health care proxy about goals of care / treatment preferences for patient. HISTORY:       HPI/SUBJECTIVE:    The patient is:   [x] Verbal and participatory  [] Non-participatory due to: Pt awake, alert. More conversational today. No pain right now, continues on home regimen. Clinical Pain Assessment (nonverbal scale for severity on nonverbal patients):   Clinical Pain Assessment  Severity: 0          Duration: for how long has pt been experiencing pain (e.g., 2 days, 1 month, years)  Frequency: how often pain is an issue (e.g., several times per day, once every few days, constant)     FUNCTIONAL ASSESSMENT:     Palliative Performance Scale (PPS):  PPS: 40       PSYCHOSOCIAL/SPIRITUAL SCREENING:     Palliative IDT has assessed this patient for cultural preferences / practices and a referral made as appropriate to needs (Cultural Services, Patient Advocacy, Ethics, etc.)    Any spiritual / Scientology concerns:  [] Yes /  [x] No    Caregiver Burnout:  [] Yes /  [x] No /  [] No Caregiver Present      Anticipatory grief assessment:   [x] Normal  / [] Maladaptive       ESAS Anxiety: Anxiety: 3    ESAS Depression: Depression: 0        REVIEW OF SYSTEMS:     Positive and pertinent negative findings in ROS are noted above in HPI. The following systems were [x] reviewed / [] unable to be reviewed as noted in HPI  Other findings are noted below. Systems: constitutional, ears/nose/mouth/throat, respiratory, gastrointestinal, genitourinary, musculoskeletal, integumentary, neurologic, psychiatric, endocrine. Positive findings noted below.   Modified ESAS Completed by: provider   Fatigue: 5 Drowsiness: 2   Depression: 0 Pain: 0   Anxiety: 3 Nausea: 0   Anorexia: 3 Dyspnea: 1                    PHYSICAL EXAM:     From RN flowsheet:  Wt Readings from Last 3 Encounters:   09/24/19 159 lb 4.8 oz (72.3 kg)   09/19/19 159 lb (72.1 kg)   09/16/19 155 lb 3.2 oz (70.4 kg)     Blood pressure 148/87, pulse 92, temperature 98.2 °F (36.8 °C), resp. rate 13, height 5' 10\" (1.778 m), weight 159 lb 4.8 oz (72.3 kg), SpO2 98 %. Pain Scale 1: Numeric (0 - 10)  Pain Intensity 1: 10  Pain Onset 1: chronic  Pain Location 1: Shoulder  Pain Orientation 1: Right  Pain Description 1: Sharp, Stabbing  Pain Intervention(s) 1: Medication (see MAR)      Constitutional: awakens easily, oriented, but disengaged   Eyes: pupils equal  ENMT: no nasal discharge, moist mucous membranes  Cardiovascular: regular rhythm  Respiratory: breathing not labored  Gastrointestinal: soft , BS present   Musculoskeletal: no deformity  Skin: warm, dry  Neurologic: moving all extremities  Psychiatric: flat          HISTORY:     Active Problems:    Cerebral edema (HCC) (9/20/2019)      Brain metastases ([facility] [address] (9/20/2019)      Past Medical History:   Diagnosis Date    Cancer ([facility] [address]     LUNG RT. SIDE METS TO BRAIN, LIVER    Chronic obstructive pulmonary disease (HCC)     MILD PER WIFE    Diabetes (HCC)     TYPE II    GERD (gastroesophageal reflux disease)     Hypertension       Past Surgical History:   Procedure Laterality Date    HX CERVICAL FUSION      HX CRANIOTOMY  01/29/2019    REMOVAL OF BRAIN METS AT [facility]    HX GI      COLONOSCOPY    HX HERNIA REPAIR Right 1972    INGUINAL    VASCULAR SURGERY PROCEDURE UNLIST Bilateral     LEGS      Family History   Problem Relation Age of Onset    Stroke Father     Diabetes Father     Other Mother 58        SUDDEN DEATH    Cancer Maternal Grandfather     Anesth Problems Neg Hx       History reviewed, no pertinent family history.   Social History     Tobacco Use    Smoking status: Former Smoker     Packs/day: 1.50     Years: 25.00     Pack years: 37.50     Last attempt to quit: 1/21/2009     Years since quitting: 10.6    Smokeless tobacco: Never Used   Substance Use Topics    Alcohol use: No     Frequency: Never     Allergies   Allergen Reactions    Other Medication Nausea and Vomiting     Pt states he is allergic to diuretics but unsure of name         Current Facility-Administered Medications   Medication Dose Route Frequency    oxyCODONE IR (ROXICODONE) tablet 20 mg  20 mg Oral Q4H PRN    lactulose (CHRONULAC) 10 gram/15 mL solution 15 mL  10 g Oral DAILY PRN    influenza vaccine 2019-20 (6 mos+)(PF) (FLUARIX/FLULAVAL/FLUZONE QUAD) injection 0.5 mL  0.5 mL IntraMUSCular PRIOR TO DISCHARGE    polyethylene glycol (MIRALAX) packet 17 g  17 g Oral DAILY    sodium chloride (NS) flush 5-40 mL  5-40 mL IntraVENous Q8H    sodium chloride (NS) flush 5-40 mL  5-40 mL IntraVENous PRN    acetaminophen (TYLENOL) tablet 650 mg  650 mg Oral Q4H PRN    ondansetron (ZOFRAN) injection 4 mg  4 mg IntraVENous Q4H PRN    insulin lispro (HUMALOG) injection   SubCUTAneous AC&HS    glucose chewable tablet 16 g  4 Tab Oral PRN    glucagon (GLUCAGEN) injection 1 mg  1 mg IntraMUSCular PRN    dextrose 10% infusion 0-250 mL  0-250 mL IntraVENous PRN    dexamethasone (DECADRON) 4 mg/mL injection 4 mg  4 mg IntraVENous Q6H    ondansetron (ZOFRAN ODT) tablet 4 mg  4 mg Oral Q8H PRN    megestrol (MEGACE) tablet 40 mg  40 mg Oral BID    pantoprazole (PROTONIX) tablet 40 mg  40 mg Oral BID    lisinopril (PRINIVIL, ZESTRIL) tablet 10 mg  10 mg Oral DAILY    morphine CR (MS CONTIN) tablet 30 mg  30 mg Oral Q8H    gabapentin (NEURONTIN) capsule 100 mg  100 mg Oral TID          LAB AND IMAGING FINDINGS:     Lab Results   Component Value Date/Time    WBC 12.6 (H) 09/23/2019 01:21 AM    HGB 11.2 (L) 09/23/2019 01:21 AM    PLATELET 302 27/66/2421 01:21 AM     Lab Results   Component Value Date/Time    Sodium 134 (L) 09/23/2019 01:21 AM    Potassium 4.4 09/23/2019 01:21 AM    Chloride 101 09/23/2019 01:21 AM    CO2 26 09/23/2019 01:21 AM    BUN 26 (H) 09/23/2019 01:21 AM    Creatinine 1.01 09/23/2019 01:21 AM    Calcium 9.5 09/23/2019 01:21 AM    Magnesium 2.2 12/31/2018 01:13 PM    Phosphorus 2.6 03/31/2010 06:43 AM      Lab Results   Component Value Date/Time    AST (SGOT) 116 (H) 09/20/2019 05:31 AM    Alk. phosphatase 73 09/20/2019 05:31 AM    Protein, total 7.3 09/20/2019 05:31 AM    Albumin 3.3 (L) 09/20/2019 05:31 AM    Globulin 4.0 09/20/2019 05:31 AM     Lab Results   Component Value Date/Time    INR 1.2 (H) 09/19/2019 08:51 AM    Prothrombin time 12.6 (H) 09/19/2019 08:51 AM    aPTT 144.2 (H) 05/28/2010 04:43 PM      No results found for: IRON, FE, TIBC, IBCT, PSAT, FERR   No results found for: PH, PCO2, PO2  No components found for: GLPOC   No results found for: CPK, CKMB             Total time: 90 min  Counseling / coordination time, spent as noted above: 60 min   > 50% counseling / coordination?: yes    Prolonged service was provided for  [x]30 min   []75 min in face to face time in the presence of the patient, spent as noted above. Time Start:  1110am  Time End: 1150am  Note: this can only be billed with  (initial) or 21 [phone] (follow up). If multiple start / stop times, list each separately. \ pt is 79 year old female with hx of HTN, DVT, dementia, pancreatic CA on chemo p/w weakness with decreased po for several days. pt admitted with AMS, sepsis POA, + MSSA, new onset afib. pt is DNR/DNI pt is 79 year old female with hx of HTN, DVT, dementia, DM (as per spouse)  pancreatic CA on chemo p/w weakness with decreased po for several days. pt admitted with AMS, sepsis POA, + MSSA, new onset afib. pt is DNR/DNI

## (undated) DEVICE — Device

## (undated) DEVICE — DRAPE XR C ARM 41X74IN LF --

## (undated) DEVICE — SOLUTION IV 500ML 0.9% SOD CHL FLX CONT

## (undated) DEVICE — KENDALL SCD EXPRESS SLEEVES, KNEE LENGTH, MEDIUM: Brand: KENDALL SCD

## (undated) DEVICE — STERILE POLYISOPRENE POWDER-FREE SURGICAL GLOVES WITH EMOLLIENT COATING: Brand: PROTEXIS

## (undated) DEVICE — SUTURE VCRL SZ 3-0 L27IN ABSRB UD L26MM SH 1/2 CIR J416H

## (undated) DEVICE — ROCKER SWITCH PENCIL BLADE ELECTRODE, HOLSTER: Brand: EDGE

## (undated) DEVICE — SURGICAL PROCEDURE PACK BASIN MAJ SET CUST NO CAUT

## (undated) DEVICE — SYR 10ML LUER LOK 1/5ML GRAD --

## (undated) DEVICE — INFECTION CONTROL KIT SYS

## (undated) DEVICE — INTENDED FOR TISSUE SEPARATION, AND OTHER PROCEDURES THAT REQUIRE A SHARP SURGICAL BLADE TO PUNCTURE OR CUT.: Brand: BARD-PARKER ® CARBON RIB-BACK BLADES

## (undated) DEVICE — HANDLE LT SNAP ON ULT DURABLE LENS FOR TRUMPF ALC DISPOSABLE

## (undated) DEVICE — APPLICATOR BNDG 1MM ADH PREMIERPRO EXOFIN

## (undated) DEVICE — REM POLYHESIVE ADULT PATIENT RETURN ELECTRODE: Brand: VALLEYLAB

## (undated) DEVICE — DEVON™ KNEE AND BODY STRAP 60" X 3" (1.5 M X 7.6 CM): Brand: DEVON

## (undated) DEVICE — TOWEL SURG W17XL27IN STD BLU COT NONFENESTRATED PREWASHED

## (undated) DEVICE — (D)PREP SKN CHLRAPRP APPL 26ML -- CONVERT TO ITEM 371833

## (undated) DEVICE — DRAPE,LAPAROTOMY,T,PEDI,STERILE: Brand: MEDLINE

## (undated) DEVICE — NEEDLE HYPO 25GA L1.5IN BVL ORIENTED ECLIPSE

## (undated) DEVICE — DRAPE PRB US TRNSDCR 6X96IN --

## (undated) DEVICE — PACK,BASIC,SIRUS,V: Brand: MEDLINE

## (undated) DEVICE — SUTURE PROL SZ 2-0 L36IN NONABSORBABLE BLU SH L26MM 1/2 CIR 8523H

## (undated) DEVICE — SUTURE VCRL SZ 4-0 L27IN ABSRB UD L19MM PS-2 3/8 CIR PRIM J426H